# Patient Record
Sex: FEMALE | Race: OTHER | NOT HISPANIC OR LATINO | URBAN - METROPOLITAN AREA
[De-identification: names, ages, dates, MRNs, and addresses within clinical notes are randomized per-mention and may not be internally consistent; named-entity substitution may affect disease eponyms.]

---

## 2019-01-04 ENCOUNTER — INPATIENT (INPATIENT)
Facility: HOSPITAL | Age: 79
LOS: 5 days | Discharge: ANOTHER HOSPITAL | End: 2019-01-10
Attending: INTERNAL MEDICINE | Admitting: INTERNAL MEDICINE
Payer: MEDICARE

## 2019-01-04 VITALS
TEMPERATURE: 99 F | OXYGEN SATURATION: 100 % | RESPIRATION RATE: 22 BRPM | HEART RATE: 123 BPM | DIASTOLIC BLOOD PRESSURE: 72 MMHG | SYSTOLIC BLOOD PRESSURE: 116 MMHG

## 2019-01-04 DIAGNOSIS — I48.91 UNSPECIFIED ATRIAL FIBRILLATION: ICD-10-CM

## 2019-01-04 LAB
ALBUMIN SERPL ELPH-MCNC: 3.6 G/DL — SIGNIFICANT CHANGE UP (ref 3.3–5)
ALP SERPL-CCNC: 285 U/L — HIGH (ref 40–120)
ALT FLD-CCNC: 26 U/L — SIGNIFICANT CHANGE UP (ref 4–33)
APTT BLD: 31.3 SEC — SIGNIFICANT CHANGE UP (ref 27.5–36.3)
AST SERPL-CCNC: 38 U/L — HIGH (ref 4–32)
BASE EXCESS BLDV CALC-SCNC: -0.9 MMOL/L — SIGNIFICANT CHANGE UP
BASOPHILS # BLD AUTO: 0.04 K/UL — SIGNIFICANT CHANGE UP (ref 0–0.2)
BASOPHILS NFR BLD AUTO: 0.4 % — SIGNIFICANT CHANGE UP (ref 0–2)
BILIRUB SERPL-MCNC: 0.5 MG/DL — SIGNIFICANT CHANGE UP (ref 0.2–1.2)
BLOOD GAS VENOUS - CREATININE: 0.72 MG/DL — SIGNIFICANT CHANGE UP (ref 0.5–1.3)
BUN SERPL-MCNC: 11 MG/DL — SIGNIFICANT CHANGE UP (ref 7–23)
CALCIUM SERPL-MCNC: 8.9 MG/DL — SIGNIFICANT CHANGE UP (ref 8.4–10.5)
CHLORIDE BLDV-SCNC: 96 MMOL/L — SIGNIFICANT CHANGE UP (ref 96–108)
CHLORIDE SERPL-SCNC: 98 MMOL/L — SIGNIFICANT CHANGE UP (ref 98–107)
CO2 SERPL-SCNC: 21 MMOL/L — LOW (ref 22–31)
CREAT SERPL-MCNC: 0.81 MG/DL — SIGNIFICANT CHANGE UP (ref 0.5–1.3)
EOSINOPHIL # BLD AUTO: 0.25 K/UL — SIGNIFICANT CHANGE UP (ref 0–0.5)
EOSINOPHIL NFR BLD AUTO: 2.7 % — SIGNIFICANT CHANGE UP (ref 0–6)
GAS PNL BLDV: 130 MMOL/L — LOW (ref 136–146)
GLUCOSE BLDV-MCNC: 104 — HIGH (ref 70–99)
GLUCOSE SERPL-MCNC: 104 MG/DL — HIGH (ref 70–99)
HCO3 BLDV-SCNC: 23 MMOL/L — SIGNIFICANT CHANGE UP (ref 20–27)
HCT VFR BLD CALC: 33.5 % — LOW (ref 34.5–45)
HCT VFR BLDV CALC: 32.3 % — LOW (ref 34.5–45)
HGB BLD-MCNC: 10.4 G/DL — LOW (ref 11.5–15.5)
HGB BLDV-MCNC: 10.4 G/DL — LOW (ref 11.5–15.5)
IMM GRANULOCYTES NFR BLD AUTO: 0.2 % — SIGNIFICANT CHANGE UP (ref 0–1.5)
INR BLD: 2.04 — HIGH (ref 0.88–1.17)
LACTATE BLDV-MCNC: 2 MMOL/L — SIGNIFICANT CHANGE UP (ref 0.5–2)
LYMPHOCYTES # BLD AUTO: 2.14 K/UL — SIGNIFICANT CHANGE UP (ref 1–3.3)
LYMPHOCYTES # BLD AUTO: 22.7 % — SIGNIFICANT CHANGE UP (ref 13–44)
MCHC RBC-ENTMCNC: 26.5 PG — LOW (ref 27–34)
MCHC RBC-ENTMCNC: 31 % — LOW (ref 32–36)
MCV RBC AUTO: 85.2 FL — SIGNIFICANT CHANGE UP (ref 80–100)
MONOCYTES # BLD AUTO: 0.63 K/UL — SIGNIFICANT CHANGE UP (ref 0–0.9)
MONOCYTES NFR BLD AUTO: 6.7 % — SIGNIFICANT CHANGE UP (ref 2–14)
NEUTROPHILS # BLD AUTO: 6.33 K/UL — SIGNIFICANT CHANGE UP (ref 1.8–7.4)
NEUTROPHILS NFR BLD AUTO: 67.3 % — SIGNIFICANT CHANGE UP (ref 43–77)
NRBC # FLD: 0 K/UL — LOW (ref 25–125)
PCO2 BLDV: 40 MMHG — LOW (ref 41–51)
PH BLDV: 7.39 PH — SIGNIFICANT CHANGE UP (ref 7.32–7.43)
PLATELET # BLD AUTO: 322 K/UL — SIGNIFICANT CHANGE UP (ref 150–400)
PMV BLD: 10.1 FL — SIGNIFICANT CHANGE UP (ref 7–13)
PO2 BLDV: 30 MMHG — LOW (ref 35–40)
POTASSIUM BLDV-SCNC: 4 MMOL/L — SIGNIFICANT CHANGE UP (ref 3.4–4.5)
POTASSIUM SERPL-MCNC: 5 MMOL/L — SIGNIFICANT CHANGE UP (ref 3.5–5.3)
POTASSIUM SERPL-SCNC: 5 MMOL/L — SIGNIFICANT CHANGE UP (ref 3.5–5.3)
PROT SERPL-MCNC: 8 G/DL — SIGNIFICANT CHANGE UP (ref 6–8.3)
PROTHROM AB SERPL-ACNC: 23.2 SEC — HIGH (ref 9.8–13.1)
RBC # BLD: 3.93 M/UL — SIGNIFICANT CHANGE UP (ref 3.8–5.2)
RBC # FLD: 15.9 % — HIGH (ref 10.3–14.5)
SAO2 % BLDV: 45.3 % — LOW (ref 60–85)
SODIUM SERPL-SCNC: 133 MMOL/L — LOW (ref 135–145)
TROPONIN T, HIGH SENSITIVITY: 8 NG/L — SIGNIFICANT CHANGE UP (ref ?–14)
TROPONIN T, HIGH SENSITIVITY: 9 NG/L — SIGNIFICANT CHANGE UP (ref ?–14)
WBC # BLD: 9.41 K/UL — SIGNIFICANT CHANGE UP (ref 3.8–10.5)
WBC # FLD AUTO: 9.41 K/UL — SIGNIFICANT CHANGE UP (ref 3.8–10.5)

## 2019-01-04 PROCEDURE — 71045 X-RAY EXAM CHEST 1 VIEW: CPT | Mod: 26

## 2019-01-04 RX ORDER — SODIUM CHLORIDE 9 MG/ML
500 INJECTION INTRAMUSCULAR; INTRAVENOUS; SUBCUTANEOUS ONCE
Qty: 0 | Refills: 0 | Status: COMPLETED | OUTPATIENT
Start: 2019-01-04 | End: 2019-01-04

## 2019-01-04 RX ADMIN — SODIUM CHLORIDE 500 MILLILITER(S): 9 INJECTION INTRAMUSCULAR; INTRAVENOUS; SUBCUTANEOUS at 21:16

## 2019-01-04 NOTE — ED ADULT TRIAGE NOTE - CHIEF COMPLAINT QUOTE
hx of DM COPD Asthma c/o SOB x 1 week LS clear pt speaking full sentences but reports increased GRADY. denies cough, as per family no hx of a-fib but is in afib at this time.

## 2019-01-04 NOTE — ED PROVIDER NOTE - OBJECTIVE STATEMENT
78F w/ pmhx DM, COPD, asthma presents w chief complaint of worsening shortness of breath and GRADY. Per patient, has been having months of shortness of breath, GRAYD, fatigue, intermittent chest discomfort, palpitations, lightheadedness, dizziness. Patient and family are poor historians and unable to recall specifics of prior medical workups. Per family, patient was reportedly in [facility] in Aug 2018 and was dx with left lower extremity DVT. She was then back at [facility] for her ongoing symptoms - it is unclear what medical testing was performed at that time. Per family, patient was told she has some heart condition (possibly blocked vessels?) and needs some procedure performed (pt and family unclear, possibly referring to cath). Patient has yet to udnergo any further medical interventions. Patient reports she may have had a cardiac recorder - also, unclear as to specifics. Patient and family deny any known prior history of being diagnosed with A-fib. Currently, patient is noted to be in Afib with RVR, HR varying 110-160s. Patient endorses ongoing shortness of breath, chest discomfort, weakness. Denies any associated headache, cough, orthopnea, blurry vision, fevers or chills, nausea or vomiting, abdominal pain. Denies hx tobacco, ethanol, or drug use. Meds include amlodipine valsartan, warfarin, monteleukast, statin, metformin, symbicort. No allergies.

## 2019-01-04 NOTE — ED ADULT NURSE NOTE - OBJECTIVE STATEMENT
pt sitting quiety with family at bedside. Pt reports" feeling short of breath, dizzy and having chest discomfort for Months...but very bad last few days. " Placed on cm= Afib 130's MD Cho at bedside. sl and labs sent, RR 28- 34 (Pt +exhertional sob.) POX 96% room air placed on 2 lnc. HOB elevated, pt positioned for comfort. Medicated as per orders. awaits xray.

## 2019-01-04 NOTE — ED PROVIDER NOTE - MEDICAL DECISION MAKING DETAILS
78F w/ pmhx DM, COPD, asthma presents w chief complaint of worsening shortness of breath and GRADY, as well as lightheadedness and chest discomfort. Afib in triage, Exam as above. Concern for ongoing afib as etiology of symptoms vs ACS vs other cardiac etiology vs other. Labs, CXR, EKG. Will require admit for further cardiac workup. Will administer fluids, diltiazem, re-assess  Gavin Sims MD, PGY2 Emergency Medicine

## 2019-01-04 NOTE — ED PROVIDER NOTE - ATTENDING CONTRIBUTION TO CARE
DR. LUZ, ATTENDING MD-  I performed a face to face bedside interview with patient regarding history of present illness, review of symptoms and past medical history. I completed an independent physical exam.  I have discussed patient's plan of care with the resident.   Documentation as above in the note.    79 y/o female h/o dm, copd, asthma, dvt on coumadin here with sob for months duration worse over past week.  A/w intermittent cp, palpitations, worsening fatigue.  Denies f/c, ha, neck stiffness, cough, abd pain, n/v/d, dysuria, rash.  Afebrile, fatigued, ctabil, s1s2 tachy irreg irreg no m/r/g, abd soft non ttp no r/g, no cva tenderness b/l, no leg swelling b/l, no rash.  Apparent new afib though sx for months, consider anemia vs lyte abn vs acs, doubt copd as clear lungs.  Obtain cbc, cmp, trop, cxr, ekg, give diltiazem for rate control, will admit for further care and evaluation.

## 2019-01-04 NOTE — ED PROVIDER NOTE - NS ED ROS FT
see HPI for full ROS  shortness of breath  GRADY  palpitationns  chest discomfort  lightheadedness  dizziness  fatigue

## 2019-01-04 NOTE — ED ADULT NURSE NOTE - NSIMPLEMENTINTERV_GEN_ALL_ED
Implemented All Fall with Harm Risk Interventions:  [place] to call system. Call bell, personal items and telephone within reach. Instruct patient to call for assistance. Room bathroom lighting operational. Non-slip footwear when patient is off stretcher. Physically safe environment: no spills, clutter or unnecessary equipment. Stretcher in lowest position, wheels locked, appropriate side rails in place. Provide visual cue, wrist band, yellow gown, etc. Monitor gait and stability. Monitor for mental status changes and reorient to person, place, and time. Review medications for side effects contributing to fall risk. Reinforce activity limits and safety measures with patient and family. Provide visual clues: red socks.

## 2019-01-05 DIAGNOSIS — Z29.9 ENCOUNTER FOR PROPHYLACTIC MEASURES, UNSPECIFIED: ICD-10-CM

## 2019-01-05 DIAGNOSIS — I48.91 UNSPECIFIED ATRIAL FIBRILLATION: ICD-10-CM

## 2019-01-05 DIAGNOSIS — E11.65 TYPE 2 DIABETES MELLITUS WITH HYPERGLYCEMIA: ICD-10-CM

## 2019-01-05 DIAGNOSIS — E78.5 HYPERLIPIDEMIA, UNSPECIFIED: ICD-10-CM

## 2019-01-05 DIAGNOSIS — J45.909 UNSPECIFIED ASTHMA, UNCOMPLICATED: ICD-10-CM

## 2019-01-05 DIAGNOSIS — J44.9 CHRONIC OBSTRUCTIVE PULMONARY DISEASE, UNSPECIFIED: ICD-10-CM

## 2019-01-05 DIAGNOSIS — I82.409 ACUTE EMBOLISM AND THROMBOSIS OF UNSPECIFIED DEEP VEINS OF UNSPECIFIED LOWER EXTREMITY: ICD-10-CM

## 2019-01-05 DIAGNOSIS — Z98.890 OTHER SPECIFIED POSTPROCEDURAL STATES: Chronic | ICD-10-CM

## 2019-01-05 PROCEDURE — 93970 EXTREMITY STUDY: CPT | Mod: 26

## 2019-01-05 PROCEDURE — 70450 CT HEAD/BRAIN W/O DYE: CPT | Mod: 26

## 2019-01-05 PROCEDURE — 71275 CT ANGIOGRAPHY CHEST: CPT | Mod: 26

## 2019-01-05 RX ORDER — DEXTROSE 50 % IN WATER 50 %
25 SYRINGE (ML) INTRAVENOUS ONCE
Qty: 0 | Refills: 0 | Status: DISCONTINUED | OUTPATIENT
Start: 2019-01-05 | End: 2019-01-10

## 2019-01-05 RX ORDER — FUROSEMIDE 40 MG
20 TABLET ORAL ONCE
Qty: 0 | Refills: 0 | Status: COMPLETED | OUTPATIENT
Start: 2019-01-05 | End: 2019-01-05

## 2019-01-05 RX ORDER — DORZOLAMIDE HYDROCHLORIDE 20 MG/ML
1 SOLUTION/ DROPS OPHTHALMIC
Qty: 0 | Refills: 0 | Status: DISCONTINUED | OUTPATIENT
Start: 2019-01-05 | End: 2019-01-10

## 2019-01-05 RX ORDER — SODIUM CHLORIDE 9 MG/ML
1000 INJECTION, SOLUTION INTRAVENOUS
Qty: 0 | Refills: 0 | Status: DISCONTINUED | OUTPATIENT
Start: 2019-01-05 | End: 2019-01-10

## 2019-01-05 RX ORDER — INSULIN LISPRO 100/ML
VIAL (ML) SUBCUTANEOUS
Qty: 0 | Refills: 0 | Status: DISCONTINUED | OUTPATIENT
Start: 2019-01-05 | End: 2019-01-10

## 2019-01-05 RX ORDER — INFLUENZA VIRUS VACCINE 15; 15; 15; 15 UG/.5ML; UG/.5ML; UG/.5ML; UG/.5ML
0.5 SUSPENSION INTRAMUSCULAR ONCE
Qty: 0 | Refills: 0 | Status: DISCONTINUED | OUTPATIENT
Start: 2019-01-05 | End: 2019-01-10

## 2019-01-05 RX ORDER — MONTELUKAST 4 MG/1
10 TABLET, CHEWABLE ORAL DAILY
Qty: 0 | Refills: 0 | Status: DISCONTINUED | OUTPATIENT
Start: 2019-01-05 | End: 2019-01-10

## 2019-01-05 RX ORDER — DEXTROSE 50 % IN WATER 50 %
12.5 SYRINGE (ML) INTRAVENOUS ONCE
Qty: 0 | Refills: 0 | Status: DISCONTINUED | OUTPATIENT
Start: 2019-01-05 | End: 2019-01-10

## 2019-01-05 RX ORDER — DEXTROSE 50 % IN WATER 50 %
15 SYRINGE (ML) INTRAVENOUS ONCE
Qty: 0 | Refills: 0 | Status: DISCONTINUED | OUTPATIENT
Start: 2019-01-05 | End: 2019-01-10

## 2019-01-05 RX ORDER — BUDESONIDE AND FORMOTEROL FUMARATE DIHYDRATE 160; 4.5 UG/1; UG/1
2 AEROSOL RESPIRATORY (INHALATION)
Qty: 0 | Refills: 0 | Status: DISCONTINUED | OUTPATIENT
Start: 2019-01-05 | End: 2019-01-10

## 2019-01-05 RX ORDER — INSULIN LISPRO 100/ML
VIAL (ML) SUBCUTANEOUS AT BEDTIME
Qty: 0 | Refills: 0 | Status: DISCONTINUED | OUTPATIENT
Start: 2019-01-05 | End: 2019-01-10

## 2019-01-05 RX ORDER — KETOTIFEN FUMARATE 0.34 MG/ML
1 SOLUTION OPHTHALMIC DAILY
Qty: 0 | Refills: 0 | Status: DISCONTINUED | OUTPATIENT
Start: 2019-01-05 | End: 2019-01-10

## 2019-01-05 RX ORDER — ATORVASTATIN CALCIUM 80 MG/1
20 TABLET, FILM COATED ORAL AT BEDTIME
Qty: 0 | Refills: 0 | Status: DISCONTINUED | OUTPATIENT
Start: 2019-01-05 | End: 2019-01-10

## 2019-01-05 RX ORDER — WARFARIN SODIUM 2.5 MG/1
6 TABLET ORAL ONCE
Qty: 0 | Refills: 0 | Status: COMPLETED | OUTPATIENT
Start: 2019-01-05 | End: 2019-01-05

## 2019-01-05 RX ORDER — GLUCAGON INJECTION, SOLUTION 0.5 MG/.1ML
1 INJECTION, SOLUTION SUBCUTANEOUS ONCE
Qty: 0 | Refills: 0 | Status: DISCONTINUED | OUTPATIENT
Start: 2019-01-05 | End: 2019-01-10

## 2019-01-05 RX ADMIN — BUDESONIDE AND FORMOTEROL FUMARATE DIHYDRATE 2 PUFF(S): 160; 4.5 AEROSOL RESPIRATORY (INHALATION) at 21:23

## 2019-01-05 RX ADMIN — WARFARIN SODIUM 6 MILLIGRAM(S): 2.5 TABLET ORAL at 17:06

## 2019-01-05 RX ADMIN — BUDESONIDE AND FORMOTEROL FUMARATE DIHYDRATE 2 PUFF(S): 160; 4.5 AEROSOL RESPIRATORY (INHALATION) at 12:06

## 2019-01-05 RX ADMIN — Medication 20 MILLIGRAM(S): at 17:06

## 2019-01-05 RX ADMIN — DORZOLAMIDE HYDROCHLORIDE 1 DROP(S): 20 SOLUTION/ DROPS OPHTHALMIC at 17:06

## 2019-01-05 RX ADMIN — KETOTIFEN FUMARATE 1 DROP(S): 0.34 SOLUTION OPHTHALMIC at 12:06

## 2019-01-05 RX ADMIN — MONTELUKAST 10 MILLIGRAM(S): 4 TABLET, CHEWABLE ORAL at 12:06

## 2019-01-05 RX ADMIN — ATORVASTATIN CALCIUM 20 MILLIGRAM(S): 80 TABLET, FILM COATED ORAL at 21:39

## 2019-01-05 NOTE — H&P ADULT - PROBLEM SELECTOR PLAN 2
Patient with Hx of DVT found at outside hospital and is on coumadin now with acute SOB  Will check CTPA  Bilateral LE dopplers ordered

## 2019-01-05 NOTE — H&P ADULT - NEGATIVE ENMT SYMPTOMS
no ear pain/no hearing difficulty/no nasal congestion/no nasal discharge/no vertigo/no sinus symptoms/no tinnitus

## 2019-01-05 NOTE — H&P ADULT - ATTENDING COMMENTS
Patient seen and examined.  Agree with above pa note.  patient is a 78 year old woman with history of Left LE DVT (Aug 2018) on Coumadin,  COPD, DM, Asthma admitted with SOB/GRADY and palpitations.    In er noted to be in Af with RVR  + orthopnea  + edema   + chest pain  Patient denies any active chest pain, cough, syncope, nausea, abdominal pain, fever, chills,  or rash.  Denies any history of CAD, MI, valve disease, cardiomyopathy, or congenital heart disease.      ecg  af with rvr, nonspec st abnl    vitals stable  nad  aao x nc/at neck supple   no jvd  cv s1s2 tachy, irreg  lungs clear b/l  abd soft, nt  ext b/l edema    A/P     78 year old woman with history of Left LE DVT (Aug 2018) on Coumadin,  COPD, DM, Asthma admitted with SOB/GRADY and palpitations.      1. new onset Af with RVR  rate better controlled   cont cardizem  cont a/c per inr   check echo to eval lv/valve fxn  no pe on cta    2. Dyspnea  multifactorial secondary to chronic lung disease, new af and component of acute HF/volume overload  rate control   iv lasix x 1 now   check echo   lou/dccv in still in af monday and inr therap  keep npo post mn sunday night  cta no pe  pulmo eval     3. DVT  cont a/c  repeat duplex no changes    4. Chest pain  resolved  consider ischemic eval pending clinical course Patient seen and examined.  Agree with above pa note.  patient is a 78 year old woman with history of Left LE DVT (Aug 2018) on Coumadin,  COPD, DM, Asthma admitted with SOB/GRADY and palpitations.    In er noted to be in Af with RVR  + orthopnea  + edema   + chest pain  Patient denies any active chest pain, cough, syncope, nausea, abdominal pain, fever, chills,  or rash.  Denies any history of CAD, MI, valve disease, cardiomyopathy, or congenital heart disease.      ecg  af with rvr, nonspec st abnl    vitals stable  nad  aao x nc/at neck supple   no jvd  cv s1s2 tachy, irreg  lungs clear b/l  abd soft, nt  ext b/l edema    A/P     78 year old woman with history of Left LE DVT (Aug 2018) on Coumadin,  COPD, DM, Asthma admitted with SOB/GRADY and palpitations.      1. new onset Af with RVR  rate better controlled   cont cardizem  cont a/c per inr   check echo to eval lv/valve fxn  no pe on cta    2. Dyspnea  multifactorial secondary to chronic lung disease, new af and component of acute HF/volume overload  rate control   iv lasix x 1 now   check echo   lou/dccv in still in af monday and inr therap  keep npo post mn sunday night  cta no pe  pulmo eval     3. DVT  cont a/c  repeat duplex no changes    4. Chest pain  resolved  consider ischemic eval pending clinical course    5. HTN  hold home meds  cont cardizem

## 2019-01-05 NOTE — H&P ADULT - PMH
Asthma    COPD (chronic obstructive pulmonary disease)    DM (diabetes mellitus)    DVT (deep venous thrombosis)

## 2019-01-05 NOTE — H&P ADULT - PROBLEM SELECTOR PLAN 1
[id number] Score of 7 and patient already on Coumadin for Hx of left LE DVT. Patient was given IV and PO Cardizem with better rate control  Will monitor on telemetry, serial EKG and Buck prn for any episodes of chest pain and palpitations   HgbA1C, TSH, lipid profile, CBC, CMP in am   TTE ordered   Started on Cardizem 30mg q6hrs  Continue with Coumadin, INR noted to be 2.0. Will need to dose coumadin tonight after INR check with morning labs

## 2019-01-05 NOTE — CONSULT NOTE ADULT - SUBJECTIVE AND OBJECTIVE BOX
Patient is a 78y old  Female who presents with a chief complaint of Palpitations and SOB (05 Jan 2019 05:47)      HPI:  79 y/o F with PMH of Left LE DVT diagnosed on Aug 2018(on Coumadin), COPD, DM, Asthma presented with the complaint of SOB/GRADY and palpitations. As per the patient she has been having intermittent SOB but it worsened the past few days which prompted her to come to the ER. Patient stated that any minimal exertion she would have to stop to catch her breath. Patient stated that she sleeps with 2-3 pillows at night and endorsed of orthopnea and PND. Patient also endorsed of intermittent LE swelling and stated that she is complaint with her medications. Patient also endorsed of midsternal chest pain, characterized as a  pressure like sensation, without any aggravating factors, alleviated when she would massage her chest, without any radiations of the chest pain, with a severity of 5/10. Patient also endorsed of palpitations for the past few days. Patient denied any fevers, chills, N/V/D/C, abdominal pain, dysuria, melena, hematochezia, recent travel, sick contact, pleuritic or positional chest pain.     On ED admission EKG revealed Atrial fibrillation with RVR at a rate of 147 with QTc of 466, CE x 1: Trop: 8, CE x 2: Trop: 9, H&H: 10.4/33.5, Na: 133, Gluc: 104, Alk Phos: 285, AST: 38. Prelim CXR: 6 mm right lung nodular opacity of uncertain significance. Follow up to resolution. Hazy right lung opacities may be secondary to rotation versus atelectasis and/or pneumonia. In the ED patient received IV Cardizem and PO Cardizem which improved her rate. (05 Jan 2019 05:47)    pulmo called: Above confirmed: She never had any fever at home:       ?FOLLOWING PRESENT  x ] Hx of PE/DVT, [? ] Hx COPD, [y ] Hx of Asthma, [ y] Hx of Hospitalization, [x ]  Hx of BiPAP/CPAP use, [x ] Hx of ROYER    Allergies    No Known Allergies    Intolerances        PAST MEDICAL & SURGICAL HISTORY:  DVT (deep venous thrombosis)  Asthma  COPD (chronic obstructive pulmonary disease)  DM (diabetes mellitus)  History of embolectomy: LLE      FAMILY HISTORY:  No pertinent family history in first degree relatives      Social History: [ x ] TOBACCO                  [ x ] ETOH                                 [x  ] IVDA/DRUGS    REVIEW OF SYSTEMS      General:	x    Skin/Breast:x  	  Ophthalmologic:x  	  ENMT:	x    Respiratory and Thorax: SOB, chest pain!  	  Cardiovascular:	x    Gastrointestinal:	x    Genitourinary:	x    Musculoskeletal:	x    Neurological:	x    Psychiatric:	x    Hematology/Lymphatics:	x    Endocrine:	x    Allergic/Immunologic:	x    MEDICATIONS  (STANDING):  atorvastatin 20 milliGRAM(s) Oral at bedtime  buDESOnide  80 MICROgram(s)/formoterol 4.5 MICROgram(s) Inhaler 2 Puff(s) Inhalation two times a day  dextrose 5%. 1000 milliLiter(s) (50 mL/Hr) IV Continuous <Continuous>  dextrose 50% Injectable 12.5 Gram(s) IV Push once  dextrose 50% Injectable 25 Gram(s) IV Push once  dextrose 50% Injectable 25 Gram(s) IV Push once  diltiazem    Tablet 30 milliGRAM(s) Oral four times a day  dorzolamide 2% Ophthalmic Solution 1 Drop(s) Both EYES <User Schedule>  furosemide   Injectable 20 milliGRAM(s) IV Push once  influenza   Vaccine 0.5 milliLiter(s) IntraMuscular once  insulin lispro (HumaLOG) corrective regimen sliding scale   SubCutaneous three times a day before meals  insulin lispro (HumaLOG) corrective regimen sliding scale   SubCutaneous at bedtime  ketotifen 0.025% Ophthalmic Solution 1 Drop(s) Both EYES daily  montelukast 10 milliGRAM(s) Oral daily  warfarin 6 milliGRAM(s) Oral once    MEDICATIONS  (PRN):  dextrose 40% Gel 15 Gram(s) Oral once PRN Blood Glucose LESS THAN 70 milliGRAM(s)/deciliter  glucagon  Injectable 1 milliGRAM(s) IntraMuscular once PRN Glucose LESS THAN 70 milligrams/deciliter       Vital Signs Last 24 Hrs  T(C): 36.8 (05 Jan 2019 14:54), Max: 37.1 (05 Jan 2019 06:26)  T(F): 98.2 (05 Jan 2019 14:54), Max: 98.7 (05 Jan 2019 06:26)  HR: 110 (05 Jan 2019 14:54) (82 - 138)  BP: 109/69 (05 Jan 2019 14:54) (97/72 - 122/78)  BP(mean): --  RR: 32 (05 Jan 2019 14:54) (18 - 32)  SpO2: 96% (05 Jan 2019 14:54) (96% - 100%)        I&O's Summary      Physical Exam:   GENERAL: NAD, well-groomed, well-developed  HEENT: MERLINE/   Atraumatic, Normocephalic  ENMT: No tonsillar erythema, exudates, or enlargement; Moist mucous membranes, Good dentition, No lesions  NECK: Supple, No JVD, Normal thyroid  CHEST/LUNG: Clear to auscultation bilaterally; No rales, rhonchi, wheezing, or rubs  CVS: Regular rate and rhythm; No murmurs, rubs, or gallops  GI: : Soft, Nontender, Nondistended; Bowel sounds present  NERVOUS SYSTEM:  Alert & Oriented X3  EXTREMITIES:  - edema  LYMPH: No lymphadenopathy noted  SKIN: No rashes or lesions  ENDOCRINOLOGY: No Thyromegaly  PSYCH: Appropriate    Labs:  -0.9<40<4>>30<<7.395>>-0.9<<3><<4><<5<<309>>                            10.8   7.45  )-----------( 268      ( 05 Jan 2019 06:52 )             37.4                         10.4   9.41  )-----------( 322      ( 04 Jan 2019 20:40 )             33.5     01-05    137  |  106  |  9   ----------------------------<  94  5.1   |  18<L>  |  0.63  01-04    133<L>  |  98  |  11  ----------------------------<  104<H>  5.0   |  21<L>  |  0.81    Ca    9.4      05 Jan 2019 06:52  Ca    8.9      04 Jan 2019 20:40  Phos  4.1     01-05  Mg     1.9     01-05    TPro  7.5  /  Alb  3.3  /  TBili  0.7  /  DBili  x   /  AST  31  /  ALT  26  /  AlkPhos  279<H>  01-05  TPro  8.0  /  Alb  3.6  /  TBili  0.5  /  DBili  x   /  AST  38<H>  /  ALT  26  /  AlkPhos  285<H>  01-04    CAPILLARY BLOOD GLUCOSE      POCT Blood Glucose.: 138 mg/dL (05 Jan 2019 12:54)  POCT Blood Glucose.: 89 mg/dL (05 Jan 2019 09:01)    LIVER FUNCTIONS - ( 05 Jan 2019 06:52 )  Alb: 3.3 g/dL / Pro: 7.5 g/dL / ALK PHOS: 279 u/L / ALT: 26 u/L / AST: 31 u/L / GGT: x           PT/INR - ( 05 Jan 2019 06:52 )   PT: 21.8 SEC;   INR: 1.88          PTT - ( 05 Jan 2019 06:52 )  PTT:37.8 SEC    D DImer      Studies  Chest X-RAY  CT SCAN Chest   CT Abdomen  Venous Dopplers: LE:   Others      < from: CT Head No Cont (01.05.19 @ 08:39) >    FINDINGS:     There is enlargement of ventricles and sulci greater expected for age is   with volume loss more pronounced in the cerebellar region. There is   decreased attenuation in the white matter, likely microvascular disease.   There is there is no intraventricular or extra axial hemorrhage or   midline shift. The basal cisterns are patent.    There is a prominent partially empty sella. There is atherosclerotic   vascular calcification of the carotid siphons. The visualized paranasal   sinuses, mastoid air cells and middle ear cavities are clear.    The soft tissues of the scalp are unremarkable. The calvarium is intact.    IMPRESSION:     Volume loss and white matter decreased attenuation likely microvascular   disease, there is no acute hemorrhage or midline shift, if symptoms   persist consider follow up head CT or MRI if no contraindications        < from: CT Angio Chest w/ IV Cont (01.05.19 @ 08:39) >  HEART: Heart size is normal. No pericardial effusion. Dense mitral   annular calcification. Aortic valve and coronary artery atherosclerotic   calcifications.  MEDIASTINUM AND VIKTORIYA: No lymphadenopathy.  CHEST WALL AND LOWER NECK: Within normal limits.  VISUALIZED UPPER ABDOMEN: Small hiatal hernia. A partially imaged fat   density lesion at the periphery of the right renal interpole.  BONES: Multilevel degenerative change.    IMPRESSION:     Limited evaluation of the distal segmental and subsegmental pulmonary   arteries due to streak and motion artifact. No central, main, lobar or   proximal segmental pulmonary embolism.     Small bilateral pleural effusions with likely pulmonary edema.    A partially imaged fat density lesion in the periphery of the right renal   interpole is indeterminate, may reflect an angiomyolipoma. For more   definitive characterization, consider nonurgent contrast-enhanced renal   protocol CT on an outpatient basis.    < end of copied text >        BONY JENKINS M.D., RADIOLOGY RESIDENT  This document has been electronically signed.  MORGAN JORGE M.D., ATTENDING RADIOLOGIST  This document has been electronically signed. Jan 5 2019 12:54PM    < end of copied text >

## 2019-01-05 NOTE — H&P ADULT - ASSESSMENT
79 y/o F with PMH of Left LE DVT diagnosed on Aug 2018(on Coumadin), COPD, DM, Asthma presented with the complaint of SOB/GRADY and palpitations. Found to be in Afib with RVR    +Atrial fibrillation with RVR-s/p Cardizem started on PO Cardizem 30mg q6hrs and on Coumadin

## 2019-01-05 NOTE — H&P ADULT - NEGATIVE OPHTHALMOLOGIC SYMPTOMS
no blurred vision L/no diplopia/no photophobia/no lacrimation L/no lacrimation R/no discharge R/no blurred vision R/no discharge L

## 2019-01-05 NOTE — ED ADULT NURSE REASSESSMENT NOTE - NS ED NURSE REASSESS COMMENT FT1
Pt transported to essu2, monitor in place ox 2 liters nc on. Pt resp even & unlabored. axox3. cm= afib 90's no c/o chest pain

## 2019-01-05 NOTE — H&P ADULT - NEGATIVE NEUROLOGICAL SYMPTOMS
no weakness/no generalized seizures/no confusion/no transient paralysis/no paresthesias/no syncope/no vertigo/no focal seizures/no difficulty walking/no tremors/no headache/no loss of sensation/no hemiparesis/no loss of consciousness

## 2019-01-05 NOTE — CONSULT NOTE ADULT - PROBLEM SELECTOR RECOMMENDATION 9
Likely reason for her SOB along with CHF: THERE IS NO pe: But limited: Her INR was therapeutic on admission. So unlikely to be PE : For EMILI DCCV on Monday

## 2019-01-05 NOTE — H&P ADULT - NEGATIVE MUSCULOSKELETAL SYMPTOMS
no arthralgia/no arthritis/no stiffness/no joint swelling/no muscle weakness/no myalgia/no muscle cramps/no neck pain

## 2019-01-05 NOTE — CONSULT NOTE ADULT - ASSESSMENT
77 y/o F with PMH of Left LE DVT diagnosed on Aug 2018(on Coumadin), COPD, DM, Asthma presented with the complaint of SOB/GRADY and palpitations. Found to be in Afib with RVR    +Atrial fibrillation with RVR-s/p Cardizem started on PO Cardizem 30mg q6hrs and on Coumadin

## 2019-01-05 NOTE — H&P ADULT - HISTORY OF PRESENT ILLNESS
77 y/o F with PMH of Left LE DVT diagnosed on Aug 2018(on Coumadin), COPD, DM, Asthma presented with the complaint of SOB/GRADY and palpitations. As per the patient she has been having intermittent SOB but it worsened the past few days which prompted her to come to the ER. Patient stated that any minimal exertion she would have to stop to catch her breath. Patient stated that she sleeps with 2-3 pillows at night and endorsed of orthopnea and PND. Patient also endorsed of intermittent LE swelling and stated that she is complaint with her medications. Patient also endorsed of midsternal chest pain, characterized as a  pressure like sensation, without any aggravating factors, alleviated when she would massage her chest, without any radiations of the chest pain, with a severity of 5/10. Patient also endorsed of palpitations for the past few days. Patient denied any fevers, chills, N/V/D/C, abdominal pain, dysuria, melena, hematochezia, recent travel, sick contact, pleuritic or positional chest pain.     On ED admission EKG revealed Atrial fibrillation with RVR at a rate of 147 with QTc of 466, CE x 1: Trop: 8, CE x 2: Trop: 9, H&H: 10.4/33.5, Na: 133, Gluc: 104, Alk Phos: 285, AST: 38. Prelim CXR: 6 mm right lung nodular opacity of uncertain significance. Follow up to resolution. Hazy right lung opacities may be secondary to rotation versus atelectasis and/or pneumonia. In the ED patient received IV Cardizem and PO Cardizem which improved her rate.

## 2019-01-05 NOTE — H&P ADULT - GASTROINTESTINAL DETAILS
nontender/no rebound tenderness/no rigidity/normal/no guarding/soft/bowel sounds normal/no distention

## 2019-01-05 NOTE — H&P ADULT - RS GEN PE MLT RESP DETAILS PC
normal/no intercostal retractions/airway patent/respirations non-labored/no chest wall tenderness/no rhonchi/no wheezes/rales

## 2019-01-05 NOTE — H&P ADULT - NSHPLABSRESULTS_GEN_ALL_CORE
10.4   9.41  )-----------( 322      ( 04 Jan 2019 20:40 )             33.5     01-04    133<L>  |  98  |  11  ----------------------------<  104<H>  5.0   |  21<L>  |  0.81    Ca    8.9      04 Jan 2019 20:40    TPro  8.0  /  Alb  3.6  /  TBili  0.5  /  DBili  x   /  AST  38<H>  /  ALT  26  /  AlkPhos  285<H>  01-04    Prelim CXR: 6 mm right lung nodular opacity of uncertain significance. Follow up to resolution. Hazy right lung opacities may be secondary to rotation versus atelectasis and/or pneumonia.    EKG: Atrial fibrillation with RVR at a rate of 147 with QTc of 466

## 2019-01-06 LAB
BUN SERPL-MCNC: 10 MG/DL — SIGNIFICANT CHANGE UP (ref 7–23)
CALCIUM SERPL-MCNC: 8.9 MG/DL — SIGNIFICANT CHANGE UP (ref 8.4–10.5)
CHLORIDE SERPL-SCNC: 100 MMOL/L — SIGNIFICANT CHANGE UP (ref 98–107)
CO2 SERPL-SCNC: 24 MMOL/L — SIGNIFICANT CHANGE UP (ref 22–31)
CREAT SERPL-MCNC: 0.75 MG/DL — SIGNIFICANT CHANGE UP (ref 0.5–1.3)
GLUCOSE SERPL-MCNC: 94 MG/DL — SIGNIFICANT CHANGE UP (ref 70–99)
HCT VFR BLD CALC: 33.8 % — LOW (ref 34.5–45)
HGB BLD-MCNC: 10.5 G/DL — LOW (ref 11.5–15.5)
INR BLD: 1.86 — HIGH (ref 0.88–1.17)
MAGNESIUM SERPL-MCNC: 1.9 MG/DL — SIGNIFICANT CHANGE UP (ref 1.6–2.6)
MCHC RBC-ENTMCNC: 26.3 PG — LOW (ref 27–34)
MCHC RBC-ENTMCNC: 31.1 % — LOW (ref 32–36)
MCV RBC AUTO: 84.7 FL — SIGNIFICANT CHANGE UP (ref 80–100)
NRBC # FLD: 0 K/UL — LOW (ref 25–125)
PHOSPHATE SERPL-MCNC: 4.6 MG/DL — HIGH (ref 2.5–4.5)
PLATELET # BLD AUTO: 334 K/UL — SIGNIFICANT CHANGE UP (ref 150–400)
PMV BLD: 10.2 FL — SIGNIFICANT CHANGE UP (ref 7–13)
POTASSIUM SERPL-MCNC: 3.6 MMOL/L — SIGNIFICANT CHANGE UP (ref 3.5–5.3)
POTASSIUM SERPL-SCNC: 3.6 MMOL/L — SIGNIFICANT CHANGE UP (ref 3.5–5.3)
PROTHROM AB SERPL-ACNC: 21 SEC — HIGH (ref 9.8–13.1)
RBC # BLD: 3.99 M/UL — SIGNIFICANT CHANGE UP (ref 3.8–5.2)
RBC # FLD: 15.9 % — HIGH (ref 10.3–14.5)
SODIUM SERPL-SCNC: 138 MMOL/L — SIGNIFICANT CHANGE UP (ref 135–145)
WBC # BLD: 7.2 K/UL — SIGNIFICANT CHANGE UP (ref 3.8–10.5)
WBC # FLD AUTO: 7.2 K/UL — SIGNIFICANT CHANGE UP (ref 3.8–10.5)

## 2019-01-06 RX ORDER — DIGOXIN 250 MCG
0.25 TABLET ORAL ONCE
Qty: 0 | Refills: 0 | Status: COMPLETED | OUTPATIENT
Start: 2019-01-06 | End: 2019-01-06

## 2019-01-06 RX ORDER — FUROSEMIDE 40 MG
20 TABLET ORAL DAILY
Qty: 0 | Refills: 0 | Status: DISCONTINUED | OUTPATIENT
Start: 2019-01-06 | End: 2019-01-08

## 2019-01-06 RX ORDER — WARFARIN SODIUM 2.5 MG/1
7 TABLET ORAL ONCE
Qty: 0 | Refills: 0 | Status: COMPLETED | OUTPATIENT
Start: 2019-01-06 | End: 2019-01-06

## 2019-01-06 RX ADMIN — Medication 20 MILLIGRAM(S): at 14:21

## 2019-01-06 RX ADMIN — BUDESONIDE AND FORMOTEROL FUMARATE DIHYDRATE 2 PUFF(S): 160; 4.5 AEROSOL RESPIRATORY (INHALATION) at 11:17

## 2019-01-06 RX ADMIN — Medication 0.25 MILLIGRAM(S): at 14:20

## 2019-01-06 RX ADMIN — KETOTIFEN FUMARATE 1 DROP(S): 0.34 SOLUTION OPHTHALMIC at 11:16

## 2019-01-06 RX ADMIN — WARFARIN SODIUM 7 MILLIGRAM(S): 2.5 TABLET ORAL at 18:06

## 2019-01-06 RX ADMIN — DORZOLAMIDE HYDROCHLORIDE 1 DROP(S): 20 SOLUTION/ DROPS OPHTHALMIC at 18:09

## 2019-01-06 RX ADMIN — MONTELUKAST 10 MILLIGRAM(S): 4 TABLET, CHEWABLE ORAL at 11:16

## 2019-01-06 RX ADMIN — ATORVASTATIN CALCIUM 20 MILLIGRAM(S): 80 TABLET, FILM COATED ORAL at 21:11

## 2019-01-06 NOTE — CONSULT NOTE ADULT - SUBJECTIVE AND OBJECTIVE BOX
JEANNE TRUJILLO  78y Female  MRN:[id number]    Patient is a 78y old  Female who presents with a chief complaint of Palpitations and SOB (06 Jan 2019 14:36)    HPI:  79 y/o F with PMH of Left LE DVT diagnosed on Aug 2018(on Coumadin), COPD, DM, Asthma presented with the complaint of SOB/GRADY and palpitations. As per the patient she has been having intermittent SOB but it worsened the past few days which prompted her to come to the ER. Patient stated that any minimal exertion she would have to stop to catch her breath. Patient stated that she sleeps with 2-3 pillows at night and endorsed of orthopnea and PND. Patient also endorsed of intermittent LE swelling and stated that she is complaint with her medications. Patient also endorsed of midsternal chest pain, characterized as a  pressure like sensation, without any aggravating factors, alleviated when she would massage her chest, without any radiations of the chest pain, with a severity of 5/10. Patient also endorsed of palpitations for the past few days. Patient denied any fevers, chills, N/V/D/C, abdominal pain, dysuria, melena, hematochezia, recent travel, sick contact, pleuritic or positional chest pain.     On ED admission EKG revealed Atrial fibrillation with RVR at a rate of 147 with QTc of 466, CE x 1: Trop: 8, CE x 2: Trop: 9, H&H: 10.4/33.5, Na: 133, Gluc: 104, Alk Phos: 285, AST: 38. Prelim CXR: 6 mm right lung nodular opacity of uncertain significance. Follow up to resolution. Hazy right lung opacities may be secondary to rotation versus atelectasis and/or pneumonia. In the ED patient received IV Cardizem and PO Cardizem which improved her rate. (05 Jan 2019 05:47)      Patient seen and evaluated at bedside.      Interval HPI: sob has improved as per pt  remains in Afib on tele    PAST MEDICAL & SURGICAL HISTORY:  DVT (deep venous thrombosis)  Asthma  COPD (chronic obstructive pulmonary disease)  DM (diabetes mellitus)  History of embolectomy: LLE    SOC:  non smoker  no drug or alcohol abuse    fam hx:  non cont     REVIEW OF SYSTEMS:  Constitutional: No fever, chills, fatigue or weight loss.  Skin: No rash.  Eyes: No recent vision problems or eye pain.  ENT: No congestion, ear pain, or sore throat.  Endocrine: No thyroid problems.  Cardiovascular: as per hpi  Respiratory: as per hpi  Gastrointestinal: No abdominal pain, nausea, vomiting, or diarrhea.  Genitourinary: No dysuria.  Musculoskeletal: No joint swelling.  Neurologic: No headache.    VITALS:  Vital Signs Last 24 Hrs  T(C): 36.7 (06 Jan 2019 18:04), Max: 36.7 (05 Jan 2019 21:23)  T(F): 98.1 (06 Jan 2019 18:04), Max: 98.1 (06 Jan 2019 18:04)  HR: 108 (06 Jan 2019 18:04) (86 - 161)  BP: 107/90 (06 Jan 2019 18:04) (99/59 - 123/78)  BP(mean): 67 (06 Jan 2019 05:13) (67 - 67)  RR: 18 (06 Jan 2019 18:04) (16 - 18)  SpO2: 96% (06 Jan 2019 18:04) (96% - 100%)  CAPILLARY BLOOD GLUCOSE      POCT Blood Glucose.: 133 mg/dL (06 Jan 2019 18:05)  POCT Blood Glucose.: 108 mg/dL (06 Jan 2019 12:31)  POCT Blood Glucose.: 108 mg/dL (06 Jan 2019 08:39)  POCT Blood Glucose.: 160 mg/dL (05 Jan 2019 21:16)    I&O's Summary      PHYSICAL EXAM:  GENERAL: NAD, well-developed  HEAD:  Atraumatic, Normocephalic  EYES: EOMI, PERRLA, conjunctiva and sclera clear  NECK: Supple, No JVD  CHEST/LUNG: Clear to auscultation bilaterally; No wheeze  HEART: S1, S2; irreg irreg  ABDOMEN: Soft, Nontender, Nondistended; Bowel sounds present  EXTREMITIES:  2+ Peripheral Pulses, No clubbing, cyanosis, or edema  PSYCH: Normal affect  NEUROLOGY: AAOX3; non-focal  SKIN: No rashes or lesions    Consultant(s) Notes Reviewed:  [x ] YES  [ ] NO  Care Discussed with Consultants/Other Providers [ x] YES  [ ] NO    MEDS:  MEDICATIONS  (STANDING):  atorvastatin 20 milliGRAM(s) Oral at bedtime  buDESOnide  80 MICROgram(s)/formoterol 4.5 MICROgram(s) Inhaler 2 Puff(s) Inhalation two times a day  dextrose 5%. 1000 milliLiter(s) (50 mL/Hr) IV Continuous <Continuous>  dextrose 50% Injectable 12.5 Gram(s) IV Push once  dextrose 50% Injectable 25 Gram(s) IV Push once  dextrose 50% Injectable 25 Gram(s) IV Push once  diltiazem    Tablet 60 milliGRAM(s) Oral four times a day  dorzolamide 2% Ophthalmic Solution 1 Drop(s) Both EYES <User Schedule>  furosemide   Injectable 20 milliGRAM(s) IV Push daily  influenza   Vaccine 0.5 milliLiter(s) IntraMuscular once  insulin lispro (HumaLOG) corrective regimen sliding scale   SubCutaneous three times a day before meals  insulin lispro (HumaLOG) corrective regimen sliding scale   SubCutaneous at bedtime  ketotifen 0.025% Ophthalmic Solution 1 Drop(s) Both EYES daily  montelukast 10 milliGRAM(s) Oral daily    MEDICATIONS  (PRN):  dextrose 40% Gel 15 Gram(s) Oral once PRN Blood Glucose LESS THAN 70 milliGRAM(s)/deciliter  glucagon  Injectable 1 milliGRAM(s) IntraMuscular once PRN Glucose LESS THAN 70 milligrams/deciliter    ALLERGIES:  No Known Allergies      LABS:                        10.5   7.20  )-----------( 334      ( 06 Jan 2019 06:00 )             33.8     01-06    138  |  100  |  10  ----------------------------<  94  3.6   |  24  |  0.75    Ca    8.9      06 Jan 2019 06:00  Phos  4.6     01-06  Mg     1.9     01-06    TPro  7.5  /  Alb  3.3  /  TBili  0.7  /  DBili  x   /  AST  31  /  ALT  26  /  AlkPhos  279<H>  01-05    PT/INR - ( 06 Jan 2019 06:00 )   PT: 21.0 SEC;   INR: 1.86          PTT - ( 05 Jan 2019 06:52 )  PTT:37.8 SEC      LIVER FUNCTIONS - ( 05 Jan 2019 06:52 )  Alb: 3.3 g/dL / Pro: 7.5 g/dL / ALK PHOS: 279 u/L / ALT: 26 u/L / AST: 31 u/L / GGT: x

## 2019-01-06 NOTE — CONSULT NOTE ADULT - ASSESSMENT
79 yo female h/o dvt on coumadin, copd, dm, asthma, a/w c.o sob, curtis.  found to be in afib with rvr and acute heart failure  PE ruled out  ACS ruled out    afib  cards following  rate control with cardizem  tele monitor  AC with coumadin  f/u echo  possible stress test  possible dccv    acute heart failure  unknown EF  pending echo  diuresis with lasix iv  strict i/o    DVT   on AC with coumadin  goal inr 2-3    asthma/copd  stable  nebs prn  pulm f/u    PT

## 2019-01-06 NOTE — PROGRESS NOTE ADULT - SUBJECTIVE AND OBJECTIVE BOX
CARDIOLOGY FOLLOW UP NOTE - DR. MORILLO    Subjective:    no cp  still sob    PHYSICAL EXAM:  T(C): 36.6 (01-06-19 @ 11:27), Max: 36.8 (01-05-19 @ 14:54)  HR: 109 (01-06-19 @ 11:27) (95 - 140)  BP: 106/62 (01-06-19 @ 11:27) (104/67 - 123/78)  RR: 18 (01-06-19 @ 11:27) (16 - 18)  SpO2: 96% (01-06-19 @ 11:27) (96% - 100%)  Wt(kg): --  I&O's Summary      Appearance: Normal	  Cardiovascular: Normal S1 S2,  irreg  tachy   respiratory: Lungs clear to auscultation	  Gastrointestinal:  Soft, Non-tender, + BS	  Extremities: Normal range of motion, No clubbing, cyanosis or edema  Vascular: Peripheral pulses palpable 2+ bilaterally  MEDICATIONS  (STANDING):  atorvastatin 20 milliGRAM(s) Oral at bedtime  buDESOnide  80 MICROgram(s)/formoterol 4.5 MICROgram(s) Inhaler 2 Puff(s) Inhalation two times a day  dextrose 5%. 1000 milliLiter(s) (50 mL/Hr) IV Continuous <Continuous>  dextrose 50% Injectable 12.5 Gram(s) IV Push once  dextrose 50% Injectable 25 Gram(s) IV Push once  dextrose 50% Injectable 25 Gram(s) IV Push once  digoxin  Injectable 0.25 milliGRAM(s) IV Push once  diltiazem    Tablet 30 milliGRAM(s) Oral four times a day  diltiazem    Tablet 60 milliGRAM(s) Oral four times a day  dorzolamide 2% Ophthalmic Solution 1 Drop(s) Both EYES <User Schedule>  furosemide   Injectable 20 milliGRAM(s) IV Push daily  influenza   Vaccine 0.5 milliLiter(s) IntraMuscular once  insulin lispro (HumaLOG) corrective regimen sliding scale   SubCutaneous three times a day before meals  insulin lispro (HumaLOG) corrective regimen sliding scale   SubCutaneous at bedtime  ketotifen 0.025% Ophthalmic Solution 1 Drop(s) Both EYES daily  montelukast 10 milliGRAM(s) Oral daily  warfarin 7 milliGRAM(s) Oral once      TELEMETRY: 	    ECG:  	  RADIOLOGY:   DIAGNOSTIC TESTING:  [ ] Echocardiogram:  [ ] Catheterization:  [ ] Stress Test:    OTHER: 	    LABS:	 	    CARDIAC MARKERS:                                10.5   7.20  )-----------( 334      ( 06 Jan 2019 06:00 )             33.8     01-06    138  |  100  |  10  ----------------------------<  94  3.6   |  24  |  0.75    Ca    8.9      06 Jan 2019 06:00  Phos  4.6     01-06  Mg     1.9     01-06    TPro  7.5  /  Alb  3.3  /  TBili  0.7  /  DBili  x   /  AST  31  /  ALT  26  /  AlkPhos  279<H>  01-05    proBNP:   PT/INR - ( 06 Jan 2019 06:00 )   PT: 21.0 SEC;   INR: 1.86          PTT - ( 05 Jan 2019 06:52 )  PTT:37.8 SEC  Lipid Profile:   HgA1c:

## 2019-01-06 NOTE — PROGRESS NOTE ADULT - SUBJECTIVE AND OBJECTIVE BOX
Patient is a 78y old  Female who presents with a chief complaint of Palpitations and SOB (06 Jan 2019 13:25)      Any change in ROS: She is feeling a little better     MEDICATIONS  (STANDING):  atorvastatin 20 milliGRAM(s) Oral at bedtime  buDESOnide  80 MICROgram(s)/formoterol 4.5 MICROgram(s) Inhaler 2 Puff(s) Inhalation two times a day  dextrose 5%. 1000 milliLiter(s) (50 mL/Hr) IV Continuous <Continuous>  dextrose 50% Injectable 12.5 Gram(s) IV Push once  dextrose 50% Injectable 25 Gram(s) IV Push once  dextrose 50% Injectable 25 Gram(s) IV Push once  diltiazem    Tablet 60 milliGRAM(s) Oral four times a day  dorzolamide 2% Ophthalmic Solution 1 Drop(s) Both EYES <User Schedule>  furosemide   Injectable 20 milliGRAM(s) IV Push daily  influenza   Vaccine 0.5 milliLiter(s) IntraMuscular once  insulin lispro (HumaLOG) corrective regimen sliding scale   SubCutaneous three times a day before meals  insulin lispro (HumaLOG) corrective regimen sliding scale   SubCutaneous at bedtime  ketotifen 0.025% Ophthalmic Solution 1 Drop(s) Both EYES daily  montelukast 10 milliGRAM(s) Oral daily  warfarin 7 milliGRAM(s) Oral once    MEDICATIONS  (PRN):  dextrose 40% Gel 15 Gram(s) Oral once PRN Blood Glucose LESS THAN 70 milliGRAM(s)/deciliter  glucagon  Injectable 1 milliGRAM(s) IntraMuscular once PRN Glucose LESS THAN 70 milligrams/deciliter    Vital Signs Last 24 Hrs  T(C): 36.6 (06 Jan 2019 11:27), Max: 36.8 (05 Jan 2019 14:54)  T(F): 97.8 (06 Jan 2019 11:27), Max: 98.2 (05 Jan 2019 14:54)  HR: 108 (06 Jan 2019 13:56) (95 - 140)  BP: 99/59 (06 Jan 2019 13:56) (99/59 - 123/78)  BP(mean): 67 (06 Jan 2019 05:13) (67 - 67)  RR: 18 (06 Jan 2019 13:56) (16 - 18)  SpO2: 96% (06 Jan 2019 13:56) (96% - 100%)    I&O's Summary        Physical Exam:   GENERAL: NAD, well-groomed, well-developed  HEENT: MERLINE/   Atraumatic, Normocephalic  ENMT: No tonsillar erythema, exudates, or enlargement; Moist mucous membranes, Good dentition, No lesions  NECK: Supple, No JVD, Normal thyroid  CHEST/LUNG: Clear to auscultaion, ; No rales, rhonchi, wheezing, or rubs  CVS: Regular rate and rhythm; No murmurs, rubs, or gallops  GI: : Soft, Nontender, Nondistended; Bowel sounds present  NERVOUS SYSTEM:  Alert & Oriented X3  EXTREMITIES:  2+ Peripheral Pulses, No clubbing, cyanosis, or edema  LYMPH: No lymphadenopathy noted  SKIN: No rashes or lesions  ENDOCRINOLOGY: No Thyromegaly  PSYCH: Appropriate    Labs:  23                            10.5   7.20  )-----------( 334      ( 06 Jan 2019 06:00 )             33.8                         10.8   7.45  )-----------( 268      ( 05 Jan 2019 06:52 )             37.4                         10.4   9.41  )-----------( 322      ( 04 Jan 2019 20:40 )             33.5     01-06    138  |  100  |  10  ----------------------------<  94  3.6   |  24  |  0.75  01-05    137  |  106  |  9   ----------------------------<  94  5.1   |  18<L>  |  0.63  01-04    133<L>  |  98  |  11  ----------------------------<  104<H>  5.0   |  21<L>  |  0.81    Ca    8.9      06 Jan 2019 06:00  Ca    9.4      05 Jan 2019 06:52  Ca    8.9      04 Jan 2019 20:40  Phos  4.6     01-06  Phos  4.1     01-05  Mg     1.9     01-06  Mg     1.9     01-05    TPro  7.5  /  Alb  3.3  /  TBili  0.7  /  DBili  x   /  AST  31  /  ALT  26  /  AlkPhos  279<H>  01-05  TPro  8.0  /  Alb  3.6  /  TBili  0.5  /  DBili  x   /  AST  38<H>  /  ALT  26  /  AlkPhos  285<H>  01-04    CAPILLARY BLOOD GLUCOSE      POCT Blood Glucose.: 108 mg/dL (06 Jan 2019 12:31)  POCT Blood Glucose.: 108 mg/dL (06 Jan 2019 08:39)  POCT Blood Glucose.: 160 mg/dL (05 Jan 2019 21:16)  POCT Blood Glucose.: 108 mg/dL (05 Jan 2019 17:59)      LIVER FUNCTIONS - ( 05 Jan 2019 06:52 )  Alb: 3.3 g/dL / Pro: 7.5 g/dL / ALK PHOS: 279 u/L / ALT: 26 u/L / AST: 31 u/L / GGT: x           PT/INR - ( 06 Jan 2019 06:00 )   PT: 21.0 SEC;   INR: 1.86          PTT - ( 05 Jan 2019 06:52 )  PTT:37.8 SEC      < from: CT Angio Chest w/ IV Cont (01.05.19 @ 08:39) >  lower lobe. No central, main, lobar or proximal segmental pulmonary   embolism. Thoracic aorta and main pulmonary artery are normal in caliber.   Atherosclerotic calcification of the thoracic aorta.    CHEST:     LUNGS AND LARGE AIRWAYS: Evaluation limited by respiratory motion. Patent   central airways. Mild interlobular septal thickening with nonspecific   groundglass opacities and peribronchial thickening at the lung bases, may   reflect pulmonary edema. A calcified granuloma in the right lower lobe.  PLEURA: Small bilateral pleural effusions.  VESSELS: As above.  HEART: Heart size is normal. No pericardial effusion. Dense mitral   annular calcification. Aortic valve and coronary artery atherosclerotic   calcifications.  MEDIASTINUM AND VIKTORIYA: No lymphadenopathy.  CHEST WALL AND LOWER NECK: Within normal limits.  VISUALIZED UPPER ABDOMEN: Small hiatal hernia. A partially imaged fat   density lesion at the periphery of the right renal interpole.  BONES: Multilevel degenerative change.    IMPRESSION:     Limited evaluation of the distal segmental and subsegmental pulmonary   arteries due to streak and motion artifact. No central, main, lobar or   proximal segmental pulmonary embolism.     Small bilateral pleural effusions with likely pulmonary edema.    A partially imaged fat density lesion in the periphery of the right renal   interpole is indeterminate, may reflect an angiomyolipoma. For more   definitive characterization, consider nonurgent contrast-enhanced renal   protocol CT on an outpatient basis.                  DELICIA SANDOVAL M.D., RADIOLOGY RESIDENT  This document has been electronically signed.  WILLOW DEE M.D., ATTENDING RADIOLOGIST  This document has been electronically signed. Jan 5 2019 10:31AM        < end of copied text >      RECENT CULTURES:        RESPIRATORY CULTURES:          Studies  Chest X-RAY  CT SCAN Chest   Venous Dopplers: LE:   CT Abdomen  Others

## 2019-01-06 NOTE — PROGRESS NOTE ADULT - ASSESSMENT
A/P    78 year old woman with history of Left LE DVT (Aug 2018) on Coumadin,  COPD, DM, Asthma admitted with SOB/GRADY and palpitations.    1. new onset Af with RVR  rate still not well controlled  inc cardizem to 60 mg  give dog x 1 iv   cont a/c per inr   check echo to eval lv/valve fxn  no pe on cta  npo for gissel for poss lou/dccv    2. Dyspnea  multifactorial secondary to chronic lung disease, new af and component of acute HF/volume overload  rate control   cont lasix iv daily   check echo   lou/dccv gissel  keep npo post mn   cta no pe  pulmo f/u     3. DVT  cont a/c  repeat duplex no changes    4. Chest pain  resolved  consider ischemic eval pending clinical course and after dccv    5. HTN  hold home meds  cont cardizem .

## 2019-01-07 LAB
APTT BLD: 44.5 SEC — HIGH (ref 27.5–36.3)
BUN SERPL-MCNC: 19 MG/DL — SIGNIFICANT CHANGE UP (ref 7–23)
CALCIUM SERPL-MCNC: 9 MG/DL — SIGNIFICANT CHANGE UP (ref 8.4–10.5)
CHLORIDE SERPL-SCNC: 101 MMOL/L — SIGNIFICANT CHANGE UP (ref 98–107)
CO2 SERPL-SCNC: 24 MMOL/L — SIGNIFICANT CHANGE UP (ref 22–31)
CREAT SERPL-MCNC: 0.87 MG/DL — SIGNIFICANT CHANGE UP (ref 0.5–1.3)
GLUCOSE SERPL-MCNC: 101 MG/DL — HIGH (ref 70–99)
HCT VFR BLD CALC: 35.5 % — SIGNIFICANT CHANGE UP (ref 34.5–45)
HGB BLD-MCNC: 11.3 G/DL — LOW (ref 11.5–15.5)
INR BLD: 1.72 — HIGH (ref 0.88–1.17)
MAGNESIUM SERPL-MCNC: 1.8 MG/DL — SIGNIFICANT CHANGE UP (ref 1.6–2.6)
MCHC RBC-ENTMCNC: 26 PG — LOW (ref 27–34)
MCHC RBC-ENTMCNC: 31.8 % — LOW (ref 32–36)
MCV RBC AUTO: 81.6 FL — SIGNIFICANT CHANGE UP (ref 80–100)
NRBC # FLD: 0 K/UL — LOW (ref 25–125)
PHOSPHATE SERPL-MCNC: 4 MG/DL — SIGNIFICANT CHANGE UP (ref 2.5–4.5)
PLATELET # BLD AUTO: 347 K/UL — SIGNIFICANT CHANGE UP (ref 150–400)
PMV BLD: 10.5 FL — SIGNIFICANT CHANGE UP (ref 7–13)
POTASSIUM SERPL-MCNC: 3.6 MMOL/L — SIGNIFICANT CHANGE UP (ref 3.5–5.3)
POTASSIUM SERPL-SCNC: 3.6 MMOL/L — SIGNIFICANT CHANGE UP (ref 3.5–5.3)
PROTHROM AB SERPL-ACNC: 19.9 SEC — HIGH (ref 9.8–13.1)
RBC # BLD: 4.35 M/UL — SIGNIFICANT CHANGE UP (ref 3.8–5.2)
RBC # FLD: 15.9 % — HIGH (ref 10.3–14.5)
SODIUM SERPL-SCNC: 139 MMOL/L — SIGNIFICANT CHANGE UP (ref 135–145)
WBC # BLD: 7.25 K/UL — SIGNIFICANT CHANGE UP (ref 3.8–10.5)
WBC # FLD AUTO: 7.25 K/UL — SIGNIFICANT CHANGE UP (ref 3.8–10.5)

## 2019-01-07 PROCEDURE — 99233 SBSQ HOSP IP/OBS HIGH 50: CPT

## 2019-01-07 PROCEDURE — 76376 3D RENDER W/INTRP POSTPROCES: CPT | Mod: 26

## 2019-01-07 PROCEDURE — 93312 ECHO TRANSESOPHAGEAL: CPT | Mod: 26

## 2019-01-07 PROCEDURE — 93306 TTE W/DOPPLER COMPLETE: CPT | Mod: 26

## 2019-01-07 RX ORDER — HEPARIN SODIUM 5000 [USP'U]/ML
3000 INJECTION INTRAVENOUS; SUBCUTANEOUS EVERY 6 HOURS
Qty: 0 | Refills: 0 | Status: DISCONTINUED | OUTPATIENT
Start: 2019-01-07 | End: 2019-01-10

## 2019-01-07 RX ORDER — WARFARIN SODIUM 2.5 MG/1
7 TABLET ORAL ONCE
Qty: 0 | Refills: 0 | Status: DISCONTINUED | OUTPATIENT
Start: 2019-01-07 | End: 2019-01-07

## 2019-01-07 RX ORDER — ENOXAPARIN SODIUM 100 MG/ML
70 INJECTION SUBCUTANEOUS EVERY 12 HOURS
Qty: 0 | Refills: 0 | Status: DISCONTINUED | OUTPATIENT
Start: 2019-01-07 | End: 2019-01-07

## 2019-01-07 RX ORDER — HEPARIN SODIUM 5000 [USP'U]/ML
INJECTION INTRAVENOUS; SUBCUTANEOUS
Qty: 25000 | Refills: 0 | Status: DISCONTINUED | OUTPATIENT
Start: 2019-01-07 | End: 2019-01-10

## 2019-01-07 RX ORDER — HEPARIN SODIUM 5000 [USP'U]/ML
6500 INJECTION INTRAVENOUS; SUBCUTANEOUS ONCE
Qty: 0 | Refills: 0 | Status: COMPLETED | OUTPATIENT
Start: 2019-01-07 | End: 2019-01-07

## 2019-01-07 RX ORDER — HEPARIN SODIUM 5000 [USP'U]/ML
6500 INJECTION INTRAVENOUS; SUBCUTANEOUS EVERY 6 HOURS
Qty: 0 | Refills: 0 | Status: DISCONTINUED | OUTPATIENT
Start: 2019-01-07 | End: 2019-01-10

## 2019-01-07 RX ADMIN — MONTELUKAST 10 MILLIGRAM(S): 4 TABLET, CHEWABLE ORAL at 12:33

## 2019-01-07 RX ADMIN — DORZOLAMIDE HYDROCHLORIDE 1 DROP(S): 20 SOLUTION/ DROPS OPHTHALMIC at 21:10

## 2019-01-07 RX ADMIN — BUDESONIDE AND FORMOTEROL FUMARATE DIHYDRATE 2 PUFF(S): 160; 4.5 AEROSOL RESPIRATORY (INHALATION) at 12:33

## 2019-01-07 RX ADMIN — HEPARIN SODIUM 1400 UNIT(S)/HR: 5000 INJECTION INTRAVENOUS; SUBCUTANEOUS at 22:52

## 2019-01-07 RX ADMIN — ATORVASTATIN CALCIUM 20 MILLIGRAM(S): 80 TABLET, FILM COATED ORAL at 21:11

## 2019-01-07 RX ADMIN — Medication 20 MILLIGRAM(S): at 05:03

## 2019-01-07 RX ADMIN — KETOTIFEN FUMARATE 1 DROP(S): 0.34 SOLUTION OPHTHALMIC at 21:10

## 2019-01-07 RX ADMIN — ENOXAPARIN SODIUM 70 MILLIGRAM(S): 100 INJECTION SUBCUTANEOUS at 12:33

## 2019-01-07 RX ADMIN — HEPARIN SODIUM 6500 UNIT(S): 5000 INJECTION INTRAVENOUS; SUBCUTANEOUS at 22:51

## 2019-01-07 NOTE — CONSULT NOTE ADULT - SUBJECTIVE AND OBJECTIVE BOX
This is a 77 yo female with past medical history of hypertension, diabetes T2, asthma/COPD, left LR DVT diagnosed 2018 (on Coumadin) who presented with worsening shortness of breath and palpitations wit intermittent chest discomfort.  No weakness, slurred speech, lightheadedness or unsteady gait. Says she had had palpitations and shortness of breath for many years but has never been evaluated or told that she had an arrhythmia.  She presented with worsening symptoms and was found to be in atrial fibrillation with RVR (147bpm) and acute heart failure. She is not rate controlled despite Cardizem. Her INR was therapeutic on admission but has been subtherapeutic since. She remains symptomatic.  We are asked to evaluate her for DC cardioversion.         PAST MEDICAL & SURGICAL HISTORY:  DVT (deep venous thrombosis)  Asthma  COPD (chronic obstructive pulmonary disease)  DM (diabetes mellitus)  History of embolectomy: LLE DVT    NKDA    MEDICATIONS  (STANDING):  atorvastatin 20 milliGRAM(s) Oral at bedtime  buDESOnide  80 MICROgram(s)/formoterol 4.5 MICROgram(s) Inhaler 2 Puff(s) Inhalation two times a day  dextrose 5%. 1000 milliLiter(s) (50 mL/Hr) IV Continuous <Continuous>  dextrose 50% Injectable 12.5 Gram(s) IV Push once  dextrose 50% Injectable 25 Gram(s) IV Push once  dextrose 50% Injectable 25 Gram(s) IV Push once  diltiazem    Tablet 60 milliGRAM(s) Oral four times a day  dorzolamide 2% Ophthalmic Solution 1 Drop(s) Both EYES <User Schedule>  enoxaparin Injectable 70 milliGRAM(s) SubCutaneous every 12 hours  furosemide   Injectable 20 milliGRAM(s) IV Push daily  influenza   Vaccine 0.5 milliLiter(s) IntraMuscular once  insulin lispro (HumaLOG) corrective regimen sliding scale   SubCutaneous three times a day before meals  insulin lispro (HumaLOG) corrective regimen sliding scale   SubCutaneous at bedtime  ketotifen 0.025% Ophthalmic Solution 1 Drop(s) Both EYES daily  montelukast 10 milliGRAM(s) Oral daily  warfarin 7 milliGRAM(s) Oral once    MEDICATIONS  (PRN):  dextrose 40% Gel 15 Gram(s) Oral once PRN Blood Glucose LESS THAN 70 milliGRAM(s)/deciliter  glucagon  Injectable 1 milliGRAM(s) IntraMuscular once PRN Glucose LESS THAN 70 milligrams/deciliter      FAMILY HISTORY:  No pertinent family history in first degree relatives      SOCIAL HISTORY:  Patient lives with her     CIGARETTES:  never  DRUGS:  Never  ALCOHOL:   never    REVIEW OF SYSTEMS:    CONSTITUTIONAL: No fever, weight loss, chills, shakes, or fatigue  EYES: No eye pain, visual disturbances, or discharge  ENMT:  No difficulty hearing, tinnitus, vertigo; No sinus or throat pain  NECK: No pain or stiffness  BREASTS: No pain, masses, or nipple discharge  RESPIRATORY: No cough, wheezing, hemoptysis    +shortness of breath/GRADY  CARDIOVASCULAR: Intermittent  mild chest discomfort.   +dyspnea,    + palpitations, No dizziness, syncope, paroxysmal nocturnal dyspnea, orthopnea, or arm or leg swelling  GASTROINTESTINAL: No abdominal  or epigastric pain, nausea, vomiting, hematemesis, diarrhea, constipation, melena or bright red blood.  GENITOURINARY: No dysuria, nocturia, hematuria, or urinary incontinence  NEUROLOGICAL: No headaches, memory loss, slurred speech, limb weakness, loss of strength, numbness, or tremors  SKIN: No itching, burning, rashes, or lesions   LYMPH NODES: No enlarged glands  ENDOCRINE: No heat or cold intolerance, or hair loss  MUSCULOSKELETAL: No joint pain or swelling, muscle, back, or extremity pain  PSYCHIATRIC: No depression, anxiety, or difficulty sleeping  HEME/LYMPH: No easy bruising or bleeding gums  ALLERGY AND IMMUNOLOGIC: No hives or rash.      Vital Signs Last 24 Hrs  T(C): 36.5 (2019 12:29), Max: 36.7 (2019 18:04)  T(F): 97.7 (2019 12:29), Max: 98.1 (2019 18:04)  HR: 108 (2019 12:29) (84 - 112)  BP: 108/67 (2019 12:29) (102/52 - 112/68)  BP(mean): --  RR: 18 (2019 12:29) (18 - 18)  SpO2: 99% (2019 12:29) (96% - 99%)    PHYSICAL EXAM:    GENERAL: In no apparent distress, well nourished, and hydrated.  HEAD:  Atraumatic, Normocephalic  EYES: EOMI, PERRLA, conjunctiva and sclera clear  ENMT: No tonsillar erythema, exudates, or enlargement; Moist mucous membranes + dentures  NECK: Supple and normal thyroid.  No JVD or carotid bruit.  Carotid pulse is 2+ bilaterally.  HEART: Irregular rate and rhythm; No murmurs, rubs, or gallops.  PULMONARY: Clear to auscultation and perfusion but decreased at bases. No rales, wheezing, or rhonchi bilaterally.  ABDOMEN: Soft, Nontender, Nondistended; Bowel sounds present  EXTREMITIES:  2+ Peripheral Pulses, No clubbing, cyanosis, or edema  LYMPH: No lymphadenopathy noted  NEUROLOGICAL: Grossly nonfocal          INTERPRETATION OF TELEMETRY:    ECG:  Atrial fibrillation @ 147 bpm QRS 84ms            LABS:                        11.3   7.25  )-----------( 347      ( 2019 06:15 )             35.5     -    139  |  101  |  19  ----------------------------<  101<H>  3.6   |  24  |  0.87    Ca    9.0      2019 06:15  Phos  4.0       Mg     1.8       TSH 3.32        PT/INR - ( 2019 06:15 )   PT: 19.9 SEC;   INR: 1.72                Daily     Daily Weight in k.9 (2019 11:13)    RADIOLOGY & ADDITIONAL STUDIES:  PREVIOUS DIAGNOSTIC TESTING:      < from: CT Angio Chest w/ IV Cont (19 @ 08:39) >  CHEST:     LUNGS AND LARGE AIRWAYS: Evaluation limited by respiratory motion. Patent   central airways. Mild interlobular septal thickening with nonspecific   groundglass opacities and peribronchial thickening at the lung bases, may   reflect pulmonary edema. A calcified granuloma in the right lower lobe.  PLEURA: Small bilateral pleural effusions.  VESSELS: As above.  HEART: Heart size is normal. No pericardial effusion. Dense mitral   annular calcification. Aortic valve and coronary artery atherosclerotic   calcifications.  MEDIASTINUM AND VIKTORIYA: No lymphadenopathy.  CHEST WALL AND LOWER NECK: Within normal limits.  VISUALIZED UPPER ABDOMEN: Small hiatal hernia. A partially imaged fat   density lesion at the periphery of the right renal interpole.  BONES: Multilevel degenerative change.    IMPRESSION:     Limited evaluation of the distal segmental and subsegmental pulmonary   arteries due to streak and motion artifact. No central, main, lobar or   proximal segmental pulmonary embolism.     Small bilateral pleural effusions with likely pulmonary edema.    A partially imaged fat density lesion in the periphery of the right renal   interpole is indeterminate, may reflect an angiomyolipoma. For more   definitive characterization, consider nonurgent contrast-enhanced renal   protocol CT on an outpatient basis.      ECHO  FINDINGS:   none    STRESS  FINDINGS:   none    CATHETERIZATION  FINDINGS:                RECOMMENDATIONS:

## 2019-01-07 NOTE — PROGRESS NOTE ADULT - SUBJECTIVE AND OBJECTIVE BOX
Patient is a 78y old  Female who presents with a chief complaint of Palpitations and SOB (07 Jan 2019 14:00)      Any change in ROS:   Feeling [facility] better  MEDICATIONS  (STANDING):  atorvastatin 20 milliGRAM(s) Oral at bedtime  buDESOnide  80 MICROgram(s)/formoterol 4.5 MICROgram(s) Inhaler 2 Puff(s) Inhalation two times a day  dextrose 5%. 1000 milliLiter(s) (50 mL/Hr) IV Continuous <Continuous>  dextrose 50% Injectable 12.5 Gram(s) IV Push once  dextrose 50% Injectable 25 Gram(s) IV Push once  dextrose 50% Injectable 25 Gram(s) IV Push once  diltiazem    Tablet 60 milliGRAM(s) Oral four times a day  dorzolamide 2% Ophthalmic Solution 1 Drop(s) Both EYES <User Schedule>  enoxaparin Injectable 70 milliGRAM(s) SubCutaneous every 12 hours  furosemide   Injectable 20 milliGRAM(s) IV Push daily  influenza   Vaccine 0.5 milliLiter(s) IntraMuscular once  insulin lispro (HumaLOG) corrective regimen sliding scale   SubCutaneous three times a day before meals  insulin lispro (HumaLOG) corrective regimen sliding scale   SubCutaneous at bedtime  ketotifen 0.025% Ophthalmic Solution 1 Drop(s) Both EYES daily  montelukast 10 milliGRAM(s) Oral daily  warfarin 7 milliGRAM(s) Oral once    MEDICATIONS  (PRN):  dextrose 40% Gel 15 Gram(s) Oral once PRN Blood Glucose LESS THAN 70 milliGRAM(s)/deciliter  glucagon  Injectable 1 milliGRAM(s) IntraMuscular once PRN Glucose LESS THAN 70 milligrams/deciliter    Vital Signs Last 24 Hrs  T(C): 36.5 (07 Jan 2019 12:29), Max: 36.7 (06 Jan 2019 18:04)  T(F): 97.7 (07 Jan 2019 12:29), Max: 98.1 (06 Jan 2019 18:04)  HR: 108 (07 Jan 2019 12:29) (84 - 108)  BP: 108/67 (07 Jan 2019 12:29) (105/65 - 112/68)  BP(mean): --  RR: 18 (07 Jan 2019 12:29) (18 - 18)  SpO2: 99% (07 Jan 2019 12:29) (96% - 99%)    I&O's Summary        Physical Exam:   GENERAL: NAD, well-groomed, well-developed  HEENT: MERLINE/   Atraumatic, Normocephalic  ENMT: No tonsillar erythema, exudates, or enlargement; Moist mucous membranes, Good dentition, No lesions  NECK: Supple, No JVD, Normal thyroid  CHEST/LUNG: Clear to auscultaion, ; No rales, rhonchi, wheezing, or rubs  CVS: Regular rate and rhythm; No murmurs, rubs, or gallops  GI: : Soft, Nontender, Nondistended; Bowel sounds present  NERVOUS SYSTEM:  Alert & Oriented X3  EXTREMITIES:  2+ Peripheral Pulses, No clubbing, cyanosis, or edema  LYMPH: No lymphadenopathy noted  SKIN: No rashes or lesions  ENDOCRINOLOGY: No Thyromegaly  PSYCH: Appropriate    Labs:  23                            11.3   7.25  )-----------( 347      ( 07 Jan 2019 06:15 )             35.5                         10.5   7.20  )-----------( 334      ( 06 Jan 2019 06:00 )             33.8                         10.8   7.45  )-----------( 268      ( 05 Jan 2019 06:52 )             37.4                         10.4   9.41  )-----------( 322      ( 04 Jan 2019 20:40 )             33.5     01-07    139  |  101  |  19  ----------------------------<  101<H>  3.6   |  24  |  0.87  01-06    138  |  100  |  10  ----------------------------<  94  3.6   |  24  |  0.75  01-05    137  |  106  |  9   ----------------------------<  94  5.1   |  18<L>  |  0.63  01-04    133<L>  |  98  |  11  ----------------------------<  104<H>  5.0   |  21<L>  |  0.81    Ca    9.0      07 Jan 2019 06:15  Ca    8.9      06 Jan 2019 06:00  Phos  4.0     01-07  Phos  4.6     01-06  Mg     1.8     01-07  Mg     1.9     01-06    TPro  7.5  /  Alb  3.3  /  TBili  0.7  /  DBili  x   /  AST  31  /  ALT  26  /  AlkPhos  279<H>  01-05  TPro  8.0  /  Alb  3.6  /  TBili  0.5  /  DBili  x   /  AST  38<H>  /  ALT  26  /  AlkPhos  285<H>  01-04    CAPILLARY BLOOD GLUCOSE      POCT Blood Glucose.: 99 mg/dL (07 Jan 2019 12:53)  POCT Blood Glucose.: 105 mg/dL (07 Jan 2019 06:14)  POCT Blood Glucose.: 115 mg/dL (07 Jan 2019 00:17)  POCT Blood Glucose.: 133 mg/dL (06 Jan 2019 18:05)        PT/INR - ( 07 Jan 2019 06:15 )   PT: 19.9 SEC;   INR: 1.72          < from: CT Head No Cont (01.05.19 @ 08:39) >  ased attenuation in the white matter, likely microvascular disease.   There is there is no intraventricular or extra axial hemorrhage or   midline shift. The basal cisterns are patent.    There is a prominent partially empty sella. There is atherosclerotic   vascular calcification of the carotid siphons. The visualized paranasal   sinuses, mastoid air cells and middle ear cavities are clear.    The soft tissues of the scalp are unremarkable. The calvarium is intact.    IMPRESSION:     Volume loss and white matter decreased attenuation likely microvascular   disease, there is no acute hemorrhage or midline shift, if symptoms   persist consider follow up head CT or MRI if no contraindications    < end of copied text >  < from: CT Angio Chest w/ IV Cont (01.05.19 @ 08:39) >  Limited evaluation of the distal segmental and subsegmental pulmonary   arteries due to streak and motion artifact. No central, main, lobar or   proximal segmental pulmonary embolism.     Small bilateral pleural effusions with likely pulmonary edema.    A partially imaged fat density lesion in the periphery of the right renal   interpole is indeterminate, may reflect an angiomyolipoma. For more   definitive characterization, consider nonurgent contrast-enhanced renal   protocol CT on an outpatient basis.    < end of copied text >            RECENT CULTURES:        RESPIRATORY CULTURES:          Studies  Chest X-RAY  CT SCAN Chest   Venous Dopplers: LE:   CT Abdomen  Others

## 2019-01-07 NOTE — CONSULT NOTE ADULT - ASSESSMENT
77 y/o F with PMH of Left LE DVT diagnosed on Aug 2018(on Coumadin), COPD, DM, Asthma presented with the complaint of SOB/GRADY and palpitations. Found to be in Afib with RVR and acute heart failure. TSH normal . Unable to rate control on Cardizem. Would recommend DCCV for restoration of normal sinus rhythm. Cardioversion to be preceeded by transesophageal echocardiogram to r/o atrial/atrial appendage thrombus.  Patient now on Lovenox to Coumadin until INR therapeutic.   Risks, benefits and alternatives to a cardioversion have been discussed and the patient has consented.  Continue NPO.  Continue Cardizem for now.

## 2019-01-07 NOTE — PROGRESS NOTE ADULT - SUBJECTIVE AND OBJECTIVE BOX
CARDIOLOGY FOLLOW UP - Dr. Fitch    CC c.o dizziness, no cp, sob slight improvement today       PHYSICAL EXAM:  T(C): 36.5 (01-07-19 @ 12:29), Max: 36.7 (01-06-19 @ 18:04)  HR: 108 (01-07-19 @ 12:29) (84 - 112)  BP: 108/67 (01-07-19 @ 12:29) (99/59 - 112/68)  RR: 18 (01-07-19 @ 12:29) (18 - 18)  SpO2: 99% (01-07-19 @ 12:29) (96% - 99%)  Wt(kg): --  I&O's Summary      Appearance: Normal	  Cardiovascular: Normal S1 S2,irregular   Respiratory: Lungs clear to auscultation	  Gastrointestinal:  Soft, Non-tender, + BS	  Extremities: Normal range of motion, No clubbing, cyanosis or edema        MEDICATIONS  (STANDING):  atorvastatin 20 milliGRAM(s) Oral at bedtime  buDESOnide  80 MICROgram(s)/formoterol 4.5 MICROgram(s) Inhaler 2 Puff(s) Inhalation two times a day  dextrose 5%. 1000 milliLiter(s) (50 mL/Hr) IV Continuous <Continuous>  dextrose 50% Injectable 12.5 Gram(s) IV Push once  dextrose 50% Injectable 25 Gram(s) IV Push once  dextrose 50% Injectable 25 Gram(s) IV Push once  diltiazem    Tablet 60 milliGRAM(s) Oral four times a day  dorzolamide 2% Ophthalmic Solution 1 Drop(s) Both EYES <User Schedule>  enoxaparin Injectable 70 milliGRAM(s) SubCutaneous every 12 hours  furosemide   Injectable 20 milliGRAM(s) IV Push daily  influenza   Vaccine 0.5 milliLiter(s) IntraMuscular once  insulin lispro (HumaLOG) corrective regimen sliding scale   SubCutaneous three times a day before meals  insulin lispro (HumaLOG) corrective regimen sliding scale   SubCutaneous at bedtime  ketotifen 0.025% Ophthalmic Solution 1 Drop(s) Both EYES daily  montelukast 10 milliGRAM(s) Oral daily  warfarin 7 milliGRAM(s) Oral once      TELEMETRY: afib HR 90- 120	    ECG:  	  RADIOLOGY:   DIAGNOSTIC TESTING:  [ ] Echocardiogram:  [ ]  Catheterization:  [ ] Stress Test:    OTHER: 	    LABS:	 	                                11.3   7.25  )-----------( 347      ( 07 Jan 2019 06:15 )             35.5     01-07    139  |  101  |  19  ----------------------------<  101<H>  3.6   |  24  |  0.87    Ca    9.0      07 Jan 2019 06:15  Phos  4.0     01-07  Mg     1.8     01-07      PT/INR - ( 07 Jan 2019 06:15 )   PT: 19.9 SEC;   INR: 1.72

## 2019-01-07 NOTE — PROGRESS NOTE ADULT - ASSESSMENT
A/P    78 year old woman with history of Left LE DVT (Aug 2018) on Coumadin,  COPD, DM, Asthma admitted with SOB/GRADY and palpitations.    1. new onset Af with RVR  s/p Dig ivp x1, rates overall improving continue with cardizem to 60 mg q 6hrs   CHADS=3-4 cont a/c coudin per inr . Start lovenox  for inr < 2  check echo to eval lv/valve fxn  no pe on cta  npo for gissel for lou/dccv today, EP f/u     2. Dyspnea  multifactorial secondary to chronic lung disease, new af and component of acute HF/volume overload, improving  rate control, plan for lou/dccv   cont lasix 20 mg iv daily   check echo   cta no pe  pulmo f/u     3. DVT  cont a/c  repeat duplex no changes    4. Chest pain  resolved  plan for stress test tomorrow for ischemic eval     5. HTN  bp stable cont cardizem

## 2019-01-07 NOTE — PROGRESS NOTE ADULT - ASSESSMENT
79 yo female h/o dvt on coumadin, copd, dm, asthma, a/w c.o sob, curtis.  found to be in afib with rvr and acute heart failure  PE ruled out  ACS ruled out    afib  cards following  rate control with cardizem  tele monitor  AC with coumadin  f/u echo -    plan for lou/dccv later today  possible stress test     acute heart failure  unknown EF  pending echo  diuresis with lasix iv  strict i/o    DVT   on AC with coumadin  goal inr 2-3    asthma/copd  stable  nebs prn  pulm f/u    PT

## 2019-01-08 LAB
APTT BLD: > 200 SEC — CRITICAL HIGH (ref 27.5–36.3)
BUN SERPL-MCNC: 18 MG/DL — SIGNIFICANT CHANGE UP (ref 7–23)
CALCIUM SERPL-MCNC: 9.2 MG/DL — SIGNIFICANT CHANGE UP (ref 8.4–10.5)
CHLORIDE SERPL-SCNC: 105 MMOL/L — SIGNIFICANT CHANGE UP (ref 98–107)
CO2 SERPL-SCNC: 22 MMOL/L — SIGNIFICANT CHANGE UP (ref 22–31)
CREAT SERPL-MCNC: 0.69 MG/DL — SIGNIFICANT CHANGE UP (ref 0.5–1.3)
GLUCOSE SERPL-MCNC: 90 MG/DL — SIGNIFICANT CHANGE UP (ref 70–99)
HCT VFR BLD CALC: 35.3 % — SIGNIFICANT CHANGE UP (ref 34.5–45)
HGB BLD-MCNC: 11.1 G/DL — LOW (ref 11.5–15.5)
INR BLD: 2.27 — HIGH (ref 0.88–1.17)
MAGNESIUM SERPL-MCNC: 1.9 MG/DL — SIGNIFICANT CHANGE UP (ref 1.6–2.6)
MCHC RBC-ENTMCNC: 26.4 PG — LOW (ref 27–34)
MCHC RBC-ENTMCNC: 31.4 % — LOW (ref 32–36)
MCV RBC AUTO: 84 FL — SIGNIFICANT CHANGE UP (ref 80–100)
NRBC # FLD: 0 K/UL — LOW (ref 25–125)
PLATELET # BLD AUTO: 343 K/UL — SIGNIFICANT CHANGE UP (ref 150–400)
PMV BLD: 10.1 FL — SIGNIFICANT CHANGE UP (ref 7–13)
POTASSIUM SERPL-MCNC: 3.9 MMOL/L — SIGNIFICANT CHANGE UP (ref 3.5–5.3)
POTASSIUM SERPL-SCNC: 3.9 MMOL/L — SIGNIFICANT CHANGE UP (ref 3.5–5.3)
PROTHROM AB SERPL-ACNC: 26.5 SEC — HIGH (ref 9.8–13.1)
RBC # BLD: 4.2 M/UL — SIGNIFICANT CHANGE UP (ref 3.8–5.2)
RBC # FLD: 15.9 % — HIGH (ref 10.3–14.5)
SODIUM SERPL-SCNC: 141 MMOL/L — SIGNIFICANT CHANGE UP (ref 135–145)
WBC # BLD: 8.36 K/UL — SIGNIFICANT CHANGE UP (ref 3.8–10.5)
WBC # FLD AUTO: 8.36 K/UL — SIGNIFICANT CHANGE UP (ref 3.8–10.5)

## 2019-01-08 PROCEDURE — 93010 ELECTROCARDIOGRAM REPORT: CPT | Mod: 77

## 2019-01-08 PROCEDURE — 93010 ELECTROCARDIOGRAM REPORT: CPT

## 2019-01-08 PROCEDURE — 99232 SBSQ HOSP IP/OBS MODERATE 35: CPT

## 2019-01-08 RX ORDER — FUROSEMIDE 40 MG
20 TABLET ORAL DAILY
Qty: 0 | Refills: 0 | Status: DISCONTINUED | OUTPATIENT
Start: 2019-01-08 | End: 2019-01-10

## 2019-01-08 RX ADMIN — HEPARIN SODIUM 0 UNIT(S)/HR: 5000 INJECTION INTRAVENOUS; SUBCUTANEOUS at 14:11

## 2019-01-08 RX ADMIN — Medication 20 MILLIGRAM(S): at 05:27

## 2019-01-08 RX ADMIN — HEPARIN SODIUM 1600 UNIT(S)/HR: 5000 INJECTION INTRAVENOUS; SUBCUTANEOUS at 05:29

## 2019-01-08 RX ADMIN — MONTELUKAST 10 MILLIGRAM(S): 4 TABLET, CHEWABLE ORAL at 11:58

## 2019-01-08 RX ADMIN — ATORVASTATIN CALCIUM 20 MILLIGRAM(S): 80 TABLET, FILM COATED ORAL at 22:21

## 2019-01-08 RX ADMIN — HEPARIN SODIUM 3000 UNIT(S): 5000 INJECTION INTRAVENOUS; SUBCUTANEOUS at 05:30

## 2019-01-08 RX ADMIN — HEPARIN SODIUM 1300 UNIT(S)/HR: 5000 INJECTION INTRAVENOUS; SUBCUTANEOUS at 15:11

## 2019-01-08 RX ADMIN — BUDESONIDE AND FORMOTEROL FUMARATE DIHYDRATE 2 PUFF(S): 160; 4.5 AEROSOL RESPIRATORY (INHALATION) at 22:21

## 2019-01-08 NOTE — PROGRESS NOTE ADULT - ASSESSMENT
A/P    78 year old woman with history of Left LE DVT (Aug 2018) on Coumadin,  COPD, DM, Asthma admitted with SOB/GRADY and palpitations.    1. new onset Af with RVR  spontaneously converted , In NSR   check EKG   on cardizem to 60 mg q 6hrs, change to cardizem  mg starting tomorrow    s/p EMILI, EF 66%, normal LV fx, Mod to sev MS, mild AS, severe mobile atherosclerotic plaque in the descending thoracic aorta, Large ANDREW thrombus ( 3.0 x 1.4 cm)  DCCV cancelled  CHADS=3-4 cont a/c on hep gtt, Continue to hold coumadin for possible cath tomorrow pending INR levels   no pe on cta  EP f/u    2. Dyspnea  multifactorial secondary to chronic lung disease, new af and component of acute HF/volume overload, improving  rate control  can change lasix to 20 mg PO daily  EMILI with normal LV fx    cta no pe  pulmo f/u     3. DVT  cont a/c, repeat duplex no changes    4. Chest pain  resolved  stress test deferred in setting of ANDREW clot noted on EMILI    5. HTN  bp stable cont cardizem

## 2019-01-08 NOTE — PROGRESS NOTE ADULT - ASSESSMENT
79 y/o F with PMH of Left LE DVT diagnosed on Aug 2018(on Coumadin), COPD, DM, Asthma presented with the complaint of SOB/GRADY and palpitations. Found to be in Afib with RVR and acute heart failure. TSH normal . Unable to rate control on Cardizem. Plan was for EMILI DCCV yesterday but patient found to have large left atrial appendage thrombus on EMILI. LV systolic function normal with EF 66%. DCCV cancelled,  however, she self converted overnight and has remained in NSR. In addition echo showed mild to moderate MR and moderate to severe mitral stenosis as well as mild aortic stenosis. Plan is for cardiac cath for further evaluation.   Plan:  Hold INR for cardiac cath. Continue IV heparin for anticoagulation.  Continue Cardizem 60mg qid for now. 79 y/o F with PMH of Left LE DVT diagnosed on Aug 2018(on Coumadin), COPD, DM, Asthma presented with the complaint of SOB/GRADY and palpitations. Found to be in Afib with RVR and acute heart failure. TSH normal . Unable to rate control on Cardizem. Plan was for EMILI DCCV yesterday but patient found to have large left atrial appendage thrombus on EMILI. LV systolic function normal with EF 66%. DCCV cancelled,  however, she self converted overnight and has remained in NSR. In addition echo showed mild to moderate MR and moderate to severe mitral stenosis as well as mild aortic stenosis. Denies history of rheumatic heart disease. Plan is for cardiac cath for further evaluation.   Plan:  Hold INR for cardiac cath. Continue IV heparin for anticoagulation.  Continue Cardizem 60mg qid for now.

## 2019-01-08 NOTE — PROGRESS NOTE ADULT - SUBJECTIVE AND OBJECTIVE BOX
Patient is a 78y old  Female who presents with a chief complaint of Palpitations and SOB (08 Jan 2019 11:40)      Any change in ROS: Pt is dong ok : no SOB at rest:     MEDICATIONS  (STANDING):  atorvastatin 20 milliGRAM(s) Oral at bedtime  buDESOnide  80 MICROgram(s)/formoterol 4.5 MICROgram(s) Inhaler 2 Puff(s) Inhalation two times a day  dextrose 5%. 1000 milliLiter(s) (50 mL/Hr) IV Continuous <Continuous>  dextrose 50% Injectable 12.5 Gram(s) IV Push once  dextrose 50% Injectable 25 Gram(s) IV Push once  dextrose 50% Injectable 25 Gram(s) IV Push once  diltiazem    Tablet 60 milliGRAM(s) Oral four times a day  dorzolamide 2% Ophthalmic Solution 1 Drop(s) Both EYES <User Schedule>  furosemide   Injectable 20 milliGRAM(s) IV Push daily  heparin  Infusion.  Unit(s)/Hr (14 mL/Hr) IV Continuous <Continuous>  influenza   Vaccine 0.5 milliLiter(s) IntraMuscular once  insulin lispro (HumaLOG) corrective regimen sliding scale   SubCutaneous three times a day before meals  insulin lispro (HumaLOG) corrective regimen sliding scale   SubCutaneous at bedtime  ketotifen 0.025% Ophthalmic Solution 1 Drop(s) Both EYES daily  montelukast 10 milliGRAM(s) Oral daily    MEDICATIONS  (PRN):  dextrose 40% Gel 15 Gram(s) Oral once PRN Blood Glucose LESS THAN 70 milliGRAM(s)/deciliter  glucagon  Injectable 1 milliGRAM(s) IntraMuscular once PRN Glucose LESS THAN 70 milligrams/deciliter  heparin  Injectable 6500 Unit(s) IV Push every 6 hours PRN For aPTT less than 40  heparin  Injectable 3000 Unit(s) IV Push every 6 hours PRN For aPTT between 40 - 57    Vital Signs Last 24 Hrs  T(C): 36.9 (08 Jan 2019 11:51), Max: 36.9 (08 Jan 2019 11:51)  T(F): 98.5 (08 Jan 2019 11:51), Max: 98.5 (08 Jan 2019 11:51)  HR: 67 (08 Jan 2019 11:51) (67 - 78)  BP: 110/66 (08 Jan 2019 11:51) (108/66 - 133/77)  BP(mean): --  RR: 18 (08 Jan 2019 11:51) (16 - 18)  SpO2: 99% (08 Jan 2019 11:51) (98% - 100%)    I&O's Summary        Physical Exam:   GENERAL: NAD, well-groomed, well-developed  HEENT: MERLINE/   Atraumatic, Normocephalic  ENMT: No tonsillar erythema, exudates, or enlargement; Moist mucous membranes, Good dentition, No lesions  NECK: Supple, No JVD, Normal thyroid  CHEST/LUNG: Clear to auscultaion  CVS: Regular rate and rhythm; No murmurs, rubs, or gallops  GI: : Soft, Nontender, Nondistended; Bowel sounds present  NERVOUS SYSTEM:  Alert & Oriented X3  EXTREMITIES:  2+ Peripheral Pulses, No clubbing, cyanosis, or edema  LYMPH: No lymphadenopathy noted  SKIN: No rashes or lesions  ENDOCRINOLOGY: No Thyromegaly  PSYCH: Appropriate    Labs:  23                            11.1   8.36  )-----------( 343      ( 08 Jan 2019 05:00 )             35.3                         11.3   7.25  )-----------( 347      ( 07 Jan 2019 06:15 )             35.5                         10.5   7.20  )-----------( 334      ( 06 Jan 2019 06:00 )             33.8                         10.8   7.45  )-----------( 268      ( 05 Jan 2019 06:52 )             37.4                         10.4   9.41  )-----------( 322      ( 04 Jan 2019 20:40 )             33.5     01-08    141  |  105  |  18  ----------------------------<  90  3.9   |  22  |  0.69  01-07    139  |  101  |  19  ----------------------------<  101<H>  3.6   |  24  |  0.87  01-06    138  |  100  |  10  ----------------------------<  94  3.6   |  24  |  0.75  01-05    137  |  106  |  9   ----------------------------<  94  5.1   |  18<L>  |  0.63  01-04    133<L>  |  98  |  11  ----------------------------<  104<H>  5.0   |  21<L>  |  0.81    Ca    9.2      08 Jan 2019 05:00  Ca    9.0      07 Jan 2019 06:15  Phos  4.0     01-07  Mg     1.9     01-08  Mg     1.8     01-07    TPro  7.5  /  Alb  3.3  /  TBili  0.7  /  DBili  x   /  AST  31  /  ALT  26  /  AlkPhos  279<H>  01-05  TPro  8.0  /  Alb  3.6  /  TBili  0.5  /  DBili  x   /  AST  38<H>  /  ALT  26  /  AlkPhos  285<H>  01-04    CAPILLARY BLOOD GLUCOSE      POCT Blood Glucose.: 129 mg/dL (08 Jan 2019 12:42)  POCT Blood Glucose.: 145 mg/dL (08 Jan 2019 09:14)  POCT Blood Glucose.: 217 mg/dL (07 Jan 2019 21:12)  POCT Blood Glucose.: 93 mg/dL (07 Jan 2019 15:59)        PT/INR - ( 08 Jan 2019 05:00 )   PT: 26.5 SEC;   INR: 2.27          PTT - ( 07 Jan 2019 21:30 )  PTT:44.5 SEC          RECENT CULTURES:        RESPIRATORY CULTURES:    < from: CT Angio Chest w/ IV Cont (01.05.19 @ 08:39) >  PLEURA: Small bilateral pleural effusions.  VESSELS: As above.  HEART: Heart size is normal. No pericardial effusion. Dense mitral   annular calcification. Aortic valve and coronary artery atherosclerotic   calcifications.  MEDIASTINUM AND VIKTORIYA: No lymphadenopathy.  CHEST WALL AND LOWER NECK: Within normal limits.  VISUALIZED UPPER ABDOMEN: Small hiatal hernia. A partially imaged fat   density lesion at the periphery of the right renal interpole.  BONES: Multilevel degenerative change.    IMPRESSION:     Limited evaluation of the distal segmental and subsegmental pulmonary   arteries due to streak and motion artifact. No central, main, lobar or   proximal segmental pulmonary embolism.     Small bilateral pleural effusions with likely pulmonary edema.    A partially imaged fat density lesion in the periphery of the right renal   interpole is indeterminate, may reflect an angiomyolipoma. For more   definitive characterization, consider nonurgent contrast-enhanced renal   protocol CT on an outpatient basis.                  DELICIA SANDOVAL M.D., RADIOLOGY RESIDENT  This document has been electronically signed.  WILLOW DEE M.D., ATTENDING RADIOLOGIST  This document has been electronically signed. Jan 5 2019 10:31AM    < end of copied text >        Studies  Chest X-RAY  CT SCAN Chest   Venous Dopplers: LE:   CT Abdomen  Others

## 2019-01-08 NOTE — PROGRESS NOTE ADULT - ASSESSMENT
77 yo female h/o dvt on coumadin, copd, dm, asthma, a/w c.o sob, curtis.  found to be in afib with rvr and acute heart failure  PE ruled out  ACS ruled out    afib  cards following  converted to NSR  cont with cardizem for now  tele monitor  found to have atrial thrombus on echo  dccv cancelled  pending cath  coumadin on hold  on hep gtt       acute heart failure  diuresis with lasix   strict i/o    DVT   on AC with hep gtt    asthma/copd  stable  nebs prn  pulm f/u    PT

## 2019-01-08 NOTE — PHYSICAL THERAPY INITIAL EVALUATION ADULT - GAIT DISTANCE, PT EVAL
5 feet/x 4 reps; distance limited secondary to (+) tubes/lines. will assess further distance when feasible

## 2019-01-08 NOTE — PROGRESS NOTE ADULT - SUBJECTIVE AND OBJECTIVE BOX
JEANNE TRUJILLO  78y Female  MRN:[id number]    Patient is a 78y old  Female who presents with a chief complaint of Palpitations and SOB (06 Jan 2019 14:36)    HPI:  77 y/o F with PMH of Left LE DVT diagnosed on Aug 2018(on Coumadin), COPD, DM, Asthma presented with the complaint of SOB/GRADY and palpitations. As per the patient she has been having intermittent SOB but it worsened the past few days which prompted her to come to the ER. Patient stated that any minimal exertion she would have to stop to catch her breath. Patient stated that she sleeps with 2-3 pillows at night and endorsed of orthopnea and PND. Patient also endorsed of intermittent LE swelling and stated that she is complaint with her medications. Patient also endorsed of midsternal chest pain, characterized as a  pressure like sensation, without any aggravating factors, alleviated when she would massage her chest, without any radiations of the chest pain, with a severity of 5/10. Patient also endorsed of palpitations for the past few days. Patient denied any fevers, chills, N/V/D/C, abdominal pain, dysuria, melena, hematochezia, recent travel, sick contact, pleuritic or positional chest pain.     On ED admission EKG revealed Atrial fibrillation with RVR at a rate of 147 with QTc of 466, CE x 1: Trop: 8, CE x 2: Trop: 9, H&H: 10.4/33.5, Na: 133, Gluc: 104, Alk Phos: 285, AST: 38. Prelim CXR: 6 mm right lung nodular opacity of uncertain significance. Follow up to resolution. Hazy right lung opacities may be secondary to rotation versus atelectasis and/or pneumonia. In the ED patient received IV Cardizem and PO Cardizem which improved her rate. (05 Jan 2019 05:47)      Patient seen and evaluated at bedside.      Interval HPI: found to have atrial thrombus on echo  self converted to NSR.      PAST MEDICAL & SURGICAL HISTORY:  DVT (deep venous thrombosis)  Asthma  COPD (chronic obstructive pulmonary disease)  DM (diabetes mellitus)  History of embolectomy: LLE    SOC:  non smoker  no drug or alcohol abuse    fam hx:  non cont     REVIEW OF SYSTEMS:  Constitutional: No fever, chills, fatigue or weight loss.  Skin: No rash.  Eyes: No recent vision problems or eye pain.  ENT: No congestion, ear pain, or sore throat.  Endocrine: No thyroid problems.  Cardiovascular: as per hpi  Respiratory: as per hpi  Gastrointestinal: No abdominal pain, nausea, vomiting, or diarrhea.  Genitourinary: No dysuria.  Musculoskeletal: No joint swelling.  Neurologic: No headache.    VITALS:  Vital Signs Last 24 Hrs  T(C): 37 (08 Jan 2019 20:41), Max: 37 (08 Jan 2019 20:41)  T(F): 98.6 (08 Jan 2019 20:41), Max: 98.6 (08 Jan 2019 20:41)  HR: 72 (08 Jan 2019 20:41) (67 - 77)  BP: 132/51 (08 Jan 2019 20:41) (108/66 - 132/51)  BP(mean): --  RR: 16 (08 Jan 2019 20:41) (16 - 18)  SpO2: 100% (08 Jan 2019 20:41) (99% - 100%)      PHYSICAL EXAM:  GENERAL: NAD, well-developed  HEAD:  Atraumatic, Normocephalic  EYES: EOMI, PERRLA, conjunctiva and sclera clear  NECK: Supple, No JVD  CHEST/LUNG: Clear to auscultation bilaterally; No wheeze  HEART: S1, S2;   ABDOMEN: Soft, Nontender, Nondistended; Bowel sounds present  EXTREMITIES:  2+ Peripheral Pulses, No clubbing, cyanosis, or edema  PSYCH: Normal affect  NEUROLOGY: AAOX3; non-focal  SKIN: No rashes or lesions    Consultant(s) Notes Reviewed:  [x ] YES  [ ] NO  Care Discussed with Consultants/Other Providers [ x] YES  [ ] NO    MEDS:  MEDICATIONS  (STANDING):  atorvastatin 20 milliGRAM(s) Oral at bedtime  buDESOnide  80 MICROgram(s)/formoterol 4.5 MICROgram(s) Inhaler 2 Puff(s) Inhalation two times a day  dextrose 5%. 1000 milliLiter(s) (50 mL/Hr) IV Continuous <Continuous>  dextrose 50% Injectable 12.5 Gram(s) IV Push once  dextrose 50% Injectable 25 Gram(s) IV Push once  dextrose 50% Injectable 25 Gram(s) IV Push once  diltiazem    Tablet 60 milliGRAM(s) Oral four times a day  dorzolamide 2% Ophthalmic Solution 1 Drop(s) Both EYES <User Schedule>  furosemide    Tablet 20 milliGRAM(s) Oral daily  heparin  Infusion.  Unit(s)/Hr (14 mL/Hr) IV Continuous <Continuous>  influenza   Vaccine 0.5 milliLiter(s) IntraMuscular once  insulin lispro (HumaLOG) corrective regimen sliding scale   SubCutaneous three times a day before meals  insulin lispro (HumaLOG) corrective regimen sliding scale   SubCutaneous at bedtime  ketotifen 0.025% Ophthalmic Solution 1 Drop(s) Both EYES daily  montelukast 10 milliGRAM(s) Oral daily    MEDICATIONS  (PRN):  dextrose 40% Gel 15 Gram(s) Oral once PRN Blood Glucose LESS THAN 70 milliGRAM(s)/deciliter  glucagon  Injectable 1 milliGRAM(s) IntraMuscular once PRN Glucose LESS THAN 70 milligrams/deciliter  heparin  Injectable 6500 Unit(s) IV Push every 6 hours PRN For aPTT less than 40  heparin  Injectable 3000 Unit(s) IV Push every 6 hours PRN For aPTT between 40 - 57      ALLERGIES:  No Known Allergies      LABS:                                          11.1   8.36  )-----------( 343      ( 08 Jan 2019 05:00 )             35.3   01-08    141  |  105  |  18  ----------------------------<  90  3.9   |  22  |  0.69    Ca    9.2      08 Jan 2019 05:00  Phos  4.0     01-07  Mg     1.9     01-08    PT/INR - ( 08 Jan 2019 05:00 )   PT: 26.5 SEC;   INR: 2.27          PTT - ( 08 Jan 2019 11:30 )  PTT:> 200.0 SEC      < from: EMILI w/TTE (w/3D Echo) (01.07.19 @ 15:03) >  ------------------------------  CONCLUSIONS:  1. Mitral annular calcification and calcified mitral  leaflets with decreased diastolic opening.  In some views  the valve has a rheumatic appearance. Mild-moderate mitral  regurgitation. Mean transmitral valve gradient equals 8 mm  Hg, estimated mitral valve area equals 1.1 sqcm (by  pressure half time equation), consistent with moderate to  severe mitral stenosis.  2. Calcified trileaflet aortic valve with decreased  opening. Peak transaortic valve gradient equals 17 mm Hg,  mean transaortic valve gradient equals 12 mm Hg, consistent  with mild aortic stenosis. No aortic valve regurgitation  seen.  3. Normal aortic root.  Mild non-mobile atherosclerotic  plaque in the aortic arch.  Severe, bulky, mobile  atherosclerotic plaque in the descending thoracic aorta.  4. Mildly dilated left atrium.  LA volume index = 35 cc/m2.   A very large (about  3.0 cm X 1.4 cm) thrombus is  visualized in the left atrial appendage.  5. Normal left ventricular internal dimensions and wall  thicknesses.  6. Normal left ventricular systolic function. No segmental  wall motion abnormalities.  7. Normal right ventricular size and function.  8. Contrast injection demonstrates no evidence of a patent  foramen ovale.  *** No previous Echo exam.  -------------------------------------------------------    < end of copied text >

## 2019-01-08 NOTE — PROGRESS NOTE ADULT - SUBJECTIVE AND OBJECTIVE BOX
CARDIOLOGY FOLLOW UP - Dr. Fitch    CC no chest pain or sob   feels better       PHYSICAL EXAM:  T(C): 36.5 (01-08-19 @ 05:20), Max: 36.7 (01-07-19 @ 21:06)  HR: 77 (01-08-19 @ 05:20) (71 - 108)  BP: 113/60 (01-08-19 @ 05:20) (108/66 - 133/77)  RR: 18 (01-08-19 @ 05:20) (16 - 18)  SpO2: 100% (01-08-19 @ 05:20) (98% - 100%)  Wt(kg): --  I&O's Summary      Appearance: Normal	  Cardiovascular: Normal S1 S2,RRR, +sys murmur  Respiratory: Lungs clear to auscultation	  Gastrointestinal:  Soft, Non-tender, + BS	  Extremities: Normal range of motion, No clubbing, cyanosis or edema        MEDICATIONS  (STANDING):  atorvastatin 20 milliGRAM(s) Oral at bedtime  buDESOnide  80 MICROgram(s)/formoterol 4.5 MICROgram(s) Inhaler 2 Puff(s) Inhalation two times a day  dextrose 5%. 1000 milliLiter(s) (50 mL/Hr) IV Continuous <Continuous>  dextrose 50% Injectable 12.5 Gram(s) IV Push once  dextrose 50% Injectable 25 Gram(s) IV Push once  dextrose 50% Injectable 25 Gram(s) IV Push once  diltiazem    Tablet 60 milliGRAM(s) Oral four times a day  dorzolamide 2% Ophthalmic Solution 1 Drop(s) Both EYES <User Schedule>  furosemide   Injectable 20 milliGRAM(s) IV Push daily  heparin  Infusion.  Unit(s)/Hr (14 mL/Hr) IV Continuous <Continuous>  influenza   Vaccine 0.5 milliLiter(s) IntraMuscular once  insulin lispro (HumaLOG) corrective regimen sliding scale   SubCutaneous three times a day before meals  insulin lispro (HumaLOG) corrective regimen sliding scale   SubCutaneous at bedtime  ketotifen 0.025% Ophthalmic Solution 1 Drop(s) Both EYES daily  montelukast 10 milliGRAM(s) Oral daily      TELEMETRY: NSR 	    ECG:  	  RADIOLOGY:   DIAGNOSTIC TESTING:  [ ] Echocardiogram:  < from: EMILI w/TTE (w/3D Echo) (01.07.19 @ 15:03) >  Ejection Fraction (Teicholtz): 66 %  ------------------------------------------------------------------------  OBSERVATIONS:  Mitral Valve: Mitral annular calcification and calcified  mitral leaflets with decreased diastolic opening.  In some  views the valve has a rheumatic appearance. Mild-moderate  mitral regurgitation. Mean transmitral valve gradient  equals 8 mm Hg, estimated mitral valve area equals 1.1 sqcm  (by pressure half time equation), consistent with moderate  to severe mitral stenosis.  Aortic Root: Normal aortic root.  Mild non-mobile  atherosclerotic plaque in the aortic arch.  Severe, bulky,  mobile atherosclerotic plaque in the descending thoracic  aorta.  Aortic Valve: Calcified trileaflet aortic valve with  decreased opening. Peak transaortic valve gradient equals  17 mm Hg, mean transaortic valve gradient equals 12 mm Hg,  consistent with mild aortic stenosis. No aortic valve  regurgitation seen.  Left Atrium: Mildly dilated left atrium.  LA volume index =  35 cc/m2.  A very large (about  3.0 cm X 1.4 cm) thrombus  is visualized in the left atrial appendage.  Left Ventricle: Normal left ventricular systolic function.  No segmental wall motion abnormalities. Normal left  ventricular internal dimensions and wall thicknesses.  Right Heart: Normal right atrium. Normal right ventricular  size and function. Normal tricuspid valve.  Mild-moderate  tricuspid regurgitation. Normal pulmonic valve.  Pericardium/PleuraNormal pericardium with no pericardial  effusion.  ------------------------------------------------------------------------  CONCLUSIONS:  1. Mitral annular calcification and calcified mitral  leaflets with decreased diastolic opening.  In some views  the valve has a rheumatic appearance. Mild-moderate mitral  regurgitation. Mean transmitral valve gradient equals 8 mm  Hg, estimated mitral valve area equals 1.1 sqcm (by  pressure half time equation), consistent with moderate to  severe mitral stenosis.  2. Calcified trileaflet aortic valve with decreased  opening. Peak transaortic valve gradient equals 17 mm Hg,  mean transaortic valve gradient equals 12 mm Hg, consistent  with mild aortic stenosis. No aortic valve regurgitation  seen.  3. Normal aortic root.  Mild non-mobile atherosclerotic  plaque in the aortic arch.  Severe, bulky, mobile  atherosclerotic plaque in the descending thoracic aorta.  4. Mildly dilated left atrium.  LA volume index = 35 cc/m2.   A very large (about  3.0 cm X 1.4 cm) thrombus is  visualized in the left atrial appendage.  5. Normal left ventricular internal dimensions and wall  thicknesses.  6. Normal left ventricular systolic function. No segmental  wall motion abnormalities.  7. Normal right ventricular size and function.  8. Contrast injection demonstrates no evidence of a patent  foramen ovale.  *** No previous Echo exam.  ------------------------------------------------------------------------  Confirmed on  1/7/2019 - 18:49:43 by Michael Alfaro M.D.  ------------------------------------------------------------------------    < end of copied text >      [ ]  Catheterization:  [ ] Stress Test:    OTHER: 	    LABS:	 	                                11.1   8.36  )-----------( 343      ( 08 Jan 2019 05:00 )             35.3     01-08    141  |  105  |  18  ----------------------------<  90  3.9   |  22  |  0.69    Ca    9.2      08 Jan 2019 05:00  Phos  4.0     01-07  Mg     1.9     01-08      PT/INR - ( 08 Jan 2019 05:00 )   PT: 26.5 SEC;   INR: 2.27          PTT - ( 07 Jan 2019 21:30 )  PTT:44.5 SEC

## 2019-01-08 NOTE — PROGRESS NOTE ADULT - SUBJECTIVE AND OBJECTIVE BOX
Patient feeling significantly better since this morning.  No palpitations. No chest pain, shortness of breath or lightheadedness.   DCCV cancelled yesterday 2/2 very large ANDREW thrombus noted on transesophageal echo. Patient self converted to NSR overnight.    Vital Signs Last 24 Hrs  T(C): 36.9 (08 Jan 2019 11:51), Max: 36.9 (08 Jan 2019 11:51)  T(F): 98.5 (08 Jan 2019 11:51), Max: 98.5 (08 Jan 2019 11:51)  HR: 67 (08 Jan 2019 11:51) (67 - 78)  BP: 110/66 (08 Jan 2019 11:51) (108/66 - 133/77)  BP(mean): --  RR: 18 (08 Jan 2019 11:51) (16 - 18)  SpO2: 99% (08 Jan 2019 11:51) (98% - 100%)      EKG  Telemetry:  Currently NSR 70's.      MEDICATIONS  (STANDING):  atorvastatin 20 milliGRAM(s) Oral at bedtime  buDESOnide  80 MICROgram(s)/formoterol 4.5 MICROgram(s) Inhaler 2 Puff(s) Inhalation two times a day  dextrose 5%. 1000 milliLiter(s) (50 mL/Hr) IV Continuous <Continuous>  dextrose 50% Injectable 12.5 Gram(s) IV Push once  dextrose 50% Injectable 25 Gram(s) IV Push once  dextrose 50% Injectable 25 Gram(s) IV Push once  diltiazem    Tablet 60 milliGRAM(s) Oral four times a day  dorzolamide 2% Ophthalmic Solution 1 Drop(s) Both EYES <User Schedule>  furosemide    Tablet 20 milliGRAM(s) Oral daily  heparin  Infusion.  Unit(s)/Hr (14 mL/Hr) IV Continuous <Continuous>  influenza   Vaccine 0.5 milliLiter(s) IntraMuscular once  insulin lispro (HumaLOG) corrective regimen sliding scale   SubCutaneous three times a day before meals  insulin lispro (HumaLOG) corrective regimen sliding scale   SubCutaneous at bedtime  ketotifen 0.025% Ophthalmic Solution 1 Drop(s) Both EYES daily  montelukast 10 milliGRAM(s) Oral daily    MEDICATIONS  (PRN):  dextrose 40% Gel 15 Gram(s) Oral once PRN Blood Glucose LESS THAN 70 milliGRAM(s)/deciliter  glucagon  Injectable 1 milliGRAM(s) IntraMuscular once PRN Glucose LESS THAN 70 milligrams/deciliter  heparin  Injectable 6500 Unit(s) IV Push every 6 hours PRN For aPTT less than 40  heparin  Injectable 3000 Unit(s) IV Push every 6 hours PRN For aPTT between 40 - 57          Physical exam:   Gen- A&O x3. NAD  Resp- clear to auscultation.  No wheezing, rales or rhonchi  CV- S1 and S2 RRR. Grade 3/6 systolic murmur   ABD-soft nontender +bowel sounds  EXT-NO edema No calf tenderness.  Neuro- grossly nonfocal                          11.1   8.36  )-----------( 343      ( 08 Jan 2019 05:00 )             35.3     PT/INR - ( 08 Jan 2019 05:00 )   PT: 26.5 SEC;   INR: 2.27          PTT - ( 08 Jan 2019 11:30 )  PTT:> 200.0 SEC  01-08    141  |  105  |  18  ----------------------------<  90  3.9   |  22  |  0.69    Ca    9.2      08 Jan 2019 05:00  Phos  4.0     01-07  Mg     1.9     01-08            ECHOCARDIOGRAM;  < from: EMILI w/TTE (w/3D Echo) (01.07.19 @ 15:03) >  Ejection Fraction (Leitz): 66 %  ------------------------------------------------------------------------  OBSERVATIONS:  Mitral Valve: Mitral annular calcification and calcified  mitral leaflets with decreased diastolic opening.  In some  views the valve has a rheumatic appearance. Mild-moderate  mitral regurgitation. Mean transmitral valve gradient  equals 8 mm Hg, estimated mitral valve area equals 1.1 sqcm  (by pressure half time equation), consistent with moderate  to severe mitral stenosis.  Aortic Root: Normal aortic root.  Mild non-mobile  atherosclerotic plaque in the aortic arch.  Severe, bulky,  mobile atherosclerotic plaque in the descending thoracic  aorta.  Aortic Valve: Calcified trileaflet aortic valve with  decreased opening. Peak transaortic valve gradient equals  17 mm Hg, mean transaortic valve gradient equals 12 mm Hg,  consistent with mild aortic stenosis. No aortic valve  regurgitation seen.  Left Atrium: Mildly dilated left atrium.  LA volume index =  35 cc/m2.  A very large (about  3.0 cm X 1.4 cm) thrombus  is visualized in the left atrial appendage.  Left Ventricle: Normal left ventricular systolic function.  No segmental wall motion abnormalities. Normal left  ventricular internal dimensions and wall thicknesses.  Right Heart: Normal right atrium. Normal right ventricular  size and function. Normal tricuspid valve.  Mild-moderate  tricuspid regurgitation. Normal pulmonic valve.  Pericardium/PleuraNormal pericardium with no pericardial  effusion.  ------------------------------------------------------------------------  CONCLUSIONS:  1. Mitral annular calcification and calcified mitral  < from: EMILI w/TTE (w/3D Echo) (01.07.19 @ 15:03) >  leaflets with decreased diastolic opening.  In some views  the valve has a rheumatic appearance. Mild-moderate mitral  regurgitation. Mean transmitral valve gradient equals 8 mm  Hg, estimated mitral valve area equals 1.1 sqcm (by  pressure half time equation), consistent with moderate to  severe mitral stenosis.  2. Calcified trileaflet aortic valve with decreased  opening. Peak transaortic valve gradient equals 17 mm Hg,  mean transaortic valve gradient equals 12 mm Hg, consistent  with mild aortic stenosis. No aortic valve regurgitation  seen.  3. Normal aortic root.  Mild non-mobile atherosclerotic  plaque in the aortic arch.  Severe, bulky, mobile  atherosclerotic plaque in the descending thoracic aorta.  4. Mildly dilated left atrium.  LA volume index = 35 cc/m2.   A very large (about  3.0 cm X 1.4 cm) thrombus is  visualized in the left atrial appendage.  5. Normal left ventricular internal dimensions and wall  thicknesses.  6. Normal left ventricular systolic function. No segmental  wall motion abnormalities.  7. Normal right ventricular size and function.  8. Contrast injection demonstrates no evidence of a patent  foramen ovale.  *** No previous Echo exam.  ------------------------------------------------------------------------  Confirmed on  1/7/2019 - 18:49:43 by Michael Alfaro M.D.  -----------------------------------------------------------------------

## 2019-01-08 NOTE — CHART NOTE - NSCHARTNOTEFT_GEN_A_CORE
Pt noted to have A very large (about  3.0 cm X 1.4 cm) thrombus is visualized in the left atrial appendage on EMILI/TTE, Hep qtt started, Coumadin and lovenox dcd, NST dcd, keep pt NPO for possible cath in am as per Sana Ceballos NP( Dr Fitch), will c/t monitor closely.

## 2019-01-09 LAB
ANION GAP SERPL CALC-SCNC: 11 MEQ/L — SIGNIFICANT CHANGE UP (ref 7–14)
APTT BLD: 140.3 SEC — CRITICAL HIGH (ref 27.5–36.3)
APTT BLD: > 200 SEC — CRITICAL HIGH (ref 27.5–36.3)
APTT BLD: > 200 SEC — CRITICAL HIGH (ref 27.5–36.3)
BASOPHILS # BLD AUTO: 0.03 K/UL — SIGNIFICANT CHANGE UP (ref 0–0.2)
BASOPHILS NFR BLD AUTO: 0.4 % — SIGNIFICANT CHANGE UP (ref 0–2)
BUN SERPL-MCNC: 13 MG/DL — SIGNIFICANT CHANGE UP (ref 7–23)
CALCIUM SERPL-MCNC: 9.2 MG/DL — SIGNIFICANT CHANGE UP (ref 8.4–10.5)
CHLORIDE SERPL-SCNC: 101 MMOL/L — SIGNIFICANT CHANGE UP (ref 98–107)
CO2 SERPL-SCNC: 24 MMOL/L — SIGNIFICANT CHANGE UP (ref 22–31)
CREAT SERPL-MCNC: 0.72 MG/DL — SIGNIFICANT CHANGE UP (ref 0.5–1.3)
EOSINOPHIL # BLD AUTO: 0.41 K/UL — SIGNIFICANT CHANGE UP (ref 0–0.5)
EOSINOPHIL NFR BLD AUTO: 5.6 % — SIGNIFICANT CHANGE UP (ref 0–6)
GLUCOSE SERPL-MCNC: 96 MG/DL — SIGNIFICANT CHANGE UP (ref 70–99)
HCT VFR BLD CALC: 36.7 % — SIGNIFICANT CHANGE UP (ref 34.5–45)
HCT VFR BLD CALC: 36.7 % — SIGNIFICANT CHANGE UP (ref 34.5–45)
HGB BLD-MCNC: 11.3 G/DL — LOW (ref 11.5–15.5)
HGB BLD-MCNC: 11.3 G/DL — LOW (ref 11.5–15.5)
IMM GRANULOCYTES NFR BLD AUTO: 0.3 % — SIGNIFICANT CHANGE UP (ref 0–1.5)
INR BLD: 2.08 — HIGH (ref 0.88–1.17)
LYMPHOCYTES # BLD AUTO: 1.61 K/UL — SIGNIFICANT CHANGE UP (ref 1–3.3)
LYMPHOCYTES # BLD AUTO: 22.1 % — SIGNIFICANT CHANGE UP (ref 13–44)
MAGNESIUM SERPL-MCNC: 2.1 MG/DL — SIGNIFICANT CHANGE UP (ref 1.6–2.6)
MCHC RBC-ENTMCNC: 25.9 PG — LOW (ref 27–34)
MCHC RBC-ENTMCNC: 25.9 PG — LOW (ref 27–34)
MCHC RBC-ENTMCNC: 30.8 % — LOW (ref 32–36)
MCHC RBC-ENTMCNC: 30.8 % — LOW (ref 32–36)
MCV RBC AUTO: 84 FL — SIGNIFICANT CHANGE UP (ref 80–100)
MCV RBC AUTO: 84 FL — SIGNIFICANT CHANGE UP (ref 80–100)
MONOCYTES # BLD AUTO: 0.68 K/UL — SIGNIFICANT CHANGE UP (ref 0–0.9)
MONOCYTES NFR BLD AUTO: 9.3 % — SIGNIFICANT CHANGE UP (ref 2–14)
NEUTROPHILS # BLD AUTO: 4.54 K/UL — SIGNIFICANT CHANGE UP (ref 1.8–7.4)
NEUTROPHILS NFR BLD AUTO: 62.3 % — SIGNIFICANT CHANGE UP (ref 43–77)
NRBC # FLD: 0 K/UL — LOW (ref 25–125)
NRBC # FLD: 0 K/UL — LOW (ref 25–125)
PLATELET # BLD AUTO: 338 K/UL — SIGNIFICANT CHANGE UP (ref 150–400)
PLATELET # BLD AUTO: 338 K/UL — SIGNIFICANT CHANGE UP (ref 150–400)
PMV BLD: 10.5 FL — SIGNIFICANT CHANGE UP (ref 7–13)
PMV BLD: 10.5 FL — SIGNIFICANT CHANGE UP (ref 7–13)
POTASSIUM SERPL-MCNC: 3.4 MMOL/L — LOW (ref 3.5–5.3)
POTASSIUM SERPL-SCNC: 3.4 MMOL/L — LOW (ref 3.5–5.3)
PROTHROM AB SERPL-ACNC: 24.3 SEC — HIGH (ref 9.8–13.1)
RBC # BLD: 4.37 M/UL — SIGNIFICANT CHANGE UP (ref 3.8–5.2)
RBC # BLD: 4.37 M/UL — SIGNIFICANT CHANGE UP (ref 3.8–5.2)
RBC # FLD: 15.8 % — HIGH (ref 10.3–14.5)
RBC # FLD: 15.8 % — HIGH (ref 10.3–14.5)
SODIUM SERPL-SCNC: 136 MMOL/L — SIGNIFICANT CHANGE UP (ref 135–145)
WBC # BLD: 7.29 K/UL — SIGNIFICANT CHANGE UP (ref 3.8–10.5)
WBC # BLD: 7.29 K/UL — SIGNIFICANT CHANGE UP (ref 3.8–10.5)
WBC # FLD AUTO: 7.29 K/UL — SIGNIFICANT CHANGE UP (ref 3.8–10.5)
WBC # FLD AUTO: 7.29 K/UL — SIGNIFICANT CHANGE UP (ref 3.8–10.5)

## 2019-01-09 PROCEDURE — 99232 SBSQ HOSP IP/OBS MODERATE 35: CPT

## 2019-01-09 RX ORDER — GABAPENTIN 400 MG/1
100 CAPSULE ORAL ONCE
Qty: 0 | Refills: 0 | Status: COMPLETED | OUTPATIENT
Start: 2019-01-09 | End: 2019-01-09

## 2019-01-09 RX ORDER — DILTIAZEM HCL 120 MG
240 CAPSULE, EXT RELEASE 24 HR ORAL DAILY
Qty: 0 | Refills: 0 | Status: DISCONTINUED | OUTPATIENT
Start: 2019-01-09 | End: 2019-01-10

## 2019-01-09 RX ORDER — POTASSIUM CHLORIDE 20 MEQ
40 PACKET (EA) ORAL EVERY 4 HOURS
Qty: 0 | Refills: 0 | Status: COMPLETED | OUTPATIENT
Start: 2019-01-09 | End: 2019-01-09

## 2019-01-09 RX ADMIN — Medication 40 MILLIEQUIVALENT(S): at 11:59

## 2019-01-09 RX ADMIN — HEPARIN SODIUM 0 UNIT(S)/HR: 5000 INJECTION INTRAVENOUS; SUBCUTANEOUS at 13:15

## 2019-01-09 RX ADMIN — HEPARIN SODIUM 1000 UNIT(S)/HR: 5000 INJECTION INTRAVENOUS; SUBCUTANEOUS at 01:48

## 2019-01-09 RX ADMIN — HEPARIN SODIUM 400 UNIT(S)/HR: 5000 INJECTION INTRAVENOUS; SUBCUTANEOUS at 22:11

## 2019-01-09 RX ADMIN — HEPARIN SODIUM 0 UNIT(S)/HR: 5000 INJECTION INTRAVENOUS; SUBCUTANEOUS at 00:49

## 2019-01-09 RX ADMIN — HEPARIN SODIUM 0 UNIT(S)/HR: 5000 INJECTION INTRAVENOUS; SUBCUTANEOUS at 21:04

## 2019-01-09 RX ADMIN — Medication 20 MILLIGRAM(S): at 05:19

## 2019-01-09 RX ADMIN — MONTELUKAST 10 MILLIGRAM(S): 4 TABLET, CHEWABLE ORAL at 12:00

## 2019-01-09 RX ADMIN — ATORVASTATIN CALCIUM 20 MILLIGRAM(S): 80 TABLET, FILM COATED ORAL at 22:05

## 2019-01-09 RX ADMIN — Medication 240 MILLIGRAM(S): at 05:19

## 2019-01-09 RX ADMIN — KETOTIFEN FUMARATE 1 DROP(S): 0.34 SOLUTION OPHTHALMIC at 12:00

## 2019-01-09 RX ADMIN — HEPARIN SODIUM 700 UNIT(S)/HR: 5000 INJECTION INTRAVENOUS; SUBCUTANEOUS at 14:34

## 2019-01-09 RX ADMIN — GABAPENTIN 100 MILLIGRAM(S): 400 CAPSULE ORAL at 23:50

## 2019-01-09 RX ADMIN — Medication 40 MILLIEQUIVALENT(S): at 18:36

## 2019-01-09 RX ADMIN — BUDESONIDE AND FORMOTEROL FUMARATE DIHYDRATE 2 PUFF(S): 160; 4.5 AEROSOL RESPIRATORY (INHALATION) at 11:58

## 2019-01-09 NOTE — PROGRESS NOTE ADULT - SUBJECTIVE AND OBJECTIVE BOX
Patient without complaints.  No shortness of breath, palpitations, dizziness of chest pain.     Vital Signs Last 24 Hrs  T(C): 36.4 (09 Jan 2019 05:15), Max: 37 (08 Jan 2019 20:41)  T(F): 97.5 (09 Jan 2019 05:15), Max: 98.6 (08 Jan 2019 20:41)  HR: 62 (09 Jan 2019 05:15) (62 - 72)  BP: 115/58 (09 Jan 2019 05:15) (107/66 - 132/51)  BP(mean): --  RR: 16 (09 Jan 2019 05:15) (16 - 18)  SpO2: 99% (09 Jan 2019 05:15) (98% - 100%)      EKG  Telemetry:  Normal sinus rhythm 60-70's.    MEDICATIONS  (STANDING):  atorvastatin 20 milliGRAM(s) Oral at bedtime  buDESOnide  80 MICROgram(s)/formoterol 4.5 MICROgram(s) Inhaler 2 Puff(s) Inhalation two times a day  dextrose 5%. 1000 milliLiter(s) (50 mL/Hr) IV Continuous <Continuous>  dextrose 50% Injectable 12.5 Gram(s) IV Push once  dextrose 50% Injectable 25 Gram(s) IV Push once  dextrose 50% Injectable 25 Gram(s) IV Push once  diltiazem    milliGRAM(s) Oral daily  dorzolamide 2% Ophthalmic Solution 1 Drop(s) Both EYES <User Schedule>  furosemide    Tablet 20 milliGRAM(s) Oral daily  heparin  Infusion.  Unit(s)/Hr (14 mL/Hr) IV Continuous <Continuous>  influenza   Vaccine 0.5 milliLiter(s) IntraMuscular once  insulin lispro (HumaLOG) corrective regimen sliding scale   SubCutaneous three times a day before meals  insulin lispro (HumaLOG) corrective regimen sliding scale   SubCutaneous at bedtime  ketotifen 0.025% Ophthalmic Solution 1 Drop(s) Both EYES daily  montelukast 10 milliGRAM(s) Oral daily  potassium chloride    Tablet ER 40 milliEquivalent(s) Oral every 4 hours    MEDICATIONS  (PRN):  dextrose 40% Gel 15 Gram(s) Oral once PRN Blood Glucose LESS THAN 70 milliGRAM(s)/deciliter  glucagon  Injectable 1 milliGRAM(s) IntraMuscular once PRN Glucose LESS THAN 70 milligrams/deciliter  heparin  Injectable 6500 Unit(s) IV Push every 6 hours PRN For aPTT less than 40  heparin  Injectable 3000 Unit(s) IV Push every 6 hours PRN For aPTT between 40 - 57          Physical exam:   Gen-alert, comfortable. NAD  Resp- clear to auscultation.  No wheezing, rales or rhonchi  CV- NOrmal s1 and S2 RRR. Grade 2-3/6 systolic murmur.  No gallops or rubs  ABD- soft non tender + bowel sounds  EXT-no edema  Neuro- grossly nonfocal                            11.3   7.29  )-----------( 338      ( 09 Jan 2019 07:05 )             36.7     PT/INR - ( 09 Jan 2019 07:05 )   PT: 24.3 SEC;   INR: 2.08          PTT - ( 09 Jan 2019 12:11 )  PTT:> 200.0 SEC  01-09    136  |  101  |  13  ----------------------------<  96  3.4<L>   |  24  |  0.72    Ca    9.2      09 Jan 2019 07:05  Mg     2.1     01-09      ECHOCARDIOGRAM  < from: EMILI w/TTE (w/3D Echo) (01.07.19 @ 15:03) >  CONCLUSIONS:  1. Mitral annular calcification and calcified mitral  leaflets with decreased diastolic opening.  In some views  the valve has a rheumatic appearance. Mild-moderate mitral  regurgitation. Mean transmitral valve gradient equals 8 mm  Hg, estimated mitral valve area equals 1.1 sqcm (by  pressure half time equation), consistent with moderate to  severe mitral stenosis.  2. Calcified trileaflet aortic valve with decreased  opening. Peak transaortic valve gradient equals 17 mm Hg,  mean transaortic valve gradient equals 12 mm Hg, consistent  with mild aortic stenosis. No aortic valve regurgitation  seen.  3. Normal aortic root.  Mild non-mobile atherosclerotic  plaque in the aortic arch.  Severe, bulky, mobile  atherosclerotic plaque in the descending thoracic aorta.  4. Mildly dilated left atrium.  LA volume index = 35 cc/m2.   A very large (about  3.0 cm X 1.4 cm) thrombus is  visualized in the left atrial appendage.  5. Normal left ventricular internal dimensions and wall  thicknesses.  6. Normal left ventricular systolic function. No segmental  wall motion abnormalities.  EF 66%  7. Normal right ventricular size and function.  8. Contrast injection demonstrates no evidence of a patent  foramen ovale.  *** No previous Echo exam.  ------------------------------------------------------------------------  Confirmed on  1/7/2019 - 18:49:43 by Michael Alfaro M.D.  ------------------------------------------------------------------------    < end of copied text > Patient without complaints.  No shortness of breath, palpitations, dizziness of chest pain.     Vital Signs Last 24 Hrs  T(C): 36.4 (09 Jan 2019 05:15), Max: 37 (08 Jan 2019 20:41)  T(F): 97.5 (09 Jan 2019 05:15), Max: 98.6 (08 Jan 2019 20:41)  HR: 62 (09 Jan 2019 05:15) (62 - 72)  BP: 115/58 (09 Jan 2019 05:15) (107/66 - 132/51)  BP(mean): --  RR: 16 (09 Jan 2019 05:15) (16 - 18)  SpO2: 99% (09 Jan 2019 05:15) (98% - 100%)      EKG  Telemetry:  Normal sinus rhythm 50-70's.    MEDICATIONS  (STANDING):  atorvastatin 20 milliGRAM(s) Oral at bedtime  buDESOnide  80 MICROgram(s)/formoterol 4.5 MICROgram(s) Inhaler 2 Puff(s) Inhalation two times a day  dextrose 5%. 1000 milliLiter(s) (50 mL/Hr) IV Continuous <Continuous>  dextrose 50% Injectable 12.5 Gram(s) IV Push once  dextrose 50% Injectable 25 Gram(s) IV Push once  dextrose 50% Injectable 25 Gram(s) IV Push once  diltiazem    milliGRAM(s) Oral daily  dorzolamide 2% Ophthalmic Solution 1 Drop(s) Both EYES <User Schedule>  furosemide    Tablet 20 milliGRAM(s) Oral daily  heparin  Infusion.  Unit(s)/Hr (14 mL/Hr) IV Continuous <Continuous>  influenza   Vaccine 0.5 milliLiter(s) IntraMuscular once  insulin lispro (HumaLOG) corrective regimen sliding scale   SubCutaneous three times a day before meals  insulin lispro (HumaLOG) corrective regimen sliding scale   SubCutaneous at bedtime  ketotifen 0.025% Ophthalmic Solution 1 Drop(s) Both EYES daily  montelukast 10 milliGRAM(s) Oral daily  potassium chloride    Tablet ER 40 milliEquivalent(s) Oral every 4 hours    MEDICATIONS  (PRN):  dextrose 40% Gel 15 Gram(s) Oral once PRN Blood Glucose LESS THAN 70 milliGRAM(s)/deciliter  glucagon  Injectable 1 milliGRAM(s) IntraMuscular once PRN Glucose LESS THAN 70 milligrams/deciliter  heparin  Injectable 6500 Unit(s) IV Push every 6 hours PRN For aPTT less than 40  heparin  Injectable 3000 Unit(s) IV Push every 6 hours PRN For aPTT between 40 - 57          Physical exam:   Gen-alert, comfortable. NAD  Resp- clear to auscultation.  No wheezing, rales or rhonchi  CV- NOrmal s1 and S2 RRR. Grade 2-3/6 systolic murmur.  No gallops or rubs  ABD- soft non tender + bowel sounds  EXT-no edema  Neuro- grossly nonfocal                            11.3   7.29  )-----------( 338      ( 09 Jan 2019 07:05 )             36.7     PT/INR - ( 09 Jan 2019 07:05 )   PT: 24.3 SEC;   INR: 2.08          PTT - ( 09 Jan 2019 12:11 )  PTT:> 200.0 SEC  01-09    136  |  101  |  13  ----------------------------<  96  3.4<L>   |  24  |  0.72    Ca    9.2      09 Jan 2019 07:05  Mg     2.1     01-09      ECHOCARDIOGRAM  < from: EMILI w/TTE (w/3D Echo) (01.07.19 @ 15:03) >  CONCLUSIONS:  1. Mitral annular calcification and calcified mitral  leaflets with decreased diastolic opening.  In some views  the valve has a rheumatic appearance. Mild-moderate mitral  regurgitation. Mean transmitral valve gradient equals 8 mm  Hg, estimated mitral valve area equals 1.1 sqcm (by  pressure half time equation), consistent with moderate to  severe mitral stenosis.  2. Calcified trileaflet aortic valve with decreased  opening. Peak transaortic valve gradient equals 17 mm Hg,  mean transaortic valve gradient equals 12 mm Hg, consistent  with mild aortic stenosis. No aortic valve regurgitation  seen.  3. Normal aortic root.  Mild non-mobile atherosclerotic  plaque in the aortic arch.  Severe, bulky, mobile  atherosclerotic plaque in the descending thoracic aorta.  4. Mildly dilated left atrium.  LA volume index = 35 cc/m2.   A very large (about  3.0 cm X 1.4 cm) thrombus is  visualized in the left atrial appendage.  5. Normal left ventricular internal dimensions and wall  thicknesses.  6. Normal left ventricular systolic function. No segmental  wall motion abnormalities.  EF 66%  7. Normal right ventricular size and function.  8. Contrast injection demonstrates no evidence of a patent  foramen ovale.  *** No previous Echo exam.  ------------------------------------------------------------------------  Confirmed on  1/7/2019 - 18:49:43 by Michael Alfaro M.D.  ------------------------------------------------------------------------    < end of copied text >

## 2019-01-09 NOTE — CHART NOTE - NSCHARTNOTEFT_GEN_A_CORE
Patient complained of cramping in her left foot.  With h/o DM will give dose of Gabapentin and monitor.

## 2019-01-09 NOTE — PROGRESS NOTE ADULT - PROBLEM SELECTOR PLAN 5
controlled: no exacerbation: cont BD and singulair: no wheezing  1/8: No wheezing: not SOB due to copd  1/9: stable: no wheezing

## 2019-01-09 NOTE — PROGRESS NOTE ADULT - ASSESSMENT
77 y/o F with PMH of Left LE DVT diagnosed on Aug 2018(on Coumadin), COPD, DM, Asthma presented with Afib with RVR and acute heart failure. TSH normal . Unable to rate control on Cardizem. Plan was for EMILI DCCV but found to have large left atrial appendage thrombus on EMILI. LV systolic function normal with EF 66%. DCCV cancelled, however, she self convertedt and has remained in NSR. In addition echo showed mild to moderate MR and moderate to severe mitral stenosis as well as mild aortic stenosis. Denies history of rheumatic heart disease. Plan is for cardiac cath for further evaluation.    Plan:  Hold INR for cardiac cath. Continue IV heparin for anticoagulation.  Continue Cardizem 60mg qid for now. Would consider Amiodarone for maintenance of sinus rhythm since her symptoms are predominantly 2/2 AFib w/RVR. 79 y/o F with PMH of Left LE DVT diagnosed on Aug 2018(on Coumadin), COPD, DM, Asthma presented with Afib with RVR and acute heart failure. TSH normal . Unable to rate control on Cardizem. Plan was for EMILI DCCV but found to have large left atrial appendage thrombus on EMILI. LV systolic function normal with EF 66%. DCCV cancelled, however, she self convertedt and has remained in NSR. In addition echo showed mild to moderate MR and moderate to severe mitral stenosis as well as mild aortic stenosis. Denies history of rheumatic heart disease. Plan is for cardiac cath for further evaluation.    Plan:  Hold INR for cardiac cath. Continue IV heparin for anticoagulation.  Continue Cardizem 60mg qid for now. Would consider Amiodarone for maintenance of sinus rhythm since her symptoms occur when in AFib. 77 y/o F with PMH of Left LE DVT diagnosed on Aug 2018(on Coumadin), COPD, DM, Asthma presented with Afib with RVR and acute heart failure. TSH normal . Unable to rate control on Cardizem. Plan was for EMILI DCCV but found to have large left atrial appendage thrombus on EMILI. LV systolic function normal with EF 66%. DCCV cancelled, however, she self convertedt and has remained in NSR. In addition echo showed mild to moderate MR and moderate to severe mitral stenosis as well as mild aortic stenosis. Denies history of rheumatic heart disease. Plan is for cardiac cath for further evaluation.    Plan:  Hold INR for cardiac cath. Continue IV heparin for anticoagulation.  Continue Cardizem 60mg qid for now. Would consider Amiodarone for maintenance of sinus rhythm since her symptoms occur when in AFib.  Keep K+ > 4.0 and Mg > 2.0

## 2019-01-09 NOTE — PROGRESS NOTE ADULT - SUBJECTIVE AND OBJECTIVE BOX
CARDIOLOGY FOLLOW UP - Dr. Fitch    CC no cp or sob       PHYSICAL EXAM:  T(C): 36.4 (01-09-19 @ 05:15), Max: 37 (01-08-19 @ 20:41)  HR: 62 (01-09-19 @ 05:15) (62 - 72)  BP: 115/58 (01-09-19 @ 05:15) (107/66 - 132/51)  RR: 16 (01-09-19 @ 05:15) (16 - 18)  SpO2: 99% (01-09-19 @ 05:15) (98% - 100%)  Wt(kg): --  I&O's Summary      Appearance: Normal	  Cardiovascular: Normal S1 S2,RRR,sys murmur   Respiratory: Lungs clear to auscultation	  Gastrointestinal:  Soft, Non-tender, + BS	  Extremities: Normal range of motion, No clubbing, cyanosis or edema        MEDICATIONS  (STANDING):  atorvastatin 20 milliGRAM(s) Oral at bedtime  buDESOnide  80 MICROgram(s)/formoterol 4.5 MICROgram(s) Inhaler 2 Puff(s) Inhalation two times a day  dextrose 5%. 1000 milliLiter(s) (50 mL/Hr) IV Continuous <Continuous>  dextrose 50% Injectable 12.5 Gram(s) IV Push once  dextrose 50% Injectable 25 Gram(s) IV Push once  dextrose 50% Injectable 25 Gram(s) IV Push once  diltiazem    milliGRAM(s) Oral daily  dorzolamide 2% Ophthalmic Solution 1 Drop(s) Both EYES <User Schedule>  furosemide    Tablet 20 milliGRAM(s) Oral daily  heparin  Infusion.  Unit(s)/Hr (14 mL/Hr) IV Continuous <Continuous>  influenza   Vaccine 0.5 milliLiter(s) IntraMuscular once  insulin lispro (HumaLOG) corrective regimen sliding scale   SubCutaneous three times a day before meals  insulin lispro (HumaLOG) corrective regimen sliding scale   SubCutaneous at bedtime  ketotifen 0.025% Ophthalmic Solution 1 Drop(s) Both EYES daily  montelukast 10 milliGRAM(s) Oral daily  potassium chloride    Tablet ER 40 milliEquivalent(s) Oral every 4 hours      TELEMETRY: NSR  	    ECG:  	  RADIOLOGY:   DIAGNOSTIC TESTING:  [ ] Echocardiogram:  [ ]  Catheterization:  [ ] Stress Test:    OTHER: 	    LABS:	 	                                11.3   7.29  )-----------( 338      ( 09 Jan 2019 07:05 )             36.7     01-09    136  |  101  |  13  ----------------------------<  96  3.4<L>   |  24  |  0.72    Ca    9.2      09 Jan 2019 07:05  Mg     2.1     01-09      PT/INR - ( 09 Jan 2019 07:05 )   PT: 24.3 SEC;   INR: 2.08          PTT - ( 08 Jan 2019 23:00 )  PTT:> 200.0 SEC

## 2019-01-09 NOTE — PROGRESS NOTE ADULT - ASSESSMENT
A/P    78 year old woman with history of Left LE DVT (Aug 2018) on Coumadin,  COPD, DM, Asthma admitted with SOB/GRADY and palpitations.    1. new onset Af with RVR  spontaneously converted , and remains in NSR   on cardizem  mg, EP f/u  s/p EMILI, EF 66%, normal LV fx, Mod to sev MS, mild AS, severe mobile atherosclerotic plaque in the descending thoracic aorta, Large ANDREW thrombus ( 3.0 x 1.4 cm)  DCCV cancelled  CHADS=3-4 cont a/c on hep gtt, Continue to hold coumadin tonight for possible cath tomorrow pending INR levels   no pe on cta  hypokalemia today, supplementation noted    2. Dyspnea  multifactorial secondary to chronic lung disease, new af and component of acute Diastolic HF/volume overload, improved   rate controlled  conitnue with  lasix to 20 mg PO daily  EMILI with normal LV fx , mod- sev MS  cta no pe  pulmo f/u     3. DVT  cont a/c, repeat duplex no changes    4. Chest pain  resolved  stress test deferred in setting of ANDREW clot noted on EMILI    5. HTN  bp stable cont cardizem

## 2019-01-09 NOTE — PROGRESS NOTE ADULT - SUBJECTIVE AND OBJECTIVE BOX
JEANNE TRUJILLO  78y Female  MRN:[id number]    Patient is a 78y old  Female who presents with a chief complaint of Palpitations and SOB (06 Jan 2019 14:36)    HPI:  79 y/o F with PMH of Left LE DVT diagnosed on Aug 2018(on Coumadin), COPD, DM, Asthma presented with the complaint of SOB/GRADY and palpitations. As per the patient she has been having intermittent SOB but it worsened the past few days which prompted her to come to the ER. Patient stated that any minimal exertion she would have to stop to catch her breath. Patient stated that she sleeps with 2-3 pillows at night and endorsed of orthopnea and PND. Patient also endorsed of intermittent LE swelling and stated that she is complaint with her medications. Patient also endorsed of midsternal chest pain, characterized as a  pressure like sensation, without any aggravating factors, alleviated when she would massage her chest, without any radiations of the chest pain, with a severity of 5/10. Patient also endorsed of palpitations for the past few days. Patient denied any fevers, chills, N/V/D/C, abdominal pain, dysuria, melena, hematochezia, recent travel, sick contact, pleuritic or positional chest pain.     On ED admission EKG revealed Atrial fibrillation with RVR at a rate of 147 with QTc of 466, CE x 1: Trop: 8, CE x 2: Trop: 9, H&H: 10.4/33.5, Na: 133, Gluc: 104, Alk Phos: 285, AST: 38. Prelim CXR: 6 mm right lung nodular opacity of uncertain significance. Follow up to resolution. Hazy right lung opacities may be secondary to rotation versus atelectasis and/or pneumonia. In the ED patient received IV Cardizem and PO Cardizem which improved her rate. (05 Jan 2019 05:47)      Patient seen and evaluated at bedside.      Interval HPI: no events o/n    PAST MEDICAL & SURGICAL HISTORY:  DVT (deep venous thrombosis)  Asthma  COPD (chronic obstructive pulmonary disease)  DM (diabetes mellitus)  History of embolectomy: LLE    SOC:  non smoker  no drug or alcohol abuse    fam hx:  non cont     REVIEW OF SYSTEMS:  Constitutional: No fever, chills, fatigue or weight loss.  Skin: No rash.  Eyes: No recent vision problems or eye pain.  ENT: No congestion, ear pain, or sore throat.  Endocrine: No thyroid problems.  Cardiovascular: as per hpi  Respiratory: as per hpi  Gastrointestinal: No abdominal pain, nausea, vomiting, or diarrhea.  Genitourinary: No dysuria.  Musculoskeletal: No joint swelling.  Neurologic: No headache.    VITALS:  Vital Signs Last 24 Hrs  T(C): 36.3 (09 Jan 2019 14:21), Max: 37 (08 Jan 2019 20:41)  T(F): 97.4 (09 Jan 2019 14:21), Max: 98.6 (08 Jan 2019 20:41)  HR: 67 (09 Jan 2019 14:21) (62 - 72)  BP: 103/57 (09 Jan 2019 14:21) (103/57 - 132/51)  BP(mean): --  RR: 18 (09 Jan 2019 14:21) (16 - 18)  SpO2: 100% (09 Jan 2019 14:21) (98% - 100%)      PHYSICAL EXAM:  GENERAL: NAD, well-developed  HEAD:  Atraumatic, Normocephalic  EYES: EOMI, PERRLA, conjunctiva and sclera clear  NECK: Supple, No JVD  CHEST/LUNG: Clear to auscultation bilaterally; No wheeze  HEART: S1, S2;   ABDOMEN: Soft, Nontender, Nondistended; Bowel sounds present  EXTREMITIES:  2+ Peripheral Pulses, No clubbing, cyanosis, or edema  PSYCH: Normal affect  NEUROLOGY: AAOX3; non-focal  SKIN: No rashes or lesions    Consultant(s) Notes Reviewed:  [x ] YES  [ ] NO  Care Discussed with Consultants/Other Providers [ x] YES  [ ] NO    MEDS:  MEDICATIONS  (STANDING):  atorvastatin 20 milliGRAM(s) Oral at bedtime  buDESOnide  80 MICROgram(s)/formoterol 4.5 MICROgram(s) Inhaler 2 Puff(s) Inhalation two times a day  dextrose 5%. 1000 milliLiter(s) (50 mL/Hr) IV Continuous <Continuous>  dextrose 50% Injectable 12.5 Gram(s) IV Push once  dextrose 50% Injectable 25 Gram(s) IV Push once  dextrose 50% Injectable 25 Gram(s) IV Push once  diltiazem    milliGRAM(s) Oral daily  dorzolamide 2% Ophthalmic Solution 1 Drop(s) Both EYES <User Schedule>  furosemide    Tablet 20 milliGRAM(s) Oral daily  heparin  Infusion.  Unit(s)/Hr (14 mL/Hr) IV Continuous <Continuous>  influenza   Vaccine 0.5 milliLiter(s) IntraMuscular once  insulin lispro (HumaLOG) corrective regimen sliding scale   SubCutaneous three times a day before meals  insulin lispro (HumaLOG) corrective regimen sliding scale   SubCutaneous at bedtime  ketotifen 0.025% Ophthalmic Solution 1 Drop(s) Both EYES daily  montelukast 10 milliGRAM(s) Oral daily  potassium chloride    Tablet ER 40 milliEquivalent(s) Oral every 4 hours    MEDICATIONS  (PRN):  dextrose 40% Gel 15 Gram(s) Oral once PRN Blood Glucose LESS THAN 70 milliGRAM(s)/deciliter  glucagon  Injectable 1 milliGRAM(s) IntraMuscular once PRN Glucose LESS THAN 70 milligrams/deciliter  heparin  Injectable 6500 Unit(s) IV Push every 6 hours PRN For aPTT less than 40  heparin  Injectable 3000 Unit(s) IV Push every 6 hours PRN For aPTT between 40 - 57        ALLERGIES:  No Known Allergies      LABS:                                                11.3   7.29  )-----------( 338      ( 09 Jan 2019 07:05 )             36.7   01-09    136  |  101  |  13  ----------------------------<  96  3.4<L>   |  24  |  0.72    Ca    9.2      09 Jan 2019 07:05  Mg     2.1     01-09    PT/INR - ( 09 Jan 2019 07:05 )   PT: 24.3 SEC;   INR: 2.08          PTT - ( 09 Jan 2019 12:11 )  PTT:> 200.0 SEC    < from: EMILI w/TTE (w/3D Echo) (01.07.19 @ 15:03) >  ------------------------------  CONCLUSIONS:  1. Mitral annular calcification and calcified mitral  leaflets with decreased diastolic opening.  In some views  the valve has a rheumatic appearance. Mild-moderate mitral  regurgitation. Mean transmitral valve gradient equals 8 mm  Hg, estimated mitral valve area equals 1.1 sqcm (by  pressure half time equation), consistent with moderate to  severe mitral stenosis.  2. Calcified trileaflet aortic valve with decreased  opening. Peak transaortic valve gradient equals 17 mm Hg,  mean transaortic valve gradient equals 12 mm Hg, consistent  with mild aortic stenosis. No aortic valve regurgitation  seen.  3. Normal aortic root.  Mild non-mobile atherosclerotic  plaque in the aortic arch.  Severe, bulky, mobile  atherosclerotic plaque in the descending thoracic aorta.  4. Mildly dilated left atrium.  LA volume index = 35 cc/m2.   A very large (about  3.0 cm X 1.4 cm) thrombus is  visualized in the left atrial appendage.  5. Normal left ventricular internal dimensions and wall  thicknesses.  6. Normal left ventricular systolic function. No segmental  wall motion abnormalities.  7. Normal right ventricular size and function.  8. Contrast injection demonstrates no evidence of a patent  foramen ovale.  *** No previous Echo exam.  -------------------------------------------------------    < end of copied text >

## 2019-01-09 NOTE — PROGRESS NOTE ADULT - PROBLEM SELECTOR PLAN 2
on coumadin and AC. To keep INR =2-3  1/7: cont ac  1/8: cont AC.  1/9: cont AC: CTA is limited for pe: but she has already been on AC:

## 2019-01-09 NOTE — PROGRESS NOTE ADULT - PROBLEM SELECTOR PLAN 6
as above  1/7: No asthma exacerbation  1/8: No wheezing  1/9: stable: no wheezing: outpatient full PFT

## 2019-01-09 NOTE — PROGRESS NOTE ADULT - SUBJECTIVE AND OBJECTIVE BOX
Patient is a 78y old  Female who presents with a chief complaint of Palpitations and SOB (09 Jan 2019 12:59)      Any change in ROS: She seems  better:     MEDICATIONS  (STANDING):  atorvastatin 20 milliGRAM(s) Oral at bedtime  buDESOnide  80 MICROgram(s)/formoterol 4.5 MICROgram(s) Inhaler 2 Puff(s) Inhalation two times a day  dextrose 5%. 1000 milliLiter(s) (50 mL/Hr) IV Continuous <Continuous>  dextrose 50% Injectable 12.5 Gram(s) IV Push once  dextrose 50% Injectable 25 Gram(s) IV Push once  dextrose 50% Injectable 25 Gram(s) IV Push once  diltiazem    milliGRAM(s) Oral daily  dorzolamide 2% Ophthalmic Solution 1 Drop(s) Both EYES <User Schedule>  furosemide    Tablet 20 milliGRAM(s) Oral daily  heparin  Infusion.  Unit(s)/Hr (14 mL/Hr) IV Continuous <Continuous>  influenza   Vaccine 0.5 milliLiter(s) IntraMuscular once  insulin lispro (HumaLOG) corrective regimen sliding scale   SubCutaneous three times a day before meals  insulin lispro (HumaLOG) corrective regimen sliding scale   SubCutaneous at bedtime  ketotifen 0.025% Ophthalmic Solution 1 Drop(s) Both EYES daily  montelukast 10 milliGRAM(s) Oral daily  potassium chloride    Tablet ER 40 milliEquivalent(s) Oral every 4 hours    MEDICATIONS  (PRN):  dextrose 40% Gel 15 Gram(s) Oral once PRN Blood Glucose LESS THAN 70 milliGRAM(s)/deciliter  glucagon  Injectable 1 milliGRAM(s) IntraMuscular once PRN Glucose LESS THAN 70 milligrams/deciliter  heparin  Injectable 6500 Unit(s) IV Push every 6 hours PRN For aPTT less than 40  heparin  Injectable 3000 Unit(s) IV Push every 6 hours PRN For aPTT between 40 - 57    Vital Signs Last 24 Hrs  T(C): 36.3 (09 Jan 2019 14:21), Max: 37 (08 Jan 2019 20:41)  T(F): 97.4 (09 Jan 2019 14:21), Max: 98.6 (08 Jan 2019 20:41)  HR: 67 (09 Jan 2019 14:21) (62 - 72)  BP: 103/57 (09 Jan 2019 14:21) (103/57 - 132/51)  BP(mean): --  RR: 18 (09 Jan 2019 14:21) (16 - 18)  SpO2: 100% (09 Jan 2019 14:21) (98% - 100%)    I&O's Summary        Physical Exam:   GENERAL: Obese  HEENT: MERLINE/   Atraumatic, Normocephalic  ENMT: No tonsillar erythema, exudates, or enlargement; Moist mucous membranes, Good dentition, No lesions  NECK: Supple, No JVD, Normal thyroid  CHEST/LUNG: Clear to auscultaion, ; No rales, rhonchi, wheezing, or rubs  CVS: Regular rate and rhythm; No murmurs, rubs, or gallops  GI: : Soft, Nontender, Nondistended; Bowel sounds present  NERVOUS SYSTEM:  Alert & Oriented X3  EXTREMITIES:  - edema  LYMPH: No lymphadenopathy noted  SKIN: No rashes or lesions  ENDOCRINOLOGY: No Thyromegaly  PSYCH: Appropriate    Labs:  23                            11.3   7.29  )-----------( 338      ( 09 Jan 2019 07:05 )             36.7                         11.1   8.36  )-----------( 343      ( 08 Jan 2019 05:00 )             35.3                         11.3   7.25  )-----------( 347      ( 07 Jan 2019 06:15 )             35.5                         10.5   7.20  )-----------( 334      ( 06 Jan 2019 06:00 )             33.8     01-09    136  |  101  |  13  ----------------------------<  96  3.4<L>   |  24  |  0.72  01-08    141  |  105  |  18  ----------------------------<  90  3.9   |  22  |  0.69  01-07    139  |  101  |  19  ----------------------------<  101<H>  3.6   |  24  |  0.87  01-06    138  |  100  |  10  ----------------------------<  94  3.6   |  24  |  0.75    Ca    9.2      09 Jan 2019 07:05  Ca    9.2      08 Jan 2019 05:00  Mg     2.1     01-09  Mg     1.9     01-08      CAPILLARY BLOOD GLUCOSE      POCT Blood Glucose.: 130 mg/dL (09 Jan 2019 12:41)  POCT Blood Glucose.: 114 mg/dL (09 Jan 2019 08:47)  POCT Blood Glucose.: 141 mg/dL (08 Jan 2019 21:03)  POCT Blood Glucose.: 103 mg/dL (08 Jan 2019 17:13)        PT/INR - ( 09 Jan 2019 07:05 )   PT: 24.3 SEC;   INR: 2.08          PTT - ( 09 Jan 2019 12:11 )  PTT:> 200.0 SEC          RECENT CULTURES:        RESPIRATORY CULTURES:    < from: CT Head No Cont (01.05.19 @ 08:39) >  COMPARISON STUDY: None available.    FINDINGS:     There is enlargement of ventricles and sulci greater expected for age is   with volume loss more pronounced in the cerebellar region. There is   decreased attenuation in the white matter, likely microvascular disease.   There is there is no intraventricular or extra axial hemorrhage or   midline shift. The basal cisterns are patent.    There is a prominent partially empty sella. There is atherosclerotic   vascular calcification of the carotid siphons. The visualized paranasal   sinuses, mastoid air cells and middle ear cavities are clear.    The soft tissues of the scalp are unremarkable. The calvarium is intact.    IMPRESSION:     Volume loss and white matter decreased attenuation likely microvascular   disease, there is no acute hemorrhage or midline shift, if symptoms   persist consider follow up head CT or MRI if no contraindications      < from: CT Angio Chest w/ IV Cont (01.05.19 @ 08:39) >  RA: Small bilateral pleural effusions.  VESSELS: As above.  HEART: Heart size is normal. No pericardial effusion. Dense mitral   annular calcification. Aortic valve and coronary artery atherosclerotic   calcifications.  MEDIASTINUM AND VIKTORIYA: No lymphadenopathy.  CHEST WALL AND LOWER NECK: Within normal limits.  VISUALIZED UPPER ABDOMEN: Small hiatal hernia. A partially imaged fat   density lesion at the periphery of the right renal interpole.  BONES: Multilevel degenerative change.    IMPRESSION:     Limited evaluation of the distal segmental and subsegmental pulmonary   arteries due to streak and motion artifact. No central, main, lobar or   proximal segmental pulmonary embolism.     Small bilateral pleural effusions with likely pulmonary edema.    A partially imaged fat density lesion in the periphery of the right renal   interpole is indeterminate, may reflect an angiomyolipoma. For more   definitive characterization, consider nonurgent contrast-enhanced renal   protocol CT on an outpatient basis.          < from: EMILI w/TTE (w/3D Echo) (01.07.19 @ 15:03) >  eft Ventricle: Normal left ventricular systolic function.  No segmental wall motion abnormalities. Normal left  ventricular internal dimensions and wall thicknesses.  Right Heart: Normal right atrium. Normal right ventricular  size and function. Normal tricuspid valve.  Mild-moderate  tricuspid regurgitation. Normal pulmonic valve.  Pericardium/PleuraNormal pericardium with no pericardial  effusion.  ------------------------------------------------------------------------  CONCLUSIONS:  1. Mitral annular calcification and calcified mitral  leaflets with decreased diastolic opening.  In some views  the valve has a rheumatic appearance. Mild-moderate mitral  regurgitation. Mean transmitral valve gradient equals 8 mm  Hg, estimated mitral valve area equals 1.1 sqcm (by  pressure half time equation), consistent with moderate to  severe mitral stenosis.  2. Calcified trileaflet aortic valve with decreased  opening. Peak transaortic valve gradient equals 17 mm Hg,  mean transaortic valve gradient equals 12 mm Hg, consistent  with mild aortic stenosis. No aortic valve regurgitation  seen.  3. Normal aortic root.  Mild non-mobile atherosclerotic  plaque in the aortic arch.  Severe, bulky, mobile  atherosclerotic plaque in the descending thoracic aorta.  4. Mildly dilated left atrium.  LA volume index = 35 cc/m2.   A very large (about  3.0 cm X 1.4 cm) thrombus is  visualized in the left atrial appendage.  5. Normal left ventricular internal dimensions and wall  thicknesses.  6. Normal left ventricular systolic function. No segmental  wall motion abnormalities.  7. Normal right ventricular size and function.  8. Contrast injection demonstrates no evidence of a patent  foramen ovale.  *** No previous Echo exam.  ------------------------------------------------------------------------  Confirmed on  1/7/2019 - 18:49:43 by Michael Alfaro M.D.  ------------------------------------------------------------------------    < end of copied text >          DELICIA SANDOVAL M.D., RADIOLOGY RESIDENT  This document has been electronically signed.  WILLOW DEE M.D., ATTENDING RADIOLOGIST  This document has been electronically signed. Jan 5 2019 10:31AM    < end of copied text >          BONY JENKINS M.D., RADIOLOGY RESIDENT  This document has been electronically signed.  MORGAN JORGE M.D., ATTENDING RADIOLOGIST  This document has been electronically signed. Jan 5 2019 12:54PM        < end of copied text >        Studies  Chest X-RAY  CT SCAN Chest   Venous Dopplers: LE:   CT Abdomen  Others

## 2019-01-09 NOTE — PROVIDER CONTACT NOTE (CRITICAL VALUE NOTIFICATION) - SITUATION
lab representative spoke to unit reception desk, reporting critical lab value: appt greater than 200.

## 2019-01-10 ENCOUNTER — TRANSCRIPTION ENCOUNTER (OUTPATIENT)
Age: 79
End: 2019-01-10

## 2019-01-10 ENCOUNTER — INPATIENT (INPATIENT)
Facility: HOSPITAL | Age: 79
LOS: 12 days | Discharge: HOME | DRG: 246 | End: 2019-01-23
Attending: INTERNAL MEDICINE | Admitting: THORACIC SURGERY (CARDIOTHORACIC VASCULAR SURGERY)
Payer: COMMERCIAL

## 2019-01-10 VITALS
OXYGEN SATURATION: 96 % | TEMPERATURE: 98 F | SYSTOLIC BLOOD PRESSURE: 110 MMHG | DIASTOLIC BLOOD PRESSURE: 61 MMHG | RESPIRATION RATE: 17 BRPM | HEART RATE: 76 BPM

## 2019-01-10 VITALS
DIASTOLIC BLOOD PRESSURE: 67 MMHG | HEART RATE: 75 BPM | RESPIRATION RATE: 18 BRPM | SYSTOLIC BLOOD PRESSURE: 129 MMHG | OXYGEN SATURATION: 99 %

## 2019-01-10 DIAGNOSIS — Z98.890 OTHER SPECIFIED POSTPROCEDURAL STATES: Chronic | ICD-10-CM

## 2019-01-10 DIAGNOSIS — Q23.2 CONGENITAL MITRAL STENOSIS: ICD-10-CM

## 2019-01-10 LAB
ANION GAP SERPL CALC-SCNC: 11 MEQ/L — SIGNIFICANT CHANGE UP (ref 7–14)
APTT BLD: 64.6 SEC — HIGH (ref 27.5–36.3)
APTT BLD: 71 SEC — HIGH (ref 27.5–36.3)
BASOPHILS # BLD AUTO: 0.02 K/UL — SIGNIFICANT CHANGE UP (ref 0–0.2)
BASOPHILS NFR BLD AUTO: 0.2 % — SIGNIFICANT CHANGE UP (ref 0–2)
BUN SERPL-MCNC: 11 MG/DL — SIGNIFICANT CHANGE UP (ref 7–23)
CALCIUM SERPL-MCNC: 9.4 MG/DL — SIGNIFICANT CHANGE UP (ref 8.4–10.5)
CHLORIDE SERPL-SCNC: 104 MMOL/L — SIGNIFICANT CHANGE UP (ref 98–107)
CO2 SERPL-SCNC: 21 MMOL/L — LOW (ref 22–31)
CREAT SERPL-MCNC: 0.64 MG/DL — SIGNIFICANT CHANGE UP (ref 0.5–1.3)
EOSINOPHIL # BLD AUTO: 0.26 K/UL — SIGNIFICANT CHANGE UP (ref 0–0.5)
EOSINOPHIL NFR BLD AUTO: 2.7 % — SIGNIFICANT CHANGE UP (ref 0–6)
GLUCOSE BLDC GLUCOMTR-MCNC: 86 MG/DL — SIGNIFICANT CHANGE UP (ref 70–99)
GLUCOSE SERPL-MCNC: 104 MG/DL — HIGH (ref 70–99)
HCT VFR BLD CALC: 33.4 % — LOW (ref 34.5–45)
HCT VFR BLD CALC: 33.4 % — LOW (ref 34.5–45)
HGB BLD-MCNC: 10.4 G/DL — LOW (ref 11.5–15.5)
HGB BLD-MCNC: 10.4 G/DL — LOW (ref 11.5–15.5)
IMM GRANULOCYTES NFR BLD AUTO: 0.3 % — SIGNIFICANT CHANGE UP (ref 0–1.5)
INR BLD: 1.62 — HIGH (ref 0.88–1.17)
LYMPHOCYTES # BLD AUTO: 1.86 K/UL — SIGNIFICANT CHANGE UP (ref 1–3.3)
LYMPHOCYTES # BLD AUTO: 19.1 % — SIGNIFICANT CHANGE UP (ref 13–44)
MAGNESIUM SERPL-MCNC: 2 MG/DL — SIGNIFICANT CHANGE UP (ref 1.6–2.6)
MCHC RBC-ENTMCNC: 26.3 PG — LOW (ref 27–34)
MCHC RBC-ENTMCNC: 26.3 PG — LOW (ref 27–34)
MCHC RBC-ENTMCNC: 31.1 % — LOW (ref 32–36)
MCHC RBC-ENTMCNC: 31.1 % — LOW (ref 32–36)
MCV RBC AUTO: 84.3 FL — SIGNIFICANT CHANGE UP (ref 80–100)
MCV RBC AUTO: 84.3 FL — SIGNIFICANT CHANGE UP (ref 80–100)
MONOCYTES # BLD AUTO: 0.72 K/UL — SIGNIFICANT CHANGE UP (ref 0–0.9)
MONOCYTES NFR BLD AUTO: 7.4 % — SIGNIFICANT CHANGE UP (ref 2–14)
NEUTROPHILS # BLD AUTO: 6.85 K/UL — SIGNIFICANT CHANGE UP (ref 1.8–7.4)
NEUTROPHILS NFR BLD AUTO: 70.3 % — SIGNIFICANT CHANGE UP (ref 43–77)
NRBC # FLD: 0 K/UL — LOW (ref 25–125)
NRBC # FLD: 0 K/UL — LOW (ref 25–125)
PLATELET # BLD AUTO: 309 K/UL — SIGNIFICANT CHANGE UP (ref 150–400)
PLATELET # BLD AUTO: 309 K/UL — SIGNIFICANT CHANGE UP (ref 150–400)
PMV BLD: 9.8 FL — SIGNIFICANT CHANGE UP (ref 7–13)
PMV BLD: 9.8 FL — SIGNIFICANT CHANGE UP (ref 7–13)
POTASSIUM SERPL-MCNC: 4.6 MMOL/L — SIGNIFICANT CHANGE UP (ref 3.5–5.3)
POTASSIUM SERPL-SCNC: 4.6 MMOL/L — SIGNIFICANT CHANGE UP (ref 3.5–5.3)
PROTHROM AB SERPL-ACNC: 18.2 SEC — HIGH (ref 9.8–13.1)
RBC # BLD: 3.96 M/UL — SIGNIFICANT CHANGE UP (ref 3.8–5.2)
RBC # BLD: 3.96 M/UL — SIGNIFICANT CHANGE UP (ref 3.8–5.2)
RBC # FLD: 15.4 % — HIGH (ref 10.3–14.5)
RBC # FLD: 15.4 % — HIGH (ref 10.3–14.5)
SODIUM SERPL-SCNC: 136 MMOL/L — SIGNIFICANT CHANGE UP (ref 135–145)
WBC # BLD: 9.74 K/UL — SIGNIFICANT CHANGE UP (ref 3.8–10.5)
WBC # BLD: 9.74 K/UL — SIGNIFICANT CHANGE UP (ref 3.8–10.5)
WBC # FLD AUTO: 9.74 K/UL — SIGNIFICANT CHANGE UP (ref 3.8–10.5)
WBC # FLD AUTO: 9.74 K/UL — SIGNIFICANT CHANGE UP (ref 3.8–10.5)

## 2019-01-10 PROCEDURE — 71045 X-RAY EXAM CHEST 1 VIEW: CPT | Mod: 26

## 2019-01-10 PROCEDURE — 99223 1ST HOSP IP/OBS HIGH 75: CPT

## 2019-01-10 PROCEDURE — 99232 SBSQ HOSP IP/OBS MODERATE 35: CPT

## 2019-01-10 RX ORDER — FUROSEMIDE 40 MG
1 TABLET ORAL
Qty: 0 | Refills: 0 | COMMUNITY
Start: 2019-01-10

## 2019-01-10 RX ORDER — FUROSEMIDE 40 MG
20 TABLET ORAL DAILY
Qty: 0 | Refills: 0 | Status: DISCONTINUED | OUTPATIENT
Start: 2019-01-10 | End: 2019-01-14

## 2019-01-10 RX ORDER — WARFARIN SODIUM 2.5 MG/1
1 TABLET ORAL
Qty: 0 | Refills: 0 | COMMUNITY

## 2019-01-10 RX ORDER — HEPARIN SODIUM 5000 [USP'U]/ML
3000 INJECTION INTRAVENOUS; SUBCUTANEOUS EVERY 6 HOURS
Qty: 0 | Refills: 0 | Status: DISCONTINUED | OUTPATIENT
Start: 2019-01-10 | End: 2019-01-10

## 2019-01-10 RX ORDER — SODIUM CHLORIDE 9 MG/ML
1000 INJECTION INTRAMUSCULAR; INTRAVENOUS; SUBCUTANEOUS
Qty: 0 | Refills: 0 | Status: DISCONTINUED | OUTPATIENT
Start: 2019-01-10 | End: 2019-01-10

## 2019-01-10 RX ORDER — AMLODIPINE AND VALSARTAN 5; 320 MG/1; MG/1
1 TABLET, FILM COATED ORAL
Qty: 0 | Refills: 0 | COMMUNITY

## 2019-01-10 RX ORDER — BUDESONIDE AND FORMOTEROL FUMARATE DIHYDRATE 160; 4.5 UG/1; UG/1
2 AEROSOL RESPIRATORY (INHALATION)
Qty: 0 | Refills: 0 | Status: DISCONTINUED | OUTPATIENT
Start: 2019-01-10 | End: 2019-01-14

## 2019-01-10 RX ORDER — HEPARIN SODIUM 5000 [USP'U]/ML
400 INJECTION INTRAVENOUS; SUBCUTANEOUS
Qty: 25000 | Refills: 0 | Status: DISCONTINUED | OUTPATIENT
Start: 2019-01-10 | End: 2019-01-10

## 2019-01-10 RX ORDER — ASPIRIN/CALCIUM CARB/MAGNESIUM 324 MG
81 TABLET ORAL DAILY
Qty: 0 | Refills: 0 | Status: DISCONTINUED | OUTPATIENT
Start: 2019-01-10 | End: 2019-01-14

## 2019-01-10 RX ORDER — MONTELUKAST 4 MG/1
10 TABLET, CHEWABLE ORAL DAILY
Qty: 0 | Refills: 0 | Status: DISCONTINUED | OUTPATIENT
Start: 2019-01-10 | End: 2019-01-14

## 2019-01-10 RX ORDER — DILTIAZEM HCL 120 MG
1 CAPSULE, EXT RELEASE 24 HR ORAL
Qty: 0 | Refills: 0 | COMMUNITY
Start: 2019-01-10

## 2019-01-10 RX ORDER — SODIUM CHLORIDE 9 MG/ML
3 INJECTION INTRAMUSCULAR; INTRAVENOUS; SUBCUTANEOUS EVERY 8 HOURS
Qty: 0 | Refills: 0 | Status: DISCONTINUED | OUTPATIENT
Start: 2019-01-10 | End: 2019-01-14

## 2019-01-10 RX ORDER — HEPARIN SODIUM 5000 [USP'U]/ML
4 INJECTION INTRAVENOUS; SUBCUTANEOUS
Qty: 0 | Refills: 0 | COMMUNITY
Start: 2019-01-10

## 2019-01-10 RX ORDER — HEPARIN SODIUM 5000 [USP'U]/ML
6500 INJECTION INTRAVENOUS; SUBCUTANEOUS EVERY 6 HOURS
Qty: 0 | Refills: 0 | Status: DISCONTINUED | OUTPATIENT
Start: 2019-01-10 | End: 2019-01-10

## 2019-01-10 RX ORDER — ATORVASTATIN CALCIUM 80 MG/1
20 TABLET, FILM COATED ORAL AT BEDTIME
Qty: 0 | Refills: 0 | Status: DISCONTINUED | OUTPATIENT
Start: 2019-01-10 | End: 2019-01-14

## 2019-01-10 RX ORDER — DILTIAZEM HCL 120 MG
240 CAPSULE, EXT RELEASE 24 HR ORAL DAILY
Qty: 0 | Refills: 0 | Status: DISCONTINUED | OUTPATIENT
Start: 2019-01-10 | End: 2019-01-14

## 2019-01-10 RX ORDER — METFORMIN HYDROCHLORIDE 850 MG/1
1 TABLET ORAL
Qty: 0 | Refills: 0 | COMMUNITY

## 2019-01-10 RX ORDER — HEPARIN SODIUM 5000 [USP'U]/ML
400 INJECTION INTRAVENOUS; SUBCUTANEOUS
Qty: 25000 | Refills: 0 | Status: DISCONTINUED | OUTPATIENT
Start: 2019-01-10 | End: 2019-01-13

## 2019-01-10 RX ADMIN — Medication 240 MILLIGRAM(S): at 06:09

## 2019-01-10 RX ADMIN — SODIUM CHLORIDE 100 MILLILITER(S): 9 INJECTION INTRAMUSCULAR; INTRAVENOUS; SUBCUTANEOUS at 15:14

## 2019-01-10 RX ADMIN — Medication 20 MILLIGRAM(S): at 06:09

## 2019-01-10 RX ADMIN — HEPARIN SODIUM 400 UNIT(S)/HR: 5000 INJECTION INTRAVENOUS; SUBCUTANEOUS at 05:37

## 2019-01-10 RX ADMIN — HEPARIN SODIUM 400 UNIT(S)/HR: 5000 INJECTION INTRAVENOUS; SUBCUTANEOUS at 20:05

## 2019-01-10 RX ADMIN — ATORVASTATIN CALCIUM 20 MILLIGRAM(S): 80 TABLET, FILM COATED ORAL at 21:01

## 2019-01-10 RX ADMIN — MONTELUKAST 10 MILLIGRAM(S): 4 TABLET, CHEWABLE ORAL at 16:30

## 2019-01-10 NOTE — PROGRESS NOTE ADULT - PROVIDER SPECIALTY LIST ADULT
Cardiology
Electrophysiology
Internal Medicine
Pulmonology

## 2019-01-10 NOTE — H&P ADULT - NSHPSOCIALHISTORY_GEN_ALL_CORE
SOCIAL HISTORY:  Smoker: [ ] Yes  [ ] No        PACK YEARS:                         WHEN QUIT?  ETOH use: [ ] Yes  [ ] No              FREQUENCY / QUANTITY:  Ilicit Drug use:  [ ] Yes  [ ] No  Occupation:  Live with:  Assist device use: SOCIAL HISTORY:  Smoker: [ ] Yes  [x ] No        PACK YEARS:                         WHEN QUIT?  ETOH use: [ ] Yes  [ x] No              FREQUENCY / QUANTITY:  Ilicit Drug use:  [ ] Yes  [x ] No  Occupation:  Live with:  Assist device use:

## 2019-01-10 NOTE — PROGRESS NOTE ADULT - PROBLEM SELECTOR PLAN 5
controlled: no exacerbation: cont BD and singulair: no wheezing  1/8: No wheezing: not SOB due to copd  1/10: quiet vesicular without wheezing

## 2019-01-10 NOTE — DISCHARGE NOTE ADULT - SECONDARY DIAGNOSIS.
COPD (chronic obstructive pulmonary disease) DM (diabetes mellitus) DVT (deep venous thrombosis) Atrial fibrillation HLD (hyperlipidemia) Left atrial thrombus

## 2019-01-10 NOTE — DISCHARGE NOTE ADULT - NS AS ACTIVITY OBS
Do not make important decisions/Do not drive or operate machinery/Showering allowed/No Heavy lifting/straining/Walking-Indoors allowed

## 2019-01-10 NOTE — DISCHARGE NOTE ADULT - CARE PLAN
Principal Discharge DX:	CAD (coronary artery disease)  Goal:	need mitral valve replacement , fistula ligation and coronary artery bypass  Assessment and plan of treatment:	for possible mitral valve replacement ( for severe litral stenosis) and open heart surgery for transfer to [facility] Dr Ingram's care- needs bypass for left main disease and fistula ligation for proximal LAD and RCA coronary fistula formation to Pulmonary artery.   continue aspirin, cardizem , zocor  Secondary Diagnosis:	Left atrial thrombus  Goal:	Possible Left atrial appendage clot extraction  Assessment and plan of treatment:	Hold coumadin for open heart surgery. Possible left atrial clot extraction and ligation during open heart surgery  continue heparin drip- restart 6 hours after procedure if RRA site without hematoma, pain , bleeding.  Secondary Diagnosis:	COPD (chronic obstructive pulmonary disease)  Assessment and plan of treatment:	singulair, symbicort  Secondary Diagnosis:	DM (diabetes mellitus)  Assessment and plan of treatment:	insulin sliding scale and finger sticks  hold all oral hypoglycemics  1800 calorie diabetic diet/ low salt, low fat , low cholesterol  Secondary Diagnosis:	DVT (deep venous thrombosis)  Assessment and plan of treatment:	continue heparin drip for chronic DVT  Secondary Diagnosis:	Atrial fibrillation  Assessment and plan of treatment:	on heparin drip   On cardizem oral for rate control  Secondary Diagnosis:	HLD (hyperlipidemia)  Assessment and plan of treatment:	continue statin

## 2019-01-10 NOTE — H&P ADULT - ASSESSMENT
Assessment:  78y Female presents with     Plan: Assessment:  78y Female presents in transfer from [facility] where she was evaluated and treated for new onset atrial fibrillation with EMILI and DCC, and now is transferred to CT surgery at [facility] for evaluation and pre-operative workup of Mitral regurgitation and mitral stenosis as well as CAD with Proximal LAD stenosis and LAD to PA fistula.

## 2019-01-10 NOTE — PROGRESS NOTE ADULT - PROBLEM SELECTOR PROBLEM 1
New onset atrial fibrillation

## 2019-01-10 NOTE — DISCHARGE NOTE ADULT - MEDICATION SUMMARY - MEDICATIONS TO TAKE
I will START or STAY ON the medications listed below when I get home from the hospital:    Aspirin Enteric Coated 81 mg oral delayed release tablet  -- 1 tab(s) by mouth once a day  -- Indication: For CAD    Cardizem  mg/24 hours oral capsule, extended release  -- 1 cap(s) by mouth once a day  -- Indication: For Atrial fibrillation    Lipitor 20 mg oral tablet  -- 1 tab(s) by mouth once a day  -- Indication: For HLD (hyperlipidemia)    Symbicort 80 mcg-4.5 mcg/inh inhalation aerosol  -- 2 puff(s) inhaled 2 times a day  -- Indication: For COPD (chronic obstructive pulmonary disease)    Lasix 20 mg oral tablet  -- 1 tab(s) by mouth once a day  -- Indication: For Hypertension    montelukast 10 mg oral tablet  -- 1 tab(s) by mouth once a day  -- Indication: For Asthma    Azopt 1% ophthalmic suspension  -- 1 drop(s) to each affected eye 2 times a day  -- Indication: For eyedrops    azelastine 0.05% ophthalmic solution  -- 1 drop(s) to each affected eye 2 times a day  -- Indication: For eye drops I will START or STAY ON the medications listed below when I get home from the hospital:    Aspirin Enteric Coated 81 mg oral delayed release tablet  -- 1 tab(s) by mouth once a day  -- Indication: For CAD    Cardizem  mg/24 hours oral capsule, extended release  -- 1 cap(s) by mouth once a day  -- Indication: For Atrial fibrillation    heparin 100 units/mL-D5% intravenous solution  -- 4 milliliter(s) intravenous   -- Indication: For CAD (coronary artery disease)    Lipitor 20 mg oral tablet  -- 1 tab(s) by mouth once a day  -- Indication: For HLD (hyperlipidemia)    Symbicort 80 mcg-4.5 mcg/inh inhalation aerosol  -- 2 puff(s) inhaled 2 times a day  -- Indication: For COPD (chronic obstructive pulmonary disease)    Lasix 20 mg oral tablet  -- 1 tab(s) by mouth once a day  -- Indication: For Hypertension    montelukast 10 mg oral tablet  -- 1 tab(s) by mouth once a day  -- Indication: For Asthma    Azopt 1% ophthalmic suspension  -- 1 drop(s) to each affected eye 2 times a day  -- Indication: For eyedrops    azelastine 0.05% ophthalmic solution  -- 1 drop(s) to each affected eye 2 times a day  -- Indication: For eye drops

## 2019-01-10 NOTE — PROGRESS NOTE ADULT - SUBJECTIVE AND OBJECTIVE BOX
Patient is a 78y old  Female who presents with a chief complaint of Palpitations and SOB (09 Jan 2019 15:15)  Any change in ROS:  For cath today    MEDICATIONS  (STANDING):  Heparin gtt  coumadin--holding  atorvastatin 20 milliGRAM(s) Oral at bedtime  buDESOnide  80 MICROgram(s)/formoterol 4.5 MICROgram(s) Inhaler 2 Puff(s) Inhalation two times a day  diltiazem    milliGRAM(s) Oral daily  dorzolamide 2% Ophthalmic Solution 1 Drop(s) Both EYES <User Schedule>  furosemide    Tablet 20 milliGRAM(s) Oral daily  influenza   Vaccine 0.5 milliLiter(s) IntraMuscular once  insulin lispro (HumaLOG) corrective regimen sliding scale   SubCutaneous three times a day before meals  insulin lispro (HumaLOG) corrective regimen sliding scale   SubCutaneous at bedtime  ketotifen 0.025% Ophthalmic Solution 1 Drop(s) Both EYES daily  montelukast 10 milliGRAM(s) Oral daily    MEDICATIONS  (PRN):  dextrose 40% Gel 15 Gram(s) Oral once PRN Blood Glucose LESS THAN 70 milliGRAM(s)/deciliter  glucagon  Injectable 1 milliGRAM(s) IntraMuscular once PRN Glucose LESS THAN 70 milligrams/deciliter    Vital Signs Last 24 Hrs  T(C): 36.6 (10 Joseph 2019 05:35), Max: 36.6 (09 Jan 2019 22:13)  T(F): 97.8 (10 Joseph 2019 05:35), Max: 97.9 (09 Jan 2019 22:13)  HR: 66 (10 Joseph 2019 05:35) (66 - 79)  BP: 128/70 (10 Joseph 2019 05:35) (103/57 - 128/70)  RR: 18 (10 Joseph 2019 05:35) (18 - 18)  SpO2: 98% (10 Joseph 2019 05:35) (98% - 100%)    I&O's Summary    Physical Exam:   GENERAL: Obese  HEENT: MERLINE/   Atraumatic, Normocephalic  ENMT: No tonsillar erythema, exudates, or enlargement; Moist mucous membranes, Good dentition, No lesions  NECK: Supple, No JVD, Normal thyroid  CHEST/LUNG: Clear to auscultation, ; No rales, rhonchi, wheezing, or rubs  CVS: Regular rate and rhythm; No murmurs, rubs, or gallops  GI: : Soft, Nontender, Nondistended; Bowel sounds present  NERVOUS SYSTEM:  Alert & Oriented X3  EXTREMITIES:  - edema  LYMPH: No lymphadenopathy noted  SKIN: No rashes or lesions  ENDOCRINOLOGY: No Thyromegaly  PSYCH: Appropriate, pleasant      Labs:  23                            10.4   9.74  )-----------( 309      ( 10 Joseph 2019 04:30 )             33.4                         11.3   7.29  )-----------( 338      ( 09 Jan 2019 07:05 )             36.7                         11.1   8.36  )-----------( 343      ( 08 Jan 2019 05:00 )             35.3                         11.3   7.25  )-----------( 347      ( 07 Jan 2019 06:15 )             35.5     01-10    136  |  104  |  11  ----------------------------<  104<H>  4.6   |  21<L>  |  0.64  01-09    136  |  101  |  13  ----------------------------<  96  3.4<L>   |  24  |  0.72  01-08    141  |  105  |  18  ----------------------------<  90  3.9   |  22  |  0.69  01-07    139  |  101  |  19  ----------------------------<  101<H>  3.6   |  24  |  0.87    Ca    9.4      10 Joseph 2019 04:30  Ca    9.2      09 Jan 2019 07:05  Mg     2.0     01-10  Mg     2.1     01-09      CAPILLARY BLOOD GLUCOSE  POCT Blood Glucose.: 109 mg/dL (10 Joseph 2019 08:44)  POCT Blood Glucose.: 95 mg/dL (09 Jan 2019 21:25)  POCT Blood Glucose.: 102 mg/dL (09 Jan 2019 17:20)      PT/INR - ( 10 Joseph 2019 04:30 )   PT: 18.2 SEC;   INR: 1.62     PTT - ( 10 Joseph 2019 12:17 )  PTT:71.0 SEC          RECENT CULTURES:        RESPIRATORY CULTURES:  Studies    Cardiac cath: 1/10: pending report    CT Angio Chest w/ IV Cont (01.05.19 @ 08:39)   CTA: Streak and motion artifact limits evaluation of the distal segmental   and subsegmental pulmonary arteries at the right upper lobe and left   lower lobe. No central, main, lobar or proximal segmental pulmonary   embolism. Thoracic aorta and main pulmonary artery are normal in caliber.   Atherosclerotic calcification of the thoracic aorta.    CHEST:     LUNGS AND LARGE AIRWAYS: Evaluation limited by respiratory motion. Patent   central airways. Mild interlobular septal thickening with nonspecific   groundglass opacities and peribronchial thickening at the lung bases, may   reflect pulmonary edema. A calcified granuloma in the right lower lobe.  PLEURA: Small bilateral pleural effusions.  VESSELS: As above.  HEART: Heart size is normal. No pericardial effusion. Dense mitral   annular calcification. Aortic valve and coronary artery atherosclerotic   calcifications.  MEDIASTINUM AND VIKTORIYA: No lymphadenopathy.  CHEST WALL AND LOWER NECK: Within normal limits.  VISUALIZED UPPER ABDOMEN: Small hiatal hernia. A partially imaged fat   density lesion at the periphery of the right renal interpole.  BONES: Multilevel degenerative change.    IMPRESSION:     Limited evaluation of the distal segmental and subsegmental pulmonary   arteries due to streak and motion artifact. No central, main, lobar or   proximal segmental pulmonary embolism.     Small bilateral pleural effusions with likely pulmonary edema.    A partially imaged fat density lesion in the periphery of the right renal   interpole is indeterminate, may reflect an angiomyolipoma. For more   definitive characterization, consider nonurgent contrast-enhanced renal   protocol CT on an outpatient basis.    Xray Chest 1 View- PORTABLE-Urgent (01.04.19 @ 22:31) >  Multiple nodular densities within the bilateral lungs. Recommend further   evaluation with chest CT.    EMILI w/ ECHO: EF- 66%, Mitral annular calcification and calcified mitral leaflets with decreased diastolic opening.  In some views the valve has a rheumatic appearance.   Mild-moderate mitral regurgitation. Mean transmitral valve gradient equals 8 mm Hg, estimated mitral valve area equals 1.1 sqcm (by pressure half time equation), consistent with moderate to severe mitral stenosis.   2. Calcified trileaflet aortic valve with decreased  opening. Peak transaortic valve gradient equals 17 mm Hg, mean transaortic valve gradient equals 12 mm Hg, consistent with mild aortic stenosis. No aortic valve regurgitation seen.   3. Normal aortic root.  Mild non-mobile atherosclerotic plaque in the aortic arch.  Severe, bulky, mobile atherosclerotic plaque in the descending thoracic aorta.  4. Mildly dilated left atrium.  LA volume index = 35 cc/m2.  A very large (about  3.0 cm X 1.4 cm) thrombus is visualized in the left atrial appendage.   5. Normal left ventricular internal dimensions and wall thicknesses. 6. Normal left ventricular systolic function. No segmental wall motion abnormalities.   7. Normal right ventricular size and function.  8. Contrast injection demonstrates no evidence of a patent foramen ovale. *** No previous Echo exam.        Venous Dopplers: LE:   CT Abdomen  Others

## 2019-01-10 NOTE — DISCHARGE NOTE ADULT - PLAN OF CARE
Hold coumadin for open heart surgery. Possible left atrial clot extraction and ligation during open heart surgery  continue heparin drip- restart 6 hours after procedure if RRA site without hematoma, pain , bleeding. singulair, symbicort insulin sliding scale and finger sticks  hold all oral hypoglycemics  1800 calorie diabetic diet/ low salt, low fat , low cholesterol continue heparin drip for chronic DVT on heparin drip   On cardizem oral for rate control continue statin need mitral valve replacement , fistula ligation and coronary artery bypass for possible mitral valve replacement ( for severe litral stenosis) and open heart surgery for transfer to [facility] Dr Ingram's care- needs bypass for left main disease and fistula ligation for proximal LAD and RCA coronary fistula formation to Pulmonary artery.   continue aspirin, cardizem , zocor Possible Left atrial appendage clot extraction

## 2019-01-10 NOTE — PROGRESS NOTE ADULT - SUBJECTIVE AND OBJECTIVE BOX
Patient s/p cardiac cath via right radial artery. Tolerated the procedure well.  No chest pain, shortness of breath, palpitations or lightheadedness.      MEDICATIONS  (STANDING):  atorvastatin 20 milliGRAM(s) Oral at bedtime  buDESOnide  80 MICROgram(s)/formoterol 4.5 MICROgram(s) Inhaler 2 Puff(s) Inhalation two times a day  dextrose 5%. 1000 milliLiter(s) (50 mL/Hr) IV Continuous <Continuous>  dextrose 50% Injectable 12.5 Gram(s) IV Push once  dextrose 50% Injectable 25 Gram(s) IV Push once  dextrose 50% Injectable 25 Gram(s) IV Push once  diltiazem    milliGRAM(s) Oral daily  dorzolamide 2% Ophthalmic Solution 1 Drop(s) Both EYES <User Schedule>  furosemide    Tablet 20 milliGRAM(s) Oral daily  influenza   Vaccine 0.5 milliLiter(s) IntraMuscular once  insulin lispro (HumaLOG) corrective regimen sliding scale   SubCutaneous three times a day before meals  insulin lispro (HumaLOG) corrective regimen sliding scale   SubCutaneous at bedtime  ketotifen 0.025% Ophthalmic Solution 1 Drop(s) Both EYES daily  montelukast 10 milliGRAM(s) Oral daily  sodium chloride 0.9%. 1000 milliLiter(s) (100 mL/Hr) IV Continuous <Continuous>    MEDICATIONS  (PRN):  dextrose 40% Gel 15 Gram(s) Oral once PRN Blood Glucose LESS THAN 70 milliGRAM(s)/deciliter  glucagon  Injectable 1 milliGRAM(s) IntraMuscular once PRN Glucose LESS THAN 70 milligrams/deciliter          Vital Signs Last 24 Hrs  T(C): 36.6 (10 Joseph 2019 05:35), Max: 36.6 (09 Jan 2019 22:13)  T(F): 97.8 (10 Joseph 2019 05:35), Max: 97.9 (09 Jan 2019 22:13)  HR: 66 (10 Joseph 2019 05:35) (66 - 79)  BP: 128/70 (10 Joseph 2019 05:35) (116/59 - 128/70)  BP(mean): --  RR: 18 (10 Joseph 2019 05:35) (18 - 18)  SpO2: 98% (10 Joseph 2019 05:35) (98% - 100%)  I&O's Detail      Physical exam:   Gen- NAD  Resp- clear to ausculation.  No wheezing, rales or rhonchi  CV- S1 and S2 RRR. Grade 2-3/6 systolic murmur.  No gallops or rubs  ABD- soft nontender + bowel sounds  EXT- no edema or calf tenderness  Neuro-grossly nonfocal    EKG/ TELEM:  Normal sinus rhythm.   No events overnight.                            10.4   9.74  )-----------( 309      ( 10 Joseph 2019 04:30 )             33.4     PT/INR - ( 10 Joseph 2019 04:30 )   PT: 18.2 SEC;   INR: 1.62          PTT - ( 10 Joseph 2019 12:17 )  PTT:71.0 SEC  01-10    136  |  104  |  11  ----------------------------<  104<H>  4.6   |  21<L>  |  0.64    Ca    9.4      10 Joseph 2019 04:30  Mg     2.0     01-10      < from: Cardiac Cath Lab - Adult (01.10.19 @ 10:54) >  VENTRICLES: Global left ventricular function was normal. EF estimated was  60 %.  VALVES: AORTIC VALVE: There was mild aortic insufficiency. MITRAL VALVE:  The mitral valve exhibited mild regurgitation.  CORONARY VESSELS: The coronary circulation is right dominant.  LM:   --  LM: Normal.  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 There was a 75 % stenosis. Lesion is at the  bifurcation of a large diagonal branch. Lesion difficult to visual with  significant vessel overlap. IFR was 0.89 but IVUS imaging revealed severe  atherosclerotic disease with calcium and IVUS mla of 4.1 mm2. IVUS  catheter was occlusive at the lesion and unable to be advanced past  lesion.  --  D1: There was a 50 % stenosis in the proximal third of the vessel  segment.  CX:   --  Circumflex: Angiography showed mild atherosclerosis with no flow  limiting lesions.  RCA:   --  RCA: Angiography showed no evidence of disease. 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  --  RPDA: Angiography showed no evidence of disease.  --  RPL1:Angiography showed no evidence of disease.  AORTA: Ascending aorta: Normal. No evidence of any aortic fistula. No  aneurysm. The PA fills from coronary flow from root injection.  COMPLICATIONS: There were no complications.  DIAGNOSTIC IMPRESSIONS: New onset chest pain, AF with RVR, and acute  diastolic HF. EMILI with mod-severe mitral stenosis and very large thrombus  in DANTE. Cath with severe proximal LAD lesion with correlating IVUS  findings and moderate diagonal disease. 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 LV function preserved.  DIAGNOSTIC RECOMMENDATIONS: Continue medical treatment of CAD, AF. CTS  evaluation for MVR/CABG possible fistula ligation/dante thrombus  extraction/dante ligation.  < from: Cardiac Cath Lab - Adult (01.10.19 @ 10:54) >  INTERVENTIONALIMPRESSIONS: New onset chest pain, AF with RVR, and acute  diastolic HF. EMILI with mod-severe mitral stenosis and very large thrombus  in DANTE. Cath with severe proximal LAD lesion with correlating IVUS  findings and moderate diagonal disease. 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 LV function preserved.  INTERVENTIONAL RECOMMENDATIONS: Continue medical treatment of CAD, AF. CTS  evaluation for MVR/CABG possible fistula ligation/dante thrombus  extraction/dante ligation.  Prepared and signed by  Michael Fitch M.D

## 2019-01-10 NOTE — DISCHARGE NOTE ADULT - CARE PROVIDER_API CALL
Michael Fitch), Cardiovascular Disease; Internal Medicine; Interventional Cardiology; Nuclear Cardiology  [address]  [address]  [place] [address]  Phone: [phone]  Fax: [phone]    Robbie Ingram), Surgery; Thoracic and Cardiac Surgery  [address] [place] [address]  Phone: [phone]  Fax: [phone]

## 2019-01-10 NOTE — DISCHARGE NOTE ADULT - PATIENT PORTAL LINK FT
You can access the [facility] Patient Portal, offered by [facility], by registering with the following website: [email or web address]

## 2019-01-10 NOTE — H&P ADULT - NSHPPHYSICALEXAM_GEN_ALL_CORE
PHYSICAL EXAM  Vital Signs Last 24 Hrs  T(C): 36.7 (10 Joseph 2019 22:15), Max: 36.7 (10 Joseph 2019 22:15)  T(F): 98 (10 Joseph 2019 22:15), Max: 98 (10 Joseph 2019 22:15)  HR: 76 (10 Joseph 2019 22:15) (66 - 76)  BP: 110/61 (10 Joseph 2019 22:15) (110/61 - 129/67)  BP(mean): --  RR: 17 (10 Joseph 2019 22:15) (17 - 18)  SpO2: 96% (10 Joseph 2019 22:15) (96% - 99%)    General: Well nourished, well developed, no acute distress.                                                         Neuro: Normal exam oriented to person/place & time with no focal motor or sensory  deficits.                    Eyes: Normal exam of conjunctiva & lids, pupils equally reactive.   ENT: Normal exam of nasal/oral mucosa with absence of cyanosis.   Neck: Normal exam of jugular veins, trachea & thyroid.   Chest: Normal lung exam with good air movement absence of wheezes, rales, or rhonchi:                                                                          CV:  Auscultation: normal [ ] S3[ ] S4[ ] Irregular [ ] Rub[ ] Clicks[ ]  Murmurs none:[ ]systolic [ ]  diastolic [ ] holosystolic [ ]  Carotids: No Bruits[ ] Other____________ Abdominal Aorta: normal [ ] nonpalpable[ ]                                                                         GI: Normal exam of abdomen, liver & spleen with no noted masses or tenderness.                                                                                              Extremities: Normal no evidence of cyanosis or deformity Edema: none[ ]trace[ ]1+[ ]2+[ ]3+[ ]4+[ ]  Lower Extremity Pulses: Right[ ] Left[ ]Varicosities[ ]  SKIN : Normal exam to inspection & palation. PHYSICAL EXAM  Vital Signs Last 24 Hrs  T(C): 36.7 (10 Joseph 2019 22:15), Max: 36.7 (10 Joseph 2019 22:15)  T(F): 98 (10 Joseph 2019 22:15), Max: 98 (10 Joseph 2019 22:15)  HR: 76 (10 Joseph 2019 22:15) (66 - 76)  BP: 110/61 (10 Joseph 2019 22:15) (110/61 - 129/67)  BP(mean): --  RR: 17 (10 Joseph 2019 22:15) (17 - 18)  SpO2: 96% (10 Joseph 2019 22:15) (96% - 99%)    General: Obese, anxious appearing female, resting comfortably in bed, denies any complaints.                                                         Neuro: Normal exam oriented to person/place & time with no focal motor or sensory  deficits.                    Eyes: Normal exam of conjunctiva & lids, pupils equally reactive.   ENT: Normal exam of nasal/oral mucosa with absence of cyanosis. Upper dentures, and lower teeth intact, no pain or signs of infection.   Neck: Normal exam of jugular veins, trachea & thyroid.   Chest: Normal lung exam with good air movement absence of wheezes, rales, or rhonchi:                                                                          CV:  Auscultation: normal [ x] S3[ ] S4[ ] Irregular [ ] Rub[ ] Clicks[ ]  Murmurs none:[ ]systolic [x ]  diastolic [ ] holosystolic [ ]  Carotids: No Bruits[x] Other____________ Abdominal Aorta: normal [x ] nonpalpable[ ]                                                                         GI: Normal exam of abdomen, liver & spleen with no noted masses or tenderness.                                                                                              Extremities: Normal no evidence of cyanosis or deformity Edema: none[ ]trace[x ]1+[ ]2+[ ]3+[ ]4+[ ]  Lower Extremity Pulses: Right[x ] Left[x ]Varicosities[ ]  SKIN : Normal exam to inspection & palation. Left groin with surgical site from prior embolectomy clean and well healed, Right groin is clean and dry with palpable pulses bilaterally, Chest is clean and dry with no signs of prior surgery or trauma.

## 2019-01-10 NOTE — H&P ADULT - NSHPREVIEWOFSYSTEMS_GEN_ALL_CORE
Review of Systems  GENERAL:  Fevers[] chills[] sweats[] fatigue[] weight loss[] weight gain []                                        NEURO:  parathesias[] seizures []  syncope []  confusion []                                                                                  EYES: glasses[]  blurry vision[]  discharge[] pain[] glaucoma []                                                                            ENMT:  difficulty hearing []  vertigo[]  dysphagia[] epistaxis[] recent dental work []                                      CV:  chest pain[] palpitations[] GRADY [] diaphoresis [] edema[]                                                                                             RESPIRATORY:  wheezing[] SOB[] cough [] sputum[] hemoptysis[]                                                                    GI:  nausea[]  vomiting []  diarrhea[] constipation [] melena []                                                                        : hematuria[ ]  dysuria[ ] urgency[] incontinence[]                                                                                              MUSKULOSKELETAL:  arthritis[ ]  joint swelling [ ] muscle weakness [ ]                                                                  SKIN/BREAST:  rash[ ] itching [ ]  hair loss[ ] masses[ ]                                                                                                PSYCH:  dementia [ ] depresion [ ] anxiety[ ]                                                                                                                  HEME/LYMPH:  bruises easily[ ] enlarged lymph nodes[ ] tender lymph nodes[ ]                                                 ENDOCRINE:  cold intolerance[ ] heat intolerance[ ] polydipsia[ ] Review of Systems  GENERAL:  Fevers[] chills[] sweats[] fatigue[] weight loss[] weight gain []                                        NEURO:  parathesias[] seizures []  syncope []  confusion [] dizziness [x]                                                                              EYES: glasses[]  blurry vision[]  discharge[] pain[] glaucoma []                                                                            ENMT:  difficulty hearing []  vertigo[]  dysphagia[] epistaxis[] recent dental work []                                      CV:  chest pain[x] palpitations[x] GRADY [x] diaphoresis [] edema[]                                                                                             RESPIRATORY:  wheezing[] SOB[x] cough [] sputum[] hemoptysis[]                                                                    GI:  nausea[]  vomiting []  diarrhea[] constipation [] melena []                                                                        : hematuria[ ]  dysuria[ ] urgency[] incontinence[]                                                                                              MUSKULOSKELETAL:  arthritis[ ]  joint swelling [ ] muscle weakness [ ]                                                                  SKIN/BREAST:  rash[ ] itching [ ]  hair loss[ ] masses[ ]                                                                                                PSYCH:  dementia [ ] depresion [ ] anxiety[ ]                                                                                                                  HEME/LYMPH:  bruises easily[ ] enlarged lymph nodes[ ] tender lymph nodes[ ]                                                 ENDOCRINE:  cold intolerance[ ] heat intolerance[ ] polydipsia[ ]

## 2019-01-10 NOTE — DISCHARGE NOTE ADULT - MEDICATION SUMMARY - MEDICATIONS TO CHANGE
I will SWITCH the dose or number of times a day I take the medications listed below when I get home from the hospital:    metFORMIN 850 mg oral tablet  -- 1 tab(s) by mouth once a day (in the morning)

## 2019-01-10 NOTE — DISCHARGE NOTE ADULT - CARE PROVIDERS DIRECT ADDRESSES
,DirectAddress_Unknown,[email or web address]

## 2019-01-10 NOTE — H&P ADULT - NSHPLABSRESULTS_GEN_ALL_CORE
LABS:                        10.4   9.74  )-----------( 309      ( 10 Joseph 2019 04:30 )             33.4     01-10    136  |  104  |  11  ----------------------------<  104<H>  4.6   |  21<L>  |  0.64    Ca    9.4      10 Joseph 2019 04:30  Mg     2.0     01-10      PT/INR - ( 10 Joseph 2019 04:30 )   PT: 18.2 SEC;   INR: 1.62          PTT - ( 10 Joseph 2019 12:17 )  PTT:71.0 SEC        Cardiac Cath:    TTE / EMILI: LABS:                        10.4   9.74  )-----------( 309      ( 10 Joseph 2019 04:30 )             33.4     01-10    136  |  104  |  11  ----------------------------<  104<H>  4.6   |  21<L>  |  0.64    Ca    9.4      10 Joseph 2019 04:30  Mg     2.0     01-10      PT/INR - ( 10 Joseph 2019 04:30 )   PT: 18.2 SEC;   INR: 1.62          PTT - ( 10 Joseph 2019 12:17 )  PTT:71.0 SEC        Cardiac Cath:  VENTRICLES: Global left ventricular function was normal. EF estimated was  60 %.  VALVES: AORTIC VALVE: There was mild aortic insufficiency. MITRAL VALVE:  The mitral valve exhibited mild regurgitation.  CORONARY VESSELS: The coronary circulation is right dominant.  LM:   --  LM: Normal.  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 There was a 75 % stenosis. Lesion is at the  bifurcation of a large diagonal branch. Lesion difficult to visual with  significant vessel overlap. IFR was 0.89 but IVUS imaging revealed severe  atherosclerotic disease with calcium and IVUS mla of 4.1 mm2. IVUS  catheter was occlusive at the lesion and unable to be advanced past  lesion.  --  D1: There was a 50 % stenosis in the proximal third of the vessel  segment.  CX:   --  Circumflex: Angiography showed mild atherosclerosis with no flow  limiting lesions.  RCA:   --  RCA: Angiography showed no evidence of disease. 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  --  RPDA: Angiography showed no evidence of disease.  --  RPL1:Angiography showed no evidence of disease.  AORTA: Ascending aorta: Normal. No evidence of any aortic fistula. No  aneurysm. The PA fills from coronary flow from root injection.      TTE / EMILI:  CONCLUSIONS:  1. Mitral annular calcification and calcified mitral  leaflets with decreased diastolic opening.  In some views  the valve has a rheumatic appearance. Mild-moderate mitral  regurgitation. Mean transmitral valve gradient equals 8 mm  Hg, estimated mitral valve area equals 1.1 sqcm (by  pressure half time equation), consistent with moderate to  severe mitral stenosis.  2. Calcified trileaflet aortic valve with decreased  opening. Peak transaortic valve gradient equals 17 mm Hg,  mean transaortic valve gradient equals 12 mm Hg, consistent  with mild aortic stenosis. No aortic valve regurgitation  seen.  3. Normal aortic root.  Mild non-mobile atherosclerotic  plaque in the aortic arch.  Severe, bulky, mobile  atherosclerotic plaque in the descending thoracic aorta.  4. Mildly dilated left atrium.  LA volume index = 35 cc/m2.   A very large (about  3.0 cm X 1.4 cm) thrombus is  visualized in the left atrial appendage.  5. Normal left ventricular internal dimensions and wall  thicknesses.  6. Normal left ventricular systolic function. No segmental  wall motion abnormalities.  7. Normal right ventricular size and function.  8. Contrast injection demonstrates no evidence of a patent  foramen ovale.

## 2019-01-10 NOTE — DISCHARGE NOTE ADULT - ADDITIONAL INSTRUCTIONS
No driving x 24 hours. No strenuous activity for three weeks. Monitor site of procedure and notify your doctor for any  bleeding / swelling/redness/ discharge/pain. You may shower but no baths or swimming x one week. No heavy lifting x 1 week.       1800 calorie diabetic diet/ low salt, low fat , low cholesterol

## 2019-01-10 NOTE — PROGRESS NOTE ADULT - PROBLEM SELECTOR PLAN 1
cont AC  1/7:I think A fibrilaiton in addition to chf is the major reson for SOB: no intrinsic lung disese : she  would  nee d outpt full PFT: FOR STRESS TESTING: AND CARDIOVEERSION
cont AC
cont AC  1/7:I think A fibrilaiton in addition to chf is the major reson for SOB: no intrinsic lung disese : she  would  nee d outpt full PFT: FOR STRESS TESTING: AND CARDIOVEERSION  1/8: No cardioversion done secondary to ANDREW  Clot: on AC  1/9: cont AC: Her breathing has been stable!
cont AC  1/7:I think A fibrilaiton in addition to chf is the major reson for SOB: no intrinsic lung disese : she  would  nee d outpt full PFT: FOR STRESS TESTING: AND CARDIOVEERSION  1/8: No cardioversion done secondary to LLClot: on AC
cont AC  1/7:I think A fibrilaiton in addition to chf is the major reson for SOB: no intrinsic lung disese : she  would  nee d outpt full PFT: FOR STRESS TESTING: AND CARDIOVEERSION  1/8: No cardioversion done secondary to LLClot: on AC  1/10: Cardiac cath today:

## 2019-01-10 NOTE — PROGRESS NOTE ADULT - ATTENDING COMMENTS
Agree with above NP note.  CV status remains stable  plan for cath when INR below 1.8 for possible preop MVR eval  pt remains NSR  cont lasix PO  cont heparin gtt
Agree with above NP note.  DCCV cancelled due to ANDREW clot  plan for cath when INR below 1.8 for preop MVR eval  pt now in NSR  cont lasix PO  cont heparin gtt
Agree with above NP note.  [email or web address] [email or web address] today   pharm stress gissel  cont lasix  cont a/c
Seems to be doing better: CTA no PE: but limited: She is on AC anyway: No asthma/ COPD exacerbation:
Seems to be doing better: CTA no PE: but limited: She is on AC anyway: No asthma/ COPD exacerbation:  1/7: stable from pulm point of view!
Seems to be doing better: CTA no PE: but limited: She is on AC anyway: No asthma/ COPD exacerbation:  1/7: stable from pulm point of view!  1/8: I think she got SOB secondary to rapid atrial fibrillation as well as chf: She has underlying chronic lung disease too: Outpatient full PFT .
Seems to be doing better: CTA no PE: but limited: She is on AC anyway: No asthma/ COPD exacerbation:  1/7: stable from pulm point of view!  1/8: I think she got SOB secondary to rapid atrial fibrillation as well as chf: She has underlying chronic lung disease too: Outpatient full PFT .  1/9: She seems to be stable from pulmonary point of view:
Stable from pulmonary point of view: No wheezing

## 2019-01-10 NOTE — PROGRESS NOTE ADULT - PROBLEM SELECTOR PROBLEM 3
Type 2 diabetes mellitus with hyperglycemia, without long-term current use of insulin

## 2019-01-10 NOTE — H&P ADULT - HISTORY OF PRESENT ILLNESS
History of Present Illness:  78y Female w/ PMH of LLE DVT 8/2018, on coumadin, COPD, DM, Asthma, presented to [facility] ER with new onset Atrial fibrillation and dyspnea on exertion worsening over the past few days prior to presentation on 1/5. Patient underwent EMILI and DCC at [facility] and was found to have a large ANDREW thrombus as well as mild to moderate MR with mild Mitral stenosis, EF 66%. CT PA was negative for PE. Left Heart Cath revealed LAD 75% stenosis and a fistula to the PA, and 50% stenosis of D1. The patient was then transferred to [facility] under Dr Ingram's Service for evaluation and pre-operative work up for CABG/MVR with ANDREW ligation and thrombus removal. The patient is currently anticoagulated on a heparin drip. Pt denies any complaints at this time, no chest pain, shortness of breath, nausea, vomiting and the patient is afebrile.     Past Medical History  DVT (deep venous thrombosis)  Asthma  COPD (chronic obstructive pulmonary disease)  DM (diabetes mellitus)  No pertinent past medical history      Past Surgical History  History of embolectomy: LLE  No significant past surgical history      MEDICATIONS  (STANDING):  aspirin enteric coated 81 milliGRAM(s) Oral daily  atorvastatin 20 milliGRAM(s) Oral at bedtime  buDESOnide  80 MICROgram(s)/formoterol 4.5 MICROgram(s) Inhaler 2 Puff(s) Inhalation two times a day  diltiazem    milliGRAM(s) Oral daily  furosemide    Tablet 20 milliGRAM(s) Oral daily  heparin  Infusion 400 Unit(s)/Hr (4 mL/Hr) IV Continuous <Continuous>  montelukast 10 milliGRAM(s) Oral daily  sodium chloride 0.9% lock flush 3 milliLiter(s) IV Push every 8 hours    MEDICATIONS  (PRN):    Antiplatelet therapy:                           Last dose/amt:    Allergies: No Known Allergies        Relevant Family History  FAMILY HISTORY:  No pertinent family history in first degree relatives

## 2019-01-10 NOTE — DISCHARGE NOTE ADULT - HOSPITAL COURSE
77 y/o female with a PMHx of  LLE DVT on Coumadin (diagnosed in August 2018), COPD, DM and asthma presents to ED with shortness of breath and palpitations, found to be in rapid atrial fibriillation.- on heparin gtt and Coumadin, started Cardizem for rate control, s/p echo showing LA thrombus (about  3.0 cm X 1.4 cm) the left atrial appendage, now converted to NSR, on heparin gtt since 1/7 +CP - s/p cath prox LAD 70%  LM disease EF 60% Also +bll bilateral pleural effusions with likely pulmonary edema- s/p Lasix 20mg IV, changed to PO on 1/8,  pulm following+ Multiple falls- CT head- unremarkable  Chronic LLE DVT on dopplers- s/p  coumadin, CTPA- No PE. Pt for transfer to [facility] for apparent proximal LAD and RCA coronary fistula formation to PA. CTS evaluation for MVR/CABG possible fistula ligation/dante thrombus  extraction/dante ligation. Will restart heparin gtt tonight if RRA site ok.           Tests  EKG: Atrial fibrillation at 147 bpm with RVR, QTc 466  CE x2: Trop 8-->9  H/H: 10.4/33.5  Na: 133  Glucose: 104  Alk phos: 285  AST: 38    1/4 CXR: Multiple nodular densities within the bilateral lungs.    1/5 CTA chest: Limited evaluation of the distal segmental and subsegmental pulmonary arteries due to streak and motion artifact. No central, main, lobar or proximal segmental pulmonary embolism. Small bilateral pleural effusions with likely pulmonary edema. A partially imaged fat density lesion in the periphery of the right renal interpole is indeterminate, may reflect an angiomyolipoma. For more definitive characterization, consider nonurgent contrast-enhanced renal protocol CT on an outpatient basis.  1/5 CT head: Volume loss and white matter decreased attenuation likely microvascular   disease. There is no acute hemorrhage or midline shift.  1/5 LE dopplers: Chronic deep vein thrombosis noted in the left femoral vein. No evidence of DVT in the right lower extremity. No evidence of  superficial venous insufficiency noted in the right and left lower extremities.    1/7 EMILI w/ ECHO: EF- 66%, Mitral annular calcification and calcified mitral leaflets with decreased diastolic opening.  In some views the valve has a rheumatic appearance. Mild-moderate mitral regurgitation. Mean transmitral valve gradient equals 8 mm Hg, estimated mitral valve area equals 1.1 sqcm (by pressure half time equation), consistent with moderate to severe mitral stenosis. 2. Calcified trileaflet aortic valve with decreased  opening. Peak transaortic valve gradient equals 17 mm Hg, mean transaortic valve gradient equals 12 mm Hg, consistent with mild aortic stenosis. No aortic valve regurgitation seen. 3. Normal aortic root.  Mild non-mobile atherosclerotic plaque in the aortic arch.  Severe, bulky, mobile atherosclerotic plaque in the descending thoracic aorta.  4. Mildly dilated left atrium.  LA volume index = 35 cc/m2.  A very large (about  3.0 cm X 1.4 cm) thrombus is visualized in the left atrial appendage. 5. Normal left ventricular internal dimensions and wall thicknesses. 6. Normal left ventricular systolic function. No segmental wall motion abnormalities. 7. Normal right ventricular size and function.  8. Contrast injection demonstrates no evidence of a patent foramen ovale. *** No previous Echo exam.

## 2019-01-10 NOTE — DISCHARGE NOTE ADULT - MEDICATION SUMMARY - MEDICATIONS TO STOP TAKING
I will STOP taking the medications listed below when I get home from the hospital:    Coumadin 5 mg oral tablet  -- 1 tab(s) by mouth once a day    amlodipine-valsartan 5 mg-160 mg oral tablet  -- 1 tab(s) by mouth once a day

## 2019-01-10 NOTE — H&P ADULT - PROBLEM SELECTOR PLAN 1
- Pre-op workup for possible CABG  -ASA 81mg, Lipitor 20mg  - Hold Coumadin  -TTE, Carotids, TSH, HgbA1c, Type and screen, Nasal PCR

## 2019-01-10 NOTE — PROGRESS NOTE ADULT - REASON FOR ADMISSION
Palpitations and SOB

## 2019-01-10 NOTE — PROGRESS NOTE ADULT - ASSESSMENT
77 y/o F with PMH of Left LE DVT diagnosed on Aug 2018(on Coumadin), COPD, DM, Asthma presented with Afib with RVR and acute heart failure. TSH normal . Unable to rate control on Cardizem. Plan was for EMILI DCCV but found to have large left atrial appendage thrombus on EMILI. LV systolic function normal with EF 66%. DCCV cancelled. She self converted  and has remained in NSR. Echo with mild to moderate MR,  moderate to severe mitral stenosis and mild aortic stenosis. Denies history of rheumatic heart disease.    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 Patient remains in NSR.  Keep K+ > 4.0 and Mg > 2.0

## 2019-01-10 NOTE — H&P ADULT - PROBLEM SELECTOR PLAN 2
-Finger sticks ac/hs  -HgbA1c  -ISS coverage -Finger sticks ac/hs  -HgbA1c  -ISS coverage  -Hold home Metformin prior to surgery

## 2019-01-11 DIAGNOSIS — E11.9 TYPE 2 DIABETES MELLITUS WITHOUT COMPLICATIONS: ICD-10-CM

## 2019-01-11 DIAGNOSIS — I25.118 ATHEROSCLEROTIC HEART DISEASE OF NATIVE CORONARY ARTERY WITH OTHER FORMS OF ANGINA PECTORIS: ICD-10-CM

## 2019-01-11 DIAGNOSIS — I34.2 NONRHEUMATIC MITRAL (VALVE) STENOSIS: ICD-10-CM

## 2019-01-11 DIAGNOSIS — I34.0 NONRHEUMATIC MITRAL (VALVE) INSUFFICIENCY: ICD-10-CM

## 2019-01-11 PROBLEM — J45.909 UNSPECIFIED ASTHMA, UNCOMPLICATED: Chronic | Status: ACTIVE | Noted: 2019-01-04

## 2019-01-11 PROBLEM — I82.409 ACUTE EMBOLISM AND THROMBOSIS OF UNSPECIFIED DEEP VEINS OF UNSPECIFIED LOWER EXTREMITY: Chronic | Status: ACTIVE | Noted: 2019-01-05

## 2019-01-11 PROBLEM — J44.9 CHRONIC OBSTRUCTIVE PULMONARY DISEASE, UNSPECIFIED: Chronic | Status: ACTIVE | Noted: 2019-01-04

## 2019-01-11 LAB
ALBUMIN SERPL ELPH-MCNC: 3.7 G/DL — SIGNIFICANT CHANGE UP (ref 3.3–5)
ALBUMIN SERPL ELPH-MCNC: 4.3 G/DL — SIGNIFICANT CHANGE UP (ref 3.3–5)
ALP SERPL-CCNC: 252 U/L — HIGH (ref 40–120)
ALP SERPL-CCNC: 279 U/L — HIGH (ref 40–120)
ALT FLD-CCNC: 23 U/L — SIGNIFICANT CHANGE UP (ref 10–45)
ALT FLD-CCNC: 30 U/L — SIGNIFICANT CHANGE UP (ref 10–45)
ANION GAP SERPL CALC-SCNC: 13 MMOL/L — SIGNIFICANT CHANGE UP (ref 5–17)
ANION GAP SERPL CALC-SCNC: 14 MMOL/L — SIGNIFICANT CHANGE UP (ref 5–17)
APPEARANCE UR: CLEAR — SIGNIFICANT CHANGE UP
APTT BLD: 51.5 SEC — HIGH (ref 27.5–36.3)
APTT BLD: 70.5 SEC — HIGH (ref 27.5–36.3)
APTT BLD: 78.9 SEC — HIGH (ref 27.5–36.3)
AST SERPL-CCNC: 34 U/L — SIGNIFICANT CHANGE UP (ref 10–40)
AST SERPL-CCNC: 37 U/L — SIGNIFICANT CHANGE UP (ref 10–40)
BASOPHILS # BLD AUTO: 0 K/UL — SIGNIFICANT CHANGE UP (ref 0–0.2)
BASOPHILS # BLD AUTO: 0.02 K/UL — SIGNIFICANT CHANGE UP (ref 0–0.2)
BASOPHILS NFR BLD AUTO: 0.2 % — SIGNIFICANT CHANGE UP (ref 0–2)
BASOPHILS NFR BLD AUTO: 0.2 % — SIGNIFICANT CHANGE UP (ref 0–2)
BILIRUB SERPL-MCNC: 0.7 MG/DL — SIGNIFICANT CHANGE UP (ref 0.2–1.2)
BILIRUB SERPL-MCNC: 0.7 MG/DL — SIGNIFICANT CHANGE UP (ref 0.2–1.2)
BILIRUB UR-MCNC: NEGATIVE — SIGNIFICANT CHANGE UP
BLD GP AB SCN SERPL QL: NEGATIVE — SIGNIFICANT CHANGE UP
BLD GP AB SCN SERPL QL: NEGATIVE — SIGNIFICANT CHANGE UP
BUN SERPL-MCNC: 14 MG/DL — SIGNIFICANT CHANGE UP (ref 7–23)
BUN SERPL-MCNC: 15 MG/DL — SIGNIFICANT CHANGE UP (ref 7–23)
CALCIUM SERPL-MCNC: 9 MG/DL — SIGNIFICANT CHANGE UP (ref 8.4–10.5)
CALCIUM SERPL-MCNC: 9.2 MG/DL — SIGNIFICANT CHANGE UP (ref 8.4–10.5)
CHLORIDE SERPL-SCNC: 102 MMOL/L — SIGNIFICANT CHANGE UP (ref 96–108)
CHLORIDE SERPL-SCNC: 99 MMOL/L — SIGNIFICANT CHANGE UP (ref 96–108)
CO2 SERPL-SCNC: 20 MMOL/L — LOW (ref 22–31)
CO2 SERPL-SCNC: 21 MMOL/L — LOW (ref 22–31)
COLOR SPEC: YELLOW — SIGNIFICANT CHANGE UP
CREAT SERPL-MCNC: 0.66 MG/DL — SIGNIFICANT CHANGE UP (ref 0.5–1.3)
CREAT SERPL-MCNC: 0.68 MG/DL — SIGNIFICANT CHANGE UP (ref 0.5–1.3)
DIFF PNL FLD: ABNORMAL
EOSINOPHIL # BLD AUTO: 0.3 K/UL — SIGNIFICANT CHANGE UP (ref 0–0.5)
EOSINOPHIL # BLD AUTO: 0.36 K/UL — SIGNIFICANT CHANGE UP (ref 0–0.5)
EOSINOPHIL NFR BLD AUTO: 3.2 % — SIGNIFICANT CHANGE UP (ref 0–6)
EOSINOPHIL NFR BLD AUTO: 4.2 % — SIGNIFICANT CHANGE UP (ref 0–6)
ESTIMATED AVERAGE GLUCOSE: 137 MG/DL — HIGH (ref 68–114)
GLUCOSE BLDC GLUCOMTR-MCNC: 118 MG/DL — HIGH (ref 70–99)
GLUCOSE BLDC GLUCOMTR-MCNC: 178 MG/DL — HIGH (ref 70–99)
GLUCOSE BLDC GLUCOMTR-MCNC: 76 MG/DL — SIGNIFICANT CHANGE UP (ref 70–99)
GLUCOSE BLDC GLUCOMTR-MCNC: 95 MG/DL — SIGNIFICANT CHANGE UP (ref 70–99)
GLUCOSE SERPL-MCNC: 112 MG/DL — HIGH (ref 70–99)
GLUCOSE SERPL-MCNC: 87 MG/DL — SIGNIFICANT CHANGE UP (ref 70–99)
GLUCOSE UR QL: NEGATIVE — SIGNIFICANT CHANGE UP
HBA1C BLD-MCNC: 6.4 % — HIGH (ref 4–5.6)
HBA1C BLD-MCNC: 6.4 % — HIGH (ref 4–5.6)
HCT VFR BLD CALC: 33.8 % — LOW (ref 34.5–45)
HCT VFR BLD CALC: 34.3 % — LOW (ref 34.5–45)
HGB BLD-MCNC: 10.7 G/DL — LOW (ref 11.5–15.5)
HGB BLD-MCNC: 11.4 G/DL — LOW (ref 11.5–15.5)
IMM GRANULOCYTES NFR BLD AUTO: 0.2 % — SIGNIFICANT CHANGE UP (ref 0–1.5)
INR BLD: 1.37 RATIO — HIGH (ref 0.88–1.16)
KETONES UR-MCNC: NEGATIVE — SIGNIFICANT CHANGE UP
LEUKOCYTE ESTERASE UR-ACNC: ABNORMAL
LYMPHOCYTES # BLD AUTO: 1.08 K/UL — SIGNIFICANT CHANGE UP (ref 1–3.3)
LYMPHOCYTES # BLD AUTO: 1.5 K/UL — SIGNIFICANT CHANGE UP (ref 1–3.3)
LYMPHOCYTES # BLD AUTO: 12.6 % — LOW (ref 13–44)
LYMPHOCYTES # BLD AUTO: 16.6 % — SIGNIFICANT CHANGE UP (ref 13–44)
MCHC RBC-ENTMCNC: 26.2 PG — LOW (ref 27–34)
MCHC RBC-ENTMCNC: 27.5 PG — SIGNIFICANT CHANGE UP (ref 27–34)
MCHC RBC-ENTMCNC: 31.7 GM/DL — LOW (ref 32–36)
MCHC RBC-ENTMCNC: 33.2 GM/DL — SIGNIFICANT CHANGE UP (ref 32–36)
MCV RBC AUTO: 82.7 FL — SIGNIFICANT CHANGE UP (ref 80–100)
MCV RBC AUTO: 82.8 FL — SIGNIFICANT CHANGE UP (ref 80–100)
MONOCYTES # BLD AUTO: 0.79 K/UL — SIGNIFICANT CHANGE UP (ref 0–0.9)
MONOCYTES # BLD AUTO: 0.9 K/UL — SIGNIFICANT CHANGE UP (ref 0–0.9)
MONOCYTES NFR BLD AUTO: 9.2 % — SIGNIFICANT CHANGE UP (ref 2–14)
MONOCYTES NFR BLD AUTO: 9.7 % — SIGNIFICANT CHANGE UP (ref 2–14)
MRSA PCR RESULT.: SIGNIFICANT CHANGE UP
NEUTROPHILS # BLD AUTO: 6.28 K/UL — SIGNIFICANT CHANGE UP (ref 1.8–7.4)
NEUTROPHILS # BLD AUTO: 6.6 K/UL — SIGNIFICANT CHANGE UP (ref 1.8–7.4)
NEUTROPHILS NFR BLD AUTO: 70.4 % — SIGNIFICANT CHANGE UP (ref 43–77)
NEUTROPHILS NFR BLD AUTO: 73.6 % — SIGNIFICANT CHANGE UP (ref 43–77)
NITRITE UR-MCNC: NEGATIVE — SIGNIFICANT CHANGE UP
NT-PROBNP SERPL-SCNC: 316 PG/ML — HIGH (ref 0–300)
PH UR: 6.5 — SIGNIFICANT CHANGE UP (ref 5–8)
PLATELET # BLD AUTO: 272 K/UL — SIGNIFICANT CHANGE UP (ref 150–400)
PLATELET # BLD AUTO: 348 K/UL — SIGNIFICANT CHANGE UP (ref 150–400)
POTASSIUM SERPL-MCNC: 3.7 MMOL/L — SIGNIFICANT CHANGE UP (ref 3.5–5.3)
POTASSIUM SERPL-MCNC: 3.8 MMOL/L — SIGNIFICANT CHANGE UP (ref 3.5–5.3)
POTASSIUM SERPL-SCNC: 3.7 MMOL/L — SIGNIFICANT CHANGE UP (ref 3.5–5.3)
POTASSIUM SERPL-SCNC: 3.8 MMOL/L — SIGNIFICANT CHANGE UP (ref 3.5–5.3)
PROT SERPL-MCNC: 7.7 G/DL — SIGNIFICANT CHANGE UP (ref 6–8.3)
PROT SERPL-MCNC: 8.6 G/DL — HIGH (ref 6–8.3)
PROT UR-MCNC: ABNORMAL
PROTHROM AB SERPL-ACNC: 15.9 SEC — HIGH (ref 10–12.9)
RBC # BLD: 4.08 M/UL — SIGNIFICANT CHANGE UP (ref 3.8–5.2)
RBC # BLD: 4.15 M/UL — SIGNIFICANT CHANGE UP (ref 3.8–5.2)
RBC # FLD: 14.6 % — HIGH (ref 10.3–14.5)
RBC # FLD: 15.9 % — HIGH (ref 10.3–14.5)
RH IG SCN BLD-IMP: POSITIVE — SIGNIFICANT CHANGE UP
RH IG SCN BLD-IMP: POSITIVE — SIGNIFICANT CHANGE UP
S AUREUS DNA NOSE QL NAA+PROBE: SIGNIFICANT CHANGE UP
SODIUM SERPL-SCNC: 134 MMOL/L — LOW (ref 135–145)
SODIUM SERPL-SCNC: 135 MMOL/L — SIGNIFICANT CHANGE UP (ref 135–145)
SP GR SPEC: >1.05 (ref 1.01–1.02)
TSH SERPL-MCNC: 8.02 UIU/ML — HIGH (ref 0.27–4.2)
UROBILINOGEN FLD QL: NEGATIVE — SIGNIFICANT CHANGE UP
WBC # BLD: 8.55 K/UL — SIGNIFICANT CHANGE UP (ref 3.8–10.5)
WBC # BLD: 9.3 K/UL — SIGNIFICANT CHANGE UP (ref 3.8–10.5)
WBC # FLD AUTO: 8.55 K/UL — SIGNIFICANT CHANGE UP (ref 3.8–10.5)
WBC # FLD AUTO: 9.3 K/UL — SIGNIFICANT CHANGE UP (ref 3.8–10.5)

## 2019-01-11 PROCEDURE — 93306 TTE W/DOPPLER COMPLETE: CPT | Mod: 26

## 2019-01-11 PROCEDURE — 99231 SBSQ HOSP IP/OBS SF/LOW 25: CPT | Mod: 24

## 2019-01-11 PROCEDURE — 93880 EXTRACRANIAL BILAT STUDY: CPT | Mod: 26

## 2019-01-11 PROCEDURE — 93010 ELECTROCARDIOGRAM REPORT: CPT

## 2019-01-11 RX ORDER — DEXTROSE 50 % IN WATER 50 %
25 SYRINGE (ML) INTRAVENOUS ONCE
Qty: 0 | Refills: 0 | Status: DISCONTINUED | OUTPATIENT
Start: 2019-01-11 | End: 2019-01-14

## 2019-01-11 RX ORDER — SODIUM CHLORIDE 9 MG/ML
1000 INJECTION, SOLUTION INTRAVENOUS
Qty: 0 | Refills: 0 | Status: DISCONTINUED | OUTPATIENT
Start: 2019-01-11 | End: 2019-01-14

## 2019-01-11 RX ORDER — DOCUSATE SODIUM 100 MG
100 CAPSULE ORAL THREE TIMES A DAY
Qty: 0 | Refills: 0 | Status: DISCONTINUED | OUTPATIENT
Start: 2019-01-11 | End: 2019-01-14

## 2019-01-11 RX ORDER — GLUCAGON INJECTION, SOLUTION 0.5 MG/.1ML
1 INJECTION, SOLUTION SUBCUTANEOUS ONCE
Qty: 0 | Refills: 0 | Status: DISCONTINUED | OUTPATIENT
Start: 2019-01-11 | End: 2019-01-12

## 2019-01-11 RX ORDER — BRINZOLAMIDE 10 MG/ML
1 SUSPENSION/ DROPS OPHTHALMIC
Qty: 0 | Refills: 0 | COMMUNITY

## 2019-01-11 RX ORDER — AZELASTINE HCL 0.05 %
1 DROPS OPHTHALMIC (EYE)
Qty: 0 | Refills: 0 | COMMUNITY

## 2019-01-11 RX ORDER — INSULIN LISPRO 100/ML
VIAL (ML) SUBCUTANEOUS
Qty: 0 | Refills: 0 | Status: DISCONTINUED | OUTPATIENT
Start: 2019-01-11 | End: 2019-01-14

## 2019-01-11 RX ORDER — DEXTROSE 50 % IN WATER 50 %
25 SYRINGE (ML) INTRAVENOUS ONCE
Qty: 0 | Refills: 0 | Status: DISCONTINUED | OUTPATIENT
Start: 2019-01-11 | End: 2019-01-12

## 2019-01-11 RX ORDER — INSULIN LISPRO 100/ML
VIAL (ML) SUBCUTANEOUS AT BEDTIME
Qty: 0 | Refills: 0 | Status: DISCONTINUED | OUTPATIENT
Start: 2019-01-11 | End: 2019-01-14

## 2019-01-11 RX ORDER — DEXTROSE 50 % IN WATER 50 %
15 SYRINGE (ML) INTRAVENOUS ONCE
Qty: 0 | Refills: 0 | Status: DISCONTINUED | OUTPATIENT
Start: 2019-01-11 | End: 2019-01-14

## 2019-01-11 RX ORDER — DEXTROSE 50 % IN WATER 50 %
12.5 SYRINGE (ML) INTRAVENOUS ONCE
Qty: 0 | Refills: 0 | Status: DISCONTINUED | OUTPATIENT
Start: 2019-01-11 | End: 2019-01-12

## 2019-01-11 RX ORDER — DORZOLAMIDE HYDROCHLORIDE 20 MG/ML
1 SOLUTION/ DROPS OPHTHALMIC
Qty: 0 | Refills: 0 | Status: DISCONTINUED | OUTPATIENT
Start: 2019-01-11 | End: 2019-01-14

## 2019-01-11 RX ADMIN — HEPARIN SODIUM 4 UNIT(S)/HR: 5000 INJECTION INTRAVENOUS; SUBCUTANEOUS at 00:02

## 2019-01-11 RX ADMIN — SODIUM CHLORIDE 3 MILLILITER(S): 9 INJECTION INTRAMUSCULAR; INTRAVENOUS; SUBCUTANEOUS at 22:08

## 2019-01-11 RX ADMIN — Medication 240 MILLIGRAM(S): at 06:27

## 2019-01-11 RX ADMIN — SODIUM CHLORIDE 3 MILLILITER(S): 9 INJECTION INTRAMUSCULAR; INTRAVENOUS; SUBCUTANEOUS at 06:22

## 2019-01-11 RX ADMIN — Medication 81 MILLIGRAM(S): at 15:15

## 2019-01-11 RX ADMIN — ATORVASTATIN CALCIUM 20 MILLIGRAM(S): 80 TABLET, FILM COATED ORAL at 22:03

## 2019-01-11 RX ADMIN — MONTELUKAST 10 MILLIGRAM(S): 4 TABLET, CHEWABLE ORAL at 15:15

## 2019-01-11 RX ADMIN — Medication 0: at 22:04

## 2019-01-11 RX ADMIN — Medication 20 MILLIGRAM(S): at 06:27

## 2019-01-11 RX ADMIN — HEPARIN SODIUM 6 UNIT(S)/HR: 5000 INJECTION INTRAVENOUS; SUBCUTANEOUS at 09:41

## 2019-01-11 RX ADMIN — HEPARIN SODIUM 6 UNIT(S)/HR: 5000 INJECTION INTRAVENOUS; SUBCUTANEOUS at 20:23

## 2019-01-11 RX ADMIN — Medication 2: at 18:05

## 2019-01-11 RX ADMIN — HEPARIN SODIUM 6 UNIT(S)/HR: 5000 INJECTION INTRAVENOUS; SUBCUTANEOUS at 01:00

## 2019-01-11 RX ADMIN — SODIUM CHLORIDE 3 MILLILITER(S): 9 INJECTION INTRAMUSCULAR; INTRAVENOUS; SUBCUTANEOUS at 00:02

## 2019-01-11 RX ADMIN — SODIUM CHLORIDE 3 MILLILITER(S): 9 INJECTION INTRAMUSCULAR; INTRAVENOUS; SUBCUTANEOUS at 15:18

## 2019-01-11 RX ADMIN — Medication 100 MILLIGRAM(S): at 22:03

## 2019-01-11 NOTE — CONSULT NOTE ADULT - CONSULT REASON
ct surg eval    hx  LLE DVT 8/2018, on coumadin, COPD, DM, Asthma, presented to [facility] ER with new onset Atrial fibrillation and dyspnea on exertion worsening over the past few days prior to presentation on 1/5. Patient underwent EMILI and DCC at [facility] and was found to have a large ANDREW thrombus as well as mild to moderate MR with mild Mitral stenosis, EF 66%.

## 2019-01-11 NOTE — PROGRESS NOTE ADULT - SUBJECTIVE AND OBJECTIVE BOX
JEANNE TRUJILLO  78y Female  MRN:[id number]    Patient is a 78y old  Female who presents with a chief complaint of Palpitations and SOB (06 Jan 2019 14:36)    HPI:  77 y/o F with PMH of Left LE DVT diagnosed on Aug 2018(on Coumadin), COPD, DM, Asthma presented with the complaint of SOB/GRADY and palpitations. As per the patient she has been having intermittent SOB but it worsened the past few days which prompted her to come to the ER. Patient stated that any minimal exertion she would have to stop to catch her breath. Patient stated that she sleeps with 2-3 pillows at night and endorsed of orthopnea and PND. Patient also endorsed of intermittent LE swelling and stated that she is complaint with her medications. Patient also endorsed of midsternal chest pain, characterized as a  pressure like sensation, without any aggravating factors, alleviated when she would massage her chest, without any radiations of the chest pain, with a severity of 5/10. Patient also endorsed of palpitations for the past few days. Patient denied any fevers, chills, N/V/D/C, abdominal pain, dysuria, melena, hematochezia, recent travel, sick contact, pleuritic or positional chest pain.     On ED admission EKG revealed Atrial fibrillation with RVR at a rate of 147 with QTc of 466, CE x 1: Trop: 8, CE x 2: Trop: 9, H&H: 10.4/33.5, Na: 133, Gluc: 104, Alk Phos: 285, AST: 38. Prelim CXR: 6 mm right lung nodular opacity of uncertain significance. Follow up to resolution. Hazy right lung opacities may be secondary to rotation versus atelectasis and/or pneumonia. In the ED patient received IV Cardizem and PO Cardizem which improved her rate. (05 Jan 2019 05:47)      Patient seen and evaluated at bedside.  tx to [facility] overnight for CTSx eval    Interval HPI: s/p cath showing CAD, valvular dz, and fistula     PAST MEDICAL & SURGICAL HISTORY:  DVT (deep venous thrombosis)  Asthma  COPD (chronic obstructive pulmonary disease)  DM (diabetes mellitus)  History of embolectomy: LLE    SOC:  non smoker  no drug or alcohol abuse    fam hx:  non cont     REVIEW OF SYSTEMS:  as per hpi    VITALS:  Vital Signs Last 24 Hrs  T(C): 36.7 (11 Jan 2019 04:42), Max: 36.7 (10 Joseph 2019 22:15)  T(F): 98 (11 Jan 2019 04:42), Max: 98 (10 Joseph 2019 22:15)  HR: 63 (11 Jan 2019 04:42) (63 - 76)  BP: 125/71 (11 Jan 2019 04:42) (110/61 - 129/67)  BP(mean): --  RR: 18 (11 Jan 2019 04:42) (17 - 18)  SpO2: 98% (11 Jan 2019 04:42) (96% - 99%)      PHYSICAL EXAM:  GENERAL: NAD, well-developed  HEAD:  Atraumatic, Normocephalic  EYES: EOMI, PERRLA, conjunctiva and sclera clear  NECK: Supple, No JVD  CHEST/LUNG: Clear to auscultation bilaterally; No wheeze  HEART: S1, S2;   ABDOMEN: Soft, Nontender, Nondistended; Bowel sounds present  EXTREMITIES:  2+ Peripheral Pulses, No clubbing, cyanosis, or edema  PSYCH: Normal affect  NEUROLOGY: AAOX3; non-focal  SKIN: No rashes or lesions    Consultant(s) Notes Reviewed:  [x ] YES  [ ] NO  Care Discussed with Consultants/Other Providers [ x] YES  [ ] NO    MEDS:  MEDICATIONS  (STANDING):  aspirin enteric coated 81 milliGRAM(s) Oral daily  atorvastatin 20 milliGRAM(s) Oral at bedtime  buDESOnide  80 MICROgram(s)/formoterol 4.5 MICROgram(s) Inhaler 2 Puff(s) Inhalation two times a day  dextrose 5%. 1000 milliLiter(s) (50 mL/Hr) IV Continuous <Continuous>  dextrose 50% Injectable 12.5 Gram(s) IV Push once  dextrose 50% Injectable 25 Gram(s) IV Push once  dextrose 50% Injectable 25 Gram(s) IV Push once  diltiazem    milliGRAM(s) Oral daily  furosemide    Tablet 20 milliGRAM(s) Oral daily  heparin  Infusion 400 Unit(s)/Hr (6 mL/Hr) IV Continuous <Continuous>  insulin lispro (HumaLOG) corrective regimen sliding scale   SubCutaneous three times a day before meals  insulin lispro (HumaLOG) corrective regimen sliding scale   SubCutaneous at bedtime  montelukast 10 milliGRAM(s) Oral daily  sodium chloride 0.9% lock flush 3 milliLiter(s) IV Push every 8 hours    MEDICATIONS  (PRN):  dextrose 40% Gel 15 Gram(s) Oral once PRN Blood Glucose LESS THAN 70 milliGRAM(s)/deciliter  glucagon  Injectable 1 milliGRAM(s) IntraMuscular once PRN Glucose LESS THAN 70 milligrams/deciliter        ALLERGIES:  No Known Allergies      LABS:                                        10.7   8.55  )-----------( 348      ( 11 Jan 2019 08:14 )             33.8   01-11    135  |  102  |  14  ----------------------------<  112<H>  3.7   |  20<L>  |  0.66    Ca    9.0      11 Jan 2019 06:54  Mg     2.0     01-10    TPro  7.7  /  Alb  3.7  /  TBili  0.7  /  DBili  x   /  AST  34  /  ALT  23  /  AlkPhos  252<H>  01-11    < from: Cardiac Cath Lab - Adult (01.10.19 @ 10:54) >  VENTRICLES: Global left ventricular function was normal. EF estimated was  60 %.  VALVES: AORTIC VALVE: There was mild aortic insufficiency. MITRAL VALVE:  The mitral valve exhibited mild regurgitation.  CORONARY VESSELS: The coronary circulation is right dominant.  LM:   --  LM: Normal.  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 There was a 75 % stenosis. Lesion is at the  bifurcation of a large diagonal branch. Lesion difficult to visual with  significant vessel overlap. IFR was 0.89 but IVUS imaging revealed severe  atherosclerotic disease with calcium and IVUS mla of 4.1 mm2. IVUS  catheter was occlusive at the lesion and unable to be advanced past  lesion.  --  D1: There was a 50 % stenosis in the proximal third of the vessel  segment.  CX:   --  Circumflex: Angiography showed mild atherosclerosis with no flow  limiting lesions.  RCA:   --  RCA: Angiography showed no evidence of disease. 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  --  RPDA: Angiography showed no evidence of disease.  --  RPL1:Angiography showed no evidence of disease.  AORTA: Ascending aorta: Normal. No evidence of any aortic fistula. No  aneurysm. The PA fills from coronary flow from root injection.  COMPLICATIONS: There were no complications.  DIAGNOSTIC IMPRESSIONS: New onset chest pain, AF with RVR, and acute  diastolic HF. EMILI with mod-severe mitral stenosis and very large thrombus  in DANTE. Cath with severe proximal LAD lesion with correlating IVUS  findings and moderate diagonal disease. 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 LV function preserved.  DIAGNOSTIC RECOMMENDATIONS: Continue medical treatment of CAD, AF. CTS  evaluation for MVR/CABG possible fistula ligation/dante thrombus  extraction/dante ligation.  INTERVENTIONALIMPRESSIONS: New onset chest pain, AF with RVR, and acute  diastolic HF. EMILI with mod-severe mitral stenosis and very large thrombus  in DANTE. Cath with severe proximal LAD lesion with correlating IVUS  findings and moderate diagonal disease. 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 LV function preserved.  INTERVENTIONAL RECOMMENDATIONS: Continue medical treatment of CAD, AF. CTS  evaluation for MVR/CABG possible fistula ligation/dante thrombus  extraction/dante ligation.    < end of copied text >          < from: EMILI w/TTE (w/3D Echo) (01.07.19 @ 15:03) >  ------------------------------  CONCLUSIONS:  1. Mitral annular calcification and calcified mitral  leaflets with decreased diastolic opening.  In some views  the valve has a rheumatic appearance. Mild-moderate mitral  regurgitation. Mean transmitral valve gradient equals 8 mm  Hg, estimated mitral valve area equals 1.1 sqcm (by  pressure half time equation), consistent with moderate to  severe mitral stenosis.  2. Calcified trileaflet aortic valve with decreased  opening. Peak transaortic valve gradient equals 17 mm Hg,  mean transaortic valve gradient equals 12 mm Hg, consistent  with mild aortic stenosis. No aortic valve regurgitation  seen.  3. Normal aortic root.  Mild non-mobile atherosclerotic  plaque in the aortic arch.  Severe, bulky, mobile  atherosclerotic plaque in the descending thoracic aorta.  4. Mildly dilated left atrium.  LA volume index = 35 cc/m2.   A very large (about  3.0 cm X 1.4 cm) thrombus is  visualized in the left atrial appendage.  5. Normal left ventricular internal dimensions and wall  thicknesses.  6. Normal left ventricular systolic function. No segmental  wall motion abnormalities.  7. Normal right ventricular size and function.  8. Contrast injection demonstrates no evidence of a patent  foramen ovale.  *** No previous Echo exam.  -------------------------------------------------------    < end of copied text >

## 2019-01-11 NOTE — CONSULT NOTE ADULT - SUBJECTIVE AND OBJECTIVE BOX
CARDIOLOGY CONSULT - Dr. Fitch     CHIEF COMPLAINT: CABG/MVR with DANTE ligations and thrombus removal     HPI:  History of Present Illness:  78y Female w/ PMH of LLE DVT 2018, on coumadin, COPD, DM, Asthma, presented to [facility] ER with new onset Atrial fibrillation and dyspnea on exertion worsening over the past few days prior to presentation on . Patient underwent EMILI and DCC at [facility] and was found to have a large DANTE thrombus as well as mild to moderate MR with mild Mitral stenosis, EF 66%. CT PA was negative for PE. Left Heart Cath revealed LAD 75% stenosis and a fistula to the PA, and 50% stenosis of D1. The patient was then transferred to [facility] under Dr Ingram's Service for evaluation and pre-operative work up for CABG/MVR with DANTE ligation and thrombus removal. The patient is currently anticoagulated on a heparin drip. Pt denies any complaints at this time, no chest pain, shortness of breath, nausea, vomiting and the patient is afebrile.     Past Medical History  DVT (deep venous thrombosis)  Asthma  COPD (chronic obstructive pulmonary disease)  DM (diabetes mellitus)  No pertinent past medical history      Past Surgical History  History of embolectomy: LLE  No significant past surgical history      MEDICATIONS  (STANDING):  aspirin enteric coated 81 milliGRAM(s) Oral daily  atorvastatin 20 milliGRAM(s) Oral at bedtime  buDESOnide  80 MICROgram(s)/formoterol 4.5 MICROgram(s) Inhaler 2 Puff(s) Inhalation two times a day  diltiazem    milliGRAM(s) Oral daily  furosemide    Tablet 20 milliGRAM(s) Oral daily  heparin  Infusion 400 Unit(s)/Hr (4 mL/Hr) IV Continuous <Continuous>  montelukast 10 milliGRAM(s) Oral daily  sodium chloride 0.9% lock flush 3 milliLiter(s) IV Push every 8 hours    MEDICATIONS  (PRN):    Antiplatelet therapy:                           Last dose/amt:    Allergies: No Known Allergies        Relevant Family History  FAMILY HISTORY:  No pertinent family history in first degree relatives (10 Joseph 2019 23:30)      PAST MEDICAL & SURGICAL HISTORY:  DVT (deep venous thrombosis)  Asthma  COPD (chronic obstructive pulmonary disease)  DM (diabetes mellitus)  History of embolectomy: LLE          PREVIOUS DIAGNOSTIC TESTING:    [ ] Echocardiogram:   < from: EMILI w/TTE (w/3D Echo) (19 @ 15:03) >  CONCLUSIONS:  1. Mitral annular calcification and calcified mitral  leaflets with decreased diastolic opening.  In some views  the valve has a rheumatic appearance. Mild-moderate mitral  regurgitation. Mean transmitral valve gradient equals 8 mm  Hg, estimated mitral valve area equals 1.1 sqcm (by  pressure half time equation), consistent with moderate to  severe mitral stenosis.  2. Calcified trileaflet aortic valve with decreased  opening. Peak transaortic valve gradient equals 17 mm Hg,  mean transaortic valve gradient equals 12 mm Hg, consistent  with mild aortic stenosis. No aortic valve regurgitation  seen.  3. Normal aortic root.  Mild non-mobile atherosclerotic  plaque in the aortic arch.  Severe, bulky, mobile  atherosclerotic plaque in the descending thoracic aorta.  4. Mildly dilated left atrium.  LA volume index = 35 cc/m2.   A very large (about  3.0 cm X 1.4 cm) thrombus is  visualized in the left atrial appendage.  5. Normal left ventricular internal dimensions and wall  thicknesses.  6. Normal left ventricular systolic function. No segmental  wall motion abnormalities.  7. Normal right ventricular size and function.  8. Contrast injection demonstrates no evidence of a patent  foramen ovale.  *** No previous Echo exam.    < end of copied text >  [ ]  Catheterization:   < from: Cardiac Cath Lab - Adult (01.10.19 @ 10:54) >  [place] [address]  [phone]  Cath Lab Report -- Comprehensive Report  Patient: JEANNE TRUJILLO  Study date: 01/10/2019  Account number: [id number]  MR number: [id number]  : 1940  Gender: Female  Race: A  Case Physician(s):  Michael Fitch M.D.  Fellow:  Luke Aguilar M.D.  Referring Physician:  Michael Fitch M.D.  INDICATIONS: New onset chest pain, AF with RVR, and acute diastolic HF. EMILI  with mod-severe mitral stenosis and very large thrombus in DANTE.  HISTORY: There was no prior cardiac history. The patient has hypertension  and medication-treated dyslipidemia. PRIOR CARDIOVASCULAR PROCEDURES:  None.  PROCEDURE:  --  Left heart catheterization with ventriculography.  --  Left coronary angiography.  --  Right coronary angiography.  --  Aortography.  --  Diagnostic myocardial fractional flow reserve.  --  Diagnostic IVUS.  TECHNIQUE: The risks and alternatives of the procedures and conscious  sedation were explained to the patient and informed consent was obtained.  Cardiac catheterization performed electively.  Local anesthetic given. Right radial artery access. Left heart  catheterization. Ventriculography was performed. Left coronary artery  angiography. Thevessel was injected utilizing a catheter. Right coronary  artery angiography. The vessel was injected utilizing a catheter.  Aortography. A catheter was placed and contrast was injected. Myocardial  (fractional) flow reserve. Diagnostic IVUS was performed. RADIATION  EXPOSURE: 39.2 min.  CONTRAST GIVEN: 200 ml Optiray.  MEDICATIONS GIVEN: Midazolam, 0.5 mg, IV. Verapamil (Isoptin, Calan,  Covera), 2.5 mg, IA. Nitroglycerin, 100 mcg, intracoronary. Heparin, 2000  units, IA. Heparin, 5000 units, IV. 2% Lidocaine, 10 ml, subcutaneously.  0.9% Normal saline, 50 ml, IV.  VENTRICLES: Global left ventricular function was normal. EF estimated was  60 %.  VALVES: AORTIC VALVE: There was mild aortic insufficiency. MITRAL VALVE:  The mitral valve exhibited mild regurgitation.  CORONARY VESSELS: The coronary circulation is right dominant.  LM:   --  LM: Normal.  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 There was a 75 % stenosis. Lesion is at the  bifurcation of a large diagonal branch. Lesion difficult to visual with  significant vessel overlap. IFR was 0.89 but IVUS imaging revealed severe  atherosclerotic disease with calcium and IVUS mla of 4.1 mm2. IVUS  catheter was occlusive at the lesion and unable to be advanced past  lesion.  --  D1: There was a 50 % stenosis in the proximal third of the vessel  segment.  CX:   --  Circumflex: Angiography showed mild atherosclerosis with no flow  limiting lesions.  RCA:   --  RCA: Angiography showed no evidence of disease. 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  --  RPDA: Angiography showed no evidence of disease.  --  RPL1:Angiography showed no evidence of disease.  AORTA: Ascending aorta: Normal. No evidence of any aortic fistula. No  aneurysm. The PA fills from coronary flow from root injection.  COMPLICATIONS: There were no complications.  DIAGNOSTIC IMPRESSIONS: New onset chest pain, AF with RVR, and acute  diastolic HF. EMILI with mod-severe mitral stenosis and very large thrombus  in DANTE. Cath with severe proximal LAD lesion with correlating IVUS  findings and moderate diagonal disease. 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 LV function preserved.  DIAGNOSTIC RECOMMENDATIONS: Continue medical treatment of CAD, AF. CTS  evaluation for MVR/CABG possible fistula ligation/dante thrombus  extraction/dante ligation.  INTERVENTIONALIMPRESSIONS: New onset chest pain, AF with RVR, and acute  diastolic HF. EMILI with mod-severe mitral stenosis and very large thrombus  in DANTE. Cath with severe proximal LAD lesion with correlating IVUS  findings and moderate diagonal disease. 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 LV function preserved.  INTERVENTIONAL RECOMMENDATIONS: Continue medical treatment of CAD, AF. CTS  evaluation for MVR/CABG possible fistula ligation/dante thrombus  extraction/dante ligation.  Prepared and signed by  Michael Fitch M.D.    < end of copied text >  [ ] Stress Test:  	    MEDICATIONS:  MEDICATIONS  (STANDING):  aspirin enteric coated 81 milliGRAM(s) Oral daily  atorvastatin 20 milliGRAM(s) Oral at bedtime  buDESOnide  80 MICROgram(s)/formoterol 4.5 MICROgram(s) Inhaler 2 Puff(s) Inhalation two times a day  dextrose 5%. 1000 milliLiter(s) (50 mL/Hr) IV Continuous <Continuous>  dextrose 50% Injectable 12.5 Gram(s) IV Push once  dextrose 50% Injectable 25 Gram(s) IV Push once  dextrose 50% Injectable 25 Gram(s) IV Push once  diltiazem    milliGRAM(s) Oral daily  furosemide    Tablet 20 milliGRAM(s) Oral daily  heparin  Infusion 400 Unit(s)/Hr (6 mL/Hr) IV Continuous <Continuous>  insulin lispro (HumaLOG) corrective regimen sliding scale   SubCutaneous three times a day before meals  insulin lispro (HumaLOG) corrective regimen sliding scale   SubCutaneous at bedtime  montelukast 10 milliGRAM(s) Oral daily  sodium chloride 0.9% lock flush 3 milliLiter(s) IV Push every 8 hours      FAMILY HISTORY:  No pertinent family history in first degree relatives      SOCIAL HISTORY:    [x] Non-smoker  [ ] Smoker  [ ] Alcohol    Allergies    No Known Allergies    Intolerances    	    REVIEW OF SYSTEMS:  CONSTITUTIONAL: No fever, weight loss, or fatigue  EYES: No eye pain, visual disturbances, or discharge  ENMT:  No difficulty hearing, tinnitus, vertigo; No sinus or throat pain  NECK: No pain or stiffness  RESPIRATORY: No cough, wheezing, chills or hemoptysis; No Shortness of Breath  CARDIOVASCULAR: No chest pain, palpitations, passing out, dizziness, or leg swelling  GASTROINTESTINAL: No abdominal or epigastric pain. No nausea, vomiting, or hematemesis; No diarrhea or constipation. No melena or hematochezia.  GENITOURINARY: No dysuria, frequency, hematuria, or incontinence  NEUROLOGICAL: No headaches, memory loss, loss of strength, numbness, or tremors  SKIN: No itching, burning, rashes, or lesions   	    [x] All others negative	  [ ] Unable to obtain    PHYSICAL EXAM:  T(C): 36.7 (19 @ 04:42), Max: 36.7 (01-10-19 @ 22:15)  HR: 63 (19 @ 04:42) (63 - 76)  BP: 125/71 (19 @ 04:42) (110/61 - 129/67)  RR: 18 (19 @ 04:42) (17 - 18)  SpO2: 98% (19 @ 04:42) (96% - 99%)  Wt(kg): --  I&O's Summary    10 Joseph 2019 07:  -  2019 07:00  --------------------------------------------------------  IN: 98 mL / OUT: 400 mL / NET: -302 mL    2019 07:  -  2019 12:54  --------------------------------------------------------  IN: 0 mL / OUT: 900 mL / NET: -900 mL        Appearance: Normal	  Psychiatry: A & O x 3, Mood & affect appropriate  HEENT:   Normal oral mucosa, PERRL, EOMI	  Lymphatic: No lymphadenopathy  Cardiovascular: Normal S1 S2,RRR, No JVD, +murmur   Respiratory: Lungs clear to auscultation	  Gastrointestinal:  Soft, Non-tender, + BS	  Skin: No rashes, No ecchymoses, No cyanosis	  Neurologic: Non-focal  Extremities: Normal range of motion, No clubbing, cyanosis or edema  Vascular: Peripheral pulses palpable 2+ bilaterally    TELEMETRY: NSR 	    ECG:  	  RADIOLOGY:  OTHER: 	  	  LABS:	 	    CARDIAC MARKERS:                                  10.7   8.55  )-----------( 348      ( 2019 08:14 )             33.8         135  |  102  |  14  ----------------------------<  112<H>  3.7   |  20<L>  |  0.66    Ca    9.0      2019 06:54  Mg     2.0     01-10    TPro  7.7  /  Alb  3.7  /  TBili  0.7  /  DBili  x   /  AST  34  /  ALT  23  /  AlkPhos  252<H>      PT/INR - ( 2019 00:22 )   PT: 15.9 sec;   INR: 1.37 ratio         PTT - ( 2019 06:54 )  PTT:70.5 sec  proBNP: Serum Pro-Brain Natriuretic Peptide: 316 pg/mL ( @ 00:22)    Lipid Profile:   HgA1c: Hemoglobin A1C, Whole Blood: 6.4 % ( @ 03:12)  Hemoglobin A1C, Whole Blood: 6.4 % ( @ 03:12)    TSH:

## 2019-01-11 NOTE — PROGRESS NOTE ADULT - ASSESSMENT
79 yo female h/o dvt on coumadin, copd, dm, asthma, a/w c.o sob, curtis.  found to be in afib with rvr and acute heart failure  PE ruled out  ACS ruled out    afib  cards following  converted to NSR  cont with cardizem for now  tele monitor  found to have atrial thrombus on echo  dccv cancelled  s/p cath. results noted with LAD dz, valvular dz, and fistula  [facility]  coumadin on hold  on hep gtt       acute heart failure  diuresis with lasix   strict i/o    DVT   on AC with hep gtt    asthma/copd  stable  nebs prn  pulm f/u    PT

## 2019-01-11 NOTE — PROGRESS NOTE ADULT - SUBJECTIVE AND OBJECTIVE BOX
Patient is a 78y old  Female who presents with a chief complaint of Mitral regurgitation, CAD, DANTE thrombus (10 Joseph 2019 23:30)      Any change in ROS: Transferred from [facility] to [facility] for cardiac surgery:  Pt seems and examined today: She has been doing well: Little apprehensive: No sob: no wheezing     MEDICATIONS  (STANDING):  aspirin enteric coated 81 milliGRAM(s) Oral daily  atorvastatin 20 milliGRAM(s) Oral at bedtime  buDESOnide  80 MICROgram(s)/formoterol 4.5 MICROgram(s) Inhaler 2 Puff(s) Inhalation two times a day  dextrose 5%. 1000 milliLiter(s) (50 mL/Hr) IV Continuous <Continuous>  dextrose 50% Injectable 12.5 Gram(s) IV Push once  dextrose 50% Injectable 25 Gram(s) IV Push once  dextrose 50% Injectable 25 Gram(s) IV Push once  diltiazem    milliGRAM(s) Oral daily  furosemide    Tablet 20 milliGRAM(s) Oral daily  heparin  Infusion 400 Unit(s)/Hr (6 mL/Hr) IV Continuous <Continuous>  insulin lispro (HumaLOG) corrective regimen sliding scale   SubCutaneous three times a day before meals  insulin lispro (HumaLOG) corrective regimen sliding scale   SubCutaneous at bedtime  montelukast 10 milliGRAM(s) Oral daily  sodium chloride 0.9% lock flush 3 milliLiter(s) IV Push every 8 hours    MEDICATIONS  (PRN):  dextrose 40% Gel 15 Gram(s) Oral once PRN Blood Glucose LESS THAN 70 milliGRAM(s)/deciliter  glucagon  Injectable 1 milliGRAM(s) IntraMuscular once PRN Glucose LESS THAN 70 milligrams/deciliter    Vital Signs Last 24 Hrs  T(C): 36.7 (11 Jan 2019 04:42), Max: 36.7 (10 Joseph 2019 22:15)  T(F): 98 (11 Jan 2019 04:42), Max: 98 (10 Joseph 2019 22:15)  HR: 63 (11 Jan 2019 04:42) (63 - 76)  BP: 125/71 (11 Jan 2019 04:42) (110/61 - 129/67)  BP(mean): --  RR: 18 (11 Jan 2019 04:42) (17 - 18)  SpO2: 98% (11 Jan 2019 04:42) (96% - 99%)    I&O's Summary    10 Joseph 2019 07:01  -  11 Jan 2019 07:00  --------------------------------------------------------  IN: 98 mL / OUT: 400 mL / NET: -302 mL    11 Jan 2019 07:01  -  11 Jan 2019 10:47  --------------------------------------------------------  IN: 0 mL / OUT: 0 mL / NET: 0 mL          Physical Exam:   GENERAL: NAD, well-groomed, well-developed  HEENT: MERLINE/   Atraumatic, Normocephalic  ENMT: No tonsillar erythema, exudates, or enlargement; Moist mucous membranes, Good dentition, No lesions  NECK: Supple, No JVD, Normal thyroid  CHEST/LUNG: Clear to auscultaion, ; No rales, rhonchi, wheezing, or rubs  CVS: Regular rate and rhythm; No murmurs, rubs, or gallops  GI: : Soft, Nontender, Nondistended; Bowel sounds present  NERVOUS SYSTEM:  Alert & Oriented X3  EXTREMITIES:  2+ Peripheral Pulses, No clubbing, cyanosis, or edema  LYMPH: No lymphadenopathy noted  SKIN: No rashes or lesions  ENDOCRINOLOGY: No Thyromegaly  PSYCH: Appropriate    Labs:  23                            10.7   8.55  )-----------( 348      ( 11 Jan 2019 08:14 )             33.8                         11.4   9.3   )-----------( 272      ( 11 Jan 2019 00:22 )             34.3                         10.4   9.74  )-----------( 309      ( 10 Joseph 2019 04:30 )             33.4                         11.3   7.29  )-----------( 338      ( 09 Jan 2019 07:05 )             36.7                         11.1   8.36  )-----------( 343      ( 08 Jan 2019 05:00 )             35.3     01-11    135  |  102  |  14  ----------------------------<  112<H>  3.7   |  20<L>  |  0.66  01-11    134<L>  |  99  |  15  ----------------------------<  87  3.8   |  21<L>  |  0.68  01-10    136  |  104  |  11  ----------------------------<  104<H>  4.6   |  21<L>  |  0.64  01-09    136  |  101  |  13  ----------------------------<  96  3.4<L>   |  24  |  0.72  01-08    141  |  105  |  18  ----------------------------<  90  3.9   |  22  |  0.69    Ca    9.0      11 Jan 2019 06:54  Ca    9.2      11 Jan 2019 00:22  Ca    9.4      10 Joseph 2019 04:30  Mg     2.0     01-10    TPro  7.7  /  Alb  3.7  /  TBili  0.7  /  DBili  x   /  AST  34  /  ALT  23  /  AlkPhos  252<H>  01-11  TPro  8.6<H>  /  Alb  4.3  /  TBili  0.7  /  DBili  x   /  AST  37  /  ALT  30  /  AlkPhos  279<H>  01-11    CAPILLARY BLOOD GLUCOSE      POCT Blood Glucose.: 118 mg/dL (11 Jan 2019 09:37)  POCT Blood Glucose.: 86 mg/dL (10 Joseph 2019 23:45)  POCT Blood Glucose.: 95 mg/dL (10 Joseph 2019 20:59)  POCT Blood Glucose.: 128 mg/dL (10 Joseph 2019 17:16)  POCT Blood Glucose.: 90 mg/dL (10 Joseph 2019 13:36)      LIVER FUNCTIONS - ( 11 Jan 2019 06:54 )  Alb: 3.7 g/dL / Pro: 7.7 g/dL / ALK PHOS: 252 U/L / ALT: 23 U/L / AST: 34 U/L / GGT: x           PT/INR - ( 11 Jan 2019 00:22 )   PT: 15.9 sec;   INR: 1.37 ratio         PTT - ( 11 Jan 2019 06:54 )  PTT:70.5 sec  Urinalysis Basic - ( 11 Jan 2019 08:25 )    Color: Yellow / Appearance: Clear / SG: >1.050 / pH: x  Gluc: x / Ketone: Negative  / Bili: Negative / Urobili: Negative   Blood: x / Protein: 30 mg/dL / Nitrite: Negative   Leuk Esterase: Large / RBC: 2 /hpf /  /HPF   Sq Epi: x / Non Sq Epi: 3 /hpf / Bacteria: Moderate      Serum Pro-Brain Natriuretic Peptide: 316 pg/mL (01-11 @ 00:22)        RECENT CULTURES:        RESPIRATORY CULTURES:    < from: CT Head No Cont (01.05.19 @ 08:39) >   Multiple falls.    TECHNIQUE: Noncontrast CT scan of the head was performed. Serial axial   images were obtained from the skull base to the vertex using multislice   helical technique. Sagittal and coronal computer-generated reconstructed   views were obtained.    COMPARISON STUDY: None available.    FINDINGS:     There is enlargement of ventricles and sulci greater expected for age is   with volume loss more pronounced in the cerebellar region. There is   decreased attenuation in the white matter, likely microvascular disease.   There is there is no intraventricular or extra axial hemorrhage or   midline shift. The basal cisterns are patent.    There is a prominent partially empty sella. There is atherosclerotic   vascular calcification of the carotid siphons. The visualized paranasal   sinuses, mastoid air cells and middle ear cavities are clear.    The soft tissues of the scalp are unremarkable. The calvarium is intact.    IMPRESSION:     Volume loss and white matter decreased attenuation likely microvascular   disease, there is no acute hemorrhage or midline shift, if symptoms   persist consider follow up head CT or MRI if no contraindications          < from: CT Angio Chest w/ IV Cont (01.05.19 @ 08:39) >  annular calcification. Aortic valve and coronary artery atherosclerotic   calcifications.  MEDIASTINUM AND VIKTORIYA: No lymphadenopathy.  CHEST WALL AND LOWER NECK: Within normal limits.  VISUALIZED UPPER ABDOMEN: Small hiatal hernia. A partially imaged fat   density lesion at the periphery of the right renal interpole.  BONES: Multilevel degenerative change.    IMPRESSION:     Limited evaluation of the distal segmental and subsegmental pulmonary   arteries due to streak and motion artifact. No central, main, lobar or   proximal segmental pulmonary embolism.     Small bilateral pleural effusions with likely pulmonary edema.    A partially imaged fat density lesion in the periphery of the right renal   interpole is indeterminate, may reflect an angiomyolipoma. For more   definitive characterization, consider nonurgent contrast-enhanced renal   protocol CT on an outpatient basis.      < end of copied text >      BONY JENKINS M.D., RADIOLOGY RESIDENT  This document has been electronically signed.  MORGAN JORGE M.D., ATTENDING RADIOLOGIST  This document has been electronically signed. Jan 5 2019 12:54PM        < end of copied text >        Studies  Chest X-RAY  CT SCAN Chest   Venous Dopplers: LE:   CT Abdomen  Others        < from: EMILI w/TTE (w/3D Echo) (01.07.19 @ 15:03) >  35 cc/m2.  A very large (about  3.0 cm X 1.4 cm) thrombus  is visualized in the left atrial appendage.  Left Ventricle: Normal left ventricular systolic function.  No segmental wall motion abnormalities. Normal left  ventricular internal dimensions and wall thicknesses.  Right Heart: Normal right atrium. Normal right ventricular  size and function. Normal tricuspid valve.  Mild-moderate  tricuspid regurgitation. Normal pulmonic valve.  Pericardium/PleuraNormal pericardium with no pericardial  effusion.  ------------------------------------------------------------------------  CONCLUSIONS:  1. Mitral annular calcification and calcified mitral  leaflets with decreased diastolic opening.  In some views  the valve has a rheumatic appearance. Mild-moderate mitral  regurgitation. Mean transmitral valve gradient equals 8 mm  Hg, estimated mitral valve area equals 1.1 sqcm (by  pressure half time equation), consistent with moderate to  severe mitral stenosis.  2. Calcified trileaflet aortic valve with decreased  opening. Peak transaortic valve gradient equals 17 mm Hg,  mean transaortic valve gradient equals 12 mm Hg, consistent  with mild aortic stenosis. No aortic valve regurgitation  seen.  3. Normal aortic root.  Mild non-mobile atherosclerotic  plaque in the aortic arch.  Severe, bulky, mobile  atherosclerotic plaque in the descending thoracic aorta.  4. Mildly dilated left atrium.  LA volume index = 35 cc/m2.   A very large (about  3.0 cm X 1.4 cm) thrombus is  visualized in the left atrial appendage.  5. Normal left ventricular internal dimensions and wall  thicknesses.  6. Normal left ventricular systolic function. No segmental  wall motion abnormalities.  7. Normal right ventricular size and function.  8. Contrast injection demonstrates no evidence of a patent  foramen ovale.  *** No previous Echo exam.  ------------------------------------------------------------------------  Confirmed on  1/7/2019 - 18:49:43 by Michael Alfaro M.D.  ------------------------------------------------------------------------    < end of copied text >      < from: Cardiac Cath Lab - Adult (01.10.19 @ 10:54) >  segment.  CX:   --  Circumflex: Angiography showed mild atherosclerosis with no flow  limiting lesions.  RCA:   --  RCA: Angiography showed no evidence of disease. 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  --  RPDA: Angiography showed no evidence of disease.  --  RPL1:Angiography showed no evidence of disease.  AORTA: Ascending aorta: Normal. No evidence of any aortic fistula. No  aneurysm. The PA fills from coronary flow from root injection.  COMPLICATIONS: There were no complications.  DIAGNOSTIC IMPRESSIONS: New onset chest pain, AF with RVR, and acute  diastolic HF. EMILI with mod-severe mitral stenosis and very large thrombus  in DANTE. Cath with severe proximal LAD lesion with correlating IVUS  findings and moderate diagonal disease. 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 LV function preserved.  DIAGNOSTIC RECOMMENDATIONS: Continue medical treatment of CAD, AF. CTS  evaluation for MVR/CABG possible fistula ligation/dante thrombus  extraction/dante ligation.  INTERVENTIONALIMPRESSIONS: New onset chest pain, AF with RVR, and acute  diastolic HF. EMILI with mod-severe mitral stenosis and very large thrombus  in DANTE. Cath with severe proximal LAD lesion with correlating IVUS  findings and moderate diagonal disease. 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 LV function preserved.  INTERVENTIONAL RECOMMENDATIONS: Continue medical treatment of CAD, AF. CTS  evaluation for MVR/CABG possible fistula ligation/dante thrombus  extraction/dante ligation.  Prepared and signed by  Michael Fitch M.D.  Signed 01/10/2019 15:30:25  HEMODYNAMIC TABLES  Pressures:  Baseline  Pressures:  - HR: 66  Pressures:  - Rhythm:  Pressures:  -- Aortic Pressure (S/D/M): 136/66/94  Pressures:  -- Left Ventricle (s/edp): 139/26/--  Outputs:  Baseline  Outputs:  -- CALCULATIONS[age]    < end of copied text >

## 2019-01-11 NOTE — PROGRESS NOTE ADULT - SUBJECTIVE AND OBJECTIVE BOX
VITAL SIGNS    Telemetry:  SR   Vital Signs Last 24 Hrs  T(C): 36.7 (19 @ 04:42), Max: 36.7 (01-10-19 @ 22:15)  T(F): 98 (19 @ 04:42), Max: 98 (01-10-19 @ 22:15)  HR: 63 (19 @ 04:42) (63 - 76)  BP: 125/71 (19 @ 04:42) (110/61 - 129/67)  RR: 18 (19 @ 04:42) (17 - 18)  SpO2: 98% (19 @ 04:42) (96% - 99%)            01-10 @ 07:  -   @ 07:00  --------------------------------------------------------  IN: 98 mL / OUT: 400 mL / NET: -302 mL     @ 07:01  -   @ 11:18  --------------------------------------------------------  IN: 0 mL / OUT: 0 mL / NET: 0 mL       Daily Height in cm: 162.56 (10 Joseph 2019 23:15)    Daily Weight in k.1 (2019 09:43)  Admit Wt: Drug Dosing Weight  Height (cm): 162.5 (10 Joseph 2019 23:17)  Weight (kg): 77.1 (10 Joseph 2019 23:17)  BMI (kg/m2): 29.2 (10 Joseph 2019 23:17)  BSA (m2): 1.82 (10 Joseph 2019 23:17)    Bilirubin Total, Serum: 0.7 mg/dL ( @ 06:54)  Bilirubin Total, Serum: 0.7 mg/dL ( 00:22)    CAPILLARY BLOOD GLUCOSE      POCT Blood Glucose.: 118 mg/dL (2019 09:37)  POCT Blood Glucose.: 86 mg/dL (10 Joseph 2019 23:45)  POCT Blood Glucose.: 95 mg/dL (10 Joseph 2019 20:59)  POCT Blood Glucose.: 128 mg/dL (10 Joseph 2019 17:16)  POCT Blood Glucose.: 90 mg/dL (10 Joseph 2019 13:36)          MEDICATIONS  aspirin enteric coated 81 milliGRAM(s) Oral daily  atorvastatin 20 milliGRAM(s) Oral at bedtime  buDESOnide  80 MICROgram(s)/formoterol 4.5 MICROgram(s) Inhaler 2 Puff(s) Inhalation two times a day  diltiazem    milliGRAM(s) Oral daily  furosemide    Tablet 20 milliGRAM(s) Oral daily  glucagon  Injectable 1 milliGRAM(s) IntraMuscular once PRN  heparin  Infusion 400 Unit(s)/Hr IV Continuous <Continuous>  insulin lispro (HumaLOG) corrective regimen sliding scale   SubCutaneous three times a day before meals  insulin lispro (HumaLOG) corrective regimen sliding scale   SubCutaneous at bedtime  montelukast 10 milliGRAM(s) Oral daily  sodium chloride 0.9% lock flush 3 milliLiter(s) IV Push every 8 hours      PHYSICAL EXAM  Subjective:  Neurology: alert and oriented x 3, nonfocal, no gross deficits  CV :  Sternal Wound :  CDI , Stable  Lungs:  Abdomen: soft, NT,ND, ( )BM  :  voiding  Extremities:       LABS      135  |  102  |  14  ----------------------------<  112<H>  3.7   |  20<L>  |  0.66    Ca    9.0      2019 06:54  Mg     2.0     01-10    TPro  7.7  /  Alb  3.7  /  TBili  0.7  /  DBili  x   /  AST  34  /  ALT  23  /  AlkPhos  252<H>                                   10.7   8.55  )-----------( 348      ( 2019 08:14 )             33.8          PT/INR - ( 2019 00:22 )   PT: 15.9 sec;   INR: 1.37 ratio         PTT - ( 2019 06:54 )  PTT:70.5 sec       PAST MEDICAL & SURGICAL HISTORY:  DVT (deep venous thrombosis)  Asthma  COPD (chronic obstructive pulmonary disease)  DM (diabetes mellitus)  History of embolectomy: KATHERINE VITAL SIGNS    Telemetry:  SR   Vital Signs Last 24 Hrs  T(C): 36.7 (19 @ 04:42), Max: 36.7 (01-10-19 @ 22:15)  T(F): 98 (19 @ 04:42), Max: 98 (01-10-19 @ 22:15)  HR: 63 (19 @ 04:42) (63 - 76)  BP: 125/71 (19 @ 04:42) (110/61 - 129/67)  RR: 18 (19 @ 04:42) (17 - 18)  SpO2: 98% (19 @ 04:42) (96% - 99%)            01-10 @ 07:  -   @ 07:00  --------------------------------------------------------  IN: 98 mL / OUT: 400 mL / NET: -302 mL     @ 07:01  -   @ 11:18  --------------------------------------------------------  IN: 0 mL / OUT: 0 mL / NET: 0 mL       Daily Height in cm: 162.56 (10 Joseph 2019 23:15)    Daily Weight in k.1 (2019 09:43)  Admit Wt: Drug Dosing Weight  Height (cm): 162.5 (10 Joseph 2019 23:17)  Weight (kg): 77.1 (10 Joseph 2019 23:17)  BMI (kg/m2): 29.2 (10 Joseph 2019 23:17)  BSA (m2): 1.82 (10 Joseph 2019 23:17)    Bilirubin Total, Serum: 0.7 mg/dL ( @ 06:54)  Bilirubin Total, Serum: 0.7 mg/dL ( 00:22)    CAPILLARY BLOOD GLUCOSE      POCT Blood Glucose.: 118 mg/dL (2019 09:37)  POCT Blood Glucose.: 86 mg/dL (10 Joseph 2019 23:45)  POCT Blood Glucose.: 95 mg/dL (10 Joseph 2019 20:59)  POCT Blood Glucose.: 128 mg/dL (10 Joseph 2019 17:16)  POCT Blood Glucose.: 90 mg/dL (10 Joseph 2019 13:36)          MEDICATIONS  aspirin enteric coated 81 milliGRAM(s) Oral daily  atorvastatin 20 milliGRAM(s) Oral at bedtime  buDESOnide  80 MICROgram(s)/formoterol 4.5 MICROgram(s) Inhaler 2 Puff(s) Inhalation two times a day  diltiazem    milliGRAM(s) Oral daily  furosemide    Tablet 20 milliGRAM(s) Oral daily  glucagon  Injectable 1 milliGRAM(s) IntraMuscular once PRN  heparin  Infusion 400 Unit(s)/Hr IV Continuous <Continuous>  insulin lispro (HumaLOG) corrective regimen sliding scale   SubCutaneous three times a day before meals  insulin lispro (HumaLOG) corrective regimen sliding scale   SubCutaneous at bedtime  montelukast 10 milliGRAM(s) Oral daily  sodium chloride 0.9% lock flush 3 milliLiter(s) IV Push every 8 hours      PHYSICAL EXAM  Subjective: NAD  Neurology: alert and oriented x 3, nonfocal, no gross deficits  CV : S1S2  Sternal Wound :  CDI , Stable  Lungs: CTA b/l  Abdomen: soft, NT,ND, (+ )BM  :  voiding  Extremities: -c/c/e      LABS      135  |  102  |  14  ----------------------------<  112<H>  3.7   |  20<L>  |  0.66    Ca    9.0      2019 06:54  Mg     2.0     01-10    TPro  7.7  /  Alb  3.7  /  TBili  0.7  /  DBili  x   /  AST  34  /  ALT  23  /  AlkPhos  252<H>                                   10.7   8.55  )-----------( 348      ( 2019 08:14 )             33.8          PT/INR - ( 2019 00:22 )   PT: 15.9 sec;   INR: 1.37 ratio         PTT - ( 2019 06:54 )  PTT:70.5 sec       PAST MEDICAL & SURGICAL HISTORY:  DVT (deep venous thrombosis)  Asthma  COPD (chronic obstructive pulmonary disease)  DM (diabetes mellitus)  History of embolectomy: KATHERINE

## 2019-01-11 NOTE — CONSULT NOTE ADULT - ATTENDING COMMENTS
Patient seen and examined.  Agree with above NP note.  cv stable   await poss or monday   cont a/c   care per cts

## 2019-01-11 NOTE — PHARMACOTHERAPY INTERVENTION NOTE - COMMENTS
Completed admission medication reconciliation. Added patient's current pharmacy on profile. Communicated with NP Fariba Ayoub regarding discrepanices: Azopt eye drops (substituted with formulary Dorzolamide 1 drop in both eyes QHS) and Azelastine PRN.

## 2019-01-11 NOTE — PROGRESS NOTE ADULT - ASSESSMENT
78y Female w/ PMH of LLE DVT 8/2018, on coumadin, COPD, DM, Asthma, presented to [facility] ER with new onset Atrial fibrillation and dyspnea on exertion worsening over the past few days prior to presentation on 1/5. Patient underwent EMILI and DCC at [facility] and was found to have a large ANDREW thrombus as well as mild to moderate MR with mild Mitral stenosis, EF 66%. CT PA was negative for PE. Left Heart Cath revealed LAD 75% stenosis and a fistula to the PA, and 50% stenosis of D1. The patient was then transferred to [facility] under Dr Ingram's Service for evaluation and pre-operative work up for CABG/MVR with ANDREW ligation and thrombus removal.  1/11 pending carotid doppler. Anticipate OHS Monday

## 2019-01-11 NOTE — CONSULT NOTE ADULT - ASSESSMENT
EMILI 1/7/9:  EF 66%, normal LV fx, Mod to sev MS, mild AS, severe mobile atherosclerotic plaque in the descending thoracic aorta, Large ANDREW thrombus ( 3.0 x 1.4 cm)  [facility] 1/10/19: LAD 75% stenosis and a fistula to the PA, and 50% stenosis of D1    a/p    78y Female w/ PMH of LLE DVT 8/2018, on coumadin, COPD, DM, Asthma, presented to [facility] ER with new onset Atrial fibrillation and dyspnea on exertion worsening over the past few days prior to presentation on 1/5. Patient underwent EMILI and DCC at [facility] and was found to have a large ANDREW thrombus as well as mild to moderate MR with mild Mitral stenosis, EF 66%. CT PA was negative for PE. Left Heart Cath revealed LAD 75% stenosis and a fistula to the PA, and 50% stenosis of D1. The patient was then transferred to [facility] under Dr Ingram's Service for evaluation and pre-operative work up for CABG/MVR with ANDREW ligation and thrombus removal.    1. CAD  Left Heart Cath revealed LAD 75% stenosis and a fistula to the PA, and 50% stenosis of D1.   cv stable no chest pain or sob. no evidence of fluid overload on exam   continue bb, statin, asa, lasix 20mg PO daily   continue heparin GTT   CT surg eval for CABG     2. Mitral valve insufficiency   EMILI revealing mild-mod MR with mild mitral stenosis   CT surg eval for MVR     3. ANDREW   EMILI revealing large ANDREW thrombus   continue AC with hep gtt  CT surg eval for ANDREW ligation & Thrombus removal     4. New onset Afib with RVR  spontaneously converted , and remains in NSR, rate controlled    continue Cardizem as ordered   CHADS=3-4 cont a/c on hep gtt    5. DVT, hx  cont a/c, repeat duplex no changes    dvt ppx EMILI 1/7/9:  EF 66%, normal LV fx, Mod to sev MS, mild AS, severe mobile atherosclerotic plaque in the descending thoracic aorta, Large ANDREW thrombus ( 3.0 x 1.4 cm)  [facility] 1/10/19: LAD 75% stenosis and a fistula to the PA, and 50% stenosis of D1    a/p    78y Female w/ PMH of LLE DVT 8/2018, on coumadin, COPD, DM, Asthma, presented to [facility] ER with new onset Atrial fibrillation and dyspnea on exertion worsening over the past few days prior to presentation on 1/5. Patient underwent EMILI and DCC at [facility] and was found to have a large ANDREW thrombus as well as mild to moderate MR with mild Mitral stenosis, EF 66%. CT PA was negative for PE. Left Heart Cath revealed LAD 75% stenosis and a fistula to the PA, and 50% stenosis of D1. The patient was then transferred to [facility] under Dr Ingram's Service for evaluation and pre-operative work up for CABG/MVR with ANDREW ligation and thrombus removal.    1. CAD  Left Heart Cath revealed LAD 75% stenosis and a fistula to the PA, and 50% stenosis of D1.   cv stable no chest pain or sob. no evidence of fluid overload on exam   continue bb, statin, asa, lasix 20mg PO daily   continue heparin GTT   CT surg eval for CABG     2. Mitral valve stenosis   EMILI revealing mild-mod MR with mod-sev mitral stenosis   CT surg eval for MVR     3. ANDREW   EMILI revealing large ANDREW thrombus   continue AC with hep gtt  CT surg eval for ANDREW ligation & Thrombus removal     4. New onset Afib with RVR  and remains in NSR, rate controlled    continue Cardizem as ordered   CHADS=3-4 cont a/c on hep gtt    5. DVT, hx  cont a/c, repeat duplex no changes    dvt ppx

## 2019-01-11 NOTE — PROGRESS NOTE ADULT - PROBLEM SELECTOR PLAN 2
on coumadin and AC. To keep INR =2-3  1/7: cont ac  1/8: cont AC.  1/10: Heparin gtt  1/11: on heparin

## 2019-01-11 NOTE — PATIENT PROFILE ADULT - DO YOU WANT TO COMPLETE THE HCP AND NAME A HEALTH CARE AGENT
yes/Damari Winchester, spouse  (h) [phone] (c) [phone]    Alie Tovarore, daughter [phone]

## 2019-01-12 DIAGNOSIS — J44.9 CHRONIC OBSTRUCTIVE PULMONARY DISEASE, UNSPECIFIED: ICD-10-CM

## 2019-01-12 LAB
ANION GAP SERPL CALC-SCNC: 14 MMOL/L — SIGNIFICANT CHANGE UP (ref 5–17)
APTT BLD: 56.5 SEC — HIGH (ref 27.5–36.3)
APTT BLD: 59.5 SEC — HIGH (ref 27.5–36.3)
APTT BLD: 92.5 SEC — HIGH (ref 27.5–36.3)
BUN SERPL-MCNC: 16 MG/DL — SIGNIFICANT CHANGE UP (ref 7–23)
CALCIUM SERPL-MCNC: 9.2 MG/DL — SIGNIFICANT CHANGE UP (ref 8.4–10.5)
CHLORIDE SERPL-SCNC: 98 MMOL/L — SIGNIFICANT CHANGE UP (ref 96–108)
CO2 SERPL-SCNC: 23 MMOL/L — SIGNIFICANT CHANGE UP (ref 22–31)
CREAT SERPL-MCNC: 0.67 MG/DL — SIGNIFICANT CHANGE UP (ref 0.5–1.3)
GLUCOSE BLDC GLUCOMTR-MCNC: 112 MG/DL — HIGH (ref 70–99)
GLUCOSE BLDC GLUCOMTR-MCNC: 124 MG/DL — HIGH (ref 70–99)
GLUCOSE BLDC GLUCOMTR-MCNC: 145 MG/DL — HIGH (ref 70–99)
GLUCOSE BLDC GLUCOMTR-MCNC: 152 MG/DL — HIGH (ref 70–99)
GLUCOSE BLDC GLUCOMTR-MCNC: 190 MG/DL — HIGH (ref 70–99)
GLUCOSE SERPL-MCNC: 105 MG/DL — HIGH (ref 70–99)
HCT VFR BLD CALC: 36 % — SIGNIFICANT CHANGE UP (ref 34.5–45)
HGB BLD-MCNC: 11.8 G/DL — SIGNIFICANT CHANGE UP (ref 11.5–15.5)
MAGNESIUM SERPL-MCNC: 2.2 MG/DL — SIGNIFICANT CHANGE UP (ref 1.6–2.6)
MCHC RBC-ENTMCNC: 27.4 PG — SIGNIFICANT CHANGE UP (ref 27–34)
MCHC RBC-ENTMCNC: 32.8 GM/DL — SIGNIFICANT CHANGE UP (ref 32–36)
MCV RBC AUTO: 83.6 FL — SIGNIFICANT CHANGE UP (ref 80–100)
PHOSPHATE SERPL-MCNC: 3.9 MG/DL — SIGNIFICANT CHANGE UP (ref 2.5–4.5)
PLATELET # BLD AUTO: 310 K/UL — SIGNIFICANT CHANGE UP (ref 150–400)
POTASSIUM SERPL-MCNC: 3.7 MMOL/L — SIGNIFICANT CHANGE UP (ref 3.5–5.3)
POTASSIUM SERPL-SCNC: 3.7 MMOL/L — SIGNIFICANT CHANGE UP (ref 3.5–5.3)
RBC # BLD: 4.31 M/UL — SIGNIFICANT CHANGE UP (ref 3.8–5.2)
RBC # FLD: 14.6 % — HIGH (ref 10.3–14.5)
SODIUM SERPL-SCNC: 135 MMOL/L — SIGNIFICANT CHANGE UP (ref 135–145)
WBC # BLD: 9.8 K/UL — SIGNIFICANT CHANGE UP (ref 3.8–10.5)
WBC # FLD AUTO: 9.8 K/UL — SIGNIFICANT CHANGE UP (ref 3.8–10.5)

## 2019-01-12 RX ORDER — POTASSIUM CHLORIDE 20 MEQ
40 PACKET (EA) ORAL EVERY 4 HOURS
Qty: 0 | Refills: 0 | Status: COMPLETED | OUTPATIENT
Start: 2019-01-12 | End: 2019-01-12

## 2019-01-12 RX ORDER — CARVEDILOL PHOSPHATE 80 MG/1
3.12 CAPSULE, EXTENDED RELEASE ORAL ONCE
Qty: 0 | Refills: 0 | Status: COMPLETED | OUTPATIENT
Start: 2019-01-14 | End: 2019-01-14

## 2019-01-12 RX ADMIN — SODIUM CHLORIDE 3 MILLILITER(S): 9 INJECTION INTRAMUSCULAR; INTRAVENOUS; SUBCUTANEOUS at 14:47

## 2019-01-12 RX ADMIN — Medication 40 MILLIEQUIVALENT(S): at 08:00

## 2019-01-12 RX ADMIN — MONTELUKAST 10 MILLIGRAM(S): 4 TABLET, CHEWABLE ORAL at 13:01

## 2019-01-12 RX ADMIN — Medication 100 MILLIGRAM(S): at 13:05

## 2019-01-12 RX ADMIN — HEPARIN SODIUM 4.5 UNIT(S)/HR: 5000 INJECTION INTRAVENOUS; SUBCUTANEOUS at 04:10

## 2019-01-12 RX ADMIN — Medication 20 MILLIGRAM(S): at 07:05

## 2019-01-12 RX ADMIN — SODIUM CHLORIDE 3 MILLILITER(S): 9 INJECTION INTRAMUSCULAR; INTRAVENOUS; SUBCUTANEOUS at 22:57

## 2019-01-12 RX ADMIN — Medication 240 MILLIGRAM(S): at 03:00

## 2019-01-12 RX ADMIN — HEPARIN SODIUM 4.5 UNIT(S)/HR: 5000 INJECTION INTRAVENOUS; SUBCUTANEOUS at 13:05

## 2019-01-12 RX ADMIN — Medication 81 MILLIGRAM(S): at 13:01

## 2019-01-12 RX ADMIN — Medication 40 MILLIEQUIVALENT(S): at 13:04

## 2019-01-12 RX ADMIN — HEPARIN SODIUM 5 UNIT(S)/HR: 5000 INJECTION INTRAVENOUS; SUBCUTANEOUS at 19:58

## 2019-01-12 RX ADMIN — SODIUM CHLORIDE 3 MILLILITER(S): 9 INJECTION INTRAMUSCULAR; INTRAVENOUS; SUBCUTANEOUS at 07:07

## 2019-01-12 RX ADMIN — ATORVASTATIN CALCIUM 20 MILLIGRAM(S): 80 TABLET, FILM COATED ORAL at 22:58

## 2019-01-12 NOTE — PROVIDER CONTACT NOTE (OTHER) - ASSESSMENT
Pt A&O x4, complains of dizziness when walking to bathroom. Lying down pt is asymptomatic. BP 92/60 manually. No complaints of CP or SOB.

## 2019-01-12 NOTE — PROGRESS NOTE ADULT - ASSESSMENT
78y Female w/ PMH of LLE DVT 8/2018, on coumadin, COPD, DM, Asthma, presented to [facility] ER with new onset Atrial fibrillation and dyspnea on exertion worsening over the past few days prior to presentation on 1/5. Patient underwent EMILI and DCC at [facility] and was found to have a large ANDREW thrombus as well as mild to moderate MR with mild Mitral stenosis, EF 66%. CT PA was negative for PE. Left Heart Cath revealed LAD 75% stenosis and a fistula to the PA, and 50% stenosis of D1. The patient was then transferred to [facility] under Dr Ingram's Service for evaluation and pre-operative work up for CABG/MVR with ANDREW ligation and thrombus removal.  1/11 pending carotid doppler. Anticipate OHS Monday 1/12 VSS, no distress overnight, pt OOB to bedside chair, heparin gtt maintained. No chest pain, no curtis, no resp distress. For OR monday.

## 2019-01-12 NOTE — PROGRESS NOTE ADULT - SUBJECTIVE AND OBJECTIVE BOX
Interval Hx; Events Overnight:  VSS, no distress overnight, pt OOB to bedside chair, heparin gtt maintained. No chest pain, no curtis, no resp distress.     LABS:                11.8                 135  | 23   | 16           9.8   >-----------< 310     ------------------------< 105                   36.0                 3.7  | 98   | 0.67                                         Ca 9.2   Mg 2.2   Ph 3.9        PTT - ( 2019 11:02 )  PTT:59.5 sec    VITAL SIGNS    Telemetry: afib 60-90s     Daily     Daily Weight in k.6 (2019 08:30)      Vital Signs Last 24 Hrs  T(C): 36.9 (19 @ 14:01), Max: 36.9 (19 @ 14:01)  T(F): 98.5 (19 @ 14:01), Max: 98.5 (19 @ 14:01)  HR: 62 (19 @ 14:01) (62 - 89)  BP: 121/70 (19 @ 14:01) (91/59 - 121/70)  RR: 22 (19 @ 14:01) (18 - 22)  SpO2: 100% (19 @ 14:01) (98% - 100%)             I&O's Detail    2019 07:01  -  2019 07:00  --------------------------------------------------------  IN:    Oral Fluid: 600 mL  Total IN: 600 mL    OUT:    Voided: 1400 mL  Total OUT: 1400 mL    Total NET: -800 mL                    GLUCOSE  CAPILLARY BLOOD GLUCOSE      POCT Blood Glucose.: 124 mg/dL (2019 13:11)  POCT Blood Glucose.: 190 mg/dL (2019 11:51)  POCT Blood Glucose.: 145 mg/dL (2019 07:09)  POCT Blood Glucose.: 76 mg/dL (2019 21:43)  POCT Blood Glucose.: 178 mg/dL (2019 17:09)                  PHYSICAL EXAM      General: NAD, well appearing, in no distress  Neurology: A&O x3, non focal, no neuro deficits. Moves all extremities to command.  CV : s1 s2 irrregular, +murmur   Lungs: clear to auscultation  Abdomen: soft, nontender, nondistended, positive bowel sounds, +flatus  :    voiding         Extremities:    no  edema. + pedal pulses      Skin: intact, no lesions  groin sites: clean, intact, no rash        MEDICATIONS  aspirin enteric coated 81 milliGRAM(s) Oral daily  atorvastatin 20 milliGRAM(s) Oral at bedtime  buDESOnide  80 MICROgram(s)/formoterol 4.5 MICROgram(s) Inhaler 2 Puff(s) Inhalation two times a day  dextrose 40% Gel 15 Gram(s) Oral once PRN  dextrose 5%. 1000 milliLiter(s) IV Continuous <Continuous>  dextrose 50% Injectable 12.5 Gram(s) IV Push once  dextrose 50% Injectable 25 Gram(s) IV Push once  dextrose 50% Injectable 25 Gram(s) IV Push once  diltiazem    milliGRAM(s) Oral daily  docusate sodium 100 milliGRAM(s) Oral three times a day  dorzolamide 2% Ophthalmic Solution 1 Drop(s) Both EYES <User Schedule>  furosemide    Tablet 20 milliGRAM(s) Oral daily  glucagon  Injectable 1 milliGRAM(s) IntraMuscular once PRN  heparin  Infusion 400 Unit(s)/Hr IV Continuous <Continuous>  insulin lispro (HumaLOG) corrective regimen sliding scale   SubCutaneous three times a day before meals  insulin lispro (HumaLOG) corrective regimen sliding scale   SubCutaneous at bedtime  montelukast 10 milliGRAM(s) Oral daily  sodium chloride 0.9% lock flush 3 milliLiter(s) IV Push every 8 hours

## 2019-01-12 NOTE — PHYSICAL THERAPY INITIAL EVALUATION ADULT - PLANNED THERAPY INTERVENTIONS, PT EVAL
transfer training/gait training/balance training/bed mobility training/GOAL: Pt will negotiate 5 steps with 1 HR and step to pattern CG in 4 weeks.

## 2019-01-12 NOTE — PROGRESS NOTE ADULT - PROBLEM SELECTOR PLAN 2
on coumadin and AC. To keep INR =2-3  1/7: cont ac  1/8: cont AC.  1/10: Heparin gtt  1/11: on heparin  1/12 continue heparin gtt

## 2019-01-12 NOTE — PROGRESS NOTE ADULT - ATTENDING COMMENTS
Patient seen and examined.  Agree with above NP note.  cv stable   await poss or monday   recurrent paroxysmal symptomatic afib, consider amio  cont a/c   care per cts

## 2019-01-12 NOTE — PHYSICAL THERAPY INITIAL EVALUATION ADULT - ADDITIONAL COMMENTS
pt lives in 1st floor apt of private home with spouse, 4 steps to enter +HR. Pt uses a rolling walker at baseline, dtr-in-law assists with functional mobility.

## 2019-01-12 NOTE — PROGRESS NOTE ADULT - SUBJECTIVE AND OBJECTIVE BOX
Patient is a 78y old  Female who presents with a chief complaint of Mitral regurgitation, CAD, ANDREW thrombus (12 Jan 2019 09:07)    Any change in ROS: c/o episode of dizziness with SOB while ambulating.     MEDICATIONS  (STANDING):  aspirin enteric coated 81 milliGRAM(s) Oral daily  atorvastatin 20 milliGRAM(s) Oral at bedtime  buDESOnide  80 MICROgram(s)/formoterol 4.5 MICROgram(s) Inhaler 2 Puff(s) Inhalation two times a day  dextrose 5%. 1000 milliLiter(s) (50 mL/Hr) IV Continuous <Continuous>  dextrose 50% Injectable 12.5 Gram(s) IV Push once  dextrose 50% Injectable 25 Gram(s) IV Push once  dextrose 50% Injectable 25 Gram(s) IV Push once  diltiazem    milliGRAM(s) Oral daily  docusate sodium 100 milliGRAM(s) Oral three times a day  dorzolamide 2% Ophthalmic Solution 1 Drop(s) Both EYES <User Schedule>  furosemide    Tablet 20 milliGRAM(s) Oral daily  heparin  Infusion 400 Unit(s)/Hr (4.5 mL/Hr) IV Continuous <Continuous>  insulin lispro (HumaLOG) corrective regimen sliding scale   SubCutaneous three times a day before meals  insulin lispro (HumaLOG) corrective regimen sliding scale   SubCutaneous at bedtime  montelukast 10 milliGRAM(s) Oral daily  potassium chloride    Tablet ER 40 milliEquivalent(s) Oral every 4 hours  sodium chloride 0.9% lock flush 3 milliLiter(s) IV Push every 8 hours    MEDICATIONS  (PRN):  dextrose 40% Gel 15 Gram(s) Oral once PRN Blood Glucose LESS THAN 70 milliGRAM(s)/deciliter  glucagon  Injectable 1 milliGRAM(s) IntraMuscular once PRN Glucose LESS THAN 70 milligrams/deciliter    Vital Signs Last 24 Hrs  T(C): 36.3 (12 Jan 2019 06:43), Max: 36.7 (11 Jan 2019 14:39)  T(F): 97.4 (12 Jan 2019 06:43), Max: 98.1 (11 Jan 2019 14:39)  HR: 80 (12 Jan 2019 08:23) (63 - 89)  BP: 96/54 (12 Jan 2019 08:23) (91/59 - 115/79)  BP(mean): --  RR: 19 (12 Jan 2019 06:43) (17 - 20)  SpO2: 98% (12 Jan 2019 06:43) (96% - 99%)    I&O's Summary    11 Jan 2019 07:01  -  12 Jan 2019 07:00  --------------------------------------------------------  IN: 600 mL / OUT: 1400 mL / NET: -800 mL          Physical Exam:   GENERAL: The patient comfortable with no apparent distress.   HEENT: Head is normocephalic and atraumatic.    NECK: Supple with no elevated JVP.  LUNGS: Clear to auscultation without wheeze and rhonchi.  HEART: S1 and S2 present without murmur.  ABDOMEN: Soft, nontender, and nondistended.  EXTREMITIES: No edema or calf tenderness.  NEUROLOGIC: Grossly intact.    Labs:                              11.8   9.8   )-----------( 310      ( 12 Jan 2019 03:24 )             36.0                         10.7   8.55  )-----------( 348      ( 11 Jan 2019 08:14 )             33.8                         11.4   9.3   )-----------( 272      ( 11 Jan 2019 00:22 )             34.3                         10.4   9.74  )-----------( 309      ( 10 Joseph 2019 04:30 )             33.4                         11.3   7.29  )-----------( 338      ( 09 Jan 2019 07:05 )             36.7     01-12    135  |  98  |  16  ----------------------------<  105<H>  3.7   |  23  |  0.67  01-11    135  |  102  |  14  ----------------------------<  112<H>  3.7   |  20<L>  |  0.66  01-11    134<L>  |  99  |  15  ----------------------------<  87  3.8   |  21<L>  |  0.68  01-10    136  |  104  |  11  ----------------------------<  104<H>  4.6   |  21<L>  |  0.64  01-09    136  |  101  |  13  ----------------------------<  96  3.4<L>   |  24  |  0.72    Ca    9.2      12 Jan 2019 03:24  Ca    9.0      11 Jan 2019 06:54  Ca    9.2      11 Jan 2019 00:22  Phos  3.9     01-12  Mg     2.2     01-12    TPro  7.7  /  Alb  3.7  /  TBili  0.7  /  DBili  x   /  AST  34  /  ALT  23  /  AlkPhos  252<H>  01-11  TPro  8.6<H>  /  Alb  4.3  /  TBili  0.7  /  DBili  x   /  AST  37  /  ALT  30  /  AlkPhos  279<H>  01-11    CAPILLARY BLOOD GLUCOSE      POCT Blood Glucose.: 145 mg/dL (12 Jan 2019 07:09)  POCT Blood Glucose.: 76 mg/dL (11 Jan 2019 21:43)  POCT Blood Glucose.: 178 mg/dL (11 Jan 2019 17:09)  POCT Blood Glucose.: 95 mg/dL (11 Jan 2019 11:53)      LIVER FUNCTIONS - ( 11 Jan 2019 06:54 )  Alb: 3.7 g/dL / Pro: 7.7 g/dL / ALK PHOS: 252 U/L / ALT: 23 U/L / AST: 34 U/L / GGT: x           PT/INR - ( 11 Jan 2019 00:22 )   PT: 15.9 sec;   INR: 1.37 ratio         PTT - ( 12 Jan 2019 03:24 )  PTT:92.5 sec  Urinalysis Basic - ( 11 Jan 2019 08:25 )    Color: Yellow / Appearance: Clear / SG: >1.050 / pH: x  Gluc: x / Ketone: Negative  / Bili: Negative / Urobili: Negative   Blood: x / Protein: 30 mg/dL / Nitrite: Negative   Leuk Esterase: Large / RBC: 2 /hpf /  /HPF   Sq Epi: x / Non Sq Epi: 3 /hpf / Bacteria: Moderate      Serum Pro-Brain Natriuretic Peptide: 316 pg/mL (01-11 @ 00:22)      Studies  Chest X-RAY   < from: Xray Chest 1 View AP/PA (01.10.19 @ 23:38) >  EXAM:  XR CHEST AP OR PA 1V                            PROCEDURE DATE:  01/10/2019            INTERPRETATION:  CLINICAL INFORMATION: Chest pain.    EXAM: Frontal radiograph of the chest.    COMPARISON: Chest radiograph from 1/4/2019, CT chest 1/5/2019.    FINDINGS:  Heart size is normal. Loop recorder is present.    A 4 mm nodule in the right lower lung field corresponds to calcified   granuloma seen on recent CT chest from 1/5/2019. Small left pleural   effusion. No pneumothorax.    IMPRESSION: Small left pleural effusion. No pneumothorax.    < end of copied text >    CT SCAN Chest   < from: CT Angio Chest w/ IV Cont (01.05.19 @ 08:39) >  EXAM:  CT ANGIO CHEST (W)AW IC        PROCEDURE DATE:  Jan 5 2019         INTERPRETATION:  CLINICAL INFORMATION: Shortness of breath and chest   palpitations. History of DVT. Assess for pulmonary embolism.    COMPARISON: None.    PROCEDURE:   CT Angiography of the Chest.  80 ml of Omnipaque 350 was injected intravenously. 20 ml were discarded.  Sagittal and coronal reformats were performed as well as 3D (MIP)   reconstructions.      FINDINGS:    CTA: Streak and motion artifact limits evaluation of the distal segmental   and subsegmental pulmonary arteries at the right upper lobe and left   lower lobe. No central, main, lobar or proximal segmental pulmonary   embolism. Thoracic aorta and main pulmonary artery are normal in caliber.   Atherosclerotic calcification of the thoracic aorta.    CHEST:     LUNGS AND LARGE AIRWAYS: Evaluation limited by respiratory motion. Patent   central airways. Mild interlobular septal thickening with nonspecific   groundglass opacities and peribronchial thickening at the lung bases, may   reflect pulmonary edema. A calcified granuloma in the right lower lobe.  PLEURA: Small bilateral pleural effusions.  VESSELS: As above.  HEART: Heart size is normal. No pericardial effusion. Dense mitral   annular calcification. Aortic valve and coronary artery atherosclerotic   calcifications.  MEDIASTINUM AND VIKTORIYA: No lymphadenopathy.  CHEST WALL AND LOWER NECK: Within normal limits.  VISUALIZED UPPER ABDOMEN: Small hiatal hernia. A partially imaged fat   density lesion at the periphery of the right renal interpole.  BONES: Multilevel degenerative change.    IMPRESSION:     Limited evaluation of the distal segmental and subsegmental pulmonary   arteries due to streak and motion artifact. No central, main, lobar or   proximal segmental pulmonary embolism.     Small bilateral pleural effusions with likely pulmonary edema.    A partially imaged fat density lesion in the periphery of the right renal   interpole is indeterminate, may reflect an angiomyolipoma. For more   definitive characterization, consider nonurgent contrast-enhanced renal   protocol CT on an outpatient basis.    < end of copied text >    Venous Dopplers: LE:  < from: VA Duplex Ext Veins Lower Comp, Bilat. (01.05.19 @ 10:22) >  Patient name: JEANNE TRUJILLO  Date of test: 1/5/2019  MR#: [id number]  [facility] #: [id number]    Location: [facility] Physician(s): , Michael Fitch MD  Interpreted by: Sujit Graves MD, [facility], [facility], [facility]  Tech: Dominique Camarena ALIA  Type of Test: Lower Extremity Venous  ------------------------------------------------------------------------  Procedure: Real-time grayscale and color Duplex  ultrasonography was used to interrogate the deep veins of  the bilateral lower extremities.  Indications: Shortness of breath (R06.02)  ------------------------------------------------------------------------  RESULT:  ------------------------------------------------------------------------  RIGHT:  Deep venous thrombosis: No  Superficial venous thrombosis: No  Deep venous insufficiency: No  Superficial venous insufficiency: No  ------------------------------------------------------------------------  LEFT:  Deep venous thrombosis: Yes  Superficial venous thrombosis: No  Deep venous insufficiency: No  Superficial venous insufficiency: No  ------------------------------------------------------------------------  Right Findings: No evidence of thrombosis in the right  lower extremity.  The right common femoral, femoral,  popliteal, gastrocnemius, posteriortibial, and peroneal  veins appear sonographically normal and compressible  without evidence of thrombosis.  Normal phasic Doppler  waveforms noted in the right common femoral vein.  No evidence of superficial vein thrombosis.  The right  great saphenous and short saphenous veins are patent, and  demonstrate full compressibility.  No evidence of deep and superficial venous insufficiency  in the right lower extremity.  Normal valve closure times  (VCT) with proximal and distal augmentation noted within  the common femoral, femoral, popliteal, great saphenous,  and short saphenous veins.  Left Findings: Chronic thrombosis noted in the left  femoral vein.  There is otherwise no evidence of acute thrombosis in the  left lower extremity.  The leftcommon femoral, popliteal,  gastrocnemius, posterior tibial, and peroneal veins appear  sonographically normal and compressible without evidence  of thrombosis.  Normal phasic Doppler waveforms noted in the left common  femoral vein.  No evidence of superficial vein thrombosis.  The left  great saphenous and short saphenous veins are patent, and  demonstrate full compressibility.  No evidence of superficial venous insufficiency in the  left lower extremity.  Normal valve closure times (VCT)  withproximal and distal augmentation noted within the  common femoral, femoral, popliteal, great saphenous, and  short saphenous veins.  ------------------------------------------------------------------------  Summary/Impressions:  1.  Chronic deep veinthrombosis noted in the left femoral  vein.  No evidence of DVT in the right lower extremity.  2.  No evidence of  superficial venous insufficiency noted  in the right and left lower extremities.    < end of copied text >     CT Abdomen   Others  < from: Transthoracic Echocardiogram (01.11.19 @ 09:33) >  Patient name: JEANNE TRUJILLO  YOB: 1940   Age: 78 (F)   MR#: [id number]  Study Date: 1/11/2019  Location: [address] [place], [place] [id number]: Shahla Velez RDCS  [facility] Sonographer: Esther Perez M.D.  Study quality: Technically difficult  ReferringPhysician: Robbie Ingram MD  Blood Pressure: 110/56 mmHg  Height: 163 cm  Weight: 77 kg  BSA: 1.8 m2  ------------------------------------------------------------------------  PROCEDURE: Transthoracic echocardiogram with 2-D, M-Mode  and complete spectral and color flow Doppler.  INDICATION: Cardiac murmur, unspecified (R01.1)  ------------------------------------------------------------------------  Dimensions:    Normal Values:  LA:     3.9    2.0 - 4.0 cm  Ao:     2.6    2.0 - 3.8 cm  SEPTUM: 0.7    0.6 - 1.2 cm  PWT:    0.9    0.6 - 1.1 cm  LVIDd:  4.2    3.0 - 5.6 cm  LVIDs:  2.9    1.8 - 4.0 cm  Derived variables:  LVMI: 55 g/m2  RWT: 0.42  Fractional short: 31 %  EF (Teicholtz): 59 %  Doppler Peak Velocity (m/sec): AoV=2.3  ------------------------------------------------------------------------  Observations:  Mitral Valve: Mitral annular calcification and calcified  mitral leaflets with decreased diastolic opening. Mild  mitral regurgitation.  Peak mitral valve gradient equals 9  mm Hg, mean transmitral valve gradient equals 4 mm Hg,  consistent with mild mitral stenosis.  Aortic Valve/Aorta: Calcified aortic valve with decreased  opening. Peak transaortic valve gradient equals 21 mm Hg,  mean transaortic valve gradient equals 12 mm Hg, estimated  aortic valve area equals 1.2 sqcm (by continuity equation),  aortic valve velocity time integral equals 46 cm,  consistent with moderate aortic stenosis. Peak left  ventricular outflow tract gradient equals 3 mm Hg, mean  gradient is equal to 1 mm Hg, LVOT velocity time integral  equals 18 cm.  Aortic Root: 2.6 cm.  LVOT diameter: 2 cm.  Left Atrium: Normal left atrium.  LA volume index = 25  cc/m2.  Left Ventricle: Normal left ventricular systolic function.  No segmental wall motion abnormalities. Normal left  ventricular internal dimensions and wall thicknesses.  Right Heart: Normal right atrium. The right ventricle is  not well visualized; grossly normal right ventricular  systolic function. Normal tricuspid valve. Minimal  tricuspid regurgitation. Normal pulmonic valve. Minimal  pulmonic regurgitation.  Pericardium/Pleura: Normal pericardium with no pericardial  effusion.  Hemodynamic: Estimated right atrial pressure is 8 mm Hg.  Estimated right ventricular systolic pressure equals 37 mm  Hg, assuming right atrial pressure equals 8 mm Hg,  consistent with borderline pulmonary hypertension.  ------------------------------------------------------------------------  Conclusions:  1. Mitral annular calcification and calcified mitral  leaflets with decreased diastolic opening. Peak mitral  valve gradient equals 9 mm Hg, mean transmitral valve  gradient equals 4 mm Hg, consistent with mild mitral  stenosis.  2. Calcified aortic valve with decreased opening. Peak  transaortic valve gradient equals 21 mm Hg, mean  transaortic valve gradient equals 12 mm Hg, estimated  aortic valve area equals 1.2 sqcm (by continuity equation),  aortic valve velocity time integral equals 46 cm,  consistent with moderate aortic stenosis.  3. Normal left ventricular internal dimensions and wall  thicknesses.  4. Normal left ventricular systolic function. No segmental  wall motion abnormalities.  *** No previous Echo exam.  ----------------------------------------------    < end of copied text >

## 2019-01-12 NOTE — PROGRESS NOTE ADULT - SUBJECTIVE AND OBJECTIVE BOX
CARDIOLOGY FOLLOW UP - Dr. Fitch    CC no cp or sob   c.o dizziness when OOB to bathroom this  AM       PHYSICAL EXAM:  T(C): 36.3 (01-12-19 @ 06:43), Max: 36.7 (01-11-19 @ 14:39)  HR: 80 (01-12-19 @ 08:23) (63 - 89)  BP: 96/54 (01-12-19 @ 08:23) (91/59 - 115/79)  RR: 19 (01-12-19 @ 06:43) (17 - 20)  SpO2: 98% (01-12-19 @ 06:43) (96% - 99%)  Wt(kg): --  I&O's Summary    11 Jan 2019 07:01  -  12 Jan 2019 07:00  --------------------------------------------------------  IN: 600 mL / OUT: 1400 mL / NET: -800 mL        Appearance: Normal	  Cardiovascular: Normal S1 S2, irregular + murmur  Respiratory: Lungs clear to auscultation	  Gastrointestinal:  Soft, Non-tender, + BS	  Extremities: Normal range of motion, No clubbing, cyanosis or edema        MEDICATIONS  (STANDING):  aspirin enteric coated 81 milliGRAM(s) Oral daily  atorvastatin 20 milliGRAM(s) Oral at bedtime  buDESOnide  80 MICROgram(s)/formoterol 4.5 MICROgram(s) Inhaler 2 Puff(s) Inhalation two times a day  dextrose 5%. 1000 milliLiter(s) (50 mL/Hr) IV Continuous <Continuous>  dextrose 50% Injectable 12.5 Gram(s) IV Push once  dextrose 50% Injectable 25 Gram(s) IV Push once  dextrose 50% Injectable 25 Gram(s) IV Push once  diltiazem    milliGRAM(s) Oral daily  docusate sodium 100 milliGRAM(s) Oral three times a day  dorzolamide 2% Ophthalmic Solution 1 Drop(s) Both EYES <User Schedule>  furosemide    Tablet 20 milliGRAM(s) Oral daily  heparin  Infusion 400 Unit(s)/Hr (4.5 mL/Hr) IV Continuous <Continuous>  insulin lispro (HumaLOG) corrective regimen sliding scale   SubCutaneous three times a day before meals  insulin lispro (HumaLOG) corrective regimen sliding scale   SubCutaneous at bedtime  montelukast 10 milliGRAM(s) Oral daily  potassium chloride    Tablet ER 40 milliEquivalent(s) Oral every 4 hours  sodium chloride 0.9% lock flush 3 milliLiter(s) IV Push every 8 hours      TELEMETRY: 	    ECG:  	  RADIOLOGY:   DIAGNOSTIC TESTING:  [ ] Echocardiogram:  [ ]  Catheterization:  [ ] Stress Test:    OTHER: 	    LABS:	 	                                11.8   9.8   )-----------( 310      ( 12 Jan 2019 03:24 )             36.0     01-12    135  |  98  |  16  ----------------------------<  105<H>  3.7   |  23  |  0.67    Ca    9.2      12 Jan 2019 03:24  Phos  3.9     01-12  Mg     2.2     01-12    TPro  7.7  /  Alb  3.7  /  TBili  0.7  /  DBili  x   /  AST  34  /  ALT  23  /  AlkPhos  252<H>  01-11    PT/INR - ( 11 Jan 2019 00:22 )   PT: 15.9 sec;   INR: 1.37 ratio         PTT - ( 12 Jan 2019 03:24 )  PTT:92.5 sec

## 2019-01-12 NOTE — PROGRESS NOTE ADULT - PROBLEM SELECTOR PLAN 6
as above  1/7: No asthma exacerbation  1/8: No wheezing  1/10: no wheezing  1/11: no wheezing  1/12 stable

## 2019-01-12 NOTE — PROVIDER CONTACT NOTE (OTHER) - ASSESSMENT
Pt A&O x4, asymptomatic, no complaints of CP, SOB. VSS: BP: 155/79 HR 89 temp 97.9. respirations 18 on room air 99% SPo2

## 2019-01-12 NOTE — PROGRESS NOTE ADULT - ASSESSMENT
EMILI 1/7/9:  EF 66%, normal LV fx, Mod to sev MS, mild AS, severe mobile atherosclerotic plaque in the descending thoracic aorta, Large ANDREW thrombus ( 3.0 x 1.4 cm)  [facility] 1/10/19: LAD 75% stenosis and a fistula to the PA, and 50% stenosis of D1    a/p    78y Female w/ PMH of LLE DVT 8/2018, on coumadin, COPD, DM, Asthma, presented to [facility] ER with new onset Atrial fibrillation and dyspnea on exertion worsening over the past few days prior to presentation on 1/5. Patient underwent EMILI and DCC at [facility] and was found to have a large ANDREW thrombus as well as mild to moderate MR with mild Mitral stenosis, EF 66%. CT PA was negative for PE. Left Heart Cath revealed LAD 75% stenosis and a fistula to the PA, and 50% stenosis of D1. The patient was then transferred to [facility] under Dr Ingram's Service for evaluation and pre-operative work up for CABG/MVR with ANDREW ligation and thrombus removal.    1. CAD  Left Heart Cath revealed LAD 75% stenosis and a fistula to the PA, and 50% stenosis of D1.   cv stable, no decomp CHF on exam   continue bb, statin, asa, lasix 20mg PO daily   continue heparin GTT   CT surg eval for CABG     2. Mitral valve stenosis   EMILI revealing mild-mod MR with mod-sev mitral stenosis   CT surg eval for MVR     3. ANDREW   EMILI revealing large ANDREW thrombus   continue AC with hep gtt  CT surg eval for ANDREW ligation & Thrombus removal     4. New onset Afib with RVR  currently in Afib, rate controlled    symptoms of dizziness when OOB likely in the setting of Afib/ hypotension    continue Cardizem as ordered   consider Amiodarone for maintenance of sinus rhythm since her symptoms occur when in AFib  CHADS=3-4 cont a/c on hep gtt  care per CTS    5. DVT, hx  cont a/c, repeat duplex no changes    dvt ppx

## 2019-01-12 NOTE — PHYSICAL THERAPY INITIAL EVALUATION ADULT - PERTINENT HX OF CURRENT PROBLEM, REHAB EVAL
77 yo F presented to [facility] ER with new onset Afib and GRADY worsening over the past few days prior to presentation on 1/5. Pt found to have large ANDREW thrombus as well as mild to moderate MR with mild Mitral stenosis, EF 66%. CT PA was negative for PE. LHC revealed LAD 75% stenosis and a fistula to the PA, and 50% stenosis of D1. Now transferred to [facility] for work up for CABG/MVR with ANDREW ligation and thrombus removal. XRay Chest 1/10: Small left pleural effusion. No pneumothorax.

## 2019-01-12 NOTE — PROGRESS NOTE ADULT - SUBJECTIVE AND OBJECTIVE BOX
JEANNE TRUJILLO  78y Female  MRN:[id number]    Patient is a 78y old  Female who presents with a chief complaint of Palpitations and SOB (06 Jan 2019 14:36)    HPI:  79 y/o F with PMH of Left LE DVT diagnosed on Aug 2018(on Coumadin), COPD, DM, Asthma presented with the complaint of SOB/GRADY and palpitations. As per the patient she has been having intermittent SOB but it worsened the past few days which prompted her to come to the ER. Patient stated that any minimal exertion she would have to stop to catch her breath. Patient stated that she sleeps with 2-3 pillows at night and endorsed of orthopnea and PND. Patient also endorsed of intermittent LE swelling and stated that she is complaint with her medications. Patient also endorsed of midsternal chest pain, characterized as a  pressure like sensation, without any aggravating factors, alleviated when she would massage her chest, without any radiations of the chest pain, with a severity of 5/10. Patient also endorsed of palpitations for the past few days. Patient denied any fevers, chills, N/V/D/C, abdominal pain, dysuria, melena, hematochezia, recent travel, sick contact, pleuritic or positional chest pain.     On ED admission EKG revealed Atrial fibrillation with RVR at a rate of 147 with QTc of 466, CE x 1: Trop: 8, CE x 2: Trop: 9, H&H: 10.4/33.5, Na: 133, Gluc: 104, Alk Phos: 285, AST: 38. Prelim CXR: 6 mm right lung nodular opacity of uncertain significance. Follow up to resolution. Hazy right lung opacities may be secondary to rotation versus atelectasis and/or pneumonia. In the ED patient received IV Cardizem and PO Cardizem which improved her rate. (05 Jan 2019 05:47)      Patient seen and evaluated at bedside.     Interval HPI: no acute events o/n    PAST MEDICAL & SURGICAL HISTORY:  DVT (deep venous thrombosis)  Asthma  COPD (chronic obstructive pulmonary disease)  DM (diabetes mellitus)  History of embolectomy: LLE    SOC:  non smoker  no drug or alcohol abuse    fam hx:  non cont     REVIEW OF SYSTEMS:  as per hpi    VITALS:  Vital Signs Last 24 Hrs  T(C): 36.5 (12 Jan 2019 21:08), Max: 36.9 (12 Jan 2019 14:01)  T(F): 97.7 (12 Jan 2019 21:08), Max: 98.5 (12 Jan 2019 14:01)  HR: 80 (12 Jan 2019 21:08) (62 - 89)  BP: 116/70 (12 Jan 2019 21:08) (91/59 - 121/70)  BP(mean): --  RR: 20 (12 Jan 2019 21:08) (18 - 22)  SpO2: 99% (12 Jan 2019 21:08) (98% - 100%)      PHYSICAL EXAM:  GENERAL: NAD, well-developed  HEAD:  Atraumatic, Normocephalic  EYES: EOMI, PERRLA, conjunctiva and sclera clear  NECK: Supple, No JVD  CHEST/LUNG: Clear to auscultation bilaterally; No wheeze  HEART: S1, S2;   ABDOMEN: Soft, Nontender, Nondistended; Bowel sounds present  EXTREMITIES:  2+ Peripheral Pulses, No clubbing, cyanosis, or edema  PSYCH: Normal affect  NEUROLOGY: AAOX3; non-focal  SKIN: No rashes or lesions    Consultant(s) Notes Reviewed:  [x ] YES  [ ] NO  Care Discussed with Consultants/Other Providers [ x] YES  [ ] NO    MEDS:  MEDICATIONS  (STANDING):  aspirin enteric coated 81 milliGRAM(s) Oral daily  atorvastatin 20 milliGRAM(s) Oral at bedtime  buDESOnide  80 MICROgram(s)/formoterol 4.5 MICROgram(s) Inhaler 2 Puff(s) Inhalation two times a day  dextrose 5%. 1000 milliLiter(s) (50 mL/Hr) IV Continuous <Continuous>  dextrose 50% Injectable 25 Gram(s) IV Push once  diltiazem    milliGRAM(s) Oral daily  docusate sodium 100 milliGRAM(s) Oral three times a day  dorzolamide 2% Ophthalmic Solution 1 Drop(s) Both EYES <User Schedule>  furosemide    Tablet 20 milliGRAM(s) Oral daily  heparin  Infusion 400 Unit(s)/Hr (5 mL/Hr) IV Continuous <Continuous>  insulin lispro (HumaLOG) corrective regimen sliding scale   SubCutaneous three times a day before meals  insulin lispro (HumaLOG) corrective regimen sliding scale   SubCutaneous at bedtime  montelukast 10 milliGRAM(s) Oral daily  sodium chloride 0.9% lock flush 3 milliLiter(s) IV Push every 8 hours    MEDICATIONS  (PRN):  dextrose 40% Gel 15 Gram(s) Oral once PRN Blood Glucose LESS THAN 70 milliGRAM(s)/deciliter      ALLERGIES:  No Known Allergies      LABS:                                         11.8   9.8   )-----------( 310      ( 12 Jan 2019 03:24 )             36.0   01-12    135  |  98  |  16  ----------------------------<  105<H>  3.7   |  23  |  0.67    Ca    9.2      12 Jan 2019 03:24  Phos  3.9     01-12  Mg     2.2     01-12    TPro  7.7  /  Alb  3.7  /  TBili  0.7  /  DBili  x   /  AST  34  /  ALT  23  /  AlkPhos  252<H>  01-11        < from: Cardiac Cath Lab - Adult (01.10.19 @ 10:54) >  VENTRICLES: Global left ventricular function was normal. EF estimated was  60 %.  VALVES: AORTIC VALVE: There was mild aortic insufficiency. MITRAL VALVE:  The mitral valve exhibited mild regurgitation.  CORONARY VESSELS: The coronary circulation is right dominant.  LM:   --  LM: Normal.  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 There was a 75 % stenosis. Lesion is at the  bifurcation of a large diagonal branch. Lesion difficult to visual with  significant vessel overlap. IFR was 0.89 but IVUS imaging revealed severe  atherosclerotic disease with calcium and IVUS mla of 4.1 mm2. IVUS  catheter was occlusive at the lesion and unable to be advanced past  lesion.  --  D1: There was a 50 % stenosis in the proximal third of the vessel  segment.  CX:   --  Circumflex: Angiography showed mild atherosclerosis with no flow  limiting lesions.  RCA:   --  RCA: Angiography showed no evidence of disease. 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  --  RPDA: Angiography showed no evidence of disease.  --  RPL1:Angiography showed no evidence of disease.  AORTA: Ascending aorta: Normal. No evidence of any aortic fistula. No  aneurysm. The PA fills from coronary flow from root injection.  COMPLICATIONS: There were no complications.  DIAGNOSTIC IMPRESSIONS: New onset chest pain, AF with RVR, and acute  diastolic HF. EMILI with mod-severe mitral stenosis and very large thrombus  in DANTE. Cath with severe proximal LAD lesion with correlating IVUS  findings and moderate diagonal disease. 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 LV function preserved.  DIAGNOSTIC RECOMMENDATIONS: Continue medical treatment of CAD, AF. CTS  evaluation for MVR/CABG possible fistula ligation/dante thrombus  extraction/dante ligation.  INTERVENTIONALIMPRESSIONS: New onset chest pain, AF with RVR, and acute  diastolic HF. EMILI with mod-severe mitral stenosis and very large thrombus  in DANTE. Cath with severe proximal LAD lesion with correlating IVUS  findings and moderate diagonal disease. 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 LV function preserved.  INTERVENTIONAL RECOMMENDATIONS: Continue medical treatment of CAD, AF. CTS  evaluation for MVR/CABG possible fistula ligation/dante thrombus  extraction/dante ligation.    < end of copied text >          < from: EMILI w/TTE (w/3D Echo) (01.07.19 @ 15:03) >  ------------------------------  CONCLUSIONS:  1. Mitral annular calcification and calcified mitral  leaflets with decreased diastolic opening.  In some views  the valve has a rheumatic appearance. Mild-moderate mitral  regurgitation. Mean transmitral valve gradient equals 8 mm  Hg, estimated mitral valve area equals 1.1 sqcm (by  pressure half time equation), consistent with moderate to  severe mitral stenosis.  2. Calcified trileaflet aortic valve with decreased  opening. Peak transaortic valve gradient equals 17 mm Hg,  mean transaortic valve gradient equals 12 mm Hg, consistent  with mild aortic stenosis. No aortic valve regurgitation  seen.  3. Normal aortic root.  Mild non-mobile atherosclerotic  plaque in the aortic arch.  Severe, bulky, mobile  atherosclerotic plaque in the descending thoracic aorta.  4. Mildly dilated left atrium.  LA volume index = 35 cc/m2.   A very large (about  3.0 cm X 1.4 cm) thrombus is  visualized in the left atrial appendage.  5. Normal left ventricular internal dimensions and wall  thicknesses.  6. Normal left ventricular systolic function. No segmental  wall motion abnormalities.  7. Normal right ventricular size and function.  8. Contrast injection demonstrates no evidence of a patent  foramen ovale.  *** No previous Echo exam.  -------------------------------------------------------    < end of copied text >

## 2019-01-12 NOTE — PROGRESS NOTE ADULT - SUBJECTIVE AND OBJECTIVE BOX
HPI:  History of Present Illness:  78y Female w/ PMH of LLE DVT 8/2018, on coumadin, COPD, DM, Asthma, presented to [facility] ER with new onset Atrial fibrillation and dyspnea on exertion worsening over the past few days prior to presentation on 1/5. Patient underwent EMILI and DCC at [facility] and was found to have a large ANDREW thrombus as well as mild to moderate MR with mild Mitral stenosis, EF 66%. CT PA was negative for PE. Left Heart Cath revealed LAD 75% stenosis and a fistula to the PA, and 50% stenosis of D1. The patient was then transferred to [facility] under Dr Ingram's Service for evaluation and pre-operative work up for CABG/MVR with ANDREW ligation and thrombus removal. The patient is currently anticoagulated on a heparin drip. Pt denies any complaints at this time, no chest pain, shortness of breath, nausea, vomiting and the patient is afebrile.   Patient has history of diabetes, on oral meds at home, no recent hypoglycemic episodes, no polyuria polydipsia. Patient follows up with PCP for diabetes management.    Past Medical History  DVT (deep venous thrombosis)  Asthma  COPD (chronic obstructive pulmonary disease)  DM (diabetes mellitus)  No pertinent past medical history      Past Surgical History  History of embolectomy: LLE  No significant past surgical history      MEDICATIONS  (STANDING):  aspirin enteric coated 81 milliGRAM(s) Oral daily  atorvastatin 20 milliGRAM(s) Oral at bedtime  buDESOnide  80 MICROgram(s)/formoterol 4.5 MICROgram(s) Inhaler 2 Puff(s) Inhalation two times a day  diltiazem    milliGRAM(s) Oral daily  furosemide    Tablet 20 milliGRAM(s) Oral daily  heparin  Infusion 400 Unit(s)/Hr (4 mL/Hr) IV Continuous <Continuous>  montelukast 10 milliGRAM(s) Oral daily  sodium chloride 0.9% lock flush 3 milliLiter(s) IV Push every 8 hours    MEDICATIONS  (PRN):    Antiplatelet therapy:                           Last dose/amt:    Allergies: No Known Allergies        Relevant Family History  FAMILY HISTORY:  No pertinent family history in first degree relatives (10 Joseph 2019 23:30)    PAST MEDICAL & SURGICAL HISTORY:  DVT (deep venous thrombosis)  Asthma  COPD (chronic obstructive pulmonary disease)  DM (diabetes mellitus)  History of embolectomy: LLE      FAMILY HISTORY:  No pertinent family history in first degree relatives      Social History:    Outpatient Medications:    MEDICATIONS  (STANDING):  aspirin enteric coated 81 milliGRAM(s) Oral daily  atorvastatin 20 milliGRAM(s) Oral at bedtime  buDESOnide  80 MICROgram(s)/formoterol 4.5 MICROgram(s) Inhaler 2 Puff(s) Inhalation two times a day  dextrose 5%. 1000 milliLiter(s) (50 mL/Hr) IV Continuous <Continuous>  dextrose 50% Injectable 12.5 Gram(s) IV Push once  dextrose 50% Injectable 25 Gram(s) IV Push once  dextrose 50% Injectable 25 Gram(s) IV Push once  diltiazem    milliGRAM(s) Oral daily  docusate sodium 100 milliGRAM(s) Oral three times a day  dorzolamide 2% Ophthalmic Solution 1 Drop(s) Both EYES <User Schedule>  furosemide    Tablet 20 milliGRAM(s) Oral daily  heparin  Infusion 400 Unit(s)/Hr (4.5 mL/Hr) IV Continuous <Continuous>  insulin lispro (HumaLOG) corrective regimen sliding scale   SubCutaneous three times a day before meals  insulin lispro (HumaLOG) corrective regimen sliding scale   SubCutaneous at bedtime  montelukast 10 milliGRAM(s) Oral daily  sodium chloride 0.9% lock flush 3 milliLiter(s) IV Push every 8 hours    MEDICATIONS  (PRN):  dextrose 40% Gel 15 Gram(s) Oral once PRN Blood Glucose LESS THAN 70 milliGRAM(s)/deciliter  glucagon  Injectable 1 milliGRAM(s) IntraMuscular once PRN Glucose LESS THAN 70 milligrams/deciliter      Allergies    No Known Allergies    Intolerances      Review of Systems:  Constitutional: No fever, no chills  Eyes: No blurry vision  Neuro: No tremors  HEENT: No pain, no neck swelling  Cardiovascular: No chest pain, no palpitations  Respiratory: Has SOB, no cough  GI: No nausea, vomiting, abdominal pain  : No dysuria  Skin: no rash  MSK: Has leg swelling.  Psych: no depression  Endocrine: no polyuria, polydipsia    ALL OTHER SYSTEMS REVIEWED AND NEGATIVE    UNABLE TO OBTAIN    PHYSICAL EXAM:  VITALS: T(C): 36.9 (01-12-19 @ 14:01)  T(F): 98.5 (01-12-19 @ 14:01), Max: 98.5 (01-12-19 @ 14:01)  HR: 62 (01-12-19 @ 14:01) (62 - 89)  BP: 121/70 (01-12-19 @ 14:01) (91/59 - 121/70)  RR:  (18 - 22)  SpO2:  (98% - 100%)  Wt(kg): --  GENERAL: NAD, well-groomed, well-developed  EYES: No proptosis, no lid lag  HEENT:  Atraumatic, Normocephalic  THYROID: Normal size, no palpable nodules  RESPIRATORY: Clear to auscultation bilaterally; No rales, rhonchi, wheezing  CARDIOVASCULAR: Si S2, No murmurs;  GI: Soft, non distended, normal bowel sounds  SKIN: Dry, intact, No rashes or lesions  MUSCULOSKELETAL: Has BL lower extremity edema.  NEURO:  no tremor, sensation decreased in feet BL,    POCT Blood Glucose.: 124 mg/dL (01-12-19 @ 13:11)  POCT Blood Glucose.: 190 mg/dL (01-12-19 @ 11:51)  POCT Blood Glucose.: 145 mg/dL (01-12-19 @ 07:09)  POCT Blood Glucose.: 76 mg/dL (01-11-19 @ 21:43)  POCT Blood Glucose.: 178 mg/dL (01-11-19 @ 17:09)  POCT Blood Glucose.: 95 mg/dL (01-11-19 @ 11:53)  POCT Blood Glucose.: 118 mg/dL (01-11-19 @ 09:37)  POCT Blood Glucose.: 86 mg/dL (01-10-19 @ 23:45)  POCT Blood Glucose.: 95 mg/dL (01-10-19 @ 20:59)  POCT Blood Glucose.: 128 mg/dL (01-10-19 @ 17:16)  POCT Blood Glucose.: 90 mg/dL (01-10-19 @ 13:36)  POCT Blood Glucose.: 109 mg/dL (01-10-19 @ 08:44)  POCT Blood Glucose.: 95 mg/dL (01-09-19 @ 21:25)  POCT Blood Glucose.: 102 mg/dL (01-09-19 @ 17:20)                            11.8   9.8   )-----------( 310      ( 12 Jan 2019 03:24 )             36.0       01-12    135  |  98  |  16  ----------------------------<  105<H>  3.7   |  23  |  0.67    EGFR if : 98  EGFR if non : 85    Ca    9.2      01-12  Mg     2.2     01-12  Phos  3.9     01-12    TPro  7.7  /  Alb  3.7  /  TBili  0.7  /  DBili  x   /  AST  34  /  ALT  23  /  AlkPhos  252<H>  01-11      Thyroid Function Tests:  01-11 @ 03:14 TSH 8.02 FreeT4 -- T3 -- Anti TPO -- Anti Thyroglobulin Ab -- TSI --  01-05 @ 06:52 TSH 3.32 FreeT4 -- T3 -- Anti TPO -- Anti Thyroglobulin Ab -- TSI --      Hemoglobin A1C, Whole Blood: 6.4 % <H> [4.0 - 5.6] (01-11-19 @ 03:12)  Hemoglobin A1C, Whole Blood: 6.4 % <H> [4.0 - 5.6] (01-11-19 @ 03:12)  Hemoglobin A1C, Whole Blood: 6.5 % <H> [4.0 - 5.6] (01-05-19 @ 06:52)      01-05 Chol 158 LDL 95 HDL 53 Trig 53    Radiology:

## 2019-01-12 NOTE — PROGRESS NOTE ADULT - ASSESSMENT
Assessment  DMT2: 78y Female with DM T2 with hyperglycemia on insulin, blood sugars improving, no hypoglycemic episode,  eating meals,  non compliant with low carb diet.  CAD: Planing CABG, on medications,  no chest pain, stable, monitored.  HLD: On statin.  HTN: Controlled, no headaches, on medications.        Sarah Foley MD  Cell: [phone]  Office: [phone]

## 2019-01-13 LAB
ALBUMIN SERPL ELPH-MCNC: 3.7 G/DL — SIGNIFICANT CHANGE UP (ref 3.3–5)
ALP SERPL-CCNC: 265 U/L — HIGH (ref 40–120)
ALT FLD-CCNC: 29 U/L — SIGNIFICANT CHANGE UP (ref 10–45)
ANION GAP SERPL CALC-SCNC: 11 MMOL/L — SIGNIFICANT CHANGE UP (ref 5–17)
APTT BLD: 62.4 SEC — HIGH (ref 27.5–36.3)
APTT BLD: 64.3 SEC — HIGH (ref 27.5–36.3)
AST SERPL-CCNC: 40 U/L — SIGNIFICANT CHANGE UP (ref 10–40)
BILIRUB SERPL-MCNC: 0.7 MG/DL — SIGNIFICANT CHANGE UP (ref 0.2–1.2)
BLD GP AB SCN SERPL QL: NEGATIVE — SIGNIFICANT CHANGE UP
BUN SERPL-MCNC: 13 MG/DL — SIGNIFICANT CHANGE UP (ref 7–23)
CALCIUM SERPL-MCNC: 9.3 MG/DL — SIGNIFICANT CHANGE UP (ref 8.4–10.5)
CHLORIDE SERPL-SCNC: 101 MMOL/L — SIGNIFICANT CHANGE UP (ref 96–108)
CO2 SERPL-SCNC: 22 MMOL/L — SIGNIFICANT CHANGE UP (ref 22–31)
CREAT SERPL-MCNC: 0.73 MG/DL — SIGNIFICANT CHANGE UP (ref 0.5–1.3)
GLUCOSE BLDC GLUCOMTR-MCNC: 123 MG/DL — HIGH (ref 70–99)
GLUCOSE BLDC GLUCOMTR-MCNC: 133 MG/DL — HIGH (ref 70–99)
GLUCOSE BLDC GLUCOMTR-MCNC: 135 MG/DL — HIGH (ref 70–99)
GLUCOSE BLDC GLUCOMTR-MCNC: 157 MG/DL — HIGH (ref 70–99)
GLUCOSE SERPL-MCNC: 115 MG/DL — HIGH (ref 70–99)
HCT VFR BLD CALC: 38.8 % — SIGNIFICANT CHANGE UP (ref 34.5–45)
HGB BLD-MCNC: 12.5 G/DL — SIGNIFICANT CHANGE UP (ref 11.5–15.5)
MCHC RBC-ENTMCNC: 27 PG — SIGNIFICANT CHANGE UP (ref 27–34)
MCHC RBC-ENTMCNC: 32.2 GM/DL — SIGNIFICANT CHANGE UP (ref 32–36)
MCV RBC AUTO: 83.8 FL — SIGNIFICANT CHANGE UP (ref 80–100)
PLATELET # BLD AUTO: 320 K/UL — SIGNIFICANT CHANGE UP (ref 150–400)
POTASSIUM SERPL-MCNC: 4.1 MMOL/L — SIGNIFICANT CHANGE UP (ref 3.5–5.3)
POTASSIUM SERPL-SCNC: 4.1 MMOL/L — SIGNIFICANT CHANGE UP (ref 3.5–5.3)
PROT SERPL-MCNC: 7.7 G/DL — SIGNIFICANT CHANGE UP (ref 6–8.3)
RBC # BLD: 4.63 M/UL — SIGNIFICANT CHANGE UP (ref 3.8–5.2)
RBC # FLD: 14.8 % — HIGH (ref 10.3–14.5)
RH IG SCN BLD-IMP: POSITIVE — SIGNIFICANT CHANGE UP
SODIUM SERPL-SCNC: 134 MMOL/L — LOW (ref 135–145)
T3 SERPL-MCNC: 105 NG/DL — SIGNIFICANT CHANGE UP (ref 80–200)
T4 AB SER-ACNC: 8 UG/DL — SIGNIFICANT CHANGE UP (ref 4.6–12)
WBC # BLD: 8.4 K/UL — SIGNIFICANT CHANGE UP (ref 3.8–10.5)
WBC # FLD AUTO: 8.4 K/UL — SIGNIFICANT CHANGE UP (ref 3.8–10.5)

## 2019-01-13 PROCEDURE — 99232 SBSQ HOSP IP/OBS MODERATE 35: CPT

## 2019-01-13 RX ORDER — CHLORHEXIDINE GLUCONATE 213 G/1000ML
15 SOLUTION TOPICAL ONCE
Qty: 0 | Refills: 0 | Status: COMPLETED | OUTPATIENT
Start: 2019-01-13 | End: 2019-01-14

## 2019-01-13 RX ORDER — CHLORHEXIDINE GLUCONATE 213 G/1000ML
1 SOLUTION TOPICAL ONCE
Qty: 0 | Refills: 0 | Status: DISCONTINUED | OUTPATIENT
Start: 2019-01-13 | End: 2019-01-13

## 2019-01-13 RX ORDER — CHLORHEXIDINE GLUCONATE 213 G/1000ML
1 SOLUTION TOPICAL ONCE
Qty: 0 | Refills: 0 | Status: COMPLETED | OUTPATIENT
Start: 2019-01-13 | End: 2019-01-14

## 2019-01-13 RX ORDER — CEFUROXIME AXETIL 250 MG
1500 TABLET ORAL ONCE
Qty: 0 | Refills: 0 | Status: DISCONTINUED | OUTPATIENT
Start: 2019-01-13 | End: 2019-01-14

## 2019-01-13 RX ORDER — HEPARIN SODIUM 5000 [USP'U]/ML
500 INJECTION INTRAVENOUS; SUBCUTANEOUS
Qty: 25000 | Refills: 0 | Status: DISCONTINUED | OUTPATIENT
Start: 2019-01-13 | End: 2019-01-14

## 2019-01-13 RX ADMIN — MONTELUKAST 10 MILLIGRAM(S): 4 TABLET, CHEWABLE ORAL at 11:00

## 2019-01-13 RX ADMIN — HEPARIN SODIUM 5 UNIT(S)/HR: 5000 INJECTION INTRAVENOUS; SUBCUTANEOUS at 01:35

## 2019-01-13 RX ADMIN — Medication 100 MILLIGRAM(S): at 05:24

## 2019-01-13 RX ADMIN — Medication 240 MILLIGRAM(S): at 05:24

## 2019-01-13 RX ADMIN — Medication 81 MILLIGRAM(S): at 11:00

## 2019-01-13 RX ADMIN — ATORVASTATIN CALCIUM 20 MILLIGRAM(S): 80 TABLET, FILM COATED ORAL at 22:58

## 2019-01-13 RX ADMIN — Medication 2: at 18:10

## 2019-01-13 RX ADMIN — HEPARIN SODIUM 5 UNIT(S)/HR: 5000 INJECTION INTRAVENOUS; SUBCUTANEOUS at 08:16

## 2019-01-13 RX ADMIN — SODIUM CHLORIDE 3 MILLILITER(S): 9 INJECTION INTRAMUSCULAR; INTRAVENOUS; SUBCUTANEOUS at 14:00

## 2019-01-13 RX ADMIN — Medication 20 MILLIGRAM(S): at 05:24

## 2019-01-13 RX ADMIN — SODIUM CHLORIDE 3 MILLILITER(S): 9 INJECTION INTRAMUSCULAR; INTRAVENOUS; SUBCUTANEOUS at 05:23

## 2019-01-13 RX ADMIN — HEPARIN SODIUM 5 UNIT(S)/HR: 5000 INJECTION INTRAVENOUS; SUBCUTANEOUS at 01:38

## 2019-01-13 NOTE — PROGRESS NOTE ADULT - ASSESSMENT
Assessment  DMT2: 78y Female with DM T2 with hyperglycemia on insulin, blood sugars improving, no hypoglycemic episode,  eating meals,  non compliant with low carb diet.  CAD: Planing CABG, on medications,  no chest pain, stable, monitored.  HLD: On statin.  HTN: Controlled, no headaches, on medications.        Sarah Foley MD  Cell: [phone]  Office: [phone] Assessment  DMT2: 78y Female with DM T2 with hyperglycemia on insulin, blood sugars improving, no hypoglycemic episode,  eating meals,  non compliant with low carb diet.  CAD: Planing procedure, on medications,  no chest pain, stable, monitored.  HLD: On statin.  HTN: Controlled, no headaches, on medications.        Sarah Foley MD  Cell: [phone]  Office: [phone]

## 2019-01-13 NOTE — PROGRESS NOTE ADULT - ATTENDING COMMENTS
Patient seen and examined.  Agree with above NP note.  cv stable   await poss or monday for CABG/MVR/Fistula closure, and ANDREW clot extraction  recurrent paroxysmal symptomatic afib  cont a/c   care per cts

## 2019-01-13 NOTE — PROGRESS NOTE ADULT - PROBLEM SELECTOR PLAN 6
as above  1/7: No asthma exacerbation  1/8: No wheezing  1/10: no wheezing  1/11: no wheezing  1/12 stable  1/13 stable. no SOB, no wheezing

## 2019-01-13 NOTE — PROGRESS NOTE ADULT - ASSESSMENT
EMILI 1/7/9:  EF 66%, normal LV fx, Mod to sev MS, mild AS, severe mobile atherosclerotic plaque in the descending thoracic aorta, Large ANDREW thrombus ( 3.0 x 1.4 cm)  [facility] 1/10/19: LAD 75% stenosis and a fistula to the PA, and 50% stenosis of D1    a/p    78y Female w/ PMH of LLE DVT 8/2018, on coumadin, COPD, DM, Asthma, presented to [facility] ER with new onset Atrial fibrillation and dyspnea on exertion worsening over the past few days prior to presentation on 1/5. Patient underwent EMILI and DCC at [facility] and was found to have a large ANDREW thrombus as well as mild to moderate MR with mild Mitral stenosis, EF 66%. CT PA was negative for PE. Left Heart Cath revealed LAD 75% stenosis and a fistula to the PA, and 50% stenosis of D1. The patient was then transferred to [facility] under Dr Ingram's Service for evaluation and pre-operative work up for CABG/MVR with ANDREW ligation and thrombus removal.    1. CAD  Left Heart Cath revealed LAD 75% stenosis and a fistula to the PA, and 50% stenosis of D1.   cv stable, no decomp CHF on exam   continue bb, statin, asa, lasix 20mg PO daily   continue heparin GTT   CTS f/u for CABG , plan for OR tomorrow      2. Mitral valve stenosis   EMILI revealing mild-mod MR with mod-sev mitral stenosis   CT surg f/u for MVR     3. ANDREW   EMILI revealing large ANDREW thrombus   continue AC with hep gtt  CT surg f/u for ANDREW ligation & Thrombus removal   plan for OR tomorrow      4. New onset Afib with RVR  currently in Afib, rate controlled    continue Cardizem as ordered   CHADS=3-4 cont a/c on hep gtt  care per CTS    5. DVT, hx  cont a/c, repeat duplex no changes    dvt ppx

## 2019-01-13 NOTE — PROGRESS NOTE ADULT - SUBJECTIVE AND OBJECTIVE BOX
CARDIOLOGY FOLLOW UP - Dr. Fitch    CC no cp or sob        PHYSICAL EXAM:  T(C): 36.4 (01-13-19 @ 04:55), Max: 36.9 (01-12-19 @ 14:01)  HR: 77 (01-13-19 @ 04:55) (62 - 80)  BP: 106/70 (01-13-19 @ 04:55) (105/72 - 121/70)  RR: 18 (01-13-19 @ 04:55) (18 - 22)  SpO2: 99% (01-13-19 @ 04:55) (99% - 100%)  Wt(kg): --  I&O's Summary    12 Jan 2019 07:01  -  13 Jan 2019 07:00  --------------------------------------------------------  IN: 540 mL / OUT: 350 mL / NET: 190 mL        Appearance: Normal	  Cardiovascular: Normal S1 S2 irregular +murmur   Respiratory: Lungs clear to auscultation	  Gastrointestinal:  Soft, Non-tender, + BS	  Extremities: Normal range of motion, No clubbing, cyanosis or edema        MEDICATIONS  (STANDING):  aspirin enteric coated 81 milliGRAM(s) Oral daily  atorvastatin 20 milliGRAM(s) Oral at bedtime  buDESOnide  80 MICROgram(s)/formoterol 4.5 MICROgram(s) Inhaler 2 Puff(s) Inhalation two times a day  dextrose 5%. 1000 milliLiter(s) (50 mL/Hr) IV Continuous <Continuous>  dextrose 50% Injectable 25 Gram(s) IV Push once  diltiazem    milliGRAM(s) Oral daily  docusate sodium 100 milliGRAM(s) Oral three times a day  dorzolamide 2% Ophthalmic Solution 1 Drop(s) Both EYES <User Schedule>  furosemide    Tablet 20 milliGRAM(s) Oral daily  heparin  Infusion 500 Unit(s)/Hr (5 mL/Hr) IV Continuous <Continuous>  insulin lispro (HumaLOG) corrective regimen sliding scale   SubCutaneous three times a day before meals  insulin lispro (HumaLOG) corrective regimen sliding scale   SubCutaneous at bedtime  montelukast 10 milliGRAM(s) Oral daily  sodium chloride 0.9% lock flush 3 milliLiter(s) IV Push every 8 hours      TELEMETRY: 	Afib HR      ECG:  	  RADIOLOGY:   DIAGNOSTIC TESTING:  [ ] Echocardiogram:  [ ]  Catheterization:  [ ] Stress Test:    OTHER: 	    LABS:	 	                                12.5   8.4   )-----------( 320      ( 13 Jan 2019 06:50 )             38.8     01-13    134<L>  |  101  |  13  ----------------------------<  115<H>  4.1   |  22  |  0.73    Ca    9.3      13 Jan 2019 06:50  Phos  3.9     01-12  Mg     2.2     01-12    TPro  7.7  /  Alb  3.7  /  TBili  0.7  /  DBili  x   /  AST  40  /  ALT  29  /  AlkPhos  265<H>  01-13    PTT - ( 13 Jan 2019 06:50 )  PTT:64.3 sec

## 2019-01-13 NOTE — PROGRESS NOTE ADULT - PROBLEM SELECTOR PLAN 2
on coumadin and AC. To keep INR =2-3  1/7: cont ac  1/8: cont AC.  1/10: Heparin gtt  1/11: on heparin  1/12 continue heparin gtt  1/13 continue heparin gtt

## 2019-01-13 NOTE — PROGRESS NOTE ADULT - ASSESSMENT
79 yo female h/o dvt on coumadin, copd, dm, asthma, a/w c.o sob, curtis.  found to be in afib with rvr and acute heart failure  PE ruled out  ACS ruled out    afib  cards following  converted to NSR  cont with cardizem for now  tele monitor  found to have atrial thrombus on echo  dccv cancelled  s/p cath. results noted with LAD dz, valvular dz, and fistula  now tx to [facility] for CTS[facility]    pending OR monday   coumadin on hold  on hep gtt       acute heart failure  diuresis with lasix   strict i/o    DVT   on AC with hep gtt    asthma/copd  stable  nebs prn  pulm f/u    PT

## 2019-01-13 NOTE — PROGRESS NOTE ADULT - SUBJECTIVE AND OBJECTIVE BOX
Cardiac Surgery Pre-op Note:  CC: Patient is a 78y old  Female who presents with a chief complaint of Mitral regurgitation, CAD, ANDREW thrombus (13 Jan 2019 09:28)    Referring Physician:     polly                                                                                        Surgeon: Dr. Wang  Procedure: (Date) (Procedure)MVR/CABG x 2    Allergies-No Known Allergies    HPI: 78F PMH of LLE DVT 8/2018, on coumadin, COPD, DM, Asthma, presented to [facility] ER with new onset Atrial fibrillation and dyspnea on exertion worsening over the past few days prior to presentation on 1/5. Patient underwent EMILI and DCC at [facility] and was found to have a large ANDREW thrombus as well as mild to moderate MR with mild Mitral stenosis, EF 66%. CT PA was negative for PE. Left Heart Cath revealed LAD 75% stenosis and a fistula to the PA, and 50% stenosis of D1. The patient was then transferred to [facility] under Dr Wang's Service for evaluation and pre-operative work up for CABG/MVR with ANDREW ligation and thrombus removal. The patient is currently anticoagulated on a heparin drip. Pt denies any complaints at this time, no chest pain, shortness of breath, nausea, vomiting and the patient is afebrile.     Past Medical History  DVT (deep venous thrombosis)  Asthma  COPD (chronic obstructive pulmonary disease)  DM (diabetes mellitus)  No pertinent past medical history    Past Surgical History  History of embolectomy: LLE  No significant past surgical history    MEDICATIONS  (STANDING):  aspirin enteric coated 81 milliGRAM(s) Oral daily  atorvastatin 20 milliGRAM(s) Oral at bedtime  buDESOnide  80 MICROgram(s)/formoterol 4.5 MICROgram(s) Inhaler 2 Puff(s) Inhalation two times a day  diltiazem    milliGRAM(s) Oral daily  furosemide    Tablet 20 milliGRAM(s) Oral daily  heparin  Infusion 400 Unit(s)/Hr (4 mL/Hr) IV Continuous <Continuous>  montelukast 10 milliGRAM(s) Oral daily  sodium chloride 0.9% lock flush 3 milliLiter(s) IV Push every 8 hours    Relevant Family History  FAMILY HISTORY:  No pertinent family history in first degree relatives (10 Joseph 2019 23:30)    PAST MEDICAL & SURGICAL HISTORY:  DVT (deep venous thrombosis)  Asthma  COPD (chronic obstructive pulmonary disease)  DM (diabetes mellitus)  History of embolectomy: LLE    MEDICATIONS  (STANDING):  aspirin enteric coated 81 milliGRAM(s) Oral daily  atorvastatin 20 milliGRAM(s) Oral at bedtime  buDESOnide  80 MICROgram(s)/formoterol 4.5 MICROgram(s) Inhaler 2 Puff(s) Inhalation two times a day  cefuroxime  IVPB 1500 milliGRAM(s) IV Intermittent once  chlorhexidine 0.12% Liquid 15 milliLiter(s) Swish and Spit once  chlorhexidine 4% Liquid 1 Application(s) Topical once  dextrose 5%. 1000 milliLiter(s) (50 mL/Hr) IV Continuous <Continuous>  dextrose 50% Injectable 25 Gram(s) IV Push once  diltiazem    milliGRAM(s) Oral daily  docusate sodium 100 milliGRAM(s) Oral three times a day  dorzolamide 2% Ophthalmic Solution 1 Drop(s) Both EYES <User Schedule>  furosemide    Tablet 20 milliGRAM(s) Oral daily  heparin  Infusion 500 Unit(s)/Hr (5 mL/Hr) IV Continuous <Continuous>  insulin lispro (HumaLOG) corrective regimen sliding scale   SubCutaneous three times a day before meals  insulin lispro (HumaLOG) corrective regimen sliding scale   SubCutaneous at bedtime  montelukast 10 milliGRAM(s) Oral daily  sodium chloride 0.9% lock flush 3 milliLiter(s) IV Push every 8 hours    MEDICATIONS  (PRN):  dextrose 40% Gel 15 Gram(s) Oral once PRN Blood Glucose LESS THAN 70 milliGRAM(s)/deciliter    Labs:                  12.5   8.4   )-----------( 320      ( 13 Jan 2019 06:50 )             38.8   134<L>  |  101  |  13  ----------------------------<  115<H>  4.1   |  22  |  0.73    Ca    9.3      13 Jan 2019 06:50  Phos  3.9     01-12  Mg     2.2     01-12    TPro  7.7  /  Alb  3.7  /  TBili  0.7  /  DBili  x   /  AST  40  /  ALT  29  /  AlkPhos  265<H>  01-13    PTT - ( 13 Jan 2019 06:50 )  PTT:64.3 sec    Blood Type: ABO Interpretation: O (01-13 @ 06:42)    HGB A1C: Hemoglobin A1C, Whole Blood: 6.4 % (01-11 @ 03:12)    Prealbumin:   Pro-BNP: Serum Pro-Brain Natriuretic Peptide: 316 pg/mL (01-11 @ 00:22)    Thyroid Panel: 01-11 @ 03:14/8.02  --/--/--    MRSA: MRSA PCR Result.: [employer] (01-11 @ 11:19)   / MSSA:     CXR: NAD    EKG: Afib 70    Carotid Duplex:    LE dopp chronic L fem DVT    PFT's:    Echocardiogram:  mild MS RUIZ 1.2 cm 2    Cardiac catheterization:      Gen: WN/WD NAD  Sky< from: Cardiac Cath Lab - Adult (01.10.19 @ 10:54) >  VENTRICLES: Global left ventricular function was normal. EF estimated was  60 %.  VALVES: AORTIC VALVE: There was mild aortic insufficiency. MITRAL VALVE:  The mitral valve exhibited mild regurgitation.  CORONARY VESSELS: The coronary circulation is right dominant.  LM:   --  LM: Normal.  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 There was a 75 % stenosis. Lesion is at the  bifurcation of a large diagonal branch. Lesion difficult to visual with  significant vessel overlap. IFR was 0.89 but IVUS imaging revealed severe  atherosclerotic disease with calcium and IVUS mla of 4.1 mm2. IVUS  catheter was occlusive at the lesion and unable to be advanced past  lesion.  --  D1: There was a 50 % stenosis in the proximal third of the vessel  segment.  CX:   --  Circumflex: Angiography showed mild atherosclerosis with no flow  limiting lesions.  RCA:   --  RCA: Angiography showed no evidence of disease. 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    < end of copied text >  ro: AAOx3, nonfocal  Pulm: CTA B/L  CV: RRR, S1S2 +systolic murmur  Abd: Soft, NT, ND +BS  Ext: No edema, + peripheral pulses      Pt has AICD/PPM [ ] Yes  [x ] No             Brand Name:  Pre-op Beta Blocker ordered within 24 hrs of surgery (CABG ONLY)?  [x ] Yes  [ ] No  If not, Why?  Type & Cross  [ ] Yes  [ ] No  NPO after Midnight [x ] Yes  [ ] No  Pre-op ABX ordered, to be taped on chart:  [ x] Yes  [ ] No     Hibiclens/Peridex ordered [x ] Yes  [ ] No  Intraop on Hold: PRBCs, CXR, EMILI [x ]   Consent obtained  [x ] Yes  [ ] No

## 2019-01-13 NOTE — PROGRESS NOTE ADULT - SUBJECTIVE AND OBJECTIVE BOX
Chief complaint  Patient is a 78y old  Female who presents with a chief complaint of Mitral regurgitation, CAD, ANDREW thrombus (13 Jan 2019 13:13)   Review of systems  Patient in bed, looks comfortable, no fever, no hypoglycemia.    Labs and Fingersticks  CAPILLARY BLOOD GLUCOSE      POCT Blood Glucose.: 133 mg/dL (13 Jan 2019 11:37)  POCT Blood Glucose.: 123 mg/dL (13 Jan 2019 07:55)  POCT Blood Glucose.: 152 mg/dL (12 Jan 2019 21:38)      Anion Gap, Serum: 11 (01-13 @ 06:50)  Anion Gap, Serum: 14 (01-12 @ 03:24)      Calcium, Total Serum: 9.3 (01-13 @ 06:50)  Calcium, Total Serum: 9.2 (01-12 @ 03:24)  Albumin, Serum: 3.7 (01-13 @ 06:50)    Alanine Aminotransferase (ALT/SGPT): 29 (01-13 @ 06:50)  Alkaline Phosphatase, Serum: 265 <H> (01-13 @ 06:50)  Aspartate Aminotransferase (AST/SGOT): 40 (01-13 @ 06:50)        01-13    134<L>  |  101  |  13  ----------------------------<  115<H>  4.1   |  22  |  0.73    Ca    9.3      13 Jan 2019 06:50  Phos  3.9     01-12  Mg     2.2     01-12    TPro  7.7  /  Alb  3.7  /  TBili  0.7  /  DBili  x   /  AST  40  /  ALT  29  /  AlkPhos  265<H>  01-13                        12.5   8.4   )-----------( 320      ( 13 Jan 2019 06:50 )             38.8     Medications  MEDICATIONS  (STANDING):  aspirin enteric coated 81 milliGRAM(s) Oral daily  atorvastatin 20 milliGRAM(s) Oral at bedtime  buDESOnide  80 MICROgram(s)/formoterol 4.5 MICROgram(s) Inhaler 2 Puff(s) Inhalation two times a day  cefuroxime  IVPB 1500 milliGRAM(s) IV Intermittent once  chlorhexidine 0.12% Liquid 15 milliLiter(s) Swish and Spit once  chlorhexidine 4% Liquid 1 Application(s) Topical once  dextrose 5%. 1000 milliLiter(s) (50 mL/Hr) IV Continuous <Continuous>  dextrose 50% Injectable 25 Gram(s) IV Push once  diltiazem    milliGRAM(s) Oral daily  docusate sodium 100 milliGRAM(s) Oral three times a day  dorzolamide 2% Ophthalmic Solution 1 Drop(s) Both EYES <User Schedule>  furosemide    Tablet 20 milliGRAM(s) Oral daily  heparin  Infusion 500 Unit(s)/Hr (5 mL/Hr) IV Continuous <Continuous>  insulin lispro (HumaLOG) corrective regimen sliding scale   SubCutaneous three times a day before meals  insulin lispro (HumaLOG) corrective regimen sliding scale   SubCutaneous at bedtime  montelukast 10 milliGRAM(s) Oral daily  sodium chloride 0.9% lock flush 3 milliLiter(s) IV Push every 8 hours      Physical Exam  General: Patient comfortable in bed  Vital Signs Last 12 Hrs  T(F): 97.9 (01-13-19 @ 14:00), Max: 97.9 (01-13-19 @ 14:00)  HR: 84 (01-13-19 @ 14:00) (84 - 84)  BP: 104/69 (01-13-19 @ 14:00) (104/69 - 104/69)  BP(mean): --  RR: 18 (01-13-19 @ 14:00) (18 - 18)  SpO2: 99% (01-13-19 @ 14:00) (99% - 99%)  Neck: No palpable thyroid nodules.  CVS: S1S2, No murmurs  Respiratory: No wheezing, no crepitations  GI: Abdomen soft, bowel sounds positive  Musculoskeletal:  edema lower extremities.   Skin: No skin rashes, no ecchymosis    Diagnostics

## 2019-01-13 NOTE — PROGRESS NOTE ADULT - SUBJECTIVE AND OBJECTIVE BOX
JEANNE TRUJILLO  78y Female  MRN:[id number]    Patient is a 78y old  Female who presents with a chief complaint of Palpitations and SOB (06 Jan 2019 14:36)    HPI:  79 y/o F with PMH of Left LE DVT diagnosed on Aug 2018(on Coumadin), COPD, DM, Asthma presented with the complaint of SOB/GRADY and palpitations. As per the patient she has been having intermittent SOB but it worsened the past few days which prompted her to come to the ER. Patient stated that any minimal exertion she would have to stop to catch her breath. Patient stated that she sleeps with 2-3 pillows at night and endorsed of orthopnea and PND. Patient also endorsed of intermittent LE swelling and stated that she is complaint with her medications. Patient also endorsed of midsternal chest pain, characterized as a  pressure like sensation, without any aggravating factors, alleviated when she would massage her chest, without any radiations of the chest pain, with a severity of 5/10. Patient also endorsed of palpitations for the past few days. Patient denied any fevers, chills, N/V/D/C, abdominal pain, dysuria, melena, hematochezia, recent travel, sick contact, pleuritic or positional chest pain.     On ED admission EKG revealed Atrial fibrillation with RVR at a rate of 147 with QTc of 466, CE x 1: Trop: 8, CE x 2: Trop: 9, H&H: 10.4/33.5, Na: 133, Gluc: 104, Alk Phos: 285, AST: 38. Prelim CXR: 6 mm right lung nodular opacity of uncertain significance. Follow up to resolution. Hazy right lung opacities may be secondary to rotation versus atelectasis and/or pneumonia. In the ED patient received IV Cardizem and PO Cardizem which improved her rate. (05 Jan 2019 05:47)      Patient seen and evaluated at bedside.     Interval HPI: no acute events o/n    PAST MEDICAL & SURGICAL HISTORY:  DVT (deep venous thrombosis)  Asthma  COPD (chronic obstructive pulmonary disease)  DM (diabetes mellitus)  History of embolectomy: LLE    SOC:  non smoker  no drug or alcohol abuse    fam hx:  non cont     REVIEW OF SYSTEMS:  as per hpi    VITALS:  Vital Signs Last 24 Hrs  T(C): 36.4 (13 Jan 2019 04:55), Max: 36.9 (12 Jan 2019 14:01)  T(F): 97.5 (13 Jan 2019 04:55), Max: 98.5 (12 Jan 2019 14:01)  HR: 77 (13 Jan 2019 04:55) (62 - 80)  BP: 106/70 (13 Jan 2019 04:55) (106/70 - 121/70)  BP(mean): --  RR: 18 (13 Jan 2019 04:55) (18 - 22)  SpO2: 99% (13 Jan 2019 04:55) (99% - 100%)      PHYSICAL EXAM:  GENERAL: NAD, well-developed  HEAD:  Atraumatic, Normocephalic  EYES: EOMI, PERRLA, conjunctiva and sclera clear  NECK: Supple, No JVD  CHEST/LUNG: Clear to auscultation bilaterally; No wheeze  HEART: S1, S2;   ABDOMEN: Soft, Nontender, Nondistended; Bowel sounds present  EXTREMITIES:  2+ Peripheral Pulses, No clubbing, cyanosis, or edema  PSYCH: Normal affect  NEUROLOGY: AAOX3; non-focal  SKIN: No rashes or lesions    Consultant(s) Notes Reviewed:  [x ] YES  [ ] NO  Care Discussed with Consultants/Other Providers [ x] YES  [ ] NO    MEDS:  MEDICATIONS  (STANDING):  aspirin enteric coated 81 milliGRAM(s) Oral daily  atorvastatin 20 milliGRAM(s) Oral at bedtime  buDESOnide  80 MICROgram(s)/formoterol 4.5 MICROgram(s) Inhaler 2 Puff(s) Inhalation two times a day  cefuroxime  IVPB 1500 milliGRAM(s) IV Intermittent once  chlorhexidine 0.12% Liquid 15 milliLiter(s) Swish and Spit once  chlorhexidine 4% Liquid 1 Application(s) Topical once  dextrose 5%. 1000 milliLiter(s) (50 mL/Hr) IV Continuous <Continuous>  dextrose 50% Injectable 25 Gram(s) IV Push once  diltiazem    milliGRAM(s) Oral daily  docusate sodium 100 milliGRAM(s) Oral three times a day  dorzolamide 2% Ophthalmic Solution 1 Drop(s) Both EYES <User Schedule>  furosemide    Tablet 20 milliGRAM(s) Oral daily  heparin  Infusion 500 Unit(s)/Hr (5 mL/Hr) IV Continuous <Continuous>  insulin lispro (HumaLOG) corrective regimen sliding scale   SubCutaneous three times a day before meals  insulin lispro (HumaLOG) corrective regimen sliding scale   SubCutaneous at bedtime  montelukast 10 milliGRAM(s) Oral daily  sodium chloride 0.9% lock flush 3 milliLiter(s) IV Push every 8 hours    MEDICATIONS  (PRN):  dextrose 40% Gel 15 Gram(s) Oral once PRN Blood Glucose LESS THAN 70 milliGRAM(s)/deciliter        ALLERGIES:  No Known Allergies      LABS:                                     12.5   8.4   )-----------( 320      ( 13 Jan 2019 06:50 )             38.8   01-13    134<L>  |  101  |  13  ----------------------------<  115<H>  4.1   |  22  |  0.73    Ca    9.3      13 Jan 2019 06:50  Phos  3.9     01-12  Mg     2.2     01-12    TPro  7.7  /  Alb  3.7  /  TBili  0.7  /  DBili  x   /  AST  40  /  ALT  29  /  AlkPhos  265<H>  01-13    PTT - ( 13 Jan 2019 06:50 )  PTT:64.3 sec    < from: Cardiac Cath Lab - Adult (01.10.19 @ 10:54) >  VENTRICLES: Global left ventricular function was normal. EF estimated was  60 %.  VALVES: AORTIC VALVE: There was mild aortic insufficiency. MITRAL VALVE:  The mitral valve exhibited mild regurgitation.  CORONARY VESSELS: The coronary circulation is right dominant.  LM:   --  LM: Normal.  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 There was a 75 % stenosis. Lesion is at the  bifurcation of a large diagonal branch. Lesion difficult to visual with  significant vessel overlap. IFR was 0.89 but IVUS imaging revealed severe  atherosclerotic disease with calcium and IVUS mla of 4.1 mm2. IVUS  catheter was occlusive at the lesion and unable to be advanced past  lesion.   D1: There was a 50 % stenosis in the proximal third of the vessel  segment.  CX:   --  Circumflex: Angiography showed mild atherosclerosis with no flow  limiting lesions.  RCA:   --  RCA: Angiography showed no evidence of disease. 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  --  RPDA: Angiography showed no evidence of disease.  --  RPL1:Angiography showed no evidence of disease.  AORTA: Ascending aorta: Normal. No evidence of any aortic fistula. No  aneurysm. The PA fills from coronary flow from root injection.  COMPLICATIONS: There were no complications.  DIAGNOSTIC IMPRESSIONS: New onset chest pain, AF with RVR, and acute  diastolic HF. EMILI with mod-severe mitral stenosis and very large thrombus  in DANTE. Cath with severe proximal LAD lesion with correlating IVUS  findings and moderate diagonal disease. 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 LV function preserved.  DIAGNOSTIC RECOMMENDATIONS: Continue medical treatment of CAD, AF. CTS  evaluation for MVR/CABG possible fistula ligation/dante thrombus  extraction/dante ligation.  INTERVENTIONALIMPRESSIONS: New onset chest pain, AF with RVR, and acute  diastolic HF. EMILI with mod-severe mitral stenosis and very large thrombus  in DANTE. Cath with severe proximal LAD lesion with correlating IVUS  findings and moderate diagonal disease. 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 LV function preserved.  INTERVENTIONAL RECOMMENDATIONS: Continue medical treatment of CAD, AF. CTS  evaluation for MVR/CABG possible fistula ligation/dante thrombus  extraction/dante ligation.    < end of copied text >          < from: EMILI w/TTE (w/3D Echo) (01.07.19 @ 15:03) >  ------------------------------  CONCLUSIONS:  1. Mitral annular calcification and calcified mitral  leaflets with decreased diastolic opening.  In some views  the valve has a rheumatic appearance. Mild-moderate mitral  regurgitation. Mean transmitral valve gradient equals 8 mm  Hg, estimated mitral valve area equals 1.1 sqcm (by  pressure half time equation), consistent with moderate to  severe mitral stenosis.  2. Calcified trileaflet aortic valve with decreased  opening. Peak transaortic valve gradient equals 17 mm Hg,  mean transaortic valve gradient equals 12 mm Hg, consistent  with mild aortic stenosis. No aortic valve regurgitation  seen.  3. Normal aortic root.  Mild non-mobile atherosclerotic  plaque in the aortic arch.  Severe, bulky, mobile  atherosclerotic plaque in the descending thoracic aorta.  4. Mildly dilated left atrium.  LA volume index = 35 cc/m2.   A very large (about  3.0 cm X 1.4 cm) thrombus is  visualized in the left atrial appendage.  5. Normal left ventricular internal dimensions and wall  thicknesses.  6. Normal left ventricular systolic function. No segmental  wall motion abnormalities.  7. Normal right ventricular size and function.  8. Contrast injection demonstrates no evidence of a patent  foramen ovale.  *** No previous Echo exam.  -------------------------------------------------------    < end of copied text >

## 2019-01-13 NOTE — PROGRESS NOTE ADULT - SUBJECTIVE AND OBJECTIVE BOX
Patient is a 78y old  Female who presents with a chief complaint of Mitral regurgitation, CAD, ANDREW thrombus (13 Jan 2019 09:24)    Any change in ROS: feels better. no dizziness episodes. no SOB, no cough, no sputum.     MEDICATIONS  (STANDING):  aspirin enteric coated 81 milliGRAM(s) Oral daily  atorvastatin 20 milliGRAM(s) Oral at bedtime  buDESOnide  80 MICROgram(s)/formoterol 4.5 MICROgram(s) Inhaler 2 Puff(s) Inhalation two times a day  dextrose 5%. 1000 milliLiter(s) (50 mL/Hr) IV Continuous <Continuous>  dextrose 50% Injectable 25 Gram(s) IV Push once  diltiazem    milliGRAM(s) Oral daily  docusate sodium 100 milliGRAM(s) Oral three times a day  dorzolamide 2% Ophthalmic Solution 1 Drop(s) Both EYES <User Schedule>  furosemide    Tablet 20 milliGRAM(s) Oral daily  heparin  Infusion 500 Unit(s)/Hr (5 mL/Hr) IV Continuous <Continuous>  insulin lispro (HumaLOG) corrective regimen sliding scale   SubCutaneous three times a day before meals  insulin lispro (HumaLOG) corrective regimen sliding scale   SubCutaneous at bedtime  montelukast 10 milliGRAM(s) Oral daily  sodium chloride 0.9% lock flush 3 milliLiter(s) IV Push every 8 hours    MEDICATIONS  (PRN):  dextrose 40% Gel 15 Gram(s) Oral once PRN Blood Glucose LESS THAN 70 milliGRAM(s)/deciliter    Vital Signs Last 24 Hrs  T(C): 36.4 (13 Jan 2019 04:55), Max: 36.9 (12 Jan 2019 14:01)  T(F): 97.5 (13 Jan 2019 04:55), Max: 98.5 (12 Jan 2019 14:01)  HR: 77 (13 Jan 2019 04:55) (62 - 80)  BP: 106/70 (13 Jan 2019 04:55) (105/72 - 121/70)  BP(mean): --  RR: 18 (13 Jan 2019 04:55) (18 - 22)  SpO2: 99% (13 Jan 2019 04:55) (99% - 100%)    I&O's Summary    12 Jan 2019 07:01  -  13 Jan 2019 07:00  --------------------------------------------------------  IN: 540 mL / OUT: 350 mL / NET: 190 mL      Physical Exam:   GENERAL: The patient comfortable with no apparent distress.   HEENT: Head is normocephalic and atraumatic.    NECK: Supple with no elevated JVP.  LUNGS: Clear to auscultation without wheeze and rhonchi.  HEART: S1 and S2 present without murmur.  ABDOMEN: Soft, nontender, and nondistended.   EXTREMITIES: No edema or calf tenderness.  NEUROLOGIC: Grossly intact.    Labs:                              12.5   8.4   )-----------( 320      ( 13 Jan 2019 06:50 )             38.8                         11.8   9.8   )-----------( 310      ( 12 Jan 2019 03:24 )             36.0                         10.7   8.55  )-----------( 348      ( 11 Jan 2019 08:14 )             33.8                         11.4   9.3   )-----------( 272      ( 11 Jan 2019 00:22 )             34.3                         10.4   9.74  )-----------( 309      ( 10 Joseph 2019 04:30 )             33.4     01-13    134<L>  |  101  |  13  ----------------------------<  115<H>  4.1   |  22  |  0.73  01-12    135  |  98  |  16  ----------------------------<  105<H>  3.7   |  23  |  0.67  01-11    135  |  102  |  14  ----------------------------<  112<H>  3.7   |  20<L>  |  0.66  01-11    134<L>  |  99  |  15  ----------------------------<  87  3.8   |  21<L>  |  0.68  01-10    136  |  104  |  11  ----------------------------<  104<H>  4.6   |  21<L>  |  0.64    Ca    9.3      13 Jan 2019 06:50  Ca    9.2      12 Jan 2019 03:24  Phos  3.9     01-12  Mg     2.2     01-12    TPro  7.7  /  Alb  3.7  /  TBili  0.7  /  DBili  x   /  AST  40  /  ALT  29  /  AlkPhos  265<H>  01-13  TPro  7.7  /  Alb  3.7  /  TBili  0.7  /  DBili  x   /  AST  34  /  ALT  23  /  AlkPhos  252<H>  01-11  TPro  8.6<H>  /  Alb  4.3  /  TBili  0.7  /  DBili  x   /  AST  37  /  ALT  30  /  AlkPhos  279<H>  01-11    CAPILLARY BLOOD GLUCOSE      POCT Blood Glucose.: 123 mg/dL (13 Jan 2019 07:55)  POCT Blood Glucose.: 152 mg/dL (12 Jan 2019 21:38)  POCT Blood Glucose.: 112 mg/dL (12 Jan 2019 17:06)  POCT Blood Glucose.: 124 mg/dL (12 Jan 2019 13:11)  POCT Blood Glucose.: 190 mg/dL (12 Jan 2019 11:51)      LIVER FUNCTIONS - ( 13 Jan 2019 06:50 )  Alb: 3.7 g/dL / Pro: 7.7 g/dL / ALK PHOS: 265 U/L / ALT: 29 U/L / AST: 40 U/L / GGT: x           PTT - ( 13 Jan 2019 06:50 )  PTT:64.3 sec    Serum Pro-Brain Natriuretic Peptide: 316 pg/mL (01-11 @ 00:22)      Studies  Chest X-RAY   < from: Xray Chest 1 View AP/PA (01.10.19 @ 23:38) >  EXAM:  XR CHEST AP OR PA 1V                            PROCEDURE DATE:  01/10/2019            INTERPRETATION:  CLINICAL INFORMATION: Chest pain.    EXAM: Frontal radiograph of the chest.    COMPARISON: Chest radiograph from 1/4/2019, CT chest 1/5/2019.    FINDINGS:  Heart size is normal. Loop recorder is present.    A 4 mm nodule in the right lower lung field corresponds to calcified   granuloma seen on recent CT chest from 1/5/2019. Small left pleural   effusion. No pneumothorax.    IMPRESSION: Small left pleural effusion. No pneumothorax.    < end of copied text >    CT SCAN Chest   < from: CT Angio Chest w/ IV Cont (01.05.19 @ 08:39) >  EXAM:  CT ANGIO CHEST (W)AW IC        PROCEDURE DATE:  Jan 5 2019         INTERPRETATION:  CLINICAL INFORMATION: Shortness of breath and chest   palpitations. History of DVT. Assess for pulmonary embolism.    COMPARISON: None.    PROCEDURE:   CT Angiography of the Chest.  80 ml of Omnipaque 350 was injected intravenously. 20 ml were discarded.  Sagittal and coronal reformats were performed as well as 3D (MIP)   reconstructions.      FINDINGS:    CTA: Streak and motion artifact limits evaluation of the distal segmental   and subsegmental pulmonary arteries at the right upper lobe and left   lower lobe. No central, main, lobar or proximal segmental pulmonary   embolism. Thoracic aorta and main pulmonary artery are normal in caliber.   Atherosclerotic calcification of the thoracic aorta.    CHEST:     LUNGS AND LARGE AIRWAYS: Evaluation limited by respiratory motion. Patent   central airways. Mild interlobular septal thickening with nonspecific   groundglass opacities and peribronchial thickening at the lung bases, may   reflect pulmonary edema. A calcified granuloma in the right lower lobe.  PLEURA: Small bilateral pleural effusions.  VESSELS: As above.  HEART: Heart size is normal. No pericardial effusion. Dense mitral   annular calcification. Aortic valve and coronary artery atherosclerotic   calcifications.  MEDIASTINUM AND VIKTORIYA: No lymphadenopathy.  CHEST WALL AND LOWER NECK: Within normal limits.  VISUALIZED UPPER ABDOMEN: Small hiatal hernia. A partially imaged fat   density lesion at the periphery of the right renal interpole.  BONES: Multilevel degenerative change.    IMPRESSION:     Limited evaluation of the distal segmental and subsegmental pulmonary   arteries due to streak and motion artifact. No central, main, lobar or   proximal segmental pulmonary embolism.     Small bilateral pleural effusions with likely pulmonary edema.    A partially imaged fat density lesion in the periphery of the right renal   interpole is indeterminate, may reflect an angiomyolipoma. For more   definitive characterization, consider nonurgent contrast-enhanced renal   protocol CT on an outpatient basis.    < end of copied text >    Venous Dopplers: LE: < from: VA Duplex Ext Veins Lower Comp, Bilat. (01.05.19 @ 10:22) >  ------------------------------------------------------------------------  Summary/Impressions:  1.  Chronic deep veinthrombosis noted in the left femoral  vein.  No evidence of DVT in the right lower extremity.  2.  No evidence of  superficial venous insufficiency noted  in the right and left lower extremities.  ----------------------------------------------------------    < end of copied text >    CT Abdomen n/a   Others  < from: Transthoracic Echocardiogram (01.11.19 @ 09:33) >  Conclusions:  1. Mitral annular calcification and calcified mitral  leaflets with decreased diastolic opening. Peak mitral  valve gradient equals 9 mm Hg, mean transmitral valve  gradient equals 4 mm Hg, consistent with mild mitral  stenosis.  2. Calcified aortic valve with decreased opening. Peak  transaortic valve gradient equals 21 mm Hg, mean  transaortic valve gradient equals 12 mm Hg, estimated  aortic valve area equals 1.2 sqcm (by continuity equation),  aortic valve velocity time integral equals 46 cm,  consistent with moderate aortic stenosis.  3. Normal left ventricular internal dimensions and wall  thicknesses.  4. Normal left ventricular systolic function. No segmental  wall motion abnormalities.  *** No previous Echo exam.  --------------------------------------------------    < end of copied text >

## 2019-01-14 ENCOUNTER — APPOINTMENT (OUTPATIENT)
Dept: CARDIOTHORACIC SURGERY | Facility: HOSPITAL | Age: 79
End: 2019-01-14

## 2019-01-14 LAB
ALBUMIN SERPL ELPH-MCNC: 4 G/DL — SIGNIFICANT CHANGE UP (ref 3.3–5)
ALP SERPL-CCNC: 285 U/L — HIGH (ref 40–120)
ALT FLD-CCNC: 38 U/L — SIGNIFICANT CHANGE UP (ref 10–45)
ANION GAP SERPL CALC-SCNC: 17 MMOL/L — SIGNIFICANT CHANGE UP (ref 5–17)
APTT BLD: 32.6 SEC — SIGNIFICANT CHANGE UP (ref 27.5–36.3)
APTT BLD: 50.3 SEC — HIGH (ref 27.5–36.3)
APTT BLD: 80.5 SEC — HIGH (ref 27.5–36.3)
AST SERPL-CCNC: 45 U/L — HIGH (ref 10–40)
BASOPHILS # BLD AUTO: 0.1 K/UL — SIGNIFICANT CHANGE UP (ref 0–0.2)
BASOPHILS NFR BLD AUTO: 0.5 % — SIGNIFICANT CHANGE UP (ref 0–2)
BILIRUB SERPL-MCNC: 0.9 MG/DL — SIGNIFICANT CHANGE UP (ref 0.2–1.2)
BUN SERPL-MCNC: 19 MG/DL — SIGNIFICANT CHANGE UP (ref 7–23)
CALCIUM SERPL-MCNC: 9.4 MG/DL — SIGNIFICANT CHANGE UP (ref 8.4–10.5)
CHLORIDE SERPL-SCNC: 99 MMOL/L — SIGNIFICANT CHANGE UP (ref 96–108)
CK MB BLD-MCNC: 2.6 % — SIGNIFICANT CHANGE UP (ref 0–3.5)
CK MB CFR SERPL CALC: 1 NG/ML — SIGNIFICANT CHANGE UP (ref 0–3.8)
CK SERPL-CCNC: 39 U/L — SIGNIFICANT CHANGE UP (ref 25–170)
CO2 SERPL-SCNC: 18 MMOL/L — LOW (ref 22–31)
CREAT SERPL-MCNC: 0.73 MG/DL — SIGNIFICANT CHANGE UP (ref 0.5–1.3)
EOSINOPHIL # BLD AUTO: 0.4 K/UL — SIGNIFICANT CHANGE UP (ref 0–0.5)
EOSINOPHIL NFR BLD AUTO: 3.4 % — SIGNIFICANT CHANGE UP (ref 0–6)
FIBRINOGEN PPP-MCNC: 886 MG/DL — HIGH (ref 350–510)
GAS PNL BLDA: SIGNIFICANT CHANGE UP
GLUCOSE BLDC GLUCOMTR-MCNC: 111 MG/DL — HIGH (ref 70–99)
GLUCOSE BLDC GLUCOMTR-MCNC: 112 MG/DL — HIGH (ref 70–99)
GLUCOSE BLDC GLUCOMTR-MCNC: 122 MG/DL — HIGH (ref 70–99)
GLUCOSE BLDC GLUCOMTR-MCNC: 130 MG/DL — HIGH (ref 70–99)
GLUCOSE BLDC GLUCOMTR-MCNC: 136 MG/DL — HIGH (ref 70–99)
GLUCOSE SERPL-MCNC: 206 MG/DL — HIGH (ref 70–99)
HCT VFR BLD CALC: 35.4 % — SIGNIFICANT CHANGE UP (ref 34.5–45)
HCT VFR BLD CALC: 36.7 % — SIGNIFICANT CHANGE UP (ref 34.5–45)
HGB BLD-MCNC: 11.7 G/DL — SIGNIFICANT CHANGE UP (ref 11.5–15.5)
HGB BLD-MCNC: 12.1 G/DL — SIGNIFICANT CHANGE UP (ref 11.5–15.5)
INR BLD: 1.13 RATIO — SIGNIFICANT CHANGE UP (ref 0.88–1.16)
LYMPHOCYTES # BLD AUTO: 1.8 K/UL — SIGNIFICANT CHANGE UP (ref 1–3.3)
LYMPHOCYTES # BLD AUTO: 14.6 % — SIGNIFICANT CHANGE UP (ref 13–44)
MAGNESIUM SERPL-MCNC: 2 MG/DL — SIGNIFICANT CHANGE UP (ref 1.6–2.6)
MCHC RBC-ENTMCNC: 27.3 PG — SIGNIFICANT CHANGE UP (ref 27–34)
MCHC RBC-ENTMCNC: 27.4 PG — SIGNIFICANT CHANGE UP (ref 27–34)
MCHC RBC-ENTMCNC: 33 GM/DL — SIGNIFICANT CHANGE UP (ref 32–36)
MCHC RBC-ENTMCNC: 33 GM/DL — SIGNIFICANT CHANGE UP (ref 32–36)
MCV RBC AUTO: 82.8 FL — SIGNIFICANT CHANGE UP (ref 80–100)
MCV RBC AUTO: 83.1 FL — SIGNIFICANT CHANGE UP (ref 80–100)
MONOCYTES # BLD AUTO: 1 K/UL — HIGH (ref 0–0.9)
MONOCYTES NFR BLD AUTO: 8 % — SIGNIFICANT CHANGE UP (ref 2–14)
NEUTROPHILS # BLD AUTO: 9.3 K/UL — HIGH (ref 1.8–7.4)
NEUTROPHILS NFR BLD AUTO: 73.4 % — SIGNIFICANT CHANGE UP (ref 43–77)
PHOSPHATE SERPL-MCNC: 4.8 MG/DL — HIGH (ref 2.5–4.5)
PLATELET # BLD AUTO: 326 K/UL — SIGNIFICANT CHANGE UP (ref 150–400)
PLATELET # BLD AUTO: 342 K/UL — SIGNIFICANT CHANGE UP (ref 150–400)
POTASSIUM SERPL-MCNC: 4.2 MMOL/L — SIGNIFICANT CHANGE UP (ref 3.5–5.3)
POTASSIUM SERPL-SCNC: 4.2 MMOL/L — SIGNIFICANT CHANGE UP (ref 3.5–5.3)
PROT SERPL-MCNC: 8.3 G/DL — SIGNIFICANT CHANGE UP (ref 6–8.3)
PROTHROM AB SERPL-ACNC: 13 SEC — HIGH (ref 10–12.9)
RBC # BLD: 4.26 M/UL — SIGNIFICANT CHANGE UP (ref 3.8–5.2)
RBC # BLD: 4.43 M/UL — SIGNIFICANT CHANGE UP (ref 3.8–5.2)
RBC # FLD: 14.7 % — HIGH (ref 10.3–14.5)
RBC # FLD: 15.1 % — HIGH (ref 10.3–14.5)
SODIUM SERPL-SCNC: 134 MMOL/L — LOW (ref 135–145)
TROPONIN T, HIGH SENSITIVITY RESULT: 41 NG/L — SIGNIFICANT CHANGE UP (ref 0–51)
WBC # BLD: 10.4 K/UL — SIGNIFICANT CHANGE UP (ref 3.8–10.5)
WBC # BLD: 12.7 K/UL — HIGH (ref 3.8–10.5)
WBC # FLD AUTO: 10.4 K/UL — SIGNIFICANT CHANGE UP (ref 3.8–10.5)
WBC # FLD AUTO: 12.7 K/UL — HIGH (ref 3.8–10.5)

## 2019-01-14 PROCEDURE — 71045 X-RAY EXAM CHEST 1 VIEW: CPT | Mod: 26

## 2019-01-14 PROCEDURE — 99291 CRITICAL CARE FIRST HOUR: CPT

## 2019-01-14 RX ORDER — HEPARIN SODIUM 5000 [USP'U]/ML
500 INJECTION INTRAVENOUS; SUBCUTANEOUS
Qty: 25000 | Refills: 0 | Status: DISCONTINUED | OUTPATIENT
Start: 2019-01-14 | End: 2019-01-14

## 2019-01-14 RX ORDER — ONDANSETRON 8 MG/1
4 TABLET, FILM COATED ORAL ONCE
Qty: 0 | Refills: 0 | Status: COMPLETED | OUTPATIENT
Start: 2019-01-14 | End: 2019-01-14

## 2019-01-14 RX ORDER — OXYCODONE AND ACETAMINOPHEN 5; 325 MG/1; MG/1
1 TABLET ORAL EVERY 4 HOURS
Qty: 0 | Refills: 0 | Status: DISCONTINUED | OUTPATIENT
Start: 2019-01-14 | End: 2019-01-15

## 2019-01-14 RX ORDER — ALBUMIN HUMAN 25 %
250 VIAL (ML) INTRAVENOUS ONCE
Qty: 0 | Refills: 0 | Status: COMPLETED | OUTPATIENT
Start: 2019-01-14 | End: 2019-01-14

## 2019-01-14 RX ORDER — DEXTROSE 50 % IN WATER 50 %
25 SYRINGE (ML) INTRAVENOUS
Qty: 0 | Refills: 0 | Status: DISCONTINUED | OUTPATIENT
Start: 2019-01-14 | End: 2019-01-23

## 2019-01-14 RX ORDER — HEPARIN SODIUM 5000 [USP'U]/ML
600 INJECTION INTRAVENOUS; SUBCUTANEOUS
Qty: 25000 | Refills: 0 | Status: DISCONTINUED | OUTPATIENT
Start: 2019-01-14 | End: 2019-01-16

## 2019-01-14 RX ORDER — ASPIRIN/CALCIUM CARB/MAGNESIUM 324 MG
325 TABLET ORAL DAILY
Qty: 0 | Refills: 0 | Status: DISCONTINUED | OUTPATIENT
Start: 2019-01-14 | End: 2019-01-18

## 2019-01-14 RX ORDER — POTASSIUM CHLORIDE 20 MEQ
10 PACKET (EA) ORAL ONCE
Qty: 0 | Refills: 0 | Status: COMPLETED | OUTPATIENT
Start: 2019-01-14 | End: 2019-01-14

## 2019-01-14 RX ORDER — INSULIN LISPRO 100/ML
VIAL (ML) SUBCUTANEOUS AT BEDTIME
Qty: 0 | Refills: 0 | Status: DISCONTINUED | OUTPATIENT
Start: 2019-01-14 | End: 2019-01-23

## 2019-01-14 RX ORDER — MEPERIDINE HYDROCHLORIDE 50 MG/ML
25 INJECTION INTRAMUSCULAR; INTRAVENOUS; SUBCUTANEOUS ONCE
Qty: 0 | Refills: 0 | Status: DISCONTINUED | OUTPATIENT
Start: 2019-01-14 | End: 2019-01-14

## 2019-01-14 RX ORDER — ONDANSETRON 8 MG/1
4 TABLET, FILM COATED ORAL ONCE
Qty: 0 | Refills: 0 | Status: DISCONTINUED | OUTPATIENT
Start: 2019-01-14 | End: 2019-01-14

## 2019-01-14 RX ORDER — INSULIN HUMAN 100 [IU]/ML
3 INJECTION, SOLUTION SUBCUTANEOUS
Qty: 100 | Refills: 0 | Status: DISCONTINUED | OUTPATIENT
Start: 2019-01-14 | End: 2019-01-14

## 2019-01-14 RX ORDER — POTASSIUM CHLORIDE 20 MEQ
10 PACKET (EA) ORAL
Qty: 0 | Refills: 0 | Status: DISCONTINUED | OUTPATIENT
Start: 2019-01-14 | End: 2019-01-14

## 2019-01-14 RX ORDER — NOREPINEPHRINE BITARTRATE/D5W 8 MG/250ML
0.08 PLASTIC BAG, INJECTION (ML) INTRAVENOUS
Qty: 8 | Refills: 0 | Status: DISCONTINUED | OUTPATIENT
Start: 2019-01-14 | End: 2019-01-14

## 2019-01-14 RX ORDER — DEXTROSE 50 % IN WATER 50 %
50 SYRINGE (ML) INTRAVENOUS
Qty: 0 | Refills: 0 | Status: DISCONTINUED | OUTPATIENT
Start: 2019-01-14 | End: 2019-01-23

## 2019-01-14 RX ORDER — OXYCODONE AND ACETAMINOPHEN 5; 325 MG/1; MG/1
2 TABLET ORAL EVERY 6 HOURS
Qty: 0 | Refills: 0 | Status: DISCONTINUED | OUTPATIENT
Start: 2019-01-14 | End: 2019-01-15

## 2019-01-14 RX ORDER — MONTELUKAST 4 MG/1
10 TABLET, CHEWABLE ORAL DAILY
Qty: 0 | Refills: 0 | Status: DISCONTINUED | OUTPATIENT
Start: 2019-01-14 | End: 2019-01-23

## 2019-01-14 RX ORDER — DOCUSATE SODIUM 100 MG
100 CAPSULE ORAL THREE TIMES A DAY
Qty: 0 | Refills: 0 | Status: DISCONTINUED | OUTPATIENT
Start: 2019-01-14 | End: 2019-01-23

## 2019-01-14 RX ORDER — CHLORHEXIDINE GLUCONATE 213 G/1000ML
5 SOLUTION TOPICAL EVERY 4 HOURS
Qty: 0 | Refills: 0 | Status: DISCONTINUED | OUTPATIENT
Start: 2019-01-14 | End: 2019-01-14

## 2019-01-14 RX ORDER — NOREPINEPHRINE BITARTRATE/D5W 8 MG/250ML
0.03 PLASTIC BAG, INJECTION (ML) INTRAVENOUS
Qty: 8 | Refills: 0 | Status: DISCONTINUED | OUTPATIENT
Start: 2019-01-14 | End: 2019-01-15

## 2019-01-14 RX ORDER — PANTOPRAZOLE SODIUM 20 MG/1
40 TABLET, DELAYED RELEASE ORAL DAILY
Qty: 0 | Refills: 0 | Status: DISCONTINUED | OUTPATIENT
Start: 2019-01-14 | End: 2019-01-14

## 2019-01-14 RX ORDER — BUDESONIDE AND FORMOTEROL FUMARATE DIHYDRATE 160; 4.5 UG/1; UG/1
2 AEROSOL RESPIRATORY (INHALATION)
Qty: 0 | Refills: 0 | Status: DISCONTINUED | OUTPATIENT
Start: 2019-01-14 | End: 2019-01-23

## 2019-01-14 RX ORDER — DEXTROSE 50 % IN WATER 50 %
25 SYRINGE (ML) INTRAVENOUS
Qty: 0 | Refills: 0 | Status: DISCONTINUED | OUTPATIENT
Start: 2019-01-14 | End: 2019-01-14

## 2019-01-14 RX ORDER — ATORVASTATIN CALCIUM 80 MG/1
20 TABLET, FILM COATED ORAL AT BEDTIME
Qty: 0 | Refills: 0 | Status: DISCONTINUED | OUTPATIENT
Start: 2019-01-14 | End: 2019-01-23

## 2019-01-14 RX ORDER — DOPAMINE HYDROCHLORIDE 40 MG/ML
3 INJECTION, SOLUTION, CONCENTRATE INTRAVENOUS
Qty: 400 | Refills: 0 | Status: DISCONTINUED | OUTPATIENT
Start: 2019-01-14 | End: 2019-01-14

## 2019-01-14 RX ORDER — ASPIRIN/CALCIUM CARB/MAGNESIUM 324 MG
300 TABLET ORAL ONCE
Qty: 0 | Refills: 0 | Status: DISCONTINUED | OUTPATIENT
Start: 2019-01-14 | End: 2019-01-14

## 2019-01-14 RX ORDER — INSULIN HUMAN 100 [IU]/ML
2 INJECTION, SOLUTION SUBCUTANEOUS
Qty: 100 | Refills: 0 | Status: DISCONTINUED | OUTPATIENT
Start: 2019-01-14 | End: 2019-01-14

## 2019-01-14 RX ORDER — DEXTROSE 50 % IN WATER 50 %
50 SYRINGE (ML) INTRAVENOUS
Qty: 0 | Refills: 0 | Status: DISCONTINUED | OUTPATIENT
Start: 2019-01-14 | End: 2019-01-14

## 2019-01-14 RX ORDER — INSULIN LISPRO 100/ML
VIAL (ML) SUBCUTANEOUS
Qty: 0 | Refills: 0 | Status: DISCONTINUED | OUTPATIENT
Start: 2019-01-14 | End: 2019-01-23

## 2019-01-14 RX ORDER — SODIUM CHLORIDE 9 MG/ML
1000 INJECTION INTRAMUSCULAR; INTRAVENOUS; SUBCUTANEOUS
Qty: 0 | Refills: 0 | Status: DISCONTINUED | OUTPATIENT
Start: 2019-01-14 | End: 2019-01-15

## 2019-01-14 RX ORDER — ONDANSETRON 8 MG/1
4 TABLET, FILM COATED ORAL ONCE
Qty: 0 | Refills: 0 | Status: DISCONTINUED | OUTPATIENT
Start: 2019-01-14 | End: 2019-01-15

## 2019-01-14 RX ADMIN — Medication 4 MICROGRAM(S)/KG/MIN: at 22:13

## 2019-01-14 RX ADMIN — CHLORHEXIDINE GLUCONATE 1 APPLICATION(S): 213 SOLUTION TOPICAL at 06:03

## 2019-01-14 RX ADMIN — ATORVASTATIN CALCIUM 20 MILLIGRAM(S): 80 TABLET, FILM COATED ORAL at 22:13

## 2019-01-14 RX ADMIN — HEPARIN SODIUM 6 UNIT(S)/HR: 5000 INJECTION INTRAVENOUS; SUBCUTANEOUS at 22:13

## 2019-01-14 RX ADMIN — DOPAMINE HYDROCHLORIDE 8.67 MICROGRAM(S)/KG/MIN: 40 INJECTION, SOLUTION, CONCENTRATE INTRAVENOUS at 17:43

## 2019-01-14 RX ADMIN — PANTOPRAZOLE SODIUM 40 MILLIGRAM(S): 20 TABLET, DELAYED RELEASE ORAL at 11:19

## 2019-01-14 RX ADMIN — Medication 100 MILLIGRAM(S): at 22:13

## 2019-01-14 RX ADMIN — Medication 50 MILLIEQUIVALENT(S): at 11:19

## 2019-01-14 RX ADMIN — CHLORHEXIDINE GLUCONATE 15 MILLILITER(S): 213 SOLUTION TOPICAL at 06:01

## 2019-01-14 RX ADMIN — MONTELUKAST 10 MILLIGRAM(S): 4 TABLET, CHEWABLE ORAL at 22:13

## 2019-01-14 RX ADMIN — INSULIN HUMAN 2 UNIT(S)/HR: 100 INJECTION, SOLUTION SUBCUTANEOUS at 17:43

## 2019-01-14 RX ADMIN — Medication 11.56 MICROGRAM(S)/KG/MIN: at 11:20

## 2019-01-14 RX ADMIN — CHLORHEXIDINE GLUCONATE 5 MILLILITER(S): 213 SOLUTION TOPICAL at 17:40

## 2019-01-14 RX ADMIN — Medication 125 MILLILITER(S): at 17:41

## 2019-01-14 RX ADMIN — Medication 240 MILLIGRAM(S): at 05:06

## 2019-01-14 RX ADMIN — SODIUM CHLORIDE 3 MILLILITER(S): 9 INJECTION INTRAMUSCULAR; INTRAVENOUS; SUBCUTANEOUS at 01:00

## 2019-01-14 RX ADMIN — CARVEDILOL PHOSPHATE 3.12 MILLIGRAM(S): 80 CAPSULE, EXTENDED RELEASE ORAL at 05:06

## 2019-01-14 RX ADMIN — HEPARIN SODIUM 500 UNIT(S)/HR: 5000 INJECTION INTRAVENOUS; SUBCUTANEOUS at 11:20

## 2019-01-14 RX ADMIN — CHLORHEXIDINE GLUCONATE 5 MILLILITER(S): 213 SOLUTION TOPICAL at 17:42

## 2019-01-14 RX ADMIN — DOPAMINE HYDROCHLORIDE 14.46 MICROGRAM(S)/KG/MIN: 40 INJECTION, SOLUTION, CONCENTRATE INTRAVENOUS at 11:20

## 2019-01-14 RX ADMIN — Medication 20 MILLIGRAM(S): at 05:05

## 2019-01-14 RX ADMIN — Medication 500 MILLILITER(S): at 22:58

## 2019-01-14 RX ADMIN — ONDANSETRON 4 MILLIGRAM(S): 8 TABLET, FILM COATED ORAL at 16:00

## 2019-01-14 NOTE — PROGRESS NOTE ADULT - SUBJECTIVE AND OBJECTIVE BOX
JEANNE TRUJILLO  78y Female  MRN:[id number]    Patient is a 78y old  Female who presents with a chief complaint of Palpitations and SOB (06 Jan 2019 14:36)    HPI:  79 y/o F with PMH of Left LE DVT diagnosed on Aug 2018(on Coumadin), COPD, DM, Asthma presented with the complaint of SOB/GRADY and palpitations. As per the patient she has been having intermittent SOB but it worsened the past few days which prompted her to come to the ER. Patient stated that any minimal exertion she would have to stop to catch her breath. Patient stated that she sleeps with 2-3 pillows at night and endorsed of orthopnea and PND. Patient also endorsed of intermittent LE swelling and stated that she is complaint with her medications. Patient also endorsed of midsternal chest pain, characterized as a  pressure like sensation, without any aggravating factors, alleviated when she would massage her chest, without any radiations of the chest pain, with a severity of 5/10. Patient also endorsed of palpitations for the past few days. Patient denied any fevers, chills, N/V/D/C, abdominal pain, dysuria, melena, hematochezia, recent travel, sick contact, pleuritic or positional chest pain.     On ED admission EKG revealed Atrial fibrillation with RVR at a rate of 147 with QTc of 466, CE x 1: Trop: 8, CE x 2: Trop: 9, H&H: 10.4/33.5, Na: 133, Gluc: 104, Alk Phos: 285, AST: 38. Prelim CXR: 6 mm right lung nodular opacity of uncertain significance. Follow up to resolution. Hazy right lung opacities may be secondary to rotation versus atelectasis and/or pneumonia. In the ED patient received IV Cardizem and PO Cardizem which improved her rate. (05 Jan 2019 05:47)      Patient seen and evaluated at bedside in CTU.    Interval HPI: OR cancelled as pt found to have less severe valvular dz    PAST MEDICAL & SURGICAL HISTORY:  DVT (deep venous thrombosis)  Asthma  COPD (chronic obstructive pulmonary disease)  DM (diabetes mellitus)  History of embolectomy: LLE    SOC:  non smoker  no drug or alcohol abuse    fam hx:  non cont     REVIEW OF SYSTEMS:  as per hpi    VITALS:   Vital Signs Last 24 Hrs  T(C): 37.3 (14 Jan 2019 20:00), Max: 37.5 (14 Jan 2019 16:00)  T(F): 99.1 (14 Jan 2019 20:00), Max: 99.5 (14 Jan 2019 16:00)  HR: 73 (14 Jan 2019 22:45) (51 - 106)  BP: 116/82 (14 Jan 2019 07:36) (116/82 - 116/82)  BP(mean): --  RR: 15 (14 Jan 2019 22:45) (0 - 88)  SpO2: 99% (14 Jan 2019 22:45) (98% - 100%)      PHYSICAL EXAM:  GENERAL: NAD, well-developed, intubated,    HEAD:  Atraumatic, Normocephalic  EYES: EOMI, PERRLA, conjunctiva and sclera clear  NECK: Supple, No JVD  CHEST/LUNG: Clear to auscultation bilaterally; No wheeze  HEART: S1, S2;   ABDOMEN: Soft, Nontender, Nondistended; Bowel sounds present  EXTREMITIES:  2+ Peripheral Pulses, No clubbing, cyanosis, or edema  NEUROLOGY: sedated  SKIN: No rashes or lesions    Consultant(s) Notes Reviewed:  [x ] YES  [ ] NO  Care Discussed with Consultants/Other Providers [ x] YES  [ ] NO    MEDS:  MEDICATIONS  (STANDING):  albumin human  5% IVPB 250 milliLiter(s) IV Intermittent once  aspirin enteric coated 325 milliGRAM(s) Oral daily  atorvastatin 20 milliGRAM(s) Oral at bedtime  buDESOnide  80 MICROgram(s)/formoterol 4.5 MICROgram(s) Inhaler 2 Puff(s) Inhalation two times a day  dextrose 50% Injectable 50 milliLiter(s) IV Push every 15 minutes  dextrose 50% Injectable 25 milliLiter(s) IV Push every 15 minutes  docusate sodium 100 milliGRAM(s) Oral three times a day  heparin  Infusion 600 Unit(s)/Hr (6 mL/Hr) IV Continuous <Continuous>  insulin lispro (HumaLOG) corrective regimen sliding scale   SubCutaneous three times a day before meals  insulin lispro (HumaLOG) corrective regimen sliding scale   SubCutaneous at bedtime  montelukast 10 milliGRAM(s) Oral daily  norepinephrine Infusion 0.028 MICROgram(s)/kG/Min (4 mL/Hr) IV Continuous <Continuous>  sodium chloride 0.9%. 1000 milliLiter(s) (10 mL/Hr) IV Continuous <Continuous>    MEDICATIONS  (PRN):  ondansetron Injectable 4 milliGRAM(s) IV Push once PRN Nausea and/or Vomiting  oxyCODONE    5 mG/acetaminophen 325 mG 1 Tablet(s) Oral every 4 hours PRN Mild Pain (1 - 3)  oxyCODONE    5 mG/acetaminophen 325 mG 2 Tablet(s) Oral every 6 hours PRN Moderate Pain (4 - 6)        ALLERGIES:  No Known Allergies      LABS:                                           12.1   12.7  )-----------( 342      ( 14 Jan 2019 11:11 )             36.7   01-14    134<L>  |  99  |  19  ----------------------------<  206<H>  4.2   |  18<L>  |  0.73    Ca    9.4      14 Jan 2019 11:11  Phos  4.8     01-14  Mg     2.0     01-14    TPro  8.3  /  Alb  4.0  /  TBili  0.9  /  DBili  x   /  AST  45<H>  /  ALT  38  /  AlkPhos  285<H>  01-14      < from: Cardiac Cath Lab - Adult (01.10.19 @ 10:54) >  VENTRICLES: Global left ventricular function was normal. EF estimated was  60 %.  VALVES: AORTIC VALVE: There was mild aortic insufficiency. MITRAL VALVE:  The mitral valve exhibited mild regurgitation.  CORONARY VESSELS: The coronary circulation is right dominant.  LM:   --  LM: Normal.  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 There was a 75 % stenosis. Lesion is at the  bifurcation of a large diagonal branch. Lesion difficult to visual with  significant vessel overlap. IFR was 0.89 but IVUS imaging revealed severe  atherosclerotic disease with calcium and IVUS mla of 4.1 mm2. IVUS  catheter was occlusive at the lesion and unable to be advanced past  lesion.   D1: There was a 50 % stenosis in the proximal third of the vessel  segment.  CX:   --  Circumflex: Angiography showed mild atherosclerosis with no flow  limiting lesions.  RCA:   --  RCA: Angiography showed no evidence of disease. 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  --  RPDA: Angiography showed no evidence of disease.  --  RPL1:Angiography showed no evidence of disease.  AORTA: Ascending aorta: Normal. No evidence of any aortic fistula. No  aneurysm. The PA fills from coronary flow from root injection.  COMPLICATIONS: There were no complications.  DIAGNOSTIC IMPRESSIONS: New onset chest pain, AF with RVR, and acute  diastolic HF. EMILI with mod-severe mitral stenosis and very large thrombus  in DANTE. Cath with severe proximal LAD lesion with correlating IVUS  findings and moderate diagonal disease. 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 LV function preserved.  DIAGNOSTIC RECOMMENDATIONS: Continue medical treatment of CAD, AF. CTS  evaluation for MVR/CABG possible fistula ligation/dante thrombus  extraction/dante ligation.  INTERVENTIONALIMPRESSIONS: New onset chest pain, AF with RVR, and acute  diastolic HF. EMILI with mod-severe mitral stenosis and very large thrombus  in DANTE. Cath with severe proximal LAD lesion with correlating IVUS  findings and moderate diagonal disease. 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 LV function preserved.  INTERVENTIONAL RECOMMENDATIONS: Continue medical treatment of CAD, AF. CTS  evaluation for MVR/CABG possible fistula ligation/dante thrombus  extraction/dante ligation.    < end of copied text >          < from: EMILI w/TTE (w/3D Echo) (01.07.19 @ 15:03) >  ------------------------------  CONCLUSIONS:  1. Mitral annular calcification and calcified mitral  leaflets with decreased diastolic opening.  In some views  the valve has a rheumatic appearance. Mild-moderate mitral  regurgitation. Mean transmitral valve gradient equals 8 mm  Hg, estimated mitral valve area equals 1.1 sqcm (by  pressure half time equation), consistent with moderate to  severe mitral stenosis.  2. Calcified trileaflet aortic valve with decreased  opening. Peak transaortic valve gradient equals 17 mm Hg,  mean transaortic valve gradient equals 12 mm Hg, consistent  with mild aortic stenosis. No aortic valve regurgitation  seen.  3. Normal aortic root.  Mild non-mobile atherosclerotic  plaque in the aortic arch.  Severe, bulky, mobile  atherosclerotic plaque in the descending thoracic aorta.  4. Mildly dilated left atrium.  LA volume index = 35 cc/m2.   A very large (about  3.0 cm X 1.4 cm) thrombus is  visualized in the left atrial appendage.  5. Normal left ventricular internal dimensions and wall  thicknesses.  6. Normal left ventricular systolic function. No segmental  wall motion abnormalities.  7. Normal right ventricular size and function.  8. Contrast injection demonstrates no evidence of a patent  foramen ovale.  *** No previous Echo exam.  -------------------------------------------------------    < end of copied text >

## 2019-01-14 NOTE — PROGRESS NOTE ADULT - SUBJECTIVE AND OBJECTIVE BOX
CARDIOLOGY FOLLOW UP - Dr. Fitch    CC intubated/ sedated       PHYSICAL EXAM:  T(C): 37.5 (01-14-19 @ 16:00), Max: 37.5 (01-14-19 @ 16:00)  HR: 79 (01-14-19 @ 17:15) (51 - 106)  BP: 116/82 (01-14-19 @ 07:36) (116/82 - 120/73)  RR: 10 (01-14-19 @ 17:15) (0 - 22)  SpO2: 100% (01-14-19 @ 17:15) (98% - 100%)  Wt(kg): --  I&O's Summary    13 Jan 2019 07:01  -  14 Jan 2019 07:00  --------------------------------------------------------  IN: 720 mL / OUT: 750 mL / NET: -30 mL    14 Jan 2019 07:01  -  14 Jan 2019 17:28  --------------------------------------------------------  IN: 291.4 mL / OUT: 265 mL / NET: 26.4 mL        Appearance: intubated and sedated   Cardiovascular: Normal S1 S2, irregular   Respiratory: Lungs clear to auscultation	  Gastrointestinal:  Soft, Non-tender, + BS	  Extremities: Normal range of motion, No clubbing, cyanosis or edema        MEDICATIONS  (STANDING):  albumin human  5% IVPB 250 milliLiter(s) IV Intermittent once  aspirin enteric coated 325 milliGRAM(s) Oral daily  chlorhexidine 0.12% Liquid 5 milliLiter(s) Oral Mucosa every 4 hours  dextrose 50% Injectable 50 milliLiter(s) IV Push every 15 minutes  dextrose 50% Injectable 25 milliLiter(s) IV Push every 15 minutes  docusate sodium 100 milliGRAM(s) Oral three times a day  DOPamine Infusion 3 MICROgram(s)/kG/Min (8.674 mL/Hr) IV Continuous <Continuous>  heparin  Infusion. 500 Unit(s)/Hr (5 mL/Hr) IV Continuous <Continuous>  insulin Infusion 2 Unit(s)/Hr (2 mL/Hr) IV Continuous <Continuous>  norepinephrine Infusion 0.08 MICROgram(s)/kG/Min (11.565 mL/Hr) IV Continuous <Continuous>  ondansetron Injectable 4 milliGRAM(s) IV Push once  pantoprazole  Injectable 40 milliGRAM(s) IV Push daily  sodium chloride 0.9%. 1000 milliLiter(s) (10 mL/Hr) IV Continuous <Continuous>      TELEMETRY: 	    ECG:  	  RADIOLOGY:   DIAGNOSTIC TESTING:  [ ] Echocardiogram:  [ ]  Catheterization:  [ ] Stress Test:    OTHER: 	    LABS:	 	                                12.1   12.7  )-----------( 342      ( 14 Jan 2019 11:11 )             36.7     01-14    134<L>  |  99  |  19  ----------------------------<  206<H>  4.2   |  18<L>  |  0.73    Ca    9.4      14 Jan 2019 11:11  Phos  4.8     01-14  Mg     2.0     01-14    TPro  8.3  /  Alb  4.0  /  TBili  0.9  /  DBili  x   /  AST  45<H>  /  ALT  38  /  AlkPhos  285<H>  01-14    PT/INR - ( 14 Jan 2019 11:11 )   PT: 13.0 sec;   INR: 1.13 ratio         PTT - ( 14 Jan 2019 11:11 )  PTT:32.6 sec

## 2019-01-14 NOTE — PROGRESS NOTE ADULT - ASSESSMENT
EMILI 1/7/9:  EF 66%, normal LV fx, Mod to sev MS, mild AS, severe mobile atherosclerotic plaque in the descending thoracic aorta, Large ANDREW thrombus ( 3.0 x 1.4 cm)  [facility] 1/10/19: LAD 75% stenosis and a fistula to the PA, and 50% stenosis of D1    a/p    78y Female w/ PMH of LLE DVT 8/2018, on coumadin, COPD, DM, Asthma, presented to [facility] ER with new onset Atrial fibrillation and dyspnea on exertion worsening over the past few days prior to presentation on 1/5. Patient underwent EMILI and DCC at [facility] and was found to have a large ANDREW thrombus as well as mild to moderate MR with mild Mitral stenosis, EF 66%. CT PA was negative for PE. Left Heart Cath revealed LAD 75% stenosis and a fistula to the PA, and 50% stenosis of D1. The patient was then transferred to [facility] under Dr Ingram's Service for evaluation and pre-operative work up for CABG/MVR with ANDREW ligation and thrombus removal.    1. CAD  Left Heart Cath revealed LAD 75% stenosis and a fistula to the PA, and 50% stenosis of D1.   cv stable, no decomp CHF on exam   surgery cancelled, care per CTS   on dopamine/ levophed gtt,     2. Mitral valve stenosis   EMILI revealing mild-mod MR with mod-sev mitral stenosis   CTs f/u, based on intraoperative echo, surgery cancelled today     3. ANDREW   EMILI revealing large ANDREW thrombus   continue AC with hep gtt  care per CTS       4. New onset Afib with RVR  currently in Afib, rate controlled    CHADS=3-4 cont a/c on hep gtt  care per CTS    5. DVT, hx  cont a/c, repeat duplex no changes    dvt ppx

## 2019-01-14 NOTE — PROGRESS NOTE ADULT - SUBJECTIVE AND OBJECTIVE BOX
JEANNE TRUJILLO  MRN#:  [id number]    The patient is a 78y Female with PMH of LLE DVT 8/2018, on coumadin, COPD, DM, Asthma, presented with new onset Atrial fibrillation and dyspnea on exertion worsening over the past few days and found to have MS/MR and ANDREW thrombus and taken to the OR today for valve replacement, but found not to have significant MS and brought back to CTU for recovery who was seen, evaluated, & examined with the CTICU staff on rounds and later in the day with a multidisciplinary care plan formulated & implemented.  All available clinical, laboratory, radiographic, pharmacologic, and electrocardiographic data were reviewed & analyzed.    The patient was in the CTICU in critical condition secondary to persistent cardiopulmonary dysfunction, hypovolemia-shock, hemodynamically significant bradycardia, persistent cardiovascular dysfunction, & hyperglycemia.      Respiratory status required full ventilatory support and subsequent weaning to extubation, the following of ABG’s with A-line monitoring, continuous pulse oximetry monitoring for support & to evaluate for & prevent further decompensation secondary to persistent cardiopulmonary dysfunction.     Invasive hemodynamic monitoring with a central venous catheter & an A-line were required for the continuous central venous and MAP/BP monitoring to ensure adequate cardiovascular support and to evaluate for & help prevent decompensation while receiving intermittent volume expansion, an IV Levophed drip, an IV Dopamine drip, secondary to hypovolemia-shock and hemodynamically significant bradycardia.       Metabolic stability, uncontrolled type 2 diabetes-hyperglycemia, & infection prophylaxis required an IV regular Insulin drip & the following of serial glucose levels to help achieve & maintain euglycemia.      Patient required acute postoperative critical care management and I provided 30 minutes of non-continuous care to the patient.  Discussed at length with the CTICU staff and helped coordinate care. JEANNE TRUJILLO  MRN#:  [id number]    The patient is a 78y Female with PMH of LLE DVT 8/2018, on coumadin, COPD, DM, Asthma, presented with new onset Atrial fibrillation and dyspnea on exertion worsening over the past few days and found to have MS/MR and ANDREW thrombus and taken to the OR today for valve replacement, but found not to have significant MS and brought back to CTU for recovery who was seen, evaluated, & examined with the CTICU staff on rounds and later in the day with a multidisciplinary care plan formulated & implemented.  All available clinical, laboratory, radiographic, pharmacologic, and electrocardiographic data were reviewed & analyzed.    The patient was in the CTICU in critical condition secondary to persistent cardiopulmonary dysfunction, hypovolemia-shock, hemodynamically significant bradycardia, persistent cardiovascular dysfunction, & hyperglycemia.      Respiratory status required full ventilatory support and subsequent weaning to extubation, the following of ABG’s with A-line monitoring, continuous pulse oximetry monitoring for support & to evaluate for & prevent further decompensation secondary to persistent cardiopulmonary dysfunction.     Invasive hemodynamic monitoring with a central venous catheter & an A-line were required for the continuous central venous and MAP/BP monitoring to ensure adequate cardiovascular support and to evaluate for & help prevent decompensation while receiving intermittent volume expansion, an IV Levophed drip, an IV Dopamine drip, and IV Heparin drip secondary to hypovolemia-shock, hemodynamically significant bradycardia, and ANDREW thrombus.       Metabolic stability, uncontrolled type 2 diabetes-hyperglycemia, & infection prophylaxis required an IV regular Insulin drip & the following of serial glucose levels to help achieve & maintain euglycemia.      Patient required acute postoperative critical care management and I provided 30 minutes of non-continuous care to the patient.  Discussed at length with the CTICU staff and helped coordinate care.

## 2019-01-14 NOTE — PROGRESS NOTE ADULT - ATTENDING COMMENTS
Patient seen and examined.  Agree with above NP note.  cv stable  events noted  or cancelled today, intraop lou revealed less mitral stenosis  now extubated   no symptoms   will plan on eventual pci to lad this week   will review films and decide on timing once on regular tele floors   cont iv heparin   cont asa  will load plavix pre pci   d/w dtr on phone         DR. LUKASZ MORILLO  CARDIOLOGY     office [phone]  cell [phone]

## 2019-01-14 NOTE — PROGRESS NOTE ADULT - ASSESSMENT
77 yo female h/o dvt on coumadin, copd, dm, asthma, a/w c.o sob, curtis.  found to be in afib with rvr and acute heart failure  PE ruled out  ACS ruled out    afib  cards following  converted to NSR  cont with cardizem for now  tele monitor  found to have atrial thrombus on echo  dccv cancelled  s/p cath. results noted with LAD dz, valvular dz, and fistula  now tx to mnt for CTSX    OR cancelled today after finding out valvular dz not severe  to proceed with possible pci and medical mngt  plan to extubate today        acute heart failure  diuresis with lasix   strict i/o    DVT   on AC with hep gtt    asthma/copd  stable  nebs prn  pulm f/u    mngt as per ctsx

## 2019-01-14 NOTE — PROGRESS NOTE ADULT - SUBJECTIVE AND OBJECTIVE BOX
Chief complaint  Patient is a 78y old  Female who presents with a chief complaint of Mitral regurgitation, CAD, ANDREW thrombus (14 Jan 2019 17:28)   Review of systems  Patient in bed, looks comfortable, no fever, no hypoglycemia.    Labs and Fingersticks  CAPILLARY BLOOD GLUCOSE      POCT Blood Glucose.: 112 mg/dL (14 Jan 2019 17:20)  POCT Blood Glucose.: 111 mg/dL (14 Jan 2019 16:06)  POCT Blood Glucose.: 122 mg/dL (14 Jan 2019 14:49)  POCT Blood Glucose.: 130 mg/dL (14 Jan 2019 14:21)  POCT Blood Glucose.: 135 mg/dL (13 Jan 2019 22:39)      Anion Gap, Serum: 17 (01-14 @ 11:11)  Anion Gap, Serum: 11 (01-13 @ 06:50)      Calcium, Total Serum: 9.4 (01-14 @ 11:11)  Calcium, Total Serum: 9.3 (01-13 @ 06:50)  Albumin, Serum: 4.0 (01-14 @ 11:11)  Albumin, Serum: 3.7 (01-13 @ 06:50)    Alanine Aminotransferase (ALT/SGPT): 38 (01-14 @ 11:11)  Alanine Aminotransferase (ALT/SGPT): 29 (01-13 @ 06:50)  Alkaline Phosphatase, Serum: 285 <H> (01-14 @ 11:11)  Alkaline Phosphatase, Serum: 265 <H> (01-13 @ 06:50)  Aspartate Aminotransferase (AST/SGOT): 45 <H> (01-14 @ 11:11)  Aspartate Aminotransferase (AST/SGOT): 40 (01-13 @ 06:50)        01-14    134<L>  |  99  |  19  ----------------------------<  206<H>  4.2   |  18<L>  |  0.73    Ca    9.4      14 Jan 2019 11:11  Phos  4.8     01-14  Mg     2.0     01-14    TPro  8.3  /  Alb  4.0  /  TBili  0.9  /  DBili  x   /  AST  45<H>  /  ALT  38  /  AlkPhos  285<H>  01-14                        12.1   12.7  )-----------( 342      ( 14 Jan 2019 11:11 )             36.7     Medications  MEDICATIONS  (STANDING):  aspirin enteric coated 325 milliGRAM(s) Oral daily  dextrose 50% Injectable 50 milliLiter(s) IV Push every 15 minutes  dextrose 50% Injectable 25 milliLiter(s) IV Push every 15 minutes  docusate sodium 100 milliGRAM(s) Oral three times a day  heparin  Infusion. 500 Unit(s)/Hr (5 mL/Hr) IV Continuous <Continuous>  insulin Infusion 2 Unit(s)/Hr (2 mL/Hr) IV Continuous <Continuous>  pantoprazole  Injectable 40 milliGRAM(s) IV Push daily  sodium chloride 0.9%. 1000 milliLiter(s) (10 mL/Hr) IV Continuous <Continuous>      Physical Exam  General: Patient comfortable in bed  Vital Signs Last 12 Hrs  T(F): 99.5 (01-14-19 @ 16:00), Max: 99.5 (01-14-19 @ 16:00)  HR: 87 (01-14-19 @ 17:45) (51 - 106)  BP: 116/82 (01-14-19 @ 07:36) (116/82 - 116/82)  BP(mean): --  RR: 16 (01-14-19 @ 17:45) (0 - 22)  SpO2: 100% (01-14-19 @ 17:45) (98% - 100%)  Neck: No palpable thyroid nodules.  CVS: S1S2, No murmurs  Respiratory: No wheezing, no crepitations  GI: Abdomen soft, bowel sounds positive  Musculoskeletal:  edema lower extremities.   Skin: No skin rashes, no ecchymosis    Diagnostics

## 2019-01-14 NOTE — AIRWAY REMOVAL NOTE  ADULT & PEDS - ARTIFICAL AIRWAY REMOVAL COMMENTS
Written order for extubation verified. The patient was identified by full name and birth date compared to the identification band. Present during the procedure was Sariah Joya RN

## 2019-01-14 NOTE — PROGRESS NOTE ADULT - ASSESSMENT
Assessment  DMT2: 78y Female with DM T2 with hyperglycemia on insulin, s/p surgery, blood sugars improving, no hypoglycemic episode,  NPO now..  CAD: S/P CABG, on medications,  no chest pain, stable, monitored.  HLD: On statin.  HTN: Controlled, no headaches, on medications.        Sarah Foley MD  Cell: [phone]  Office: [phone] Assessment  DMT2: 78y Female with DM T2 with hyperglycemia on insulin, s/p surgery, blood sugars improving, no hypoglycemic episode,  NPO now..  CAD: on medications,  no chest pain, stable, monitored.  HLD: On statin.  HTN: Controlled, no headaches, on medications.        Sarah Foley MD  Cell: [phone]  Office: [phone]

## 2019-01-15 DIAGNOSIS — I48.91 UNSPECIFIED ATRIAL FIBRILLATION: ICD-10-CM

## 2019-01-15 DIAGNOSIS — J45.909 UNSPECIFIED ASTHMA, UNCOMPLICATED: ICD-10-CM

## 2019-01-15 LAB
ALBUMIN SERPL ELPH-MCNC: 4 G/DL — SIGNIFICANT CHANGE UP (ref 3.3–5)
ALP SERPL-CCNC: 200 U/L — HIGH (ref 40–120)
ALT FLD-CCNC: 28 U/L — SIGNIFICANT CHANGE UP (ref 10–45)
ANION GAP SERPL CALC-SCNC: 17 MMOL/L — SIGNIFICANT CHANGE UP (ref 5–17)
APTT BLD: 113.6 SEC — HIGH (ref 27.5–36.3)
APTT BLD: 73.3 SEC — HIGH (ref 27.5–36.3)
APTT BLD: 75.8 SEC — HIGH (ref 27.5–36.3)
AST SERPL-CCNC: 28 U/L — SIGNIFICANT CHANGE UP (ref 10–40)
BILIRUB SERPL-MCNC: 0.6 MG/DL — SIGNIFICANT CHANGE UP (ref 0.2–1.2)
BUN SERPL-MCNC: 20 MG/DL — SIGNIFICANT CHANGE UP (ref 7–23)
CALCIUM SERPL-MCNC: 8.8 MG/DL — SIGNIFICANT CHANGE UP (ref 8.4–10.5)
CHLORIDE SERPL-SCNC: 100 MMOL/L — SIGNIFICANT CHANGE UP (ref 96–108)
CO2 SERPL-SCNC: 20 MMOL/L — LOW (ref 22–31)
CREAT SERPL-MCNC: 0.79 MG/DL — SIGNIFICANT CHANGE UP (ref 0.5–1.3)
GAS PNL BLDA: SIGNIFICANT CHANGE UP
GLUCOSE BLDC GLUCOMTR-MCNC: 105 MG/DL — HIGH (ref 70–99)
GLUCOSE BLDC GLUCOMTR-MCNC: 130 MG/DL — HIGH (ref 70–99)
GLUCOSE BLDC GLUCOMTR-MCNC: 163 MG/DL — HIGH (ref 70–99)
GLUCOSE BLDC GLUCOMTR-MCNC: 188 MG/DL — HIGH (ref 70–99)
GLUCOSE SERPL-MCNC: 121 MG/DL — HIGH (ref 70–99)
HCT VFR BLD CALC: 28.6 % — LOW (ref 34.5–45)
HGB BLD-MCNC: 9.7 G/DL — LOW (ref 11.5–15.5)
INR BLD: 1.19 RATIO — HIGH (ref 0.88–1.16)
MAGNESIUM SERPL-MCNC: 1.9 MG/DL — SIGNIFICANT CHANGE UP (ref 1.6–2.6)
MCHC RBC-ENTMCNC: 27.9 PG — SIGNIFICANT CHANGE UP (ref 27–34)
MCHC RBC-ENTMCNC: 33.8 GM/DL — SIGNIFICANT CHANGE UP (ref 32–36)
MCV RBC AUTO: 82.7 FL — SIGNIFICANT CHANGE UP (ref 80–100)
PHOSPHATE SERPL-MCNC: 4.9 MG/DL — HIGH (ref 2.5–4.5)
PLATELET # BLD AUTO: 283 K/UL — SIGNIFICANT CHANGE UP (ref 150–400)
POTASSIUM SERPL-MCNC: 4.2 MMOL/L — SIGNIFICANT CHANGE UP (ref 3.5–5.3)
POTASSIUM SERPL-SCNC: 4.2 MMOL/L — SIGNIFICANT CHANGE UP (ref 3.5–5.3)
PROT SERPL-MCNC: 7.4 G/DL — SIGNIFICANT CHANGE UP (ref 6–8.3)
PROTHROM AB SERPL-ACNC: 13.7 SEC — HIGH (ref 10–12.9)
RBC # BLD: 3.47 M/UL — LOW (ref 3.8–5.2)
RBC # FLD: 14.7 % — HIGH (ref 10.3–14.5)
SODIUM SERPL-SCNC: 137 MMOL/L — SIGNIFICANT CHANGE UP (ref 135–145)
WBC # BLD: 10.4 K/UL — SIGNIFICANT CHANGE UP (ref 3.8–10.5)
WBC # FLD AUTO: 10.4 K/UL — SIGNIFICANT CHANGE UP (ref 3.8–10.5)

## 2019-01-15 PROCEDURE — 99291 CRITICAL CARE FIRST HOUR: CPT

## 2019-01-15 PROCEDURE — 71045 X-RAY EXAM CHEST 1 VIEW: CPT | Mod: 26

## 2019-01-15 PROCEDURE — 99232 SBSQ HOSP IP/OBS MODERATE 35: CPT | Mod: 24

## 2019-01-15 RX ORDER — SODIUM CHLORIDE 9 MG/ML
3 INJECTION INTRAMUSCULAR; INTRAVENOUS; SUBCUTANEOUS EVERY 8 HOURS
Qty: 0 | Refills: 0 | Status: DISCONTINUED | OUTPATIENT
Start: 2019-01-15 | End: 2019-01-23

## 2019-01-15 RX ORDER — PANTOPRAZOLE SODIUM 20 MG/1
40 TABLET, DELAYED RELEASE ORAL
Qty: 0 | Refills: 0 | Status: DISCONTINUED | OUTPATIENT
Start: 2019-01-15 | End: 2019-01-23

## 2019-01-15 RX ORDER — GLUCAGON INJECTION, SOLUTION 0.5 MG/.1ML
1 INJECTION, SOLUTION SUBCUTANEOUS ONCE
Qty: 0 | Refills: 0 | Status: DISCONTINUED | OUTPATIENT
Start: 2019-01-15 | End: 2019-01-23

## 2019-01-15 RX ORDER — INSULIN GLARGINE 100 [IU]/ML
10 INJECTION, SOLUTION SUBCUTANEOUS AT BEDTIME
Qty: 0 | Refills: 0 | Status: DISCONTINUED | OUTPATIENT
Start: 2019-01-15 | End: 2019-01-23

## 2019-01-15 RX ORDER — DEXTROSE 50 % IN WATER 50 %
15 SYRINGE (ML) INTRAVENOUS ONCE
Qty: 0 | Refills: 0 | Status: DISCONTINUED | OUTPATIENT
Start: 2019-01-15 | End: 2019-01-23

## 2019-01-15 RX ORDER — INSULIN LISPRO 100/ML
7 VIAL (ML) SUBCUTANEOUS
Qty: 0 | Refills: 0 | Status: DISCONTINUED | OUTPATIENT
Start: 2019-01-15 | End: 2019-01-23

## 2019-01-15 RX ORDER — SODIUM CHLORIDE 9 MG/ML
1000 INJECTION, SOLUTION INTRAVENOUS
Qty: 0 | Refills: 0 | Status: DISCONTINUED | OUTPATIENT
Start: 2019-01-15 | End: 2019-01-23

## 2019-01-15 RX ORDER — ALBUMIN HUMAN 25 %
250 VIAL (ML) INTRAVENOUS ONCE
Qty: 0 | Refills: 0 | Status: COMPLETED | OUTPATIENT
Start: 2019-01-15 | End: 2019-01-15

## 2019-01-15 RX ADMIN — INSULIN GLARGINE 10 UNIT(S): 100 INJECTION, SOLUTION SUBCUTANEOUS at 22:14

## 2019-01-15 RX ADMIN — HEPARIN SODIUM 6 UNIT(S)/HR: 5000 INJECTION INTRAVENOUS; SUBCUTANEOUS at 15:30

## 2019-01-15 RX ADMIN — BUDESONIDE AND FORMOTEROL FUMARATE DIHYDRATE 2 PUFF(S): 160; 4.5 AEROSOL RESPIRATORY (INHALATION) at 21:19

## 2019-01-15 RX ADMIN — HEPARIN SODIUM 6 UNIT(S)/HR: 5000 INJECTION INTRAVENOUS; SUBCUTANEOUS at 03:29

## 2019-01-15 RX ADMIN — SODIUM CHLORIDE 3 MILLILITER(S): 9 INJECTION INTRAMUSCULAR; INTRAVENOUS; SUBCUTANEOUS at 15:20

## 2019-01-15 RX ADMIN — Medication 325 MILLIGRAM(S): at 11:00

## 2019-01-15 RX ADMIN — Medication 100 MILLIGRAM(S): at 21:19

## 2019-01-15 RX ADMIN — Medication 100 MILLIGRAM(S): at 05:56

## 2019-01-15 RX ADMIN — Medication 100 MILLIGRAM(S): at 16:51

## 2019-01-15 RX ADMIN — HEPARIN SODIUM 6 UNIT(S)/HR: 5000 INJECTION INTRAVENOUS; SUBCUTANEOUS at 22:51

## 2019-01-15 RX ADMIN — Medication 1: at 16:50

## 2019-01-15 RX ADMIN — PANTOPRAZOLE SODIUM 40 MILLIGRAM(S): 20 TABLET, DELAYED RELEASE ORAL at 12:30

## 2019-01-15 RX ADMIN — Medication 4 MICROGRAM(S)/KG/MIN: at 07:15

## 2019-01-15 RX ADMIN — HEPARIN SODIUM 6 UNIT(S)/HR: 5000 INJECTION INTRAVENOUS; SUBCUTANEOUS at 07:15

## 2019-01-15 RX ADMIN — ATORVASTATIN CALCIUM 20 MILLIGRAM(S): 80 TABLET, FILM COATED ORAL at 21:18

## 2019-01-15 RX ADMIN — Medication 500 MILLILITER(S): at 01:39

## 2019-01-15 RX ADMIN — SODIUM CHLORIDE 3 MILLILITER(S): 9 INJECTION INTRAMUSCULAR; INTRAVENOUS; SUBCUTANEOUS at 21:20

## 2019-01-15 RX ADMIN — Medication 1: at 12:30

## 2019-01-15 RX ADMIN — SODIUM CHLORIDE 10 MILLILITER(S): 9 INJECTION INTRAMUSCULAR; INTRAVENOUS; SUBCUTANEOUS at 07:15

## 2019-01-15 RX ADMIN — MONTELUKAST 10 MILLIGRAM(S): 4 TABLET, CHEWABLE ORAL at 12:30

## 2019-01-15 NOTE — PROGRESS NOTE ADULT - PROBLEM SELECTOR PLAN 5
Continue Singulair and Symbicort
controlled: no exacerbation: cont BD and singulair: no wheezing  1/8: No wheezing: not SOB due to copd  1/10: quiet vesicular without wheezing  1/11: no wheezing
controlled: no exacerbation: cont BD and singulair: no wheezing  1/8: No wheezing: not SOB due to copd  1/10: quiet vesicular without wheezing  1/11: no wheezing  1/12 stable.
controlled: no exacerbation: cont BD and singulair: no wheezing  1/8: No wheezing: not SOB due to copd  1/10: quiet vesicular without wheezing  1/11: no wheezing  1/12 stable.  1/13 no wheezing. continue current meds. keep O2 sat greater than 88%

## 2019-01-15 NOTE — PROGRESS NOTE ADULT - PROBLEM SELECTOR PROBLEM 5
Asthma
COPD (chronic obstructive pulmonary disease)

## 2019-01-15 NOTE — PROGRESS NOTE ADULT - SUBJECTIVE AND OBJECTIVE BOX
Chief complaint  Patient is a 78y old  Female who presents with a chief complaint of Mitral regurgitation, CAD, ANDREW thrombus (15 Joseph 2019 14:59)   Review of systems  Patient in bed, looks comfortable, no fever, no hypoglycemia.    Labs and Fingersticks  CAPILLARY BLOOD GLUCOSE      POCT Blood Glucose.: 163 mg/dL (15 Joseph 2019 16:34)  POCT Blood Glucose.: 188 mg/dL (15 Joseph 2019 11:35)  POCT Blood Glucose.: 105 mg/dL (15 Joseph 2019 08:43)  POCT Blood Glucose.: 136 mg/dL (14 Jan 2019 22:10)      Anion Gap, Serum: 17 (01-15 @ 01:52)  Anion Gap, Serum: 17 (01-14 @ 11:11)      Calcium, Total Serum: 8.8 (01-15 @ 01:52)  Calcium, Total Serum: 9.4 (01-14 @ 11:11)  Albumin, Serum: 4.0 (01-15 @ 01:52)  Albumin, Serum: 4.0 (01-14 @ 11:11)    Alanine Aminotransferase (ALT/SGPT): 28 (01-15 @ 01:52)  Alanine Aminotransferase (ALT/SGPT): 38 (01-14 @ 11:11)  Alkaline Phosphatase, Serum: 200 <H> (01-15 @ 01:52)  Alkaline Phosphatase, Serum: 285 <H> (01-14 @ 11:11)  Aspartate Aminotransferase (AST/SGOT): 28 (01-15 @ 01:52)  Aspartate Aminotransferase (AST/SGOT): 45 <H> (01-14 @ 11:11)        01-15    137  |  100  |  20  ----------------------------<  121<H>  4.2   |  20<L>  |  0.79    Ca    8.8      15 Joseph 2019 01:52  Phos  4.9     01-15  Mg     1.9     01-15    TPro  7.4  /  Alb  4.0  /  TBili  0.6  /  DBili  x   /  AST  28  /  ALT  28  /  AlkPhos  200<H>  01-15                        9.7    10.4  )-----------( 283      ( 15 Joseph 2019 01:52 )             28.6     Medications  MEDICATIONS  (STANDING):  aspirin enteric coated 325 milliGRAM(s) Oral daily  atorvastatin 20 milliGRAM(s) Oral at bedtime  buDESOnide  80 MICROgram(s)/formoterol 4.5 MICROgram(s) Inhaler 2 Puff(s) Inhalation two times a day  dextrose 5%. 1000 milliLiter(s) (50 mL/Hr) IV Continuous <Continuous>  dextrose 50% Injectable 50 milliLiter(s) IV Push every 15 minutes  dextrose 50% Injectable 25 milliLiter(s) IV Push every 15 minutes  docusate sodium 100 milliGRAM(s) Oral three times a day  heparin  Infusion 600 Unit(s)/Hr (6 mL/Hr) IV Continuous <Continuous>  insulin glargine Injectable (LANTUS) 10 Unit(s) SubCutaneous at bedtime  insulin lispro (HumaLOG) corrective regimen sliding scale   SubCutaneous three times a day before meals  insulin lispro (HumaLOG) corrective regimen sliding scale   SubCutaneous at bedtime  insulin lispro Injectable (HumaLOG) 7 Unit(s) SubCutaneous three times a day before meals  montelukast 10 milliGRAM(s) Oral daily  pantoprazole    Tablet 40 milliGRAM(s) Oral before breakfast  sodium chloride 0.9% lock flush 3 milliLiter(s) IV Push every 8 hours      Physical Exam  General: Patient comfortable in bed  Vital Signs Last 12 Hrs  T(F): 98.2 (01-15-19 @ 11:00), Max: 98.2 (01-15-19 @ 11:00)  HR: 88 (01-15-19 @ 11:00) (84 - 100)  BP: 116/66 (01-15-19 @ 11:00) (115/68 - 116/66)  BP(mean): 85 (01-15-19 @ 10:00) (85 - 85)  RR: 921 (01-15-19 @ 11:00) (14 - 921)  SpO2: 98% (01-15-19 @ 11:00) (98% - 100%)  Neck: No palpable thyroid nodules.  CVS: S1S2, No murmurs  Respiratory: No wheezing, no crepitations  GI: Abdomen soft, bowel sounds positive  Musculoskeletal:  edema lower extremities.   Skin: No skin rashes, no ecchymosis    Diagnostics

## 2019-01-15 NOTE — PROGRESS NOTE ADULT - ASSESSMENT
EMILI 1/7/9:  EF 66%, normal LV fx, Mod to sev MS, mild AS, severe mobile atherosclerotic plaque in the descending thoracic aorta, Large ANDREW thrombus ( 3.0 x 1.4 cm)  [facility] 1/10/19: LAD 75% stenosis and a fistula to the PA, and 50% stenosis of D1  intraop EMILI revealing mild-mod MR with mod-sev mitral stenosis  a/p    78y Female w/ PMH of LLE DVT 8/2018, on coumadin, COPD, DM, Asthma, presented to [facility] ER with new onset Atrial fibrillation and dyspnea on exertion worsening over the past few days prior to presentation on 1/5. Patient underwent EMILI and DCC at [facility] and was found to have a large ANDREW thrombus as well as mild to moderate MR with mild Mitral stenosis, EF 66%. CT PA was negative for PE. Left Heart Cath revealed LAD 75% stenosis and a fistula to the PA, and 50% stenosis of D1. The patient was then transferred to [facility] under Dr Ingram's Service for evaluation and pre-operative work up for CABG/MVR with ANDREW ligation and thrombus removal.    1. CAD  Left Heart Cath revealed LAD 75% stenosis and a fistula to the PA, and 50% stenosis of D1.   cv stable, no decomp CHF on exam   surgery cancelled, care per CTS   willl plan for PCI to LAD this week, will load plavix pre pci     2. Mitral valve stenosis   EMILI revealing mild-mod MR with mod-sev mitral stenosis   CTs f/u, based on intraoperative echo, surgery cancelled     3. ANDREW   EMILI revealing large ANDREW thrombus   continue AC with hep gtt  care per CTS       4. New onset Afib with RVR  currently in Afib, rate controlled    CHADS=3-4 cont a/c on hep gtt  care per CTS    5. DVT, hx  cont a/c, repeat duplex no changes    dvt ppx

## 2019-01-15 NOTE — PROGRESS NOTE ADULT - ASSESSMENT
77 yo female h/o dvt on coumadin, copd, dm, asthma, a/w c.o sob, curtis.  found to be in afib with rvr and acute heart failure  PE ruled out  ACS ruled out    afib  cards following  AC with hep gtt  rate controlled  tele monitor    found to have atrial thrombus on echo  dccv cancelled  s/p cath. results noted with LAD dz, valvular dz, and fistula  [facility]    OR cancelled  after finding out mitral stenosis not significant  to proceed with possible pci and medical mngt    DVT LLE  on AC with hep gtt    asthma/copd  stable  nebs prn  pulm f/u    mngt as per ctsx

## 2019-01-15 NOTE — PROGRESS NOTE ADULT - SUBJECTIVE AND OBJECTIVE BOX
JEANNE TRUJILLO  78y Female  MRN:[id number]    Patient is a 78y old  Female who presents with a chief complaint of Palpitations and SOB (06 Jan 2019 14:36)    HPI:  79 y/o F with PMH of Left LE DVT diagnosed on Aug 2018(on Coumadin), COPD, DM, Asthma presented with the complaint of SOB/GRADY and palpitations. As per the patient she has been having intermittent SOB but it worsened the past few days which prompted her to come to the ER. Patient stated that any minimal exertion she would have to stop to catch her breath. Patient stated that she sleeps with 2-3 pillows at night and endorsed of orthopnea and PND. Patient also endorsed of intermittent LE swelling and stated that she is complaint with her medications. Patient also endorsed of midsternal chest pain, characterized as a  pressure like sensation, without any aggravating factors, alleviated when she would massage her chest, without any radiations of the chest pain, with a severity of 5/10. Patient also endorsed of palpitations for the past few days. Patient denied any fevers, chills, N/V/D/C, abdominal pain, dysuria, melena, hematochezia, recent travel, sick contact, pleuritic or positional chest pain.     On ED admission EKG revealed Atrial fibrillation with RVR at a rate of 147 with QTc of 466, CE x 1: Trop: 8, CE x 2: Trop: 9, H&H: 10.4/33.5, Na: 133, Gluc: 104, Alk Phos: 285, AST: 38. Prelim CXR: 6 mm right lung nodular opacity of uncertain significance. Follow up to resolution. Hazy right lung opacities may be secondary to rotation versus atelectasis and/or pneumonia. In the ED patient received IV Cardizem and PO Cardizem which improved her rate. (05 Jan 2019 05:47)      Patient seen and evaluated at bedside.  tx out of CTU    Interval HPI: OR cancelled as pt found to have not significant MS    PAST MEDICAL & SURGICAL HISTORY:  DVT (deep venous thrombosis)  Asthma  COPD (chronic obstructive pulmonary disease)  DM (diabetes mellitus)  History of embolectomy: LLE    SOC:  non smoker  no drug or alcohol abuse    fam hx:  non cont     REVIEW OF SYSTEMS:  as per hpi    VITALS:  Vital Signs Last 24 Hrs  T(C): 36.5 (15 Joseph 2019 08:00), Max: 37.5 (14 Jan 2019 16:00)  T(F): 97.7 (15 Joseph 2019 08:00), Max: 99.5 (14 Jan 2019 16:00)  HR: 100 (15 Joseph 2019 10:22) (65 - 100)  BP: 115/68 (15 Joseph 2019 10:00) (115/68 - 115/68)  BP(mean): 85 (15 Joseph 2019 10:00) (85 - 85)  RR: 36 (15 Joseph 2019 10:22) (0 - 88)  SpO2: 99% (15 Joseph 2019 10:22) (97% - 100%)      PHYSICAL EXAM:  GENERAL: NAD, well-developed,   HEAD:  Atraumatic, Normocephalic  EYES: EOMI, PERRLA, conjunctiva and sclera clear  NECK: Supple, No JVD  CHEST/LUNG: Clear to auscultation bilaterally; No wheeze  HEART: S1, S2;   ABDOMEN: Soft, Nontender, Nondistended; Bowel sounds present  EXTREMITIES:  2+ Peripheral Pulses, No clubbing, cyanosis, or edema  NEUROLOGY: AOx3  SKIN: No rashes or lesions    Consultant(s) Notes Reviewed:  [x ] YES  [ ] NO  Care Discussed with Consultants/Other Providers [ x] YES  [ ] NO    MEDS:  MEDICATIONS  (STANDING):  aspirin enteric coated 325 milliGRAM(s) Oral daily  atorvastatin 20 milliGRAM(s) Oral at bedtime  buDESOnide  80 MICROgram(s)/formoterol 4.5 MICROgram(s) Inhaler 2 Puff(s) Inhalation two times a day  dextrose 50% Injectable 50 milliLiter(s) IV Push every 15 minutes  dextrose 50% Injectable 25 milliLiter(s) IV Push every 15 minutes  docusate sodium 100 milliGRAM(s) Oral three times a day  heparin  Infusion 600 Unit(s)/Hr (6 mL/Hr) IV Continuous <Continuous>  insulin lispro (HumaLOG) corrective regimen sliding scale   SubCutaneous three times a day before meals  insulin lispro (HumaLOG) corrective regimen sliding scale   SubCutaneous at bedtime  montelukast 10 milliGRAM(s) Oral daily  pantoprazole    Tablet 40 milliGRAM(s) Oral before breakfast  sodium chloride 0.9% lock flush 3 milliLiter(s) IV Push every 8 hours    MEDICATIONS  (PRN):          ALLERGIES:  No Known Allergies      LABS:                                                   9.7    10.4  )-----------( 283      ( 15 Joseph 2019 01:52 )             28.6   01-15    137  |  100  |  20  ----------------------------<  121<H>  4.2   |  20<L>  |  0.79    Ca    8.8      15 Joseph 2019 01:52  Phos  4.9     01-15  Mg     1.9     01-15    TPro  7.4  /  Alb  4.0  /  TBili  0.6  /  DBili  x   /  AST  28  /  ALT  28  /  AlkPhos  200<H>  01-15        < from: Cardiac Cath Lab - Adult (01.10.19 @ 10:54) >  VENTRICLES: Global left ventricular function was normal. EF estimated was  60 %.  VALVES: AORTIC VALVE: There was mild aortic insufficiency. MITRAL VALVE:  The mitral valve exhibited mild regurgitation.  CORONARY VESSELS: The coronary circulation is right dominant.  LM:   --  LM: Normal.  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 There was a 75 % stenosis. Lesion is at the  bifurcation of a large diagonal branch. Lesion difficult to visual with  significant vessel overlap. IFR was 0.89 but IVUS imaging revealed severe  atherosclerotic disease with calcium and IVUS mla of 4.1 mm2. IVUS  catheter was occlusive at the lesion and unable to be advanced past  lesion.   D1: There was a 50 % stenosis in the proximal third of the vessel  segment.  CX:   --  Circumflex: Angiography showed mild atherosclerosis with no flow  limiting lesions.  RCA:   --  RCA: Angiography showed no evidence of disease. 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  --  RPDA: Angiography showed no evidence of disease.  --  RPL1:Angiography showed no evidence of disease.  AORTA: Ascending aorta: Normal. No evidence of any aortic fistula. No  aneurysm. The PA fills from coronary flow from root injection.  COMPLICATIONS: There were no complications.  DIAGNOSTIC IMPRESSIONS: New onset chest pain, AF with RVR, and acute  diastolic HF. EMILI with mod-severe mitral stenosis and very large thrombus  in DANTE. Cath with severe proximal LAD lesion with correlating IVUS  findings and moderate diagonal disease. 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 LV function preserved.  DIAGNOSTIC RECOMMENDATIONS: Continue medical treatment of CAD, AF. CTS  evaluation for MVR/CABG possible fistula ligation/dante thrombus  extraction/dante ligation.  INTERVENTIONALIMPRESSIONS: New onset chest pain, AF with RVR, and acute  diastolic HF. EMILI with mod-severe mitral stenosis and very large thrombus  in DANTE. Cath with severe proximal LAD lesion with correlating IVUS  findings and moderate diagonal disease. 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 LV function preserved.  INTERVENTIONAL RECOMMENDATIONS: Continue medical treatment of CAD, AF. CTS  evaluation for MVR/CABG possible fistula ligation/dante thrombus  extraction/dante ligation.    < end of copied text >          < from: EMILI w/TTE (w/3D Echo) (01.07.19 @ 15:03) >  ------------------------------  CONCLUSIONS:  1. Mitral annular calcification and calcified mitral  leaflets with decreased diastolic opening.  In some views  the valve has a rheumatic appearance. Mild-moderate mitral  regurgitation. Mean transmitral valve gradient equals 8 mm  Hg, estimated mitral valve area equals 1.1 sqcm (by  pressure half time equation), consistent with moderate to  severe mitral stenosis.  2. Calcified trileaflet aortic valve with decreased  opening. Peak transaortic valve gradient equals 17 mm Hg,  mean transaortic valve gradient equals 12 mm Hg, consistent  with mild aortic stenosis. No aortic valve regurgitation  seen.  3. Normal aortic root.  Mild non-mobile atherosclerotic  plaque in the aortic arch.  Severe, bulky, mobile  atherosclerotic plaque in the descending thoracic aorta.  4. Mildly dilated left atrium.  LA volume index = 35 cc/m2.   A very large (about  3.0 cm X 1.4 cm) thrombus is  visualized in the left atrial appendage.  5. Normal left ventricular internal dimensions and wall  thicknesses.  6. Normal left ventricular systolic function. No segmental  wall motion abnormalities.  7. Normal right ventricular size and function.  8. Contrast injection demonstrates no evidence of a patent  foramen ovale.  *** No previous Echo exam.  -------------------------------------------------------    < end of copied text >

## 2019-01-15 NOTE — PROGRESS NOTE ADULT - PROBLEM SELECTOR PLAN 2
On medications, monitored and followed up by cardiothoracic team/cardiology team On medications, monitored and followed up by cardiology team

## 2019-01-15 NOTE — PROGRESS NOTE ADULT - SUBJECTIVE AND OBJECTIVE BOX
Subjective: Pt states "I'm ok" denies any CP, SOB, N/V and palpitations. No acute events overnight.     Telemetry:  Afib 90s  Vital Signs Last 24 Hrs  T(C): 36.5 (01-15-19 @ 08:00), Max: 37.5 (19 @ 16:00)  T(F): 97.7 (01-15-19 @ 08:00), Max: 99.5 (19 @ 16:00)  HR: 100 (01-15-19 @ 10:22) (65 - 100)  BP: 115/68 (01-15-19 @ 10:00) (115/68 - 115/68)  RR: 36 (01-15-19 @ 10:22) (0 - 88)  SpO2: 99% (01-15-19 @ 10:22) (97% - 100%)             01-15 @ 07:01  -  01-15 @ 11:49  --------------------------------------------------------  IN: 150 mL / OUT: 80 mL / NET: 70 mL       Daily Weight in k.1 (15 Joseph 2019 00:00)                        9.7    10.4  )-----------( 283      ( 15 Joseph 2019 01:52 )             28.6     137  |  100  |  20  ----------------------------<  121<H>  4.2   |  20<L>  |  0.79    AST  28  /  ALT  28  /  AlkPhos  200<H>  01-15        CAPILLARY BLOOD GLUCOSE  105 - 188          PHYSICAL EXAM  Neurology: A&Ox3, nonfocal, no gross deficits  CV : Irregular, +S1S2 +murmur  Lungs: Respirations non-labored, B/L BS clear, diminished at bases  Abdomen: Soft, NT/ND, +BSx4Q, last BM   (-)N/V/D  : Voiding without difficulty  Extremities: B/L LE no edema, negative calf tenderness, +PP         MEDICATIONS  aspirin enteric coated 325 milliGRAM(s) Oral daily  atorvastatin 20 milliGRAM(s) Oral at bedtime  buDESOnide  80 MICROgram(s)/formoterol 4.5 MICROgram(s) Inhaler 2 Puff(s) Inhalation two times a day  dextrose 50% Injectable 50 milliLiter(s) IV Push every 15 minutes  dextrose 50% Injectable 25 milliLiter(s) IV Push every 15 minutes  docusate sodium 100 milliGRAM(s) Oral three times a day  heparin  Infusion 600 Unit(s)/Hr IV Continuous <Continuous>  insulin lispro (HumaLOG) corrective regimen sliding scale   SubCutaneous three times a day before meals  insulin lispro (HumaLOG) corrective regimen sliding scale   SubCutaneous at bedtime  montelukast 10 milliGRAM(s) Oral daily  pantoprazole    Tablet 40 milliGRAM(s) Oral before breakfast  sodium chloride 0.9% lock flush 3 milliLiter(s) IV Push every 8 hours      Physical Therapy Rec:   Home  [  ]   Home w/ PT  [ X ]  Rehab  [  ]    Discussed with Cardiothoracic Team at AM rounds.

## 2019-01-15 NOTE — PROGRESS NOTE ADULT - SUBJECTIVE AND OBJECTIVE BOX
CARDIOLOGY FOLLOW UP - Dr. Fitch    CC no cp or sob       PHYSICAL EXAM:  T(C): 36.5 (01-15-19 @ 08:00), Max: 37.5 (01-14-19 @ 16:00)  HR: 100 (01-15-19 @ 10:22) (65 - 100)  BP: 115/68 (01-15-19 @ 10:00) (115/68 - 115/68)  RR: 36 (01-15-19 @ 10:22) (6 - 88)  SpO2: 99% (01-15-19 @ 10:22) (97% - 100%)  Wt(kg): --  I&O's Summary    14 Jan 2019 07:01  -  15 Joseph 2019 07:00  --------------------------------------------------------  IN: 1336.4 mL / OUT: 630 mL / NET: 706.4 mL    15 Joseph 2019 07:01  -  15 Joseph 2019 14:59  --------------------------------------------------------  IN: 150 mL / OUT: 80 mL / NET: 70 mL        Appearance: Normal	  Cardiovascular: Normal S1 S2, irregular   Respiratory: Lungs clear to auscultation	  Gastrointestinal:  Soft, Non-tender, + BS	  Extremities: Normal range of motion, No clubbing, cyanosis or edema        MEDICATIONS  (STANDING):  aspirin enteric coated 325 milliGRAM(s) Oral daily  atorvastatin 20 milliGRAM(s) Oral at bedtime  buDESOnide  80 MICROgram(s)/formoterol 4.5 MICROgram(s) Inhaler 2 Puff(s) Inhalation two times a day  dextrose 50% Injectable 50 milliLiter(s) IV Push every 15 minutes  dextrose 50% Injectable 25 milliLiter(s) IV Push every 15 minutes  docusate sodium 100 milliGRAM(s) Oral three times a day  heparin  Infusion 600 Unit(s)/Hr (6 mL/Hr) IV Continuous <Continuous>  insulin lispro (HumaLOG) corrective regimen sliding scale   SubCutaneous three times a day before meals  insulin lispro (HumaLOG) corrective regimen sliding scale   SubCutaneous at bedtime  montelukast 10 milliGRAM(s) Oral daily  pantoprazole    Tablet 40 milliGRAM(s) Oral before breakfast  sodium chloride 0.9% lock flush 3 milliLiter(s) IV Push every 8 hours      TELEMETRY: afib HR 70-80	    ECG:  	  RADIOLOGY:   DIAGNOSTIC TESTING:  [ ] Echocardiogram:  [ ]  Catheterization:  [ ] Stress Test:    OTHER: 	    LABS:	 	                                9.7    10.4  )-----------( 283      ( 15 Joseph 2019 01:52 )             28.6     01-15    137  |  100  |  20  ----------------------------<  121<H>  4.2   |  20<L>  |  0.79    Ca    8.8      15 Joseph 2019 01:52  Phos  4.9     01-15  Mg     1.9     01-15    TPro  7.4  /  Alb  4.0  /  TBili  0.6  /  DBili  x   /  AST  28  /  ALT  28  /  AlkPhos  200<H>  01-15    PT/INR - ( 15 Joseph 2019 01:52 )   PT: 13.7 sec;   INR: 1.19 ratio         PTT - ( 15 Joseph 2019 01:52 )  PTT:73.3 sec

## 2019-01-15 NOTE — PROGRESS NOTE ADULT - ASSESSMENT
78y Female w/ PMH of LLE DVT 8/2018, on coumadin, COPD, DM2 - A1c 6.4, Asthma, presented to [facility] ER with new onset Atrial fibrillation and dyspnea on exertion worsening over the past few days prior to presentation on 1/5. Patient underwent EMILI and DCC at [facility] and was found to have a large ANDREW thrombus as well as mild to moderate MR with mild Mitral stenosis, EF 66%. CT PA was negative for PE. Left Heart Cath revealed LAD 75% stenosis and a fistula to the PA, and 50% stenosis of D1. The patient was then transferred to [facility] under Dr Ingram's Service for evaluation and pre-operative work up for CABG/MVR with ANDREW ligation and thrombus removal.  1/11 pending carotid doppler. Anticipate OHS Monday 1/12 VSS, no distress overnight, pt OOB to bedside chair, heparin gtt maintained. No chest pain, no curtis, no resp distress. For OR monday.   1/13 Preop for OR Monday 1/14 Surgery cancelled - intraop EMILI revealing mild-mod MR with mod-sev mitral stenosis. Pt transferred to CTU for recovery. Weaned  1/15 Transferred to floor, VSS, on Heparin drip for afib and ANDREW thrombus - plan for PCI to LAD this week. 78y Female w/ PMH of LLE DVT 8/2018, on coumadin, COPD, DM2 - A1c 6.4, Asthma, presented to [facility] ER with new onset Atrial fibrillation and dyspnea on exertion worsening over the past few days prior to presentation on 1/5. Patient underwent EMILI and DCC at [facility] and was found to have a large ANDREW thrombus as well as mild to moderate MR with mild Mitral stenosis, EF 66%. CT PA was negative for PE. Left Heart Cath revealed LAD 75% stenosis and a fistula to the PA, and 50% stenosis of D1. The patient was then transferred to [facility] under Dr Ingram's Service for evaluation and pre-operative work up for CABG/MVR with ANDREW ligation and thrombus removal.  1/11 pending carotid doppler. Anticipate OHS Monday 1/12 VSS, no distress overnight, pt OOB to bedside chair, heparin gtt maintained. No chest pain, no curtis, no resp distress. For OR monday.   1/13 Preop for OR Monday 1/14 Surgery cancelled - intraop EMILI revealing mild-mod MR with mod-sev mitral stenosis. Pt transferred to CTU for recovery. Weaned off levo/dopamine. Off B-blocker for bradycardia.  1/15 Transferred to floor, VSS, on Heparin drip for afib and ANDREW thrombus - plan for PCI to LAD this week.

## 2019-01-15 NOTE — PROGRESS NOTE ADULT - ASSESSMENT
Assessment  DMT2: 78y Female with DM T2 with hyperglycemia on insulin, s/p surgery, blood sugars trending up, started eating full meals.  CAD: S/P CABG, on medications,  no chest pain, stable, monitored.  HLD: On statin.  HTN: Controlled, no headaches, on medications.        Sarah Foley MD  Cell: [phone]  Office: [phone] Assessment  DMT2: 78y Female with DM T2 with hyperglycemia on insulin, s/p surgery, blood sugars trending up, started eating full meals.  CAD: on medications,  no chest pain, stable, monitored.  HLD: On statin.  HTN: Controlled, no headaches, on medications.        Sarah Foley MD  Cell: [phone]  Office: [phone]

## 2019-01-15 NOTE — PROGRESS NOTE ADULT - SUBJECTIVE AND OBJECTIVE BOX
JEANNE TRUJILLO  MRN#:  [id number]    The patient is a 78y Female with PMH of LLE DVT 8/2018, on coumadin, COPD, DM, Asthma, presented with new onset Atrial fibrillation and dyspnea on exertion worsening over the past few days and found to have MS/MR and ANDREW thrombus and taken to the OR today for valve replacement, but found not to have significant MS and brought back to CTU for recovery who was seen, evaluated, & examined with the CTICU staff on rounds and later in the day with a multidisciplinary care plan formulated & implemented.  All available clinical, laboratory, radiographic, pharmacologic, and electrocardiographic data were reviewed & analyzed.    The patient was in the CTICU in critical condition secondary to persistent cardiopulmonary dysfunction, hypovolemia-shock, hemodynamically significant bradycardia, persistent cardiovascular dysfunction, & hyperglycemia.      Respiratory status required supplemental oxygen and subsequent weaning to extubation, the following of ABG’s with A-line monitoring, continuous pulse oximetry monitoring for support & to evaluate for & prevent further decompensation secondary to persistent cardiopulmonary dysfunction.     Patient required acute postoperative critical care management and I provided 30 minutes of non-continuous care to the patient.  Discussed at length with the CTICU staff and helped coordinate care.

## 2019-01-16 LAB
ANION GAP SERPL CALC-SCNC: 11 MMOL/L — SIGNIFICANT CHANGE UP (ref 5–17)
APTT BLD: 104.6 SEC — HIGH (ref 27.5–36.3)
APTT BLD: 42 SEC — HIGH (ref 27.5–36.3)
APTT BLD: 52.8 SEC — HIGH (ref 27.5–36.3)
BUN SERPL-MCNC: 16 MG/DL — SIGNIFICANT CHANGE UP (ref 7–23)
CALCIUM SERPL-MCNC: 9.1 MG/DL — SIGNIFICANT CHANGE UP (ref 8.4–10.5)
CHLORIDE SERPL-SCNC: 103 MMOL/L — SIGNIFICANT CHANGE UP (ref 96–108)
CO2 SERPL-SCNC: 24 MMOL/L — SIGNIFICANT CHANGE UP (ref 22–31)
CREAT SERPL-MCNC: 0.71 MG/DL — SIGNIFICANT CHANGE UP (ref 0.5–1.3)
GLUCOSE BLDC GLUCOMTR-MCNC: 110 MG/DL — HIGH (ref 70–99)
GLUCOSE BLDC GLUCOMTR-MCNC: 119 MG/DL — HIGH (ref 70–99)
GLUCOSE BLDC GLUCOMTR-MCNC: 135 MG/DL — HIGH (ref 70–99)
GLUCOSE BLDC GLUCOMTR-MCNC: 75 MG/DL — SIGNIFICANT CHANGE UP (ref 70–99)
GLUCOSE SERPL-MCNC: 113 MG/DL — HIGH (ref 70–99)
HCT VFR BLD CALC: 32 % — LOW (ref 34.5–45)
HGB BLD-MCNC: 10.6 G/DL — LOW (ref 11.5–15.5)
INR BLD: 1.23 RATIO — HIGH (ref 0.88–1.16)
MCHC RBC-ENTMCNC: 27.9 PG — SIGNIFICANT CHANGE UP (ref 27–34)
MCHC RBC-ENTMCNC: 33.2 GM/DL — SIGNIFICANT CHANGE UP (ref 32–36)
MCV RBC AUTO: 84.1 FL — SIGNIFICANT CHANGE UP (ref 80–100)
PLATELET # BLD AUTO: 266 K/UL — SIGNIFICANT CHANGE UP (ref 150–400)
POTASSIUM SERPL-MCNC: 4.1 MMOL/L — SIGNIFICANT CHANGE UP (ref 3.5–5.3)
POTASSIUM SERPL-SCNC: 4.1 MMOL/L — SIGNIFICANT CHANGE UP (ref 3.5–5.3)
PROTHROM AB SERPL-ACNC: 14.1 SEC — HIGH (ref 10–12.9)
RBC # BLD: 3.81 M/UL — SIGNIFICANT CHANGE UP (ref 3.8–5.2)
RBC # FLD: 14.9 % — HIGH (ref 10.3–14.5)
SODIUM SERPL-SCNC: 138 MMOL/L — SIGNIFICANT CHANGE UP (ref 135–145)
WBC # BLD: 8.4 K/UL — SIGNIFICANT CHANGE UP (ref 3.8–10.5)
WBC # FLD AUTO: 8.4 K/UL — SIGNIFICANT CHANGE UP (ref 3.8–10.5)

## 2019-01-16 PROCEDURE — 71045 X-RAY EXAM CHEST 1 VIEW: CPT | Mod: 26

## 2019-01-16 RX ORDER — DILTIAZEM HCL 120 MG
240 CAPSULE, EXT RELEASE 24 HR ORAL DAILY
Qty: 0 | Refills: 0 | Status: DISCONTINUED | OUTPATIENT
Start: 2019-01-16 | End: 2019-01-23

## 2019-01-16 RX ORDER — HEPARIN SODIUM 5000 [USP'U]/ML
3000 INJECTION INTRAVENOUS; SUBCUTANEOUS EVERY 6 HOURS
Qty: 0 | Refills: 0 | Status: DISCONTINUED | OUTPATIENT
Start: 2019-01-16 | End: 2019-01-18

## 2019-01-16 RX ORDER — HEPARIN SODIUM 5000 [USP'U]/ML
200 INJECTION INTRAVENOUS; SUBCUTANEOUS
Qty: 25000 | Refills: 0 | Status: DISCONTINUED | OUTPATIENT
Start: 2019-01-16 | End: 2019-01-18

## 2019-01-16 RX ORDER — HEPARIN SODIUM 5000 [USP'U]/ML
6500 INJECTION INTRAVENOUS; SUBCUTANEOUS EVERY 6 HOURS
Qty: 0 | Refills: 0 | Status: DISCONTINUED | OUTPATIENT
Start: 2019-01-16 | End: 2019-01-18

## 2019-01-16 RX ORDER — LANOLIN ALCOHOL/MO/W.PET/CERES
3 CREAM (GRAM) TOPICAL AT BEDTIME
Qty: 0 | Refills: 0 | Status: DISCONTINUED | OUTPATIENT
Start: 2019-01-16 | End: 2019-01-23

## 2019-01-16 RX ADMIN — HEPARIN SODIUM 600 UNIT(S)/HR: 5000 INJECTION INTRAVENOUS; SUBCUTANEOUS at 23:31

## 2019-01-16 RX ADMIN — Medication 240 MILLIGRAM(S): at 13:18

## 2019-01-16 RX ADMIN — SODIUM CHLORIDE 3 MILLILITER(S): 9 INJECTION INTRAMUSCULAR; INTRAVENOUS; SUBCUTANEOUS at 15:41

## 2019-01-16 RX ADMIN — Medication 100 MILLIGRAM(S): at 21:08

## 2019-01-16 RX ADMIN — Medication 7 UNIT(S): at 07:56

## 2019-01-16 RX ADMIN — SODIUM CHLORIDE 3 MILLILITER(S): 9 INJECTION INTRAMUSCULAR; INTRAVENOUS; SUBCUTANEOUS at 06:52

## 2019-01-16 RX ADMIN — HEPARIN SODIUM 400 UNIT(S)/HR: 5000 INJECTION INTRAVENOUS; SUBCUTANEOUS at 16:53

## 2019-01-16 RX ADMIN — ATORVASTATIN CALCIUM 20 MILLIGRAM(S): 80 TABLET, FILM COATED ORAL at 21:07

## 2019-01-16 RX ADMIN — Medication 100 MILLIGRAM(S): at 16:10

## 2019-01-16 RX ADMIN — HEPARIN SODIUM 3000 UNIT(S): 5000 INJECTION INTRAVENOUS; SUBCUTANEOUS at 23:36

## 2019-01-16 RX ADMIN — Medication 100 MILLIGRAM(S): at 05:41

## 2019-01-16 RX ADMIN — Medication 7 UNIT(S): at 11:57

## 2019-01-16 RX ADMIN — MONTELUKAST 10 MILLIGRAM(S): 4 TABLET, CHEWABLE ORAL at 11:57

## 2019-01-16 RX ADMIN — Medication 3 MILLIGRAM(S): at 23:22

## 2019-01-16 RX ADMIN — BUDESONIDE AND FORMOTEROL FUMARATE DIHYDRATE 2 PUFF(S): 160; 4.5 AEROSOL RESPIRATORY (INHALATION) at 05:41

## 2019-01-16 RX ADMIN — PANTOPRAZOLE SODIUM 40 MILLIGRAM(S): 20 TABLET, DELAYED RELEASE ORAL at 05:41

## 2019-01-16 RX ADMIN — INSULIN GLARGINE 10 UNIT(S): 100 INJECTION, SOLUTION SUBCUTANEOUS at 22:08

## 2019-01-16 RX ADMIN — HEPARIN SODIUM 200 UNIT(S)/HR: 5000 INJECTION INTRAVENOUS; SUBCUTANEOUS at 09:30

## 2019-01-16 RX ADMIN — SODIUM CHLORIDE 3 MILLILITER(S): 9 INJECTION INTRAMUSCULAR; INTRAVENOUS; SUBCUTANEOUS at 21:04

## 2019-01-16 RX ADMIN — Medication 325 MILLIGRAM(S): at 11:57

## 2019-01-16 NOTE — PROGRESS NOTE ADULT - ATTENDING COMMENTS
Patient seen and examined.  Agree with above NP note.  cv stable  plan for pci to lad tomorrow   cont iv heparin   cont asa  will load plavix pre pci           DR. LUKASZ MORILLO  CARDIOLOGY     office [phone]  cell [phone] Patient seen and examined.  Agree with above NP note.  cv stable  plan for pci to lad Friday  cont iv heparin   cont asa  will load plavix pre pci           DR. LUKASZ MORILLO  CARDIOLOGY     office [phone]  cell [phone]

## 2019-01-16 NOTE — PROGRESS NOTE ADULT - SUBJECTIVE AND OBJECTIVE BOX
CARDIOLOGY FOLLOW UP - Dr. Fitch    CC no cp or sob      PHYSICAL EXAM:  T(C): 36.4 (01-16-19 @ 04:32), Max: 36.8 (01-15-19 @ 11:00)  HR: 82 (01-16-19 @ 04:32) (82 - 140)  BP: 112/74 (01-16-19 @ 04:32) (107/72 - 116/66)  RR: 18 (01-16-19 @ 04:32) (18 - 921)  SpO2: 99% (01-16-19 @ 04:32) (98% - 100%)  Wt(kg): --  I&O's Summary    15 Joseph 2019 07:01  -  16 Jan 2019 07:00  --------------------------------------------------------  IN: 962 mL / OUT: 780 mL / NET: 182 mL        Appearance: Normal	  Cardiovascular: Normal S1 S2,irregular  Respiratory: Lungs clear to auscultation	  Gastrointestinal:  Soft, Non-tender, + BS	  Extremities: Normal range of motion, No clubbing, cyanosis or edema        MEDICATIONS  (STANDING):  aspirin enteric coated 325 milliGRAM(s) Oral daily  atorvastatin 20 milliGRAM(s) Oral at bedtime  buDESOnide  80 MICROgram(s)/formoterol 4.5 MICROgram(s) Inhaler 2 Puff(s) Inhalation two times a day  dextrose 5%. 1000 milliLiter(s) (50 mL/Hr) IV Continuous <Continuous>  dextrose 50% Injectable 50 milliLiter(s) IV Push every 15 minutes  dextrose 50% Injectable 25 milliLiter(s) IV Push every 15 minutes  docusate sodium 100 milliGRAM(s) Oral three times a day  heparin  Infusion. 200 Unit(s)/Hr (2 mL/Hr) IV Continuous <Continuous>  insulin glargine Injectable (LANTUS) 10 Unit(s) SubCutaneous at bedtime  insulin lispro (HumaLOG) corrective regimen sliding scale   SubCutaneous three times a day before meals  insulin lispro (HumaLOG) corrective regimen sliding scale   SubCutaneous at bedtime  insulin lispro Injectable (HumaLOG) 7 Unit(s) SubCutaneous three times a day before meals  melatonin 3 milliGRAM(s) Oral at bedtime  montelukast 10 milliGRAM(s) Oral daily  pantoprazole    Tablet 40 milliGRAM(s) Oral before breakfast  sodium chloride 0.9% lock flush 3 milliLiter(s) IV Push every 8 hours      TELEMETRY: Afib -110	    ECG:  	  RADIOLOGY:   DIAGNOSTIC TESTING:  [ ] Echocardiogram:  [ ]  Catheterization:  [ ] Stress Test:    OTHER: 	    LABS:	 	                                10.6   8.4   )-----------( 266      ( 16 Jan 2019 06:35 )             32.0     01-16    138  |  103  |  16  ----------------------------<  113<H>  4.1   |  24  |  0.71    Ca    9.1      16 Jan 2019 06:35  Phos  4.9     01-15  Mg     1.9     01-15    TPro  7.4  /  Alb  4.0  /  TBili  0.6  /  DBili  x   /  AST  28  /  ALT  28  /  AlkPhos  200<H>  01-15    PT/INR - ( 16 Jan 2019 06:35 )   PT: 14.1 sec;   INR: 1.23 ratio         PTT - ( 16 Jan 2019 06:35 )  PTT:104.6 sec

## 2019-01-16 NOTE — PROGRESS NOTE ADULT - ASSESSMENT
77 yo female h/o dvt on coumadin, copd, dm, asthma, a/w c.o sob, curtis.  found to be in afib with rvr and acute heart failure  PE ruled out  ACS ruled out    afib  cards following  AC with hep gtt  rate controlled  tele monitor    found to have atrial thrombus on echo  dccv cancelled  s/p cath. results noted with LAD dz, valvular dz, and fistula  [facility]    OR cancelled  after finding out mitral stenosis not significant  to proceed with LAD pci later this week    DVT LLE  on AC with hep gtt    asthma/copd  stable  nebs prn  pulm f/u 79 yo female h/o dvt on coumadin, copd, dm, asthma, a/w c.o sob, curtis.  found to be in afib with rvr and acute heart failure  PE ruled out  ACS ruled out    afib  cards following  AC with hep gtt  rate control - restart Cardizem   tele monitor    found to have atrial thrombus on echo  dccv cancelled  s/p cath. results noted with LAD dz, valvular dz, and fistula  [facility]    OR cancelled  after finding out mitral stenosis not significant  to proceed with LAD pci later this week    DVT LLE  on AC with hep gtt    asthma/copd  stable  nebs prn  pulm f/u

## 2019-01-16 NOTE — PROGRESS NOTE ADULT - SUBJECTIVE AND OBJECTIVE BOX
JEANNE TRUJILLO  78y Female  MRN:[id number]    Patient is a 78y old  Female who presents with a chief complaint of Palpitations and SOB (06 Jan 2019 14:36)    HPI:  77 y/o F with PMH of Left LE DVT diagnosed on Aug 2018(on Coumadin), COPD, DM, Asthma presented with the complaint of SOB/GRADY and palpitations. As per the patient she has been having intermittent SOB but it worsened the past few days which prompted her to come to the ER. Patient stated that any minimal exertion she would have to stop to catch her breath. Patient stated that she sleeps with 2-3 pillows at night and endorsed of orthopnea and PND. Patient also endorsed of intermittent LE swelling and stated that she is complaint with her medications. Patient also endorsed of midsternal chest pain, characterized as a  pressure like sensation, without any aggravating factors, alleviated when she would massage her chest, without any radiations of the chest pain, with a severity of 5/10. Patient also endorsed of palpitations for the past few days. Patient denied any fevers, chills, N/V/D/C, abdominal pain, dysuria, melena, hematochezia, recent travel, sick contact, pleuritic or positional chest pain.     On ED admission EKG revealed Atrial fibrillation with RVR at a rate of 147 with QTc of 466, CE x 1: Trop: 8, CE x 2: Trop: 9, H&H: 10.4/33.5, Na: 133, Gluc: 104, Alk Phos: 285, AST: 38. Prelim CXR: 6 mm right lung nodular opacity of uncertain significance. Follow up to resolution. Hazy right lung opacities may be secondary to rotation versus atelectasis and/or pneumonia. In the ED patient received IV Cardizem and PO Cardizem which improved her rate. (05 Jan 2019 05:47)      Patient seen and evaluated at bedside.       Interval HPI: no acute events o/n     PAST MEDICAL & SURGICAL HISTORY:  DVT (deep venous thrombosis)  Asthma  COPD (chronic obstructive pulmonary disease)  DM (diabetes mellitus)  History of embolectomy: LLE    SOC:  non smoker  no drug or alcohol abuse    fam hx:  non cont     REVIEW OF SYSTEMS:  as per hpi    VITALS:  Vital Signs Last 24 Hrs  T(C): 36.4 (16 Jan 2019 04:32), Max: 36.5 (15 Joseph 2019 20:25)  T(F): 97.5 (16 Jan 2019 04:32), Max: 97.7 (15 Joseph 2019 20:25)  HR: 82 (16 Jan 2019 04:32) (82 - 140)  BP: 112/74 (16 Jan 2019 04:32) (107/72 - 112/74)  BP(mean): --  RR: 18 (16 Jan 2019 04:32) (18 - 18)  SpO2: 99% (16 Jan 2019 04:32) (99% - 100%)      PHYSICAL EXAM:  GENERAL: NAD, well-developed,   HEAD:  Atraumatic, Normocephalic  EYES: EOMI, PERRLA, conjunctiva and sclera clear  NECK: Supple, No JVD  CHEST/LUNG: Clear to auscultation bilaterally; No wheeze  HEART: S1, S2;   ABDOMEN: Soft, Nontender, Nondistended; Bowel sounds present  EXTREMITIES:  2+ Peripheral Pulses, No clubbing, cyanosis, or edema  NEUROLOGY: AOx3  SKIN: No rashes or lesions    Consultant(s) Notes Reviewed:  [x ] YES  [ ] NO  Care Discussed with Consultants/Other Providers [ x] YES  [ ] NO    MEDS:  MEDICATIONS  (STANDING):  aspirin enteric coated 325 milliGRAM(s) Oral daily  atorvastatin 20 milliGRAM(s) Oral at bedtime  buDESOnide  80 MICROgram(s)/formoterol 4.5 MICROgram(s) Inhaler 2 Puff(s) Inhalation two times a day  dextrose 5%. 1000 milliLiter(s) (50 mL/Hr) IV Continuous <Continuous>  dextrose 50% Injectable 50 milliLiter(s) IV Push every 15 minutes  dextrose 50% Injectable 25 milliLiter(s) IV Push every 15 minutes  docusate sodium 100 milliGRAM(s) Oral three times a day  heparin  Infusion. 200 Unit(s)/Hr (2 mL/Hr) IV Continuous <Continuous>  insulin glargine Injectable (LANTUS) 10 Unit(s) SubCutaneous at bedtime  insulin lispro (HumaLOG) corrective regimen sliding scale   SubCutaneous three times a day before meals  insulin lispro (HumaLOG) corrective regimen sliding scale   SubCutaneous at bedtime  insulin lispro Injectable (HumaLOG) 7 Unit(s) SubCutaneous three times a day before meals  melatonin 3 milliGRAM(s) Oral at bedtime  montelukast 10 milliGRAM(s) Oral daily  pantoprazole    Tablet 40 milliGRAM(s) Oral before breakfast  sodium chloride 0.9% lock flush 3 milliLiter(s) IV Push every 8 hours    MEDICATIONS  (PRN):  dextrose 40% Gel 15 Gram(s) Oral once PRN Blood Glucose LESS THAN 70 milliGRAM(s)/deciLiter  glucagon  Injectable 1 milliGRAM(s) IntraMuscular once PRN Glucose <70 milliGRAM(s)/deciLiter  heparin  Injectable 6500 Unit(s) IV Push every 6 hours PRN For aPTT less than 40  heparin  Injectable 3000 Unit(s) IV Push every 6 hours PRN For aPTT between 40 - 57            ALLERGIES:  No Known Allergies      LABS:                                                          10.6   8.4   )-----------( 266      ( 16 Jan 2019 06:35 )             32.0   01-16    138  |  103  |  16  ----------------------------<  113<H>  4.1   |  24  |  0.71    Ca    9.1      16 Jan 2019 06:35  Phos  4.9     01-15  Mg     1.9     01-15    TPro  7.4  /  Alb  4.0  /  TBili  0.6  /  DBili  x   /  AST  28  /  ALT  28  /  AlkPhos  200<H>  01-15        < from: Cardiac Cath Lab - Adult (01.10.19 @ 10:54) >  VENTRICLES: Global left ventricular function was normal. EF estimated was  60 %.  VALVES: AORTIC VALVE: There was mild aortic insufficiency. MITRAL VALVE:  The mitral valve exhibited mild regurgitation.  CORONARY VESSELS: The coronary circulation is right dominant.  LM:   --  LM: Normal.  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 There was a 75 % stenosis. Lesion is at the  bifurcation of a large diagonal branch. Lesion difficult to visual with  significant vessel overlap. IFR was 0.89 but IVUS imaging revealed severe  atherosclerotic disease with calcium and IVUS mla of 4.1 mm2. IVUS  catheter was occlusive at the lesion and unable to be advanced past  lesion.   D1: There was a 50 % stenosis in the proximal third of the vessel  segment.  CX:   --  Circumflex: Angiography showed mild atherosclerosis with no flow  limiting lesions.  RCA:   --  RCA: Angiography showed no evidence of disease. 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  --  RPDA: Angiography showed no evidence of disease.  --  RPL1:Angiography showed no evidence of disease.  AORTA: Ascending aorta: Normal. No evidence of any aortic fistula. No  aneurysm. The PA fills from coronary flow from root injection.  COMPLICATIONS: There were no complications.  DIAGNOSTIC IMPRESSIONS: New onset chest pain, AF with RVR, and acute  diastolic HF. EMILI with mod-severe mitral stenosis and very large thrombus  in DANTE. Cath with severe proximal LAD lesion with correlating IVUS  findings and moderate diagonal disease. 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 LV function preserved.  DIAGNOSTIC RECOMMENDATIONS: Continue medical treatment of CAD, AF. CTS  evaluation for MVR/CABG possible fistula ligation/dante thrombus  extraction/dante ligation.  INTERVENTIONALIMPRESSIONS: New onset chest pain, AF with RVR, and acute  diastolic HF. EMILI with mod-severe mitral stenosis and very large thrombus  in DANTE. Cath with severe proximal LAD lesion with correlating IVUS  findings and moderate diagonal disease. 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 LV function preserved.  INTERVENTIONAL RECOMMENDATIONS: Continue medical treatment of CAD, AF. CTS  evaluation for MVR/CABG possible fistula ligation/dante thrombus  extraction/dante ligation.    < end of copied text >          < from: EMILI w/TTE (w/3D Echo) (01.07.19 @ 15:03) >  ------------------------------  CONCLUSIONS:  1. Mitral annular calcification and calcified mitral  leaflets with decreased diastolic opening.  In some views  the valve has a rheumatic appearance. Mild-moderate mitral  regurgitation. Mean transmitral valve gradient equals 8 mm  Hg, estimated mitral valve area equals 1.1 sqcm (by  pressure half time equation), consistent with moderate to  severe mitral stenosis.  2. Calcified trileaflet aortic valve with decreased  opening. Peak transaortic valve gradient equals 17 mm Hg,  mean transaortic valve gradient equals 12 mm Hg, consistent  with mild aortic stenosis. No aortic valve regurgitation  seen.  3. Normal aortic root.  Mild non-mobile atherosclerotic  plaque in the aortic arch.  Severe, bulky, mobile  atherosclerotic plaque in the descending thoracic aorta.  4. Mildly dilated left atrium.  LA volume index = 35 cc/m2.   A very large (about  3.0 cm X 1.4 cm) thrombus is  visualized in the left atrial appendage.  5. Normal left ventricular internal dimensions and wall  thicknesses.  6. Normal left ventricular systolic function. No segmental  wall motion abnormalities.  7. Normal right ventricular size and function.  8. Contrast injection demonstrates no evidence of a patent  foramen ovale.  *** No previous Echo exam.  -------------------------------------------------------    < end of copied text >

## 2019-01-16 NOTE — PROGRESS NOTE ADULT - SUBJECTIVE AND OBJECTIVE BOX
Chief complaint  Patient is a 78y old  Female who presents with a chief complaint of Mitral regurgitation, CAD, ANDREW thrombus (16 Jan 2019 12:44)   Review of systems  Patient in bed, looks comfortable, no fever, no hypoglycemia.    Labs and Fingersticks  CAPILLARY BLOOD GLUCOSE      POCT Blood Glucose.: 110 mg/dL (16 Jan 2019 11:30)  POCT Blood Glucose.: 119 mg/dL (16 Jan 2019 07:32)  POCT Blood Glucose.: 130 mg/dL (15 Joseph 2019 21:47)  POCT Blood Glucose.: 163 mg/dL (15 Joseph 2019 16:34)      Anion Gap, Serum: 11 (01-16 @ 06:35)  Anion Gap, Serum: 17 (01-15 @ 01:52)      Calcium, Total Serum: 9.1 (01-16 @ 06:35)  Calcium, Total Serum: 8.8 (01-15 @ 01:52)  Albumin, Serum: 4.0 (01-15 @ 01:52)    Alanine Aminotransferase (ALT/SGPT): 28 (01-15 @ 01:52)  Alkaline Phosphatase, Serum: 200 <H> (01-15 @ 01:52)  Aspartate Aminotransferase (AST/SGOT): 28 (01-15 @ 01:52)        01-16    138  |  103  |  16  ----------------------------<  113<H>  4.1   |  24  |  0.71    Ca    9.1      16 Jan 2019 06:35  Phos  4.9     01-15  Mg     1.9     01-15    TPro  7.4  /  Alb  4.0  /  TBili  0.6  /  DBili  x   /  AST  28  /  ALT  28  /  AlkPhos  200<H>  01-15                        10.6   8.4   )-----------( 266      ( 16 Jan 2019 06:35 )             32.0     Medications  MEDICATIONS  (STANDING):  aspirin enteric coated 325 milliGRAM(s) Oral daily  atorvastatin 20 milliGRAM(s) Oral at bedtime  buDESOnide  80 MICROgram(s)/formoterol 4.5 MICROgram(s) Inhaler 2 Puff(s) Inhalation two times a day  dextrose 5%. 1000 milliLiter(s) (50 mL/Hr) IV Continuous <Continuous>  dextrose 50% Injectable 50 milliLiter(s) IV Push every 15 minutes  dextrose 50% Injectable 25 milliLiter(s) IV Push every 15 minutes  diltiazem    milliGRAM(s) Oral daily  docusate sodium 100 milliGRAM(s) Oral three times a day  heparin  Infusion. 200 Unit(s)/Hr (2 mL/Hr) IV Continuous <Continuous>  insulin glargine Injectable (LANTUS) 10 Unit(s) SubCutaneous at bedtime  insulin lispro (HumaLOG) corrective regimen sliding scale   SubCutaneous three times a day before meals  insulin lispro (HumaLOG) corrective regimen sliding scale   SubCutaneous at bedtime  insulin lispro Injectable (HumaLOG) 7 Unit(s) SubCutaneous three times a day before meals  melatonin 3 milliGRAM(s) Oral at bedtime  montelukast 10 milliGRAM(s) Oral daily  pantoprazole    Tablet 40 milliGRAM(s) Oral before breakfast  sodium chloride 0.9% lock flush 3 milliLiter(s) IV Push every 8 hours      Physical Exam  General: Patient comfortable in bed  Vital Signs Last 12 Hrs  T(F): 97.3 (01-16-19 @ 12:00), Max: 97.5 (01-16-19 @ 03:24)  HR: 112 (01-16-19 @ 12:00) (82 - 140)  BP: 109/73 (01-16-19 @ 12:00) (107/72 - 112/74)  BP(mean): --  RR: 18 (01-16-19 @ 12:00) (18 - 18)  SpO2: 99% (01-16-19 @ 12:00) (99% - 100%)  Neck: No palpable thyroid nodules.  CVS: S1S2, No murmurs  Respiratory: No wheezing, no crepitations  GI: Abdomen soft, bowel sounds positive  Musculoskeletal:  edema lower extremities.   Skin: No skin rashes, no ecchymosis    Diagnostics

## 2019-01-16 NOTE — PROGRESS NOTE ADULT - ASSESSMENT
Assessment  DMT2: 78y Female with DM T2 with hyperglycemia on basal bolus insulin, s/p surgery, blood sugars trending down, started eating full meals.  CAD: S/P CABG, on medications,  no chest pain, stable, monitored.  HLD: On statin.  HTN: Controlled, no headaches, on medications.        Sarah Foley MD  Cell: [phone]  Office: [phone] Assessment  DMT2: 78y Female with DM T2 with hyperglycemia on basal bolus insulin, s/p surgery, blood sugars trending down, started eating full meals.  CAD: on medications,  no chest pain, stable, monitored.  HLD: On statin.  HTN: Controlled, no headaches, on medications.        Sarah Foley MD  Cell: [phone]  Office: [phone]

## 2019-01-16 NOTE — PROGRESS NOTE ADULT - ASSESSMENT
EMILI 1/7/9:  EF 66%, normal LV fx, Mod to sev MS, mild AS, severe mobile atherosclerotic plaque in the descending thoracic aorta, Large ANDREW thrombus ( 3.0 x 1.4 cm)  [facility] 1/10/19: LAD 75% stenosis and a fistula to the PA, and 50% stenosis of D1  intraop EMILI revealing mild-mod MR with mod-sev mitral stenosis  a/p    78y Female w/ PMH of LLE DVT 8/2018, on coumadin, COPD, DM, Asthma, presented to [facility] ER with new onset Atrial fibrillation and dyspnea on exertion worsening over the past few days prior to presentation on 1/5. Patient underwent EMILI and DCC at [facility] and was found to have a large ANDREW thrombus as well as mild to moderate MR with mild Mitral stenosis, EF 66%. CT PA was negative for PE. Left Heart Cath revealed LAD 75% stenosis and a fistula to the PA, and 50% stenosis of D1. The patient was then transferred to [facility] under Dr Ingram's Service for evaluation and pre-operative work up for CABG/MVR with ANDREW ligation and thrombus removal.    1. CAD  Left Heart Cath revealed LAD 75% stenosis and a fistula to the PA, and 50% stenosis of D1.   cv stable, no decomp CHF on exam   surgery cancelled, care per CTS   willl plan for PCI to LAD this week, will load plavix pre pci     2. Mitral valve stenosis   intraop EMILI revealing mild-mod MR with mod-sev mitral stenosis  surgery cancelled, will medically manage for now    3. ANDREW   EMILI revealing large ANDREW thrombus   continue AC with hep gtt    4. New onset Afib with RVR  currently in Afib, with episodes of RVR  Resume outpt cardizem dose for rate control   CHADS=3-4 cont a/c on hep gtt    5. DVT, hx  cont a/c, repeat duplex no changes    dvt ppx

## 2019-01-16 NOTE — PROVIDER CONTACT NOTE (OTHER) - BACKGROUND
PMH of LLE DVT 8/2018. 1/14 CABG surgery cancelled - intraop EMILI revealing mild-mod MR with mod-sev mitral stenosis. 1/15 on Heparin drip for afib and ANDREW thrombus - plan for PCI to LAD this week.

## 2019-01-17 LAB
ANION GAP SERPL CALC-SCNC: 13 MMOL/L — SIGNIFICANT CHANGE UP (ref 5–17)
APTT BLD: 69.4 SEC — HIGH (ref 27.5–36.3)
APTT BLD: >200 SEC — CRITICAL HIGH (ref 27.5–36.3)
BUN SERPL-MCNC: 15 MG/DL — SIGNIFICANT CHANGE UP (ref 7–23)
CALCIUM SERPL-MCNC: 9 MG/DL — SIGNIFICANT CHANGE UP (ref 8.4–10.5)
CHLORIDE SERPL-SCNC: 103 MMOL/L — SIGNIFICANT CHANGE UP (ref 96–108)
CO2 SERPL-SCNC: 22 MMOL/L — SIGNIFICANT CHANGE UP (ref 22–31)
CREAT SERPL-MCNC: 0.66 MG/DL — SIGNIFICANT CHANGE UP (ref 0.5–1.3)
GLUCOSE BLDC GLUCOMTR-MCNC: 108 MG/DL — HIGH (ref 70–99)
GLUCOSE BLDC GLUCOMTR-MCNC: 208 MG/DL — HIGH (ref 70–99)
GLUCOSE BLDC GLUCOMTR-MCNC: 66 MG/DL — LOW (ref 70–99)
GLUCOSE BLDC GLUCOMTR-MCNC: 70 MG/DL — SIGNIFICANT CHANGE UP (ref 70–99)
GLUCOSE BLDC GLUCOMTR-MCNC: 74 MG/DL — SIGNIFICANT CHANGE UP (ref 70–99)
GLUCOSE BLDC GLUCOMTR-MCNC: 77 MG/DL — SIGNIFICANT CHANGE UP (ref 70–99)
GLUCOSE BLDC GLUCOMTR-MCNC: 82 MG/DL — SIGNIFICANT CHANGE UP (ref 70–99)
GLUCOSE BLDC GLUCOMTR-MCNC: 90 MG/DL — SIGNIFICANT CHANGE UP (ref 70–99)
GLUCOSE BLDC GLUCOMTR-MCNC: 93 MG/DL — SIGNIFICANT CHANGE UP (ref 70–99)
GLUCOSE SERPL-MCNC: 95 MG/DL — SIGNIFICANT CHANGE UP (ref 70–99)
HCT VFR BLD CALC: 32 % — LOW (ref 34.5–45)
HGB BLD-MCNC: 10.1 G/DL — LOW (ref 11.5–15.5)
INR BLD: 1.2 RATIO — HIGH (ref 0.88–1.16)
INR BLD: 1.25 RATIO — HIGH (ref 0.88–1.16)
MAGNESIUM SERPL-MCNC: 2 MG/DL — SIGNIFICANT CHANGE UP (ref 1.6–2.6)
MCHC RBC-ENTMCNC: 26.6 PG — LOW (ref 27–34)
MCHC RBC-ENTMCNC: 31.6 GM/DL — LOW (ref 32–36)
MCV RBC AUTO: 84.4 FL — SIGNIFICANT CHANGE UP (ref 80–100)
PLATELET # BLD AUTO: 308 K/UL — SIGNIFICANT CHANGE UP (ref 150–400)
POTASSIUM SERPL-MCNC: 3.8 MMOL/L — SIGNIFICANT CHANGE UP (ref 3.5–5.3)
POTASSIUM SERPL-SCNC: 3.8 MMOL/L — SIGNIFICANT CHANGE UP (ref 3.5–5.3)
PROTHROM AB SERPL-ACNC: 13.9 SEC — HIGH (ref 10–12.9)
PROTHROM AB SERPL-ACNC: 14.3 SEC — HIGH (ref 10–12.9)
RBC # BLD: 3.79 M/UL — LOW (ref 3.8–5.2)
RBC # FLD: 15.8 % — HIGH (ref 10.3–14.5)
SODIUM SERPL-SCNC: 138 MMOL/L — SIGNIFICANT CHANGE UP (ref 135–145)
WBC # BLD: 8.73 K/UL — SIGNIFICANT CHANGE UP (ref 3.8–10.5)
WBC # FLD AUTO: 8.73 K/UL — SIGNIFICANT CHANGE UP (ref 3.8–10.5)

## 2019-01-17 PROCEDURE — 71045 X-RAY EXAM CHEST 1 VIEW: CPT | Mod: 26

## 2019-01-17 RX ORDER — CLOPIDOGREL BISULFATE 75 MG/1
600 TABLET, FILM COATED ORAL ONCE
Qty: 0 | Refills: 0 | Status: COMPLETED | OUTPATIENT
Start: 2019-01-17 | End: 2019-01-17

## 2019-01-17 RX ORDER — INSULIN GLARGINE 100 [IU]/ML
8 INJECTION, SOLUTION SUBCUTANEOUS ONCE
Qty: 0 | Refills: 0 | Status: COMPLETED | OUTPATIENT
Start: 2019-01-17 | End: 2019-01-17

## 2019-01-17 RX ORDER — CLOPIDOGREL BISULFATE 75 MG/1
75 TABLET, FILM COATED ORAL DAILY
Qty: 0 | Refills: 0 | Status: DISCONTINUED | OUTPATIENT
Start: 2019-01-18 | End: 2019-01-23

## 2019-01-17 RX ADMIN — Medication 2: at 17:01

## 2019-01-17 RX ADMIN — ATORVASTATIN CALCIUM 20 MILLIGRAM(S): 80 TABLET, FILM COATED ORAL at 21:46

## 2019-01-17 RX ADMIN — SODIUM CHLORIDE 3 MILLILITER(S): 9 INJECTION INTRAMUSCULAR; INTRAVENOUS; SUBCUTANEOUS at 06:52

## 2019-01-17 RX ADMIN — HEPARIN SODIUM 0 UNIT(S)/HR: 5000 INJECTION INTRAVENOUS; SUBCUTANEOUS at 07:59

## 2019-01-17 RX ADMIN — Medication 7 UNIT(S): at 17:02

## 2019-01-17 RX ADMIN — CLOPIDOGREL BISULFATE 600 MILLIGRAM(S): 75 TABLET, FILM COATED ORAL at 13:07

## 2019-01-17 RX ADMIN — INSULIN GLARGINE 8 UNIT(S): 100 INJECTION, SOLUTION SUBCUTANEOUS at 23:22

## 2019-01-17 RX ADMIN — BUDESONIDE AND FORMOTEROL FUMARATE DIHYDRATE 2 PUFF(S): 160; 4.5 AEROSOL RESPIRATORY (INHALATION) at 18:12

## 2019-01-17 RX ADMIN — SODIUM CHLORIDE 3 MILLILITER(S): 9 INJECTION INTRAMUSCULAR; INTRAVENOUS; SUBCUTANEOUS at 15:00

## 2019-01-17 RX ADMIN — Medication 7 UNIT(S): at 07:57

## 2019-01-17 RX ADMIN — Medication 325 MILLIGRAM(S): at 11:12

## 2019-01-17 RX ADMIN — SODIUM CHLORIDE 3 MILLILITER(S): 9 INJECTION INTRAMUSCULAR; INTRAVENOUS; SUBCUTANEOUS at 21:45

## 2019-01-17 RX ADMIN — Medication 100 MILLIGRAM(S): at 06:54

## 2019-01-17 RX ADMIN — BUDESONIDE AND FORMOTEROL FUMARATE DIHYDRATE 2 PUFF(S): 160; 4.5 AEROSOL RESPIRATORY (INHALATION) at 06:56

## 2019-01-17 RX ADMIN — Medication 7 UNIT(S): at 12:08

## 2019-01-17 RX ADMIN — Medication 240 MILLIGRAM(S): at 06:53

## 2019-01-17 RX ADMIN — MONTELUKAST 10 MILLIGRAM(S): 4 TABLET, CHEWABLE ORAL at 11:12

## 2019-01-17 RX ADMIN — PANTOPRAZOLE SODIUM 40 MILLIGRAM(S): 20 TABLET, DELAYED RELEASE ORAL at 06:54

## 2019-01-17 RX ADMIN — Medication 3 MILLIGRAM(S): at 21:46

## 2019-01-17 NOTE — PROGRESS NOTE ADULT - ASSESSMENT
79 yo female h/o dvt on coumadin, copd, dm, asthma, a/w c.o sob, curtis.  found to be in afib with rvr and acute heart failure  PE ruled out  ACS ruled out    afib  cards following  AC with hep gtt  rate control - on Cardizem   tele monitor    found to have atrial thrombus on echo  dccv cancelled  s/p cath. results noted with LAD dz, valvular dz, and fistula  [facility]    OR cancelled  after finding out mitral stenosis not significant  to proceed with LAD pci later this week    DVT LLE  on AC with hep gtt    asthma/copd  stable  nebs prn  pulm f/u

## 2019-01-17 NOTE — PROGRESS NOTE ADULT - SUBJECTIVE AND OBJECTIVE BOX
CARDIOLOGY FOLLOW UP - Dr. Fitch    CC no cp/sob       PHYSICAL EXAM:  T(C): 36.4 (01-17-19 @ 06:10), Max: 36.5 (01-16-19 @ 20:09)  HR: 77 (01-17-19 @ 06:10) (77 - 112)  BP: 105/73 (01-17-19 @ 06:10) (105/73 - 115/54)  RR: 18 (01-17-19 @ 06:10) (18 - 18)  SpO2: 99% (01-17-19 @ 06:10) (98% - 99%)  Wt(kg): --  I&O's Summary    16 Jan 2019 07:01  -  17 Jan 2019 07:00  --------------------------------------------------------  IN: 952 mL / OUT: 1550 mL / NET: -598 mL    17 Jan 2019 07:01  -  17 Jan 2019 11:49  --------------------------------------------------------  IN: 360 mL / OUT: 250 mL / NET: 110 mL        Appearance: Normal	  Cardiovascular: Normal S1 S2,irreg   Respiratory: Lungs clear to auscultation	  Gastrointestinal:  Soft, Non-tender, + BS	  Extremities: Normal range of motion, No clubbing, cyanosis or edema        MEDICATIONS  (STANDING):  aspirin enteric coated 325 milliGRAM(s) Oral daily  atorvastatin 20 milliGRAM(s) Oral at bedtime  buDESOnide  80 MICROgram(s)/formoterol 4.5 MICROgram(s) Inhaler 2 Puff(s) Inhalation two times a day  dextrose 5%. 1000 milliLiter(s) (50 mL/Hr) IV Continuous <Continuous>  dextrose 50% Injectable 50 milliLiter(s) IV Push every 15 minutes  dextrose 50% Injectable 25 milliLiter(s) IV Push every 15 minutes  diltiazem    milliGRAM(s) Oral daily  docusate sodium 100 milliGRAM(s) Oral three times a day  heparin  Infusion. 200 Unit(s)/Hr (2 mL/Hr) IV Continuous <Continuous>  insulin glargine Injectable (LANTUS) 10 Unit(s) SubCutaneous at bedtime  insulin lispro (HumaLOG) corrective regimen sliding scale   SubCutaneous three times a day before meals  insulin lispro (HumaLOG) corrective regimen sliding scale   SubCutaneous at bedtime  insulin lispro Injectable (HumaLOG) 7 Unit(s) SubCutaneous three times a day before meals  melatonin 3 milliGRAM(s) Oral at bedtime  montelukast 10 milliGRAM(s) Oral daily  pantoprazole    Tablet 40 milliGRAM(s) Oral before breakfast  sodium chloride 0.9% lock flush 3 milliLiter(s) IV Push every 8 hours      TELEMETRY: afib  	    ECG:  	  RADIOLOGY:   DIAGNOSTIC TESTING:  [ ] Echocardiogram:  [ ]  Catheterization:  [ ] Stress Test:    OTHER: 	    LABS:	 	                                10.1   8.73  )-----------( 308      ( 17 Jan 2019 07:58 )             32.0     01-17    138  |  103  |  15  ----------------------------<  95  3.8   |  22  |  0.66    Ca    9.0      17 Jan 2019 06:20  Mg     2.0     01-17      PT/INR - ( 17 Jan 2019 06:25 )   PT: 13.9 sec;   INR: 1.20 ratio         PTT - ( 17 Jan 2019 06:25 )  PTT:>200.0 sec

## 2019-01-17 NOTE — DIETITIAN INITIAL EVALUATION ADULT. - OTHER INFO
Pt seen for LOS admission. Pt seen for LOS admission. Pt currently reports good po intake/appetite at this time and per her baseline, denies GI distress no chewing/swallowing difficulty, no N+V, diarrhea or constipation. Pt reports weight stable PTA at around 169 pounds, does fluctuate slightly 2-3 pounds. In terms of DM control PTA, pt was on Metformin reports good adherence to daily regimen. Owns glucometer and checks her FS daily fasting with results often <105, occasionally will check post-prandial FS with result often under 130. Pt reports consuming a general healthful diet PTA, cooks most of the meals herself, eats most lean proteins (chicken, fish) uses salt sparingly. Admits to limited physical activity PTA however, feels it is related to her medical issues. Repeatedly states need for an aide to come and help her around the house. Pt amenable to discuss heart healthy and T2DM diet/management recommendations at this time.

## 2019-01-17 NOTE — DIETITIAN INITIAL EVALUATION ADULT. - ORAL INTAKE PTA
good/Pt reports good po intake PTA. Typical meal intake consistent of traditional [place] meals, often chicken or fish link, roti, vegetables, squash and pumpkin. Pt drinks mostly water, occasionally drinks tea or coffee and applejuice. Denies taking vitamin/mineral/herbal supplements PTA. Denies food allergy or intolerance. Denies EtOH use.

## 2019-01-17 NOTE — PROGRESS NOTE ADULT - ASSESSMENT
Assessment  DMT2: 78y Female with DM T2 with hyperglycemia on basal bolus insulin, s/p surgery, blood sugars trending down, eating full meals.  CAD: S/P CABG, on medications, planing procedure, no chest pain, stable, monitored.  HLD: On statin.  HTN: Controlled, no headaches, on medications.        Sarah Foley MD  Cell: [phone]  Office: [phone] Assessment  DMT2: 78y Female with DM T2 with hyperglycemia on basal bolus insulin, s/p surgery, blood sugars trending down, eating full meals.  CAD: on medications, planing procedure, no chest pain, stable, monitored.  HLD: On statin.  HTN: Controlled, no headaches, on medications.        Sarah Foley MD  Cell: [phone]  Office: [phone]

## 2019-01-17 NOTE — DIETITIAN INITIAL EVALUATION ADULT. - ADHERENCE
fair/Pt states she cooks all of her meals at home, does not add a lot of salt to her meals. Per diet recall and assessment, suspect kcal intake in excess of needs.

## 2019-01-17 NOTE — PROGRESS NOTE ADULT - SUBJECTIVE AND OBJECTIVE BOX
Chief complaint  Patient is a 78y old  Female who presents with a chief complaint of Mitral regurgitation, CAD, ANDREW thrombus (16 Jan 2019 14:07)   Review of systems  Patient in bed, looks comfortable, no fever,  no hypoglycemia.    Labs and Fingersticks  CAPILLARY BLOOD GLUCOSE      POCT Blood Glucose.: 82 mg/dL (17 Jan 2019 11:35)  POCT Blood Glucose.: 90 mg/dL (17 Jan 2019 07:26)  POCT Blood Glucose.: 135 mg/dL (16 Jan 2019 21:48)  POCT Blood Glucose.: 75 mg/dL (16 Jan 2019 16:32)      Anion Gap, Serum: 13 (01-17 @ 06:20)  Anion Gap, Serum: 11 (01-16 @ 06:35)      Calcium, Total Serum: 9.0 (01-17 @ 06:20)  Calcium, Total Serum: 9.1 (01-16 @ 06:35)          01-17    138  |  103  |  15  ----------------------------<  95  3.8   |  22  |  0.66    Ca    9.0      17 Jan 2019 06:20  Mg     2.0     01-17                          10.1   8.73  )-----------( 308      ( 17 Jan 2019 07:58 )             32.0     Medications  MEDICATIONS  (STANDING):  aspirin enteric coated 325 milliGRAM(s) Oral daily  atorvastatin 20 milliGRAM(s) Oral at bedtime  buDESOnide  80 MICROgram(s)/formoterol 4.5 MICROgram(s) Inhaler 2 Puff(s) Inhalation two times a day  dextrose 5%. 1000 milliLiter(s) (50 mL/Hr) IV Continuous <Continuous>  dextrose 50% Injectable 50 milliLiter(s) IV Push every 15 minutes  dextrose 50% Injectable 25 milliLiter(s) IV Push every 15 minutes  diltiazem    milliGRAM(s) Oral daily  docusate sodium 100 milliGRAM(s) Oral three times a day  heparin  Infusion. 200 Unit(s)/Hr (2 mL/Hr) IV Continuous <Continuous>  insulin glargine Injectable (LANTUS) 10 Unit(s) SubCutaneous at bedtime  insulin lispro (HumaLOG) corrective regimen sliding scale   SubCutaneous three times a day before meals  insulin lispro (HumaLOG) corrective regimen sliding scale   SubCutaneous at bedtime  insulin lispro Injectable (HumaLOG) 7 Unit(s) SubCutaneous three times a day before meals  melatonin 3 milliGRAM(s) Oral at bedtime  montelukast 10 milliGRAM(s) Oral daily  pantoprazole    Tablet 40 milliGRAM(s) Oral before breakfast  sodium chloride 0.9% lock flush 3 milliLiter(s) IV Push every 8 hours      Physical Exam  General: Patient comfortable in bed  Vital Signs Last 12 Hrs  T(F): 97.5 (01-17-19 @ 06:10), Max: 97.5 (01-17-19 @ 06:10)  HR: 77 (01-17-19 @ 06:10) (77 - 77)  BP: 105/73 (01-17-19 @ 06:10) (105/73 - 105/73)  BP(mean): --  RR: 18 (01-17-19 @ 06:10) (18 - 18)  SpO2: 99% (01-17-19 @ 06:10) (99% - 99%)  Neck: No palpable thyroid nodules.  CVS: S1S2, No murmurs  Respiratory: No wheezing, no crepitations  GI: Abdomen soft, bowel sounds positive  Musculoskeletal:  edema lower extremities.   Skin: No skin rashes, no ecchymosis    Diagnostics

## 2019-01-17 NOTE — DIETITIAN INITIAL EVALUATION ADULT. - ENERGY NEEDS
ht: 64 inches. wt: 164.2 pounds (current, standing, no edema noted). BMI: 28.2 kG/m2. UBW:  IBW: 120 pounds +/- 10%. %IBW: 137%  Other pertinent objective information: 78 year old female pt with PMH LLE DVT, on Coumadin, COPD, T2DM, Asthma, presented to [facility] ER with new onset Atrial fibrillation and dyspnea on exertion worsening over the past few days prior to presentation on 1/5. Underwent EMILI and DCC at [facility] and was found to have a large ANDREW thrombus as well as mild to moderate MR with mild Mitral stenosis, EF 66%. Left Heart Cath revealed LAD 75% stenosis and a fistula to the PA, and 50% stenosis of D1. The patient was then transferred to [facility] for further care. Deemed not to require surgical intervention at this time, noted with plans for PCI to LAD this week. Medical management of mitral valve stenosis noted. ht: 64 inches. wt: 164.2 pounds (current, standing, no edema noted). BMI: 28.2 kG/m2. UBW: 169 pounds per pt. IBW: 120 pounds +/- 10%. %IBW: 137%  Other pertinent objective information: 78 year old female pt with PMH LLE DVT, on Coumadin, COPD, T2DM, Asthma, presented to [facility] ER with new onset Atrial fibrillation and dyspnea on exertion worsening over the past few days prior to presentation on 1/5. Underwent EMILI and DCC at [facility] and was found to have a large ANDREW thrombus as well as mild to moderate MR with mild Mitral stenosis, EF 66%. Left Heart Cath revealed LAD 75% stenosis and a fistula to the PA, and 50% stenosis of D1. The patient was then transferred to [facility] for further care. Deemed not to require surgical intervention at this time, noted with plans for PCI to LAD this week. Medical management of mitral valve stenosis noted.

## 2019-01-17 NOTE — DIETITIAN INITIAL EVALUATION ADULT. - NS AS NUTRI INTERV MEALS SNACK
General/healthful diet/Recommend continue current consistent CHO, low Na diet to promote glucose control and heart health. Pt's food preferences obtained and honored in menu.

## 2019-01-17 NOTE — PROGRESS NOTE ADULT - ASSESSMENT
EMILI 1/7/9:  EF 66%, normal LV fx, Mod to sev MS, mild AS, severe mobile atherosclerotic plaque in the descending thoracic aorta, Large ANDREW thrombus ( 3.0 x 1.4 cm)  [place] 1/10/19: LAD 75% stenosis and a fistula to the PA, and 50% stenosis of D1  intraop EMILI revealing mild-mod MR with mod-sev mitral stenosis  a/p    78y Female w/ PMH of LLE DVT 8/2018, on coumadin, COPD, DM, Asthma, presented to [facility] ER with new onset Atrial fibrillation and dyspnea on exertion worsening over the past few days prior to presentation on 1/5. Patient underwent EMILI and DCC at [facility] and was found to have a large ANDREW thrombus as well as mild to moderate MR with mild Mitral stenosis, EF 66%. CT PA was negative for PE. Left Heart Cath revealed LAD 75% stenosis and a fistula to the PA, and 50% stenosis of D1. The patient was then transferred to [facility] under Dr Ingram's Service for evaluation and pre-operative work up for CABG/MVR with ANDREW ligation and thrombus removal.    1. CAD  Left Heart Cath revealed LAD 75% stenosis and a fistula to the PA, and 50% stenosis of D1.   cv stable, no decomp CHF on exam   surgery cancelled, care per CTS   willl plan for PCI to LAD tomorrow, load plavix pre pci     2. Mitral valve stenosis   intraop EMILI revealing mild-mod MR with mod-sev mitral stenosis  surgery cancelled, will medically manage for now    3. ANDREW   EMILI revealing large ANDREW thrombus   continue AC with hep gtt    4. New onset Afib with RVR  currently in Afib, rates improved  continue cardizem,   CHADS=3-4 cont a/c on hep gtt    5. DVT, hx  cont a/c, repeat duplex no changes    dvt ppx

## 2019-01-17 NOTE — PROGRESS NOTE ADULT - ATTENDING COMMENTS
Patient seen and examined.  Agree with above NP note.  cv stable  pci to lad gissel am  plavix load  asa   cont iv heparin  post pci d/c planning per inr

## 2019-01-17 NOTE — DIETITIAN INITIAL EVALUATION ADULT. - NS AS NUTRI INTERV ED CONTENT
Educated pt on heart healthy and T2DM diet/management recommendations. Reviewed low sodium and lower saturated fat intake, consistent CHO diet, SMBG and general goal fingerstick ranges, avoiding sugar-sweetened beverage intake as able, wt loss to promote overall improved metabolic profile, 1800 kcal recommendations, physical actitivyt as tolerated and per MD discretion upon d/c. Pt voiced understanding and accepted Low sodium nutrition therapy handout; Heart-healthy nutrition therapy handout, 1800 kcal sample meal plan for review at her leisure./Nutrition relationship to health/disease/Recommended modifications/Purpose of the nutrition education

## 2019-01-17 NOTE — PROGRESS NOTE ADULT - SUBJECTIVE AND OBJECTIVE BOX
JEANNE TRUJILLO  78y Female  MRN:[id number]    Patient is a 78y old  Female who presents with a chief complaint of Palpitations and SOB (06 Jan 2019 14:36)    HPI:  79 y/o F with PMH of Left LE DVT diagnosed on Aug 2018(on Coumadin), COPD, DM, Asthma presented with the complaint of SOB/GRADY and palpitations. As per the patient she has been having intermittent SOB but it worsened the past few days which prompted her to come to the ER. Patient stated that any minimal exertion she would have to stop to catch her breath. Patient stated that she sleeps with 2-3 pillows at night and endorsed of orthopnea and PND. Patient also endorsed of intermittent LE swelling and stated that she is complaint with her medications. Patient also endorsed of midsternal chest pain, characterized as a  pressure like sensation, without any aggravating factors, alleviated when she would massage her chest, without any radiations of the chest pain, with a severity of 5/10. Patient also endorsed of palpitations for the past few days. Patient denied any fevers, chills, N/V/D/C, abdominal pain, dysuria, melena, hematochezia, recent travel, sick contact, pleuritic or positional chest pain.     On ED admission EKG revealed Atrial fibrillation with RVR at a rate of 147 with QTc of 466, CE x 1: Trop: 8, CE x 2: Trop: 9, H&H: 10.4/33.5, Na: 133, Gluc: 104, Alk Phos: 285, AST: 38. Prelim CXR: 6 mm right lung nodular opacity of uncertain significance. Follow up to resolution. Hazy right lung opacities may be secondary to rotation versus atelectasis and/or pneumonia. In the ED patient received IV Cardizem and PO Cardizem which improved her rate. (05 Jan 2019 05:47)      Patient seen and evaluated at bedside.       Interval HPI: no acute events o/n     PAST MEDICAL & SURGICAL HISTORY:  DVT (deep venous thrombosis)  Asthma  COPD (chronic obstructive pulmonary disease)  DM (diabetes mellitus)  History of embolectomy: LLE    SOC:  non smoker  no drug or alcohol abuse    fam hx:  non cont     REVIEW OF SYSTEMS:  as per hpi    VITALS:  Vital Signs Last 24 Hrs  T(C): 36.6 (17 Jan 2019 12:00), Max: 36.6 (17 Jan 2019 12:00)  T(F): 97.8 (17 Jan 2019 12:00), Max: 97.8 (17 Jan 2019 12:00)  HR: 73 (17 Jan 2019 12:00) (73 - 91)  BP: 102/65 (17 Jan 2019 12:00) (102/65 - 115/54)  BP(mean): --  RR: 18 (17 Jan 2019 12:00) (18 - 18)  SpO2: 99% (17 Jan 2019 12:00) (98% - 99%)    PHYSICAL EXAM:  GENERAL: NAD, well-developed,   HEAD:  Atraumatic, Normocephalic  EYES: EOMI, PERRLA, conjunctiva and sclera clear  NECK: Supple, No JVD  CHEST/LUNG: Clear to auscultation bilaterally; No wheeze  HEART: S1, S2;   ABDOMEN: Soft, Nontender, Nondistended; Bowel sounds present  EXTREMITIES:  2+ Peripheral Pulses, No clubbing, cyanosis, or edema  NEUROLOGY: AOx3  SKIN: No rashes or lesions    Consultant(s) Notes Reviewed:  [x ] YES  [ ] NO  Care Discussed with Consultants/Other Providers [ x] YES  [ ] NO    MEDS:  MEDICATIONS  (STANDING):  aspirin enteric coated 325 milliGRAM(s) Oral daily  atorvastatin 20 milliGRAM(s) Oral at bedtime  buDESOnide  80 MICROgram(s)/formoterol 4.5 MICROgram(s) Inhaler 2 Puff(s) Inhalation two times a day  dextrose 5%. 1000 milliLiter(s) (50 mL/Hr) IV Continuous <Continuous>  dextrose 50% Injectable 50 milliLiter(s) IV Push every 15 minutes  dextrose 50% Injectable 25 milliLiter(s) IV Push every 15 minutes  diltiazem    milliGRAM(s) Oral daily  docusate sodium 100 milliGRAM(s) Oral three times a day  heparin  Infusion. 200 Unit(s)/Hr (2 mL/Hr) IV Continuous <Continuous>  insulin glargine Injectable (LANTUS) 10 Unit(s) SubCutaneous at bedtime  insulin lispro (HumaLOG) corrective regimen sliding scale   SubCutaneous three times a day before meals  insulin lispro (HumaLOG) corrective regimen sliding scale   SubCutaneous at bedtime  insulin lispro Injectable (HumaLOG) 7 Unit(s) SubCutaneous three times a day before meals  melatonin 3 milliGRAM(s) Oral at bedtime  montelukast 10 milliGRAM(s) Oral daily  pantoprazole    Tablet 40 milliGRAM(s) Oral before breakfast  sodium chloride 0.9% lock flush 3 milliLiter(s) IV Push every 8 hours    MEDICATIONS  (PRN):  dextrose 40% Gel 15 Gram(s) Oral once PRN Blood Glucose LESS THAN 70 milliGRAM(s)/deciLiter  glucagon  Injectable 1 milliGRAM(s) IntraMuscular once PRN Glucose <70 milliGRAM(s)/deciLiter  heparin  Injectable 6500 Unit(s) IV Push every 6 hours PRN For aPTT less than 40  heparin  Injectable 3000 Unit(s) IV Push every 6 hours PRN For aPTT between 40 - 57      ALLERGIES:  No Known Allergies      LABS:                                                               10.1   8.73  )-----------( 308      ( 17 Jan 2019 07:58 )             32.0   01-17    138  |  103  |  15  ----------------------------<  95  3.8   |  22  |  0.66    Ca    9.0      17 Jan 2019 06:20  Mg     2.0     01-17        < from: Cardiac Cath Lab - Adult (01.10.19 @ 10:54) >  VENTRICLES: Global left ventricular function was normal. EF estimated was  60 %.  VALVES: AORTIC VALVE: There was mild aortic insufficiency. MITRAL VALVE:  The mitral valve exhibited mild regurgitation.  CORONARY VESSELS: The coronary circulation is right dominant.  LM:   --  LM: Normal.  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 There was a 75 % stenosis. Lesion is at the  bifurcation of a large diagonal branch. Lesion difficult to visual with  significant vessel overlap. IFR was 0.89 but IVUS imaging revealed severe  atherosclerotic disease with calcium and IVUS mla of 4.1 mm2. IVUS  catheter was occlusive at the lesion and unable to be advanced past  lesion.   D1: There was a 50 % stenosis in the proximal third of the vessel  segment.  CX:   --  Circumflex: Angiography showed mild atherosclerosis with no flow  limiting lesions.  RCA:   --  RCA: Angiography showed no evidence of disease. 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  --  RPDA: Angiography showed no evidence of disease.  --  RPL1:Angiography showed no evidence of disease.  AORTA: Ascending aorta: Normal. No evidence of any aortic fistula. No  aneurysm. The PA fills from coronary flow from root injection.  COMPLICATIONS: There were no complications.  DIAGNOSTIC IMPRESSIONS: New onset chest pain, AF with RVR, and acute  diastolic HF. EMILI with mod-severe mitral stenosis and very large thrombus  in DANTE. Cath with severe proximal LAD lesion with correlating IVUS  findings and moderate diagonal disease. 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 LV function preserved.  DIAGNOSTIC RECOMMENDATIONS: Continue medical treatment of CAD, AF. CTS  evaluation for MVR/CABG possible fistula ligation/dante thrombus  extraction/dante ligation.  INTERVENTIONALIMPRESSIONS: New onset chest pain, AF with RVR, and acute  diastolic HF. EMILI with mod-severe mitral stenosis and very large thrombus  in DANTE. Cath with severe proximal LAD lesion with correlating IVUS  findings and moderate diagonal disease. 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 LV function preserved.  INTERVENTIONAL RECOMMENDATIONS: Continue medical treatment of CAD, AF. CTS  evaluation for MVR/CABG possible fistula ligation/dante thrombus  extraction/dante ligation.    < end of copied text >          < from: EIMLI w/TTE (w/3D Echo) (01.07.19 @ 15:03) >  ------------------------------  CONCLUSIONS:  1. Mitral annular calcification and calcified mitral  leaflets with decreased diastolic opening.  In some views  the valve has a rheumatic appearance. Mild-moderate mitral  regurgitation. Mean transmitral valve gradient equals 8 mm  Hg, estimated mitral valve area equals 1.1 sqcm (by  pressure half time equation), consistent with moderate to  severe mitral stenosis.  2. Calcified trileaflet aortic valve with decreased  opening. Peak transaortic valve gradient equals 17 mm Hg,  mean transaortic valve gradient equals 12 mm Hg, consistent  with mild aortic stenosis. No aortic valve regurgitation  seen.  3. Normal aortic root.  Mild non-mobile atherosclerotic  plaque in the aortic arch.  Severe, bulky, mobile  atherosclerotic plaque in the descending thoracic aorta.  4. Mildly dilated left atrium.  LA volume index = 35 cc/m2.   A very large (about  3.0 cm X 1.4 cm) thrombus is  visualized in the left atrial appendage.  5. Normal left ventricular internal dimensions and wall  thicknesses.  6. Normal left ventricular systolic function. No segmental  wall motion abnormalities.  7. Normal right ventricular size and function.  8. Contrast injection demonstrates no evidence of a patent  foramen ovale.  *** No previous Echo exam.  -------------------------------------------------------    < end of copied text >

## 2019-01-18 ENCOUNTER — TRANSCRIPTION ENCOUNTER (OUTPATIENT)
Age: 79
End: 2019-01-18

## 2019-01-18 LAB
APTT BLD: 61.4 SEC — HIGH (ref 27.5–36.3)
APTT BLD: 77 SEC — HIGH (ref 27.5–36.3)
GLUCOSE BLDC GLUCOMTR-MCNC: 151 MG/DL — HIGH (ref 70–99)
GLUCOSE BLDC GLUCOMTR-MCNC: 184 MG/DL — HIGH (ref 70–99)
GLUCOSE BLDC GLUCOMTR-MCNC: 87 MG/DL — SIGNIFICANT CHANGE UP (ref 70–99)
GLUCOSE BLDC GLUCOMTR-MCNC: 94 MG/DL — SIGNIFICANT CHANGE UP (ref 70–99)
HCT VFR BLD CALC: 31.1 % — LOW (ref 34.5–45)
HGB BLD-MCNC: 10 G/DL — LOW (ref 11.5–15.5)
MCHC RBC-ENTMCNC: 27.1 PG — SIGNIFICANT CHANGE UP (ref 27–34)
MCHC RBC-ENTMCNC: 32.3 GM/DL — SIGNIFICANT CHANGE UP (ref 32–36)
MCV RBC AUTO: 83.8 FL — SIGNIFICANT CHANGE UP (ref 80–100)
PLATELET # BLD AUTO: 276 K/UL — SIGNIFICANT CHANGE UP (ref 150–400)
RBC # BLD: 3.71 M/UL — LOW (ref 3.8–5.2)
RBC # FLD: 14.7 % — HIGH (ref 10.3–14.5)
WBC # BLD: 9.9 K/UL — SIGNIFICANT CHANGE UP (ref 3.8–10.5)
WBC # FLD AUTO: 9.9 K/UL — SIGNIFICANT CHANGE UP (ref 3.8–10.5)

## 2019-01-18 PROCEDURE — 93010 ELECTROCARDIOGRAM REPORT: CPT | Mod: 76

## 2019-01-18 RX ORDER — HEPARIN SODIUM 5000 [USP'U]/ML
6500 INJECTION INTRAVENOUS; SUBCUTANEOUS EVERY 6 HOURS
Qty: 0 | Refills: 0 | Status: DISCONTINUED | OUTPATIENT
Start: 2019-01-18 | End: 2019-01-23

## 2019-01-18 RX ORDER — WARFARIN SODIUM 2.5 MG/1
10 TABLET ORAL ONCE
Qty: 0 | Refills: 0 | Status: DISCONTINUED | OUTPATIENT
Start: 2019-01-18 | End: 2019-01-18

## 2019-01-18 RX ORDER — ASPIRIN/CALCIUM CARB/MAGNESIUM 324 MG
81 TABLET ORAL DAILY
Qty: 0 | Refills: 0 | Status: DISCONTINUED | OUTPATIENT
Start: 2019-01-19 | End: 2019-01-23

## 2019-01-18 RX ORDER — HEPARIN SODIUM 5000 [USP'U]/ML
400 INJECTION INTRAVENOUS; SUBCUTANEOUS
Qty: 25000 | Refills: 0 | Status: DISCONTINUED | OUTPATIENT
Start: 2019-01-18 | End: 2019-01-23

## 2019-01-18 RX ORDER — HEPARIN SODIUM 5000 [USP'U]/ML
3000 INJECTION INTRAVENOUS; SUBCUTANEOUS EVERY 6 HOURS
Qty: 0 | Refills: 0 | Status: DISCONTINUED | OUTPATIENT
Start: 2019-01-18 | End: 2019-01-18

## 2019-01-18 RX ORDER — WARFARIN SODIUM 2.5 MG/1
10 TABLET ORAL ONCE
Qty: 0 | Refills: 0 | Status: COMPLETED | OUTPATIENT
Start: 2019-01-18 | End: 2019-01-18

## 2019-01-18 RX ORDER — HEPARIN SODIUM 5000 [USP'U]/ML
400 INJECTION INTRAVENOUS; SUBCUTANEOUS
Qty: 25000 | Refills: 0 | Status: DISCONTINUED | OUTPATIENT
Start: 2019-01-18 | End: 2019-01-18

## 2019-01-18 RX ORDER — HEPARIN SODIUM 5000 [USP'U]/ML
6500 INJECTION INTRAVENOUS; SUBCUTANEOUS EVERY 6 HOURS
Qty: 0 | Refills: 0 | Status: DISCONTINUED | OUTPATIENT
Start: 2019-01-18 | End: 2019-01-18

## 2019-01-18 RX ORDER — HEPARIN SODIUM 5000 [USP'U]/ML
3000 INJECTION INTRAVENOUS; SUBCUTANEOUS EVERY 6 HOURS
Qty: 0 | Refills: 0 | Status: DISCONTINUED | OUTPATIENT
Start: 2019-01-18 | End: 2019-01-23

## 2019-01-18 RX ADMIN — Medication 100 MILLIGRAM(S): at 06:22

## 2019-01-18 RX ADMIN — ATORVASTATIN CALCIUM 20 MILLIGRAM(S): 80 TABLET, FILM COATED ORAL at 21:18

## 2019-01-18 RX ADMIN — PANTOPRAZOLE SODIUM 40 MILLIGRAM(S): 20 TABLET, DELAYED RELEASE ORAL at 06:22

## 2019-01-18 RX ADMIN — HEPARIN SODIUM 400 UNIT(S)/HR: 5000 INJECTION INTRAVENOUS; SUBCUTANEOUS at 22:21

## 2019-01-18 RX ADMIN — SODIUM CHLORIDE 3 MILLILITER(S): 9 INJECTION INTRAMUSCULAR; INTRAVENOUS; SUBCUTANEOUS at 21:21

## 2019-01-18 RX ADMIN — SODIUM CHLORIDE 3 MILLILITER(S): 9 INJECTION INTRAMUSCULAR; INTRAVENOUS; SUBCUTANEOUS at 06:20

## 2019-01-18 RX ADMIN — Medication 3 MILLIGRAM(S): at 21:18

## 2019-01-18 RX ADMIN — WARFARIN SODIUM 10 MILLIGRAM(S): 2.5 TABLET ORAL at 21:18

## 2019-01-18 RX ADMIN — HEPARIN SODIUM 400 UNIT(S)/HR: 5000 INJECTION INTRAVENOUS; SUBCUTANEOUS at 00:37

## 2019-01-18 RX ADMIN — SODIUM CHLORIDE 3 MILLILITER(S): 9 INJECTION INTRAMUSCULAR; INTRAVENOUS; SUBCUTANEOUS at 16:14

## 2019-01-18 RX ADMIN — Medication 240 MILLIGRAM(S): at 06:22

## 2019-01-18 RX ADMIN — INSULIN GLARGINE 10 UNIT(S): 100 INJECTION, SOLUTION SUBCUTANEOUS at 22:01

## 2019-01-18 NOTE — DISCHARGE NOTE ADULT - HOSPITAL COURSE
EMILI 1/7/9:  EF 66%, normal LV fx, Mod to sev MS, mild AS, severe mobile atherosclerotic plaque in the descending thoracic aorta, Large ANDREW thrombus ( 3.0 x 1.4 cm)  [facility] 1/10/19: LAD 75% stenosis and a fistula to the PA, and 50% stenosis of D1  initial EMILI revealing mild-mod MR with mod-sev mitral stenosis  a/p    78 year old woman with history of LLE DVT 8/2018, on coumadin, COPD, DM, Asthma, presented to [facility] ER with new onset Atrial fibrillation and dyspnea on exertion worsening over the past few days prior to presentation on 1/5. Patient underwent EMILI and DCC at [facility] and was found to have a large ANDREW thrombus as well as mild to moderate MR with mild Mitral stenosis, EF 66%. CT PA was negative for PE. Left Heart Cath revealed LAD 75% stenosis and a fistula to the PA, and 50% stenosis of D1. The patient was then transferred to [facility] under Dr Ingram's Service for evaluation and pre-operative work up for CABG/MVR with ANDREW ligation and thrombus removal. Preop EMILI revealed less mitral stenosis, or cancelled.     1. CAD  s/p PCI to proximal LAD 1/18  stable, no cp  cont asa, plavix     2. Mitral valve stenosis, regurgitation  cont to manage medically    3. ANDREW thrombus, atrial fibrillation  rates improved continue ccb, bb   CHADS=3-4 - INR 2.04 today, d/c home on coumadin 5mg daily , home INR check set up for Monday 1/28   no dccv due to thrombus   4. DVT, hx  cont a/c, repeat duplex no changes  stable for d/c today   f/u apt. scheduled for 1/31 @ 3pm   INR check set up for monday 1/28   discussed plan w/ Medicine NP   Attending Attestation:   Patient seen and examined, agree with the above assessment and plan by DIONY Castro.  AF rate controlled  INR therapuetic  cont current meds  DC planning to followup with us and INR check on Monday.

## 2019-01-18 NOTE — DISCHARGE NOTE ADULT - MEDICATION SUMMARY - MEDICATIONS TO STOP TAKING
I will STOP taking the medications listed below when I get home from the hospital:    Lasix 20 mg oral tablet  -- 1 tab(s) by mouth once a day    heparin 100 units/mL-D5% intravenous solution  -- 4 milliliter(s) intravenous

## 2019-01-18 NOTE — DISCHARGE NOTE ADULT - SECONDARY DIAGNOSIS.
Chronic obstructive pulmonary disease, unspecified COPD type Type 2 diabetes mellitus with complication, unspecified whether long term insulin use Coronary artery disease involving native coronary artery of native heart with other form of angina pectoris Thrombosis/embolism, arterial

## 2019-01-18 NOTE — DISCHARGE NOTE ADULT - ADDITIONAL INSTRUCTIONS
Follow up with Cardiology on 1/31/19 at 3pm Follow up with Cardiology on 1/31/19 at 3pm  - home INR check set up for Monday 1/28

## 2019-01-18 NOTE — DISCHARGE NOTE ADULT - PATIENT PORTAL LINK FT
You can access the [facility] Patient Portal, offered by [facility], by registering with the following website: [email or web address]

## 2019-01-18 NOTE — DISCHARGE NOTE ADULT - MEDICATION SUMMARY - MEDICATIONS TO TAKE
I will START or STAY ON the medications listed below when I get home from the hospital:    Aspirin Enteric Coated 81 mg oral delayed release tablet  -- 1 tab(s) by mouth once a day  -- Indication: For Coronary artery disease involving native coronary artery of native heart with other form of angina pectoris    Cardizem  mg/24 hours oral capsule, extended release  -- 1 cap(s) by mouth once a day  -- Indication: For Coronary artery disease involving native coronary artery of native heart with other form of angina pectoris    Janumet 50 mg-1000 mg oral tablet  -- 1 tab(s) by mouth 2 times a day   -- Do not drink alcoholic beverages when taking this medication.  Take with food or milk.    -- Indication: For DM (diabetes mellitus)    Lipitor 20 mg oral tablet  -- 1 tab(s) by mouth once a day  -- Indication: For HLD    clopidogrel 75 mg oral tablet  -- 1 tab(s) by mouth once a day  -- Indication: For Coronary artery disease involving native coronary artery of native heart with other form of angina pectoris    metoprolol tartrate 25 mg oral tablet  -- 0.5 tab(s) by mouth 2 times a day   -- It is very important that you take or use this exactly as directed.  Do not skip doses or discontinue unless directed by your doctor.  May cause drowsiness.  Alcohol may intensify this effect.  Use care when operating dangerous machinery.  Some non-prescription drugs may aggravate your condition.  Read all labels carefully.  If a warning appears, check with your doctor before taking.  Take with food or milk.  This drug may impair the ability to drive or operate machinery.  Use care until you become familiar with its effects.    -- Indication: For HTN    Symbicort 80 mcg-4.5 mcg/inh inhalation aerosol  -- 2 puff(s) inhaled 2 times a day  -- Indication: For Asthma    docusate sodium 100 mg oral capsule  -- 1 cap(s) by mouth 3 times a day  -- Indication: For ConSTIPATION    montelukast 10 mg oral tablet  -- 1 tab(s) by mouth once a day  -- Indication: For Asthma    azelastine 0.05% ophthalmic solution  -- 1 drop(s) to each affected eye 2 times a day, As Needed for tears  -- Indication: For EYE DROPS     Azopt 1% ophthalmic suspension  -- 1 drop(s) to each affected eye once a day (at bedtime)  -- Indication: For EYE DROPS     pantoprazole 40 mg oral delayed release tablet  -- 1 tab(s) by mouth once a day (before a meal)  -- Indication: For GERD I will START or STAY ON the medications listed below when I get home from the hospital:    Aspirin Enteric Coated 81 mg oral delayed release tablet  -- 1 tab(s) by mouth once a day  -- Indication: For Coronary artery disease involving native coronary artery of native heart with other form of angina pectoris    Cardizem  mg/24 hours oral capsule, extended release  -- 1 cap(s) by mouth once a day  -- Indication: For Coronary artery disease involving native coronary artery of native heart with other form of angina pectoris    Coumadin 5 mg oral tablet  -- 1 tab(s) by mouth once a day   -- Do not take this drug if you are pregnant.  It is very important that you take or use this exactly as directed.  Do not skip doses or discontinue unless directed by your doctor.  Obtain medical advice before taking any non-prescription drugs as some may affect the action of this medication.    -- Indication: For AF (atrial fibrillation)    Janumet 50 mg-1000 mg oral tablet  -- 1 tab(s) by mouth 2 times a day   -- Do not drink alcoholic beverages when taking this medication.  Take with food or milk.    -- Indication: For DM (diabetes mellitus)    Lipitor 20 mg oral tablet  -- 1 tab(s) by mouth once a day  -- Indication: For HLD    clopidogrel 75 mg oral tablet  -- 1 tab(s) by mouth once a day  -- Indication: For Coronary artery disease involving native coronary artery of native heart with other form of angina pectoris    metoprolol tartrate 25 mg oral tablet  -- 0.5 tab(s) by mouth 2 times a day   -- It is very important that you take or use this exactly as directed.  Do not skip doses or discontinue unless directed by your doctor.  May cause drowsiness.  Alcohol may intensify this effect.  Use care when operating dangerous machinery.  Some non-prescription drugs may aggravate your condition.  Read all labels carefully.  If a warning appears, check with your doctor before taking.  Take with food or milk.  This drug may impair the ability to drive or operate machinery.  Use care until you become familiar with its effects.    -- Indication: For HTN    Symbicort 80 mcg-4.5 mcg/inh inhalation aerosol  -- 2 puff(s) inhaled 2 times a day  -- Indication: For Asthma    docusate sodium 100 mg oral capsule  -- 1 cap(s) by mouth 3 times a day  -- Indication: For ConSTIPATION    montelukast 10 mg oral tablet  -- 1 tab(s) by mouth once a day  -- Indication: For Asthma    azelastine 0.05% ophthalmic solution  -- 1 drop(s) to each affected eye 2 times a day, As Needed for tears  -- Indication: For EYE DROPS     Azopt 1% ophthalmic suspension  -- 1 drop(s) to each affected eye once a day (at bedtime)  -- Indication: For EYE DROPS     pantoprazole 40 mg oral delayed release tablet  -- 1 tab(s) by mouth once a day (before a meal)  -- Indication: For GERD

## 2019-01-18 NOTE — DISCHARGE NOTE ADULT - PLAN OF CARE
Continue Coumadin: Symptoms improved Atrial fibrillation is the most common heart rhythm problem.  The condition puts you at risk for has stroke and heart attack  It helps if you control your blood pressure, not drink more than 1-2 alcohol drinks per day, cut down on caffeine, getting treatment for over active thyroid gland, and get regular exercise  Call your doctor if you feel your heart racing or beating unusually, chest tightness or pain, lightheaded, faint, shortness of breath especially with exercise  It is important to take your heart medication as prescribed  You may be on anticoagulation which is very important to take as directed - you may need blood work to monitor drug levels HgA1C this admission.  Make sure you get your HgA1c checked every three months.  If you take oral diabetes medications, check your blood glucose two times a day.  If you take insulin, check your blood glucose before meals and at bedtime.  It's important not to skip any meals.  Keep a log of your blood glucose results and always take it with you to your doctor appointments.  Keep a list of your current medications including injectables and over the counter medications and bring this medication list with you to all your doctor appointments.  If you have not seen your ophthalmologist this year call for appointment.  Check your feet daily for redness, sores, or openings. Do not self treat. If no improvement in two days call your primary care physician for an appointment.  Low blood sugar (hypoglycemia) is a blood sugar below 70mg/dl. Check your blood sugar if you feel signs/symptoms of hypoglycemia. If your blood sugar is below 70 take 15 grams of carbohydrates (ex 4 oz of apple juice, 3-4 glucose tablets, or 4-6 oz of regular soda) wait 15 minutes and repeat blood sugar to make sure it comes up above 70.  If your blood sugar is above 70 and you are due for a meal, have a meal.  If you are not due for a meal have a snack.  This snack helps keeps your blood sugar at a safe range. s/p pci, stent : large thrombus Coronary artery disease is a condition where the arteries the supply the heart muscle get clogges with fatty deposits & puts you at risk for a heart attack  Call your doctor if you have any new pain, pressure, or discomfort in the center of your chest, pain, tingling or discomfort in arms, back, neck, jaw, or stomach, shortness of breath, nausea, vomiting, burping or heartburn, sweating, cold and clammy skin, racing or abnormal heartbeat for more than 10 minutes or if they keep coming & going.  Call 911 and do not tr to get to hospital by care  You can help yourself with lefestyle changes (quitting smoking if you smoke), eat lots of fruits & vegetables & low fat dairy products, not a lot of meat & fatty foods, walk or some form of physical activity most days of the week, lose weight if you are overweight  Take your cardiac medication as prescribed to lower cholesterol, to lower blood pressure, aspirin to prevent blood clots, and diabetes control  Make sure to keep appointments with doctor for cardiac follow up care Continue Coumadin

## 2019-01-18 NOTE — PROGRESS NOTE ADULT - SUBJECTIVE AND OBJECTIVE BOX
Chief complaint  Patient is a 78y old  Female who presents with a chief complaint of Mitral regurgitation, CAD, ANDREW thrombus (18 Jan 2019 10:30)   Review of systems  Patient in bed, looks comfortable, no fever, no hypoglycemia.    Labs and Fingersticks  CAPILLARY BLOOD GLUCOSE      POCT Blood Glucose.: 94 mg/dL (18 Jan 2019 14:52)  POCT Blood Glucose.: 87 mg/dL (18 Jan 2019 07:46)  POCT Blood Glucose.: 184 mg/dL (18 Jan 2019 02:23)  POCT Blood Glucose.: 108 mg/dL (17 Jan 2019 22:37)  POCT Blood Glucose.: 77 mg/dL (17 Jan 2019 22:21)  POCT Blood Glucose.: 93 mg/dL (17 Jan 2019 22:04)  POCT Blood Glucose.: 74 mg/dL (17 Jan 2019 21:45)  POCT Blood Glucose.: 70 mg/dL (17 Jan 2019 21:33)  POCT Blood Glucose.: 66 mg/dL (17 Jan 2019 21:12)  POCT Blood Glucose.: 208 mg/dL (17 Jan 2019 16:38)      Anion Gap, Serum: 13 (01-17 @ 06:20)      Calcium, Total Serum: 9.0 (01-17 @ 06:20)          01-17    138  |  103  |  15  ----------------------------<  95  3.8   |  22  |  0.66    Ca    9.0      17 Jan 2019 06:20  Mg     2.0     01-17                          10.0   9.9   )-----------( 276      ( 18 Jan 2019 07:21 )             31.1     Medications  MEDICATIONS  (STANDING):  atorvastatin 20 milliGRAM(s) Oral at bedtime  buDESOnide  80 MICROgram(s)/formoterol 4.5 MICROgram(s) Inhaler 2 Puff(s) Inhalation two times a day  clopidogrel Tablet 75 milliGRAM(s) Oral daily  dextrose 5%. 1000 milliLiter(s) (50 mL/Hr) IV Continuous <Continuous>  dextrose 50% Injectable 50 milliLiter(s) IV Push every 15 minutes  dextrose 50% Injectable 25 milliLiter(s) IV Push every 15 minutes  diltiazem    milliGRAM(s) Oral daily  docusate sodium 100 milliGRAM(s) Oral three times a day  heparin  Infusion. 400 Unit(s)/Hr (4 mL/Hr) IV Continuous <Continuous>  insulin glargine Injectable (LANTUS) 10 Unit(s) SubCutaneous at bedtime  insulin lispro (HumaLOG) corrective regimen sliding scale   SubCutaneous three times a day before meals  insulin lispro (HumaLOG) corrective regimen sliding scale   SubCutaneous at bedtime  insulin lispro Injectable (HumaLOG) 7 Unit(s) SubCutaneous three times a day before meals  melatonin 3 milliGRAM(s) Oral at bedtime  montelukast 10 milliGRAM(s) Oral daily  pantoprazole    Tablet 40 milliGRAM(s) Oral before breakfast  sodium chloride 0.9% lock flush 3 milliLiter(s) IV Push every 8 hours  warfarin 10 milliGRAM(s) Oral once      Physical Exam  General: Patient comfortable in bed  Vital Signs Last 12 Hrs  T(F): 97.5 (01-18-19 @ 05:49), Max: 97.5 (01-18-19 @ 05:49)  HR: 104 (01-18-19 @ 05:49) (104 - 104)  BP: 120/71 (01-18-19 @ 05:49) (120/71 - 120/71)  BP(mean): --  RR: 18 (01-18-19 @ 05:49) (18 - 18)  SpO2: 99% (01-18-19 @ 05:49) (99% - 99%)  Neck: No palpable thyroid nodules.  CVS: S1S2, No murmurs  Respiratory: No wheezing, no crepitations  GI: Abdomen soft, bowel sounds positive  Musculoskeletal:  edema lower extremities.   Skin: No skin rashes, no ecchymosis    Diagnostics

## 2019-01-18 NOTE — PROGRESS NOTE ADULT - ASSESSMENT
Assessment  DMT2: 78y Female with DM T2 with hyperglycemia on basal bolus insulin, s/p surgery, blood sugars in acceptable range, eating full meals.  CAD: S/P CABG, on medications, planing procedure, no chest pain, stable, monitored.  HLD: On statin.  HTN: Controlled, no headaches, on medications.        Sarah Foley MD  Cell: [phone]  Office: [phone] Assessment  DMT2: 78y Female with DM T2 with hyperglycemia on basal bolus insulin, s/p surgery, blood sugars in acceptable range, eating full meals.  CAD:  on medications, planing procedure, no chest pain, stable, monitored.  HLD: On statin.  HTN: Controlled, no headaches, on medications.        Sarah Foley MD  Cell: [phone]  Office: [phone]

## 2019-01-18 NOTE — DISCHARGE NOTE ADULT - CARE PROVIDER_API CALL
Michael Fitch (MD), Cardiovascular Disease; Internal Medicine; Interventional Cardiology; Nuclear Cardiology  [address] [facility] [address]  [place] [address]  Phone: [phone]  Fax: [phone]

## 2019-01-18 NOTE — DISCHARGE NOTE ADULT - CARE PLAN
Principal Discharge DX:	Atrial fibrillation, unspecified type  Goal:	Continue Coumadin: Symptoms improved  Assessment and plan of treatment:	Atrial fibrillation is the most common heart rhythm problem.  The condition puts you at risk for has stroke and heart attack  It helps if you control your blood pressure, not drink more than 1-2 alcohol drinks per day, cut down on caffeine, getting treatment for over active thyroid gland, and get regular exercise  Call your doctor if you feel your heart racing or beating unusually, chest tightness or pain, lightheaded, faint, shortness of breath especially with exercise  It is important to take your heart medication as prescribed  You may be on anticoagulation which is very important to take as directed - you may need blood work to monitor drug levels  Secondary Diagnosis:	Chronic obstructive pulmonary disease, unspecified COPD type  Secondary Diagnosis:	Type 2 diabetes mellitus with complication, unspecified whether long term insulin use  Assessment and plan of treatment:	HgA1C this admission.  Make sure you get your HgA1c checked every three months.  If you take oral diabetes medications, check your blood glucose two times a day.  If you take insulin, check your blood glucose before meals and at bedtime.  It's important not to skip any meals.  Keep a log of your blood glucose results and always take it with you to your doctor appointments.  Keep a list of your current medications including injectables and over the counter medications and bring this medication list with you to all your doctor appointments.  If you have not seen your ophthalmologist this year call for appointment.  Check your feet daily for redness, sores, or openings. Do not self treat. If no improvement in two days call your primary care physician for an appointment.  Low blood sugar (hypoglycemia) is a blood sugar below 70mg/dl. Check your blood sugar if you feel signs/symptoms of hypoglycemia. If your blood sugar is below 70 take 15 grams of carbohydrates (ex 4 oz of apple juice, 3-4 glucose tablets, or 4-6 oz of regular soda) wait 15 minutes and repeat blood sugar to make sure it comes up above 70.  If your blood sugar is above 70 and you are due for a meal, have a meal.  If you are not due for a meal have a snack.  This snack helps keeps your blood sugar at a safe range.  Secondary Diagnosis:	Coronary artery disease involving native coronary artery of native heart with other form of angina pectoris  Goal:	s/p pci, stent : large thrombus  Assessment and plan of treatment:	Coronary artery disease is a condition where the arteries the supply the heart muscle get clogges with fatty deposits & puts you at risk for a heart attack  Call your doctor if you have any new pain, pressure, or discomfort in the center of your chest, pain, tingling or discomfort in arms, back, neck, jaw, or stomach, shortness of breath, nausea, vomiting, burping or heartburn, sweating, cold and clammy skin, racing or abnormal heartbeat for more than 10 minutes or if they keep coming & going.  Call 911 and do not tr to get to hospital by care  You can help yourself with lefestyle changes (quitting smoking if you smoke), eat lots of fruits & vegetables & low fat dairy products, not a lot of meat & fatty foods, walk or some form of physical activity most days of the week, lose weight if you are overweight  Take your cardiac medication as prescribed to lower cholesterol, to lower blood pressure, aspirin to prevent blood clots, and diabetes control  Make sure to keep appointments with doctor for cardiac follow up care  Secondary Diagnosis:	Thrombosis/embolism, arterial  Assessment and plan of treatment:	Continue Coumadin

## 2019-01-19 LAB
ANION GAP SERPL CALC-SCNC: 12 MMOL/L — SIGNIFICANT CHANGE UP (ref 5–17)
APTT BLD: 166.2 SEC — CRITICAL HIGH (ref 27.5–36.3)
APTT BLD: 42.4 SEC — HIGH (ref 27.5–36.3)
APTT BLD: 58.9 SEC — HIGH (ref 27.5–36.3)
BUN SERPL-MCNC: 13 MG/DL — SIGNIFICANT CHANGE UP (ref 7–23)
CALCIUM SERPL-MCNC: 9.1 MG/DL — SIGNIFICANT CHANGE UP (ref 8.4–10.5)
CHLORIDE SERPL-SCNC: 106 MMOL/L — SIGNIFICANT CHANGE UP (ref 96–108)
CO2 SERPL-SCNC: 22 MMOL/L — SIGNIFICANT CHANGE UP (ref 22–31)
CREAT SERPL-MCNC: 0.71 MG/DL — SIGNIFICANT CHANGE UP (ref 0.5–1.3)
GLUCOSE BLDC GLUCOMTR-MCNC: 110 MG/DL — HIGH (ref 70–99)
GLUCOSE BLDC GLUCOMTR-MCNC: 132 MG/DL — HIGH (ref 70–99)
GLUCOSE BLDC GLUCOMTR-MCNC: 283 MG/DL — HIGH (ref 70–99)
GLUCOSE BLDC GLUCOMTR-MCNC: 63 MG/DL — LOW (ref 70–99)
GLUCOSE BLDC GLUCOMTR-MCNC: 98 MG/DL — SIGNIFICANT CHANGE UP (ref 70–99)
GLUCOSE SERPL-MCNC: 102 MG/DL — HIGH (ref 70–99)
HCT VFR BLD CALC: 30.8 % — LOW (ref 34.5–45)
HGB BLD-MCNC: 10.1 G/DL — LOW (ref 11.5–15.5)
INR BLD: 1.18 RATIO — HIGH (ref 0.88–1.16)
MCHC RBC-ENTMCNC: 27.8 PG — SIGNIFICANT CHANGE UP (ref 27–34)
MCHC RBC-ENTMCNC: 32.9 GM/DL — SIGNIFICANT CHANGE UP (ref 32–36)
MCV RBC AUTO: 84.6 FL — SIGNIFICANT CHANGE UP (ref 80–100)
PLATELET # BLD AUTO: 292 K/UL — SIGNIFICANT CHANGE UP (ref 150–400)
POTASSIUM SERPL-MCNC: 4.3 MMOL/L — SIGNIFICANT CHANGE UP (ref 3.5–5.3)
POTASSIUM SERPL-SCNC: 4.3 MMOL/L — SIGNIFICANT CHANGE UP (ref 3.5–5.3)
PROTHROM AB SERPL-ACNC: 13.6 SEC — HIGH (ref 10–12.9)
RBC # BLD: 3.64 M/UL — LOW (ref 3.8–5.2)
RBC # FLD: 15.1 % — HIGH (ref 10.3–14.5)
SODIUM SERPL-SCNC: 140 MMOL/L — SIGNIFICANT CHANGE UP (ref 135–145)
WBC # BLD: 8.9 K/UL — SIGNIFICANT CHANGE UP (ref 3.8–10.5)
WBC # FLD AUTO: 8.9 K/UL — SIGNIFICANT CHANGE UP (ref 3.8–10.5)

## 2019-01-19 PROCEDURE — 93010 ELECTROCARDIOGRAM REPORT: CPT

## 2019-01-19 RX ORDER — WARFARIN SODIUM 2.5 MG/1
8 TABLET ORAL ONCE
Qty: 0 | Refills: 0 | Status: COMPLETED | OUTPATIENT
Start: 2019-01-19 | End: 2019-01-19

## 2019-01-19 RX ADMIN — Medication 100 MILLIGRAM(S): at 22:18

## 2019-01-19 RX ADMIN — CLOPIDOGREL BISULFATE 75 MILLIGRAM(S): 75 TABLET, FILM COATED ORAL at 11:51

## 2019-01-19 RX ADMIN — INSULIN GLARGINE 10 UNIT(S): 100 INJECTION, SOLUTION SUBCUTANEOUS at 22:20

## 2019-01-19 RX ADMIN — Medication 3 MILLIGRAM(S): at 22:18

## 2019-01-19 RX ADMIN — PANTOPRAZOLE SODIUM 40 MILLIGRAM(S): 20 TABLET, DELAYED RELEASE ORAL at 05:46

## 2019-01-19 RX ADMIN — WARFARIN SODIUM 8 MILLIGRAM(S): 2.5 TABLET ORAL at 22:18

## 2019-01-19 RX ADMIN — SODIUM CHLORIDE 3 MILLILITER(S): 9 INJECTION INTRAMUSCULAR; INTRAVENOUS; SUBCUTANEOUS at 05:48

## 2019-01-19 RX ADMIN — HEPARIN SODIUM 300 UNIT(S)/HR: 5000 INJECTION INTRAVENOUS; SUBCUTANEOUS at 16:14

## 2019-01-19 RX ADMIN — HEPARIN SODIUM 300 UNIT(S)/HR: 5000 INJECTION INTRAVENOUS; SUBCUTANEOUS at 23:00

## 2019-01-19 RX ADMIN — HEPARIN SODIUM 3000 UNIT(S): 5000 INJECTION INTRAVENOUS; SUBCUTANEOUS at 08:04

## 2019-01-19 RX ADMIN — Medication 7 UNIT(S): at 16:49

## 2019-01-19 RX ADMIN — Medication 81 MILLIGRAM(S): at 11:51

## 2019-01-19 RX ADMIN — Medication 3: at 16:49

## 2019-01-19 RX ADMIN — ATORVASTATIN CALCIUM 20 MILLIGRAM(S): 80 TABLET, FILM COATED ORAL at 22:18

## 2019-01-19 RX ADMIN — MONTELUKAST 10 MILLIGRAM(S): 4 TABLET, CHEWABLE ORAL at 11:51

## 2019-01-19 RX ADMIN — SODIUM CHLORIDE 3 MILLILITER(S): 9 INJECTION INTRAMUSCULAR; INTRAVENOUS; SUBCUTANEOUS at 14:55

## 2019-01-19 RX ADMIN — SODIUM CHLORIDE 3 MILLILITER(S): 9 INJECTION INTRAMUSCULAR; INTRAVENOUS; SUBCUTANEOUS at 22:22

## 2019-01-19 RX ADMIN — HEPARIN SODIUM 600 UNIT(S)/HR: 5000 INJECTION INTRAVENOUS; SUBCUTANEOUS at 08:00

## 2019-01-19 RX ADMIN — Medication 240 MILLIGRAM(S): at 05:46

## 2019-01-19 RX ADMIN — HEPARIN SODIUM 0 UNIT(S)/HR: 5000 INJECTION INTRAVENOUS; SUBCUTANEOUS at 15:26

## 2019-01-19 NOTE — PROGRESS NOTE ADULT - SUBJECTIVE AND OBJECTIVE BOX
Chief complaint  Patient is a 78y old  Female who presents with a chief complaint of Mitral regurgitation, CAD, ANDREW thrombus (19 Jan 2019 12:13)   Review of systems  Patient in bed, looks comfortable, no fever, no hypoglycemia.    Labs and Fingersticks  CAPILLARY BLOOD GLUCOSE      POCT Blood Glucose.: 132 mg/dL (19 Jan 2019 11:28)  POCT Blood Glucose.: 98 mg/dL (19 Jan 2019 07:42)  POCT Blood Glucose.: 151 mg/dL (18 Jan 2019 21:24)  POCT Blood Glucose.: 94 mg/dL (18 Jan 2019 14:52)      Anion Gap, Serum: 12 (01-19 @ 06:00)      Calcium, Total Serum: 9.1 (01-19 @ 06:00)          01-19    140  |  106  |  13  ----------------------------<  102<H>  4.3   |  22  |  0.71    Ca    9.1      19 Jan 2019 06:00                          10.1   8.9   )-----------( 292      ( 19 Jan 2019 06:00 )             30.8     Medications  MEDICATIONS  (STANDING):  aspirin enteric coated 81 milliGRAM(s) Oral daily  atorvastatin 20 milliGRAM(s) Oral at bedtime  buDESOnide  80 MICROgram(s)/formoterol 4.5 MICROgram(s) Inhaler 2 Puff(s) Inhalation two times a day  clopidogrel Tablet 75 milliGRAM(s) Oral daily  dextrose 5%. 1000 milliLiter(s) (50 mL/Hr) IV Continuous <Continuous>  dextrose 50% Injectable 50 milliLiter(s) IV Push every 15 minutes  dextrose 50% Injectable 25 milliLiter(s) IV Push every 15 minutes  diltiazem    milliGRAM(s) Oral daily  docusate sodium 100 milliGRAM(s) Oral three times a day  heparin  Infusion. 400 Unit(s)/Hr (4 mL/Hr) IV Continuous <Continuous>  insulin glargine Injectable (LANTUS) 10 Unit(s) SubCutaneous at bedtime  insulin lispro (HumaLOG) corrective regimen sliding scale   SubCutaneous three times a day before meals  insulin lispro (HumaLOG) corrective regimen sliding scale   SubCutaneous at bedtime  insulin lispro Injectable (HumaLOG) 7 Unit(s) SubCutaneous three times a day before meals  melatonin 3 milliGRAM(s) Oral at bedtime  montelukast 10 milliGRAM(s) Oral daily  pantoprazole    Tablet 40 milliGRAM(s) Oral before breakfast  sodium chloride 0.9% lock flush 3 milliLiter(s) IV Push every 8 hours      Physical Exam  General: Patient comfortable in bed  Vital Signs Last 12 Hrs  T(F): 98 (01-19-19 @ 13:43), Max: 98.2 (01-19-19 @ 04:25)  HR: 86 (01-19-19 @ 13:43) (86 - 93)  BP: 118/73 (01-19-19 @ 13:43) (105/70 - 118/73)  BP(mean): --  RR: 18 (01-19-19 @ 13:43) (18 - 18)  SpO2: 100% (01-19-19 @ 13:43) (98% - 100%)  Neck: No palpable thyroid nodules.  CVS: S1S2, No murmurs  Respiratory: No wheezing, no crepitations  GI: Abdomen soft, bowel sounds positive  Musculoskeletal:  edema lower extremities.   Skin: No skin rashes, no ecchymosis    Diagnostics

## 2019-01-19 NOTE — PROGRESS NOTE ADULT - SUBJECTIVE AND OBJECTIVE BOX
JEANNE TRUJILLO  78y Female  MRN:[id number]    Patient is a 78y old  Female who presents with a chief complaint of Palpitations and SOB (06 Jan 2019 14:36)    HPI:  79 y/o F with PMH of Left LE DVT diagnosed on Aug 2018(on Coumadin), COPD, DM, Asthma presented with the complaint of SOB/GRADY and palpitations. As per the patient she has been having intermittent SOB but it worsened the past few days which prompted her to come to the ER. Patient stated that any minimal exertion she would have to stop to catch her breath. Patient stated that she sleeps with 2-3 pillows at night and endorsed of orthopnea and PND. Patient also endorsed of intermittent LE swelling and stated that she is complaint with her medications. Patient also endorsed of midsternal chest pain, characterized as a  pressure like sensation, without any aggravating factors, alleviated when she would massage her chest, without any radiations of the chest pain, with a severity of 5/10. Patient also endorsed of palpitations for the past few days. Patient denied any fevers, chills, N/V/D/C, abdominal pain, dysuria, melena, hematochezia, recent travel, sick contact, pleuritic or positional chest pain.     On ED admission EKG revealed Atrial fibrillation with RVR at a rate of 147 with QTc of 466, CE x 1: Trop: 8, CE x 2: Trop: 9, H&H: 10.4/33.5, Na: 133, Gluc: 104, Alk Phos: 285, AST: 38. Prelim CXR: 6 mm right lung nodular opacity of uncertain significance. Follow up to resolution. Hazy right lung opacities may be secondary to rotation versus atelectasis and/or pneumonia. In the ED patient received IV Cardizem and PO Cardizem which improved her rate. (05 Jan 2019 05:47)      Patient seen and evaluated at bedside.       Interval HPI: no acute events o/n     PAST MEDICAL & SURGICAL HISTORY:  DVT (deep venous thrombosis)  Asthma  COPD (chronic obstructive pulmonary disease)  DM (diabetes mellitus)  History of embolectomy: LLE    SOC:  non smoker  no drug or alcohol abuse    fam hx:  non cont     REVIEW OF SYSTEMS:  as per hpi    VITALS:  Vital Signs Last 24 Hrs  T(C): 36.6 (19 Jan 2019 19:37), Max: 36.8 (19 Jan 2019 04:25)  T(F): 97.9 (19 Jan 2019 19:37), Max: 98.2 (19 Jan 2019 04:25)  HR: 106 (19 Jan 2019 19:37) (86 - 106)  BP: 126/81 (19 Jan 2019 19:37) (105/70 - 126/81)  BP(mean): --  RR: 18 (19 Jan 2019 19:37) (18 - 18)  SpO2: 100% (19 Jan 2019 19:37) (98% - 100%)    PHYSICAL EXAM:  GENERAL: NAD, well-developed,   HEAD:  Atraumatic, Normocephalic  EYES: EOMI, PERRLA, conjunctiva and sclera clear  NECK: Supple, No JVD  CHEST/LUNG: Clear to auscultation bilaterally; No wheeze  HEART: S1, S2;   ABDOMEN: Soft, Nontender, Nondistended; Bowel sounds present  EXTREMITIES:  2+ Peripheral Pulses, No clubbing, cyanosis, or edema  NEUROLOGY: AOx3  SKIN: No rashes or lesions    Consultant(s) Notes Reviewed:  [x ] YES  [ ] NO  Care Discussed with Consultants/Other Providers [ x] YES  [ ] NO    MEDS:  MEDICATIONS  (STANDING):  aspirin enteric coated 81 milliGRAM(s) Oral daily  atorvastatin 20 milliGRAM(s) Oral at bedtime  buDESOnide  80 MICROgram(s)/formoterol 4.5 MICROgram(s) Inhaler 2 Puff(s) Inhalation two times a day  clopidogrel Tablet 75 milliGRAM(s) Oral daily  dextrose 5%. 1000 milliLiter(s) (50 mL/Hr) IV Continuous <Continuous>  dextrose 50% Injectable 50 milliLiter(s) IV Push every 15 minutes  dextrose 50% Injectable 25 milliLiter(s) IV Push every 15 minutes  diltiazem    milliGRAM(s) Oral daily  docusate sodium 100 milliGRAM(s) Oral three times a day  heparin  Infusion. 400 Unit(s)/Hr (4 mL/Hr) IV Continuous <Continuous>  insulin glargine Injectable (LANTUS) 10 Unit(s) SubCutaneous at bedtime  insulin lispro (HumaLOG) corrective regimen sliding scale   SubCutaneous three times a day before meals  insulin lispro (HumaLOG) corrective regimen sliding scale   SubCutaneous at bedtime  insulin lispro Injectable (HumaLOG) 7 Unit(s) SubCutaneous three times a day before meals  melatonin 3 milliGRAM(s) Oral at bedtime  montelukast 10 milliGRAM(s) Oral daily  pantoprazole    Tablet 40 milliGRAM(s) Oral before breakfast  sodium chloride 0.9% lock flush 3 milliLiter(s) IV Push every 8 hours  warfarin 8 milliGRAM(s) Oral once    MEDICATIONS  (PRN):  dextrose 40% Gel 15 Gram(s) Oral once PRN Blood Glucose LESS THAN 70 milliGRAM(s)/deciLiter  glucagon  Injectable 1 milliGRAM(s) IntraMuscular once PRN Glucose <70 milliGRAM(s)/deciLiter  heparin  Injectable 6500 Unit(s) IV Push every 6 hours PRN For aPTT less than 40  heparin  Injectable 3000 Unit(s) IV Push every 6 hours PRN For aPTT between 40 - 57      ALLERGIES:  No Known Allergies      LABS:                                          10.1   8.9   )-----------( 292      ( 19 Jan 2019 06:00 )             30.8   01-19    140  |  106  |  13  ----------------------------<  102<H>  4.3   |  22  |  0.71    Ca    9.1      19 Jan 2019 06:00    PT/INR - ( 19 Jan 2019 06:00 )   PT: 13.6 sec;   INR: 1.18 ratio         PTT - ( 19 Jan 2019 14:30 )  PTT:166.2 sec      < from: Cardiac Cath Lab - Adult (01.18.19 @ 11:34) >  INTERVENTIONAL RECOMMENDATIONS: PCI to severe proximal LAD lesion with BRENDA  in setting of new onset angina, acute diastolic HF. Continue ASA + plavix  daily. Restart IV heparin 6 hours post radial band removal if site ok.  Restart Coumadin tonight for AF/DANTE thrombus.    < end of copied text >        < from: Cardiac Cath Lab - Adult (01.10.19 @ 10:54) >  VENTRICLES: Global left ventricular function was normal. EF estimated was  60 %.  VALVES: AORTIC VALVE: There was mild aortic insufficiency. MITRAL VALVE:  The mitral valve exhibited mild regurgitation.  CORONARY VESSELS: The coronary circulation is right dominant.  LM:   --  LM: Normal.  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 There was a 75 % stenosis. Lesion is at the  bifurcation of a large diagonal branch. Lesion difficult to visual with  significant vessel overlap. IFR was 0.89 but IVUS imaging revealed severe  atherosclerotic disease with calcium and IVUS mla of 4.1 mm2. IVUS  catheter was occlusive at the lesion and unable to be advanced past  lesion.   D1: There was a 50 % stenosis in the proximal third of the vessel  segment.  CX:   --  Circumflex: Angiography showed mild atherosclerosis with no flow  limiting lesions.  RCA:   --  RCA: Angiography showed no evidence of disease. 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  --  RPDA: Angiography showed no evidence of disease.  --  RPL1:Angiography showed no evidence of disease.  AORTA: Ascending aorta: Normal. No evidence of any aortic fistula. No  aneurysm. The PA fills from coronary flow from root injection.  COMPLICATIONS: There were no complications.  DIAGNOSTIC IMPRESSIONS: New onset chest pain, AF with RVR, and acute  diastolic HF. EMILI with mod-severe mitral stenosis and very large thrombus  in DANTE. Cath with severe proximal LAD lesion with correlating IVUS  findings and moderate diagonal disease. 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 LV function preserved.  DIAGNOSTIC RECOMMENDATIONS: Continue medical treatment of CAD, AF. CTS  evaluation for MVR/CABG possible fistula ligation/dante thrombus  extraction/dante ligation.  INTERVENTIONALIMPRESSIONS: New onset chest pain, AF with RVR, and acute  diastolic HF. EMILI with mod-severe mitral stenosis and very large thrombus  in DANTE. Cath with severe proximal LAD lesion with correlating IVUS  findings and moderate diagonal disease. 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 LV function preserved.  INTERVENTIONAL RECOMMENDATIONS: Continue medical treatment of CAD, AF. CTS  evaluation for MVR/CABG possible fistula ligation/dante thrombus  extraction/dante ligation.    < end of copied text >          < from: EMILI w/TTE (w/3D Echo) (01.07.19 @ 15:03) >  ------------------------------  CONCLUSIONS:  1. Mitral annular calcification and calcified mitral  leaflets with decreased diastolic opening.  In some views  the valve has a rheumatic appearance. Mild-moderate mitral  regurgitation. Mean transmitral valve gradient equals 8 mm  Hg, estimated mitral valve area equals 1.1 sqcm (by  pressure half time equation), consistent with moderate to  severe mitral stenosis.  2. Calcified trileaflet aortic valve with decreased  opening. Peak transaortic valve gradient equals 17 mm Hg,  mean transaortic valve gradient equals 12 mm Hg, consistent  with mild aortic stenosis. No aortic valve regurgitation  seen.  3. Normal aortic root.  Mild non-mobile atherosclerotic  plaque in the aortic arch.  Severe, bulky, mobile  atherosclerotic plaque in the descending thoracic aorta.  4. Mildly dilated left atrium.  LA volume index = 35 cc/m2.   A very large (about  3.0 cm X 1.4 cm) thrombus is  visualized in the left atrial appendage.  5. Normal left ventricular internal dimensions and wall  thicknesses.  6. Normal left ventricular systolic function. No segmental  wall motion abnormalities.  7. Normal right ventricular size and function.  8. Contrast injection demonstrates no evidence of a patent  foramen ovale.  *** No previous Echo exam.  -------------------------------------------------------    < end of copied text >

## 2019-01-19 NOTE — PROGRESS NOTE ADULT - SUBJECTIVE AND OBJECTIVE BOX
CARDIOLOGY FOLLOW UP NOTE - DR. MORILLO    Subjective:    no chest pain, sob      PHYSICAL EXAM:  T(C): 36.8 (01-19-19 @ 04:25), Max: 36.8 (01-18-19 @ 17:00)  HR: 93 (01-19-19 @ 04:25) (88 - 102)  BP: 105/70 (01-19-19 @ 04:25) (105/70 - 130/70)  RR: 18 (01-19-19 @ 04:25) (16 - 18)  SpO2: 98% (01-19-19 @ 04:25) (98% - 100%)  Wt(kg): --  I&O's Summary    18 Jan 2019 07:01  -  19 Jan 2019 07:00  --------------------------------------------------------  IN: 500 mL / OUT: 150 mL / NET: 350 mL    19 Jan 2019 07:01  -  19 Jan 2019 12:13  --------------------------------------------------------  IN: 240 mL / OUT: 0 mL / NET: 240 mL        Appearance: Normal	  Cardiovascular: Normal S1 S2, irreg, sm  Respiratory: Lungs clear to auscultation	  Gastrointestinal:  Soft, Non-tender, + BS	  Extremities: Normal range of motion, No clubbing, cyanosis or edema    MEDICATIONS  (STANDING):  aspirin enteric coated 81 milliGRAM(s) Oral daily  atorvastatin 20 milliGRAM(s) Oral at bedtime  buDESOnide  80 MICROgram(s)/formoterol 4.5 MICROgram(s) Inhaler 2 Puff(s) Inhalation two times a day  clopidogrel Tablet 75 milliGRAM(s) Oral daily  dextrose 5%. 1000 milliLiter(s) (50 mL/Hr) IV Continuous <Continuous>  dextrose 50% Injectable 50 milliLiter(s) IV Push every 15 minutes  dextrose 50% Injectable 25 milliLiter(s) IV Push every 15 minutes  diltiazem    milliGRAM(s) Oral daily  docusate sodium 100 milliGRAM(s) Oral three times a day  heparin  Infusion. 400 Unit(s)/Hr (4 mL/Hr) IV Continuous <Continuous>  insulin glargine Injectable (LANTUS) 10 Unit(s) SubCutaneous at bedtime  insulin lispro (HumaLOG) corrective regimen sliding scale   SubCutaneous three times a day before meals  insulin lispro (HumaLOG) corrective regimen sliding scale   SubCutaneous at bedtime  insulin lispro Injectable (HumaLOG) 7 Unit(s) SubCutaneous three times a day before meals  melatonin 3 milliGRAM(s) Oral at bedtime  montelukast 10 milliGRAM(s) Oral daily  pantoprazole    Tablet 40 milliGRAM(s) Oral before breakfast  sodium chloride 0.9% lock flush 3 milliLiter(s) IV Push every 8 hours      TELEMETRY: 	    ECG:  	  RADIOLOGY:   DIAGNOSTIC TESTING:  [ ] Echocardiogram:  [ ] Catheterization:  [ ] Stress Test:    OTHER: 	    LABS:	 	    CARDIAC MARKERS:                                10.1   8.9   )-----------( 292      ( 19 Jan 2019 06:00 )             30.8     01-19    140  |  106  |  13  ----------------------------<  102<H>  4.3   |  22  |  0.71    Ca    9.1      19 Jan 2019 06:00      proBNP:   PT/INR - ( 19 Jan 2019 06:00 )   PT: 13.6 sec;   INR: 1.18 ratio         PTT - ( 19 Jan 2019 06:00 )  PTT:42.4 sec  Lipid Profile:   HgA1c:

## 2019-01-19 NOTE — PROGRESS NOTE ADULT - ASSESSMENT
Assessment  DMT2: 78y Female with DM T2 with hyperglycemia on basal bolus insulin, s/p surgery, blood sugars in acceptable range, eating full meals, ambulating.  CAD: S/P CABG, on medications, planing procedure, no chest pain, stable, monitored.  HLD: On statin.  HTN: Controlled, no headaches, on medications.        Sarah Foley MD  Cell: [phone]  Office: [phone] Assessment  DMT2: 78y Female with DM T2 with hyperglycemia on basal bolus insulin, s/p surgery, blood sugars in acceptable range, eating full meals, ambulating.  CAD: on medications, planing procedure, no chest pain, stable, monitored.  HLD: On statin.  HTN: Controlled, no headaches, on medications.        Sarah Foley MD  Cell: [phone]  Office: [phone]

## 2019-01-19 NOTE — PROGRESS NOTE ADULT - ASSESSMENT
77 yo female h/o dvt on coumadin, copd, dm, asthma, a/w c.o sob, curtis.  found to be in afib with rvr and acute heart failure  PE ruled out  ACS ruled out    afib  cards following  AC with hep gtt to coumadin  rate control - on Cardizem   tele monitor    found to have atrial thrombus on echo  dccv cancelled  s/p cath. results noted with LAD dz, valvular dz, and fistula  [facility]    OR cancelled  after finding out mitral stenosis not significant  s/p cath with pci to LAD  asa/plavix as per cards  hep gtt to coumadin for LV thrombus     DVT LLE  on AC with hep gtt to coumadin     asthma/copd  stable  nebs prn  pulm f/u     dc pending therapeutic inr

## 2019-01-19 NOTE — PROGRESS NOTE ADULT - ASSESSMENT
EMILI 1/7/9:  EF 66%, normal LV fx, Mod to sev MS, mild AS, severe mobile atherosclerotic plaque in the descending thoracic aorta, Large ANDREW thrombus ( 3.0 x 1.4 cm)  [facility] 1/10/19: LAD 75% stenosis and a fistula to the PA, and 50% stenosis of D1  initial EMILI revealing mild-mod MR with mod-sev mitral stenosis  a/p    78 year old woman with history of LLE DVT 8/2018, on coumadin, COPD, DM, Asthma, presented to [facility] ER with new onset Atrial fibrillation and dyspnea on exertion worsening over the past few days prior to presentation on 1/5. Patient underwent EMILI and DCC at [facility] and was found to have a large ANDREW thrombus as well as mild to moderate MR with mild Mitral stenosis, EF 66%. CT PA was negative for PE. Left Heart Cath revealed LAD 75% stenosis and a fistula to the PA, and 50% stenosis of D1. The patient was then transferred to [facility] under Dr Ingram's Service for evaluation and pre-operative work up for CABG/MVR with ANDREW ligation and thrombus removal. Preop EMILI revealed less mitral stenosis, or cancelled    1. CAD  s/p PCI to proximal LAD 1/18  stable, no cp  cont asa, plavix     2. Mitral valve stenosis, regurgitation  cont to manage medically    3. ANDREW thrombus, atrial fibrillation  continue AC with hep gtt, cont coumadin dosing   cont rate control with ccb  no dccv due to thrombus   CHADS=3-4    4. DVT, hx  cont a/c, repeat duplex no changes    d/c plan when inr > 2  attempt to obtain home services

## 2019-01-20 LAB
ANION GAP SERPL CALC-SCNC: 12 MMOL/L — SIGNIFICANT CHANGE UP (ref 5–17)
APTT BLD: 43.6 SEC — HIGH (ref 27.5–36.3)
APTT BLD: 88.3 SEC — HIGH (ref 27.5–36.3)
APTT BLD: 88.9 SEC — HIGH (ref 27.5–36.3)
BUN SERPL-MCNC: 11 MG/DL — SIGNIFICANT CHANGE UP (ref 7–23)
CALCIUM SERPL-MCNC: 9.4 MG/DL — SIGNIFICANT CHANGE UP (ref 8.4–10.5)
CHLORIDE SERPL-SCNC: 104 MMOL/L — SIGNIFICANT CHANGE UP (ref 96–108)
CO2 SERPL-SCNC: 23 MMOL/L — SIGNIFICANT CHANGE UP (ref 22–31)
CREAT SERPL-MCNC: 0.69 MG/DL — SIGNIFICANT CHANGE UP (ref 0.5–1.3)
GLUCOSE BLDC GLUCOMTR-MCNC: 112 MG/DL — HIGH (ref 70–99)
GLUCOSE BLDC GLUCOMTR-MCNC: 126 MG/DL — HIGH (ref 70–99)
GLUCOSE BLDC GLUCOMTR-MCNC: 143 MG/DL — HIGH (ref 70–99)
GLUCOSE BLDC GLUCOMTR-MCNC: 87 MG/DL — SIGNIFICANT CHANGE UP (ref 70–99)
GLUCOSE SERPL-MCNC: 96 MG/DL — SIGNIFICANT CHANGE UP (ref 70–99)
HCT VFR BLD CALC: 30.9 % — LOW (ref 34.5–45)
HGB BLD-MCNC: 9.9 G/DL — LOW (ref 11.5–15.5)
INR BLD: 1.22 RATIO — HIGH (ref 0.88–1.16)
MCHC RBC-ENTMCNC: 27.3 PG — SIGNIFICANT CHANGE UP (ref 27–34)
MCHC RBC-ENTMCNC: 32 GM/DL — SIGNIFICANT CHANGE UP (ref 32–36)
MCV RBC AUTO: 85.1 FL — SIGNIFICANT CHANGE UP (ref 80–100)
PLATELET # BLD AUTO: 322 K/UL — SIGNIFICANT CHANGE UP (ref 150–400)
POTASSIUM SERPL-MCNC: 4.1 MMOL/L — SIGNIFICANT CHANGE UP (ref 3.5–5.3)
POTASSIUM SERPL-SCNC: 4.1 MMOL/L — SIGNIFICANT CHANGE UP (ref 3.5–5.3)
PROTHROM AB SERPL-ACNC: 14 SEC — HIGH (ref 10–12.9)
RBC # BLD: 3.63 M/UL — LOW (ref 3.8–5.2)
RBC # FLD: 16.2 % — HIGH (ref 10.3–14.5)
SODIUM SERPL-SCNC: 139 MMOL/L — SIGNIFICANT CHANGE UP (ref 135–145)
WBC # BLD: 9.45 K/UL — SIGNIFICANT CHANGE UP (ref 3.8–10.5)
WBC # FLD AUTO: 9.45 K/UL — SIGNIFICANT CHANGE UP (ref 3.8–10.5)

## 2019-01-20 RX ORDER — WARFARIN SODIUM 2.5 MG/1
8 TABLET ORAL ONCE
Qty: 0 | Refills: 0 | Status: COMPLETED | OUTPATIENT
Start: 2019-01-20 | End: 2019-01-20

## 2019-01-20 RX ADMIN — Medication 81 MILLIGRAM(S): at 11:57

## 2019-01-20 RX ADMIN — HEPARIN SODIUM 500 UNIT(S)/HR: 5000 INJECTION INTRAVENOUS; SUBCUTANEOUS at 15:47

## 2019-01-20 RX ADMIN — SODIUM CHLORIDE 3 MILLILITER(S): 9 INJECTION INTRAMUSCULAR; INTRAVENOUS; SUBCUTANEOUS at 15:06

## 2019-01-20 RX ADMIN — Medication 100 MILLIGRAM(S): at 22:03

## 2019-01-20 RX ADMIN — SODIUM CHLORIDE 3 MILLILITER(S): 9 INJECTION INTRAMUSCULAR; INTRAVENOUS; SUBCUTANEOUS at 22:10

## 2019-01-20 RX ADMIN — Medication 3 MILLIGRAM(S): at 22:03

## 2019-01-20 RX ADMIN — MONTELUKAST 10 MILLIGRAM(S): 4 TABLET, CHEWABLE ORAL at 11:57

## 2019-01-20 RX ADMIN — Medication 240 MILLIGRAM(S): at 05:48

## 2019-01-20 RX ADMIN — ATORVASTATIN CALCIUM 20 MILLIGRAM(S): 80 TABLET, FILM COATED ORAL at 22:02

## 2019-01-20 RX ADMIN — HEPARIN SODIUM 500 UNIT(S)/HR: 5000 INJECTION INTRAVENOUS; SUBCUTANEOUS at 07:53

## 2019-01-20 RX ADMIN — HEPARIN SODIUM 500 UNIT(S)/HR: 5000 INJECTION INTRAVENOUS; SUBCUTANEOUS at 23:10

## 2019-01-20 RX ADMIN — INSULIN GLARGINE 10 UNIT(S): 100 INJECTION, SOLUTION SUBCUTANEOUS at 22:03

## 2019-01-20 RX ADMIN — PANTOPRAZOLE SODIUM 40 MILLIGRAM(S): 20 TABLET, DELAYED RELEASE ORAL at 05:48

## 2019-01-20 RX ADMIN — SODIUM CHLORIDE 3 MILLILITER(S): 9 INJECTION INTRAMUSCULAR; INTRAVENOUS; SUBCUTANEOUS at 05:49

## 2019-01-20 RX ADMIN — WARFARIN SODIUM 8 MILLIGRAM(S): 2.5 TABLET ORAL at 22:03

## 2019-01-20 RX ADMIN — CLOPIDOGREL BISULFATE 75 MILLIGRAM(S): 75 TABLET, FILM COATED ORAL at 11:58

## 2019-01-20 NOTE — PROGRESS NOTE ADULT - SUBJECTIVE AND OBJECTIVE BOX
CARDIOLOGY FOLLOW UP NOTE - DR. MORILLO    Subjective:    no cp, sob    PHYSICAL EXAM:  T(C): 36.4 (01-20-19 @ 04:28), Max: 36.7 (01-19-19 @ 13:43)  HR: 110 (01-20-19 @ 04:28) (86 - 110)  BP: 104/69 (01-20-19 @ 04:28) (104/69 - 126/81)  RR: 18 (01-20-19 @ 04:28) (18 - 18)  SpO2: 100% (01-20-19 @ 04:28) (100% - 100%)  Wt(kg): --  I&O's Summary    19 Jan 2019 07:01  -  20 Jan 2019 07:00  --------------------------------------------------------  IN: 1008 mL / OUT: 1700 mL / NET: -692 mL    20 Jan 2019 07:01  -  20 Jan 2019 10:21  --------------------------------------------------------  IN: 360 mL / OUT: 0 mL / NET: 360 mL        Appearance: Normal	  Cardiovascular: Normal S1 S2, irreg, smn   Respiratory: Lungs clear to auscultation	  Gastrointestinal:  Soft, Non-tender, + BS	  Extremities: Normal range of motion, No clubbing, cyanosis or edema      MEDICATIONS  (STANDING):  aspirin enteric coated 81 milliGRAM(s) Oral daily  atorvastatin 20 milliGRAM(s) Oral at bedtime  buDESOnide  80 MICROgram(s)/formoterol 4.5 MICROgram(s) Inhaler 2 Puff(s) Inhalation two times a day  clopidogrel Tablet 75 milliGRAM(s) Oral daily  dextrose 5%. 1000 milliLiter(s) (50 mL/Hr) IV Continuous <Continuous>  dextrose 50% Injectable 50 milliLiter(s) IV Push every 15 minutes  dextrose 50% Injectable 25 milliLiter(s) IV Push every 15 minutes  diltiazem    milliGRAM(s) Oral daily  docusate sodium 100 milliGRAM(s) Oral three times a day  heparin  Infusion. 400 Unit(s)/Hr (4 mL/Hr) IV Continuous <Continuous>  insulin glargine Injectable (LANTUS) 10 Unit(s) SubCutaneous at bedtime  insulin lispro (HumaLOG) corrective regimen sliding scale   SubCutaneous three times a day before meals  insulin lispro (HumaLOG) corrective regimen sliding scale   SubCutaneous at bedtime  insulin lispro Injectable (HumaLOG) 7 Unit(s) SubCutaneous three times a day before meals  melatonin 3 milliGRAM(s) Oral at bedtime  montelukast 10 milliGRAM(s) Oral daily  pantoprazole    Tablet 40 milliGRAM(s) Oral before breakfast  sodium chloride 0.9% lock flush 3 milliLiter(s) IV Push every 8 hours  warfarin 8 milliGRAM(s) Oral once      TELEMETRY: 	    ECG:  	  RADIOLOGY:   DIAGNOSTIC TESTING:  [ ] Echocardiogram:  [ ] Catheterization:  [ ] Stress Test:    OTHER: 	    LABS:	 	    CARDIAC MARKERS:                                9.9    9.45  )-----------( 322      ( 20 Jan 2019 08:24 )             30.9     01-20    139  |  104  |  11  ----------------------------<  96  4.1   |  23  |  0.69    Ca    9.4      20 Jan 2019 06:10      proBNP:   PT/INR - ( 20 Jan 2019 06:12 )   PT: 14.0 sec;   INR: 1.22 ratio         PTT - ( 20 Jan 2019 06:12 )  PTT:43.6 sec  Lipid Profile:   HgA1c:

## 2019-01-20 NOTE — PROGRESS NOTE ADULT - ASSESSMENT
EMILI 1/7/9:  EF 66%, normal LV fx, Mod to sev MS, mild AS, severe mobile atherosclerotic plaque in the descending thoracic aorta, Large ANDREW thrombus ( 3.0 x 1.4 cm)  [facility] 1/10/19: LAD 75% stenosis and a fistula to the PA, and 50% stenosis of D1  initial EMILI revealing mild-mod MR with mod-sev mitral stenosis  a/p    78 year old woman with history of LLE DVT 8/2018, on coumadin, COPD, DM, Asthma, presented to [facility] ER with new onset Atrial fibrillation and dyspnea on exertion worsening over the past few days prior to presentation on 1/5. Patient underwent EMILI and DCC at [facility] and was found to have a large ANDREW thrombus as well as mild to moderate MR with mild Mitral stenosis, EF 66%. CT PA was negative for PE. Left Heart Cath revealed LAD 75% stenosis and a fistula to the PA, and 50% stenosis of D1. The patient was then transferred to [facility] under Dr Ingram's Service for evaluation and pre-operative work up for CABG/MVR with ANDREW ligation and thrombus removal. Preop EMILI revealed less mitral stenosis, or cancelled    1. CAD  s/p PCI to proximal LAD 1/18  stable, no cp  cont asa, plavix     2. Mitral valve stenosis, regurgitation  cont to manage medically    3. ANDREW thrombus, atrial fibrillation  continue AC with hep gtt, cont coumadin dosing   8mg tonight   cont rate control with ccb  no dccv due to thrombus   CHADS=3-4    4. DVT, hx  cont a/c, repeat duplex no changes    d/c plan when inr > 2  attempt to obtain home services

## 2019-01-20 NOTE — PROGRESS NOTE ADULT - SUBJECTIVE AND OBJECTIVE BOX
Chief complaint  Patient is a 78y old  Female who presents with a chief complaint of Mitral regurgitation, CAD, ANDREW thrombus (20 Jan 2019 13:01)   Review of systems  Patient in bed, looks comfortable, no fever,  eating and ambulating, no hypoglycemia.    Labs and Fingersticks  CAPILLARY BLOOD GLUCOSE      POCT Blood Glucose.: 126 mg/dL (20 Jan 2019 11:51)  POCT Blood Glucose.: 87 mg/dL (20 Jan 2019 07:36)  POCT Blood Glucose.: 110 mg/dL (19 Jan 2019 22:05)  POCT Blood Glucose.: 63 mg/dL (19 Jan 2019 21:29)  POCT Blood Glucose.: 283 mg/dL (19 Jan 2019 16:25)      Anion Gap, Serum: 12 (01-20 @ 06:10)  Anion Gap, Serum: 12 (01-19 @ 06:00)      Calcium, Total Serum: 9.4 (01-20 @ 06:10)  Calcium, Total Serum: 9.1 (01-19 @ 06:00)          01-20    139  |  104  |  11  ----------------------------<  96  4.1   |  23  |  0.69    Ca    9.4      20 Jan 2019 06:10                          9.9    9.45  )-----------( 322      ( 20 Jan 2019 08:24 )             30.9     Medications  MEDICATIONS  (STANDING):  aspirin enteric coated 81 milliGRAM(s) Oral daily  atorvastatin 20 milliGRAM(s) Oral at bedtime  buDESOnide  80 MICROgram(s)/formoterol 4.5 MICROgram(s) Inhaler 2 Puff(s) Inhalation two times a day  clopidogrel Tablet 75 milliGRAM(s) Oral daily  dextrose 5%. 1000 milliLiter(s) (50 mL/Hr) IV Continuous <Continuous>  dextrose 50% Injectable 50 milliLiter(s) IV Push every 15 minutes  dextrose 50% Injectable 25 milliLiter(s) IV Push every 15 minutes  diltiazem    milliGRAM(s) Oral daily  docusate sodium 100 milliGRAM(s) Oral three times a day  heparin  Infusion. 400 Unit(s)/Hr (4 mL/Hr) IV Continuous <Continuous>  insulin glargine Injectable (LANTUS) 10 Unit(s) SubCutaneous at bedtime  insulin lispro (HumaLOG) corrective regimen sliding scale   SubCutaneous three times a day before meals  insulin lispro (HumaLOG) corrective regimen sliding scale   SubCutaneous at bedtime  insulin lispro Injectable (HumaLOG) 7 Unit(s) SubCutaneous three times a day before meals  melatonin 3 milliGRAM(s) Oral at bedtime  montelukast 10 milliGRAM(s) Oral daily  pantoprazole    Tablet 40 milliGRAM(s) Oral before breakfast  sodium chloride 0.9% lock flush 3 milliLiter(s) IV Push every 8 hours  warfarin 8 milliGRAM(s) Oral once      Physical Exam  General: Patient comfortable in bed  Vital Signs Last 12 Hrs  T(F): 98.2 (01-20-19 @ 13:37), Max: 98.2 (01-20-19 @ 13:37)  HR: 94 (01-20-19 @ 13:37) (94 - 110)  BP: 120/76 (01-20-19 @ 13:37) (104/69 - 120/76)  BP(mean): --  RR: 18 (01-20-19 @ 13:37) (18 - 18)  SpO2: 100% (01-20-19 @ 13:37) (100% - 100%)  Neck: No palpable thyroid nodules.  CVS: S1S2, No murmurs  Respiratory: No wheezing, no crepitations  GI: Abdomen soft, bowel sounds positive  Musculoskeletal:  edema lower extremities.   Skin: No skin rashes, no ecchymosis    Diagnostics

## 2019-01-20 NOTE — PROGRESS NOTE ADULT - ASSESSMENT
79 yo female h/o dvt on coumadin, copd, dm, asthma, a/w c.o sob, curtis.  found to be in afib with rvr and acute heart failure  PE ruled out  ACS ruled out    afib  cards following  AC with hep gtt to coumadin  rate control - on Cardizem   tele monitor    found to have atrial thrombus on echo  dccv cancelled  s/p cath. results noted with LAD dz, valvular dz, and fistula  [facility]    OR cancelled  after finding out mitral stenosis not significant  s/p cath with pci to LAD  asa/plavix as per cards  hep gtt to coumadin for LV thrombus     DVT LLE  on AC with hep gtt to coumadin     asthma/copd  stable  nebs prn  pulm f/u     dc pending therapeutic inr

## 2019-01-20 NOTE — PROGRESS NOTE ADULT - ASSESSMENT
Assessment  DMT2: 78y Female with DM T2 with hyperglycemia on basal bolus insulin, blood sugars in acceptable range, eating full meals, ambulating.  CAD: on medications, planing procedure, no chest pain, stable, monitored.  HLD: On statin.  HTN: Controlled, no headaches, on medications.        Sarah Foley MD  Cell: [phone]  Office: [phone]

## 2019-01-20 NOTE — PROGRESS NOTE ADULT - SUBJECTIVE AND OBJECTIVE BOX
JEANNE TRUJILLO  78y Female  MRN:[id number]    Patient is a 78y old  Female who presents with a chief complaint of Palpitations and SOB (06 Jan 2019 14:36)    HPI:  77 y/o F with PMH of Left LE DVT diagnosed on Aug 2018(on Coumadin), COPD, DM, Asthma presented with the complaint of SOB/GRADY and palpitations. As per the patient she has been having intermittent SOB but it worsened the past few days which prompted her to come to the ER. Patient stated that any minimal exertion she would have to stop to catch her breath. Patient stated that she sleeps with 2-3 pillows at night and endorsed of orthopnea and PND. Patient also endorsed of intermittent LE swelling and stated that she is complaint with her medications. Patient also endorsed of midsternal chest pain, characterized as a  pressure like sensation, without any aggravating factors, alleviated when she would massage her chest, without any radiations of the chest pain, with a severity of 5/10. Patient also endorsed of palpitations for the past few days. Patient denied any fevers, chills, N/V/D/C, abdominal pain, dysuria, melena, hematochezia, recent travel, sick contact, pleuritic or positional chest pain.     On ED admission EKG revealed Atrial fibrillation with RVR at a rate of 147 with QTc of 466, CE x 1: Trop: 8, CE x 2: Trop: 9, H&H: 10.4/33.5, Na: 133, Gluc: 104, Alk Phos: 285, AST: 38. Prelim CXR: 6 mm right lung nodular opacity of uncertain significance. Follow up to resolution. Hazy right lung opacities may be secondary to rotation versus atelectasis and/or pneumonia. In the ED patient received IV Cardizem and PO Cardizem which improved her rate. (05 Jan 2019 05:47)      Patient seen and evaluated at bedside.       Interval HPI: no acute events o/n     PAST MEDICAL & SURGICAL HISTORY:  DVT (deep venous thrombosis)  Asthma  COPD (chronic obstructive pulmonary disease)  DM (diabetes mellitus)  History of embolectomy: LLE    SOC:  non smoker  no drug or alcohol abuse    fam hx:  non cont     REVIEW OF SYSTEMS:  as per hpi    VITALS:  Vital Signs Last 24 Hrs  T(C): 36.4 (20 Jan 2019 04:28), Max: 36.7 (19 Jan 2019 13:43)  T(F): 97.5 (20 Jan 2019 04:28), Max: 98 (19 Jan 2019 13:43)  HR: 110 (20 Jan 2019 04:28) (86 - 110)  BP: 104/69 (20 Jan 2019 04:28) (104/69 - 126/81)  BP(mean): --  RR: 18 (20 Jan 2019 04:28) (18 - 18)  SpO2: 100% (20 Jan 2019 04:28) (100% - 100%)    PHYSICAL EXAM:  GENERAL: NAD, well-developed,   HEAD:  Atraumatic, Normocephalic  EYES: EOMI, PERRLA, conjunctiva and sclera clear  NECK: Supple, No JVD  CHEST/LUNG: Clear to auscultation bilaterally; No wheeze  HEART: S1, S2;   ABDOMEN: Soft, Nontender, Nondistended; Bowel sounds present  EXTREMITIES:  2+ Peripheral Pulses, No clubbing, cyanosis, or edema  NEUROLOGY: AOx3  SKIN: No rashes or lesions    Consultant(s) Notes Reviewed:  [x ] YES  [ ] NO  Care Discussed with Consultants/Other Providers [ x] YES  [ ] NO    MEDS:  MEDICATIONS  (STANDING):  aspirin enteric coated 81 milliGRAM(s) Oral daily  atorvastatin 20 milliGRAM(s) Oral at bedtime  buDESOnide  80 MICROgram(s)/formoterol 4.5 MICROgram(s) Inhaler 2 Puff(s) Inhalation two times a day  clopidogrel Tablet 75 milliGRAM(s) Oral daily  dextrose 5%. 1000 milliLiter(s) (50 mL/Hr) IV Continuous <Continuous>  dextrose 50% Injectable 50 milliLiter(s) IV Push every 15 minutes  dextrose 50% Injectable 25 milliLiter(s) IV Push every 15 minutes  diltiazem    milliGRAM(s) Oral daily  docusate sodium 100 milliGRAM(s) Oral three times a day  heparin  Infusion. 400 Unit(s)/Hr (4 mL/Hr) IV Continuous <Continuous>  insulin glargine Injectable (LANTUS) 10 Unit(s) SubCutaneous at bedtime  insulin lispro (HumaLOG) corrective regimen sliding scale   SubCutaneous three times a day before meals  insulin lispro (HumaLOG) corrective regimen sliding scale   SubCutaneous at bedtime  insulin lispro Injectable (HumaLOG) 7 Unit(s) SubCutaneous three times a day before meals  melatonin 3 milliGRAM(s) Oral at bedtime  montelukast 10 milliGRAM(s) Oral daily  pantoprazole    Tablet 40 milliGRAM(s) Oral before breakfast  sodium chloride 0.9% lock flush 3 milliLiter(s) IV Push every 8 hours  warfarin 8 milliGRAM(s) Oral once    MEDICATIONS  (PRN):  dextrose 40% Gel 15 Gram(s) Oral once PRN Blood Glucose LESS THAN 70 milliGRAM(s)/deciLiter  glucagon  Injectable 1 milliGRAM(s) IntraMuscular once PRN Glucose <70 milliGRAM(s)/deciLiter  heparin  Injectable 6500 Unit(s) IV Push every 6 hours PRN For aPTT less than 40  heparin  Injectable 3000 Unit(s) IV Push every 6 hours PRN For aPTT between 40 - 57        ALLERGIES:  No Known Allergies      LABS:                                        9.9    9.45  )-----------( 322      ( 20 Jan 2019 08:24 )             30.9   01-20    139  |  104  |  11  ----------------------------<  96  4.1   |  23  |  0.69    Ca    9.4      20 Jan 2019 06:10    PT/INR - ( 20 Jan 2019 06:12 )   PT: 14.0 sec;   INR: 1.22 ratio         PTT - ( 20 Jan 2019 06:12 )  PTT:43.6 sec      < from: Cardiac Cath Lab - Adult (01.18.19 @ 11:34) >  INTERVENTIONAL RECOMMENDATIONS: PCI to severe proximal LAD lesion with BRENDA  in setting of new onset angina, acute diastolic HF. Continue ASA + plavix  daily. Restart IV heparin 6 hours post radial band removal if site ok.  Restart Coumadin tonight for AF/DANTE thrombus.    < end of copied text >        < from: Cardiac Cath Lab - Adult (01.10.19 @ 10:54) >  VENTRICLES: Global left ventricular function was normal. EF estimated was  60 %.      VALVES: AORTIC VALVE: There was mild aortic insufficiency. MITRAL VALVE:  The mitral valve exhibited mild regurgitation.  CORONARY VESSELS: The coronary circulation is right dominant.  LM:   --  LM: Normal.  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 There was a 75 % stenosis. Lesion is at the  bifurcation of a large diagonal branch. Lesion difficult to visual with  significant vessel overlap. IFR was 0.89 but IVUS imaging revealed severe  atherosclerotic disease with calcium and IVUS mla of 4.1 mm2. IVUS  catheter was occlusive at the lesion and unable to be advanced past  lesion.   D1: There was a 50 % stenosis in the proximal third of the vessel  segment.  CX:   --  Circumflex: Angiography showed mild atherosclerosis with no flow  limiting lesions.  RCA:   --  RCA: Angiography showed no evidence of disease. 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  --  RPDA: Angiography showed no evidence of disease.  --  RPL1:Angiography showed no evidence of disease.  AORTA: Ascending aorta: Normal. No evidence of any aortic fistula. No  aneurysm. The PA fills from coronary flow from root injection.  COMPLICATIONS: There were no complications.  DIAGNOSTIC IMPRESSIONS: New onset chest pain, AF with RVR, and acute  diastolic HF. EMILI with mod-severe mitral stenosis and very large thrombus  in DANTE. Cath with severe proximal LAD lesion with correlating IVUS  findings and moderate diagonal disease. 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 LV function preserved.  DIAGNOSTIC RECOMMENDATIONS: Continue medical treatment of CAD, AF. CTS  evaluation for MVR/CABG possible fistula ligation/dante thrombus  extraction/dante ligation.  INTERVENTIONALIMPRESSIONS: New onset chest pain, AF with RVR, and acute  diastolic HF. EMILI with mod-severe mitral stenosis and very large thrombus  in DANTE. Cath with severe proximal LAD lesion with correlating IVUS  findings and moderate diagonal disease. 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 LV function preserved.  INTERVENTIONAL RECOMMENDATIONS: Continue medical treatment of CAD, AF. CTS  evaluation for MVR/CABG possible fistula ligation/dante thrombus  extraction/adnte ligation.    < end of copied text >          < from: EMILI w/TTE (w/3D Echo) (01.07.19 @ 15:03) >  ------------------------------  CONCLUSIONS:  1. Mitral annular calcification and calcified mitral  leaflets with decreased diastolic opening.  In some views  the valve has a rheumatic appearance. Mild-moderate mitral  regurgitation. Mean transmitral valve gradient equals 8 mm  Hg, estimated mitral valve area equals 1.1 sqcm (by  pressure half time equation), consistent with moderate to  severe mitral stenosis.  2. Calcified trileaflet aortic valve with decreased  opening. Peak transaortic valve gradient equals 17 mm Hg,  mean transaortic valve gradient equals 12 mm Hg, consistent  with mild aortic stenosis. No aortic valve regurgitation  seen.  3. Normal aortic root.  Mild non-mobile atherosclerotic  plaque in the aortic arch.  Severe, bulky, mobile  atherosclerotic plaque in the descending thoracic aorta.  4. Mildly dilated left atrium.  LA volume index = 35 cc/m2.   A very large (about  3.0 cm X 1.4 cm) thrombus is  visualized in the left atrial appendage.  5. Normal left ventricular internal dimensions and wall  thicknesses.  6. Normal left ventricular systolic function. No segmental  wall motion abnormalities.  7. Normal right ventricular size and function.  8. Contrast injection demonstrates no evidence of a patent  foramen ovale.  *** No previous Echo exam.  -------------------------------------------------------    < end of copied text >

## 2019-01-21 LAB
ANION GAP SERPL CALC-SCNC: 9 MMOL/L — SIGNIFICANT CHANGE UP (ref 5–17)
APTT BLD: 72.3 SEC — HIGH (ref 27.5–36.3)
BUN SERPL-MCNC: 14 MG/DL — SIGNIFICANT CHANGE UP (ref 7–23)
CALCIUM SERPL-MCNC: 9.5 MG/DL — SIGNIFICANT CHANGE UP (ref 8.4–10.5)
CHLORIDE SERPL-SCNC: 104 MMOL/L — SIGNIFICANT CHANGE UP (ref 96–108)
CO2 SERPL-SCNC: 23 MMOL/L — SIGNIFICANT CHANGE UP (ref 22–31)
CREAT SERPL-MCNC: 0.7 MG/DL — SIGNIFICANT CHANGE UP (ref 0.5–1.3)
GLUCOSE BLDC GLUCOMTR-MCNC: 105 MG/DL — HIGH (ref 70–99)
GLUCOSE BLDC GLUCOMTR-MCNC: 118 MG/DL — HIGH (ref 70–99)
GLUCOSE BLDC GLUCOMTR-MCNC: 78 MG/DL — SIGNIFICANT CHANGE UP (ref 70–99)
GLUCOSE BLDC GLUCOMTR-MCNC: 87 MG/DL — SIGNIFICANT CHANGE UP (ref 70–99)
GLUCOSE BLDC GLUCOMTR-MCNC: 96 MG/DL — SIGNIFICANT CHANGE UP (ref 70–99)
GLUCOSE SERPL-MCNC: 111 MG/DL — HIGH (ref 70–99)
HCT VFR BLD CALC: 31.1 % — LOW (ref 34.5–45)
HCT VFR BLD CALC: 31.9 % — LOW (ref 34.5–45)
HGB BLD-MCNC: 10.1 G/DL — LOW (ref 11.5–15.5)
HGB BLD-MCNC: 10.6 G/DL — LOW (ref 11.5–15.5)
INR BLD: 1.44 RATIO — HIGH (ref 0.88–1.16)
MCHC RBC-ENTMCNC: 27 PG — SIGNIFICANT CHANGE UP (ref 27–34)
MCHC RBC-ENTMCNC: 27.9 PG — SIGNIFICANT CHANGE UP (ref 27–34)
MCHC RBC-ENTMCNC: 32.5 GM/DL — SIGNIFICANT CHANGE UP (ref 32–36)
MCHC RBC-ENTMCNC: 33.1 GM/DL — SIGNIFICANT CHANGE UP (ref 32–36)
MCV RBC AUTO: 83.2 FL — SIGNIFICANT CHANGE UP (ref 80–100)
MCV RBC AUTO: 84.4 FL — SIGNIFICANT CHANGE UP (ref 80–100)
PLATELET # BLD AUTO: 279 K/UL — SIGNIFICANT CHANGE UP (ref 150–400)
PLATELET # BLD AUTO: 292 K/UL — SIGNIFICANT CHANGE UP (ref 150–400)
POTASSIUM SERPL-MCNC: 4.6 MMOL/L — SIGNIFICANT CHANGE UP (ref 3.5–5.3)
POTASSIUM SERPL-SCNC: 4.6 MMOL/L — SIGNIFICANT CHANGE UP (ref 3.5–5.3)
PROTHROM AB SERPL-ACNC: 16.6 SEC — HIGH (ref 10–13.1)
RBC # BLD: 3.74 M/UL — LOW (ref 3.8–5.2)
RBC # BLD: 3.78 M/UL — LOW (ref 3.8–5.2)
RBC # FLD: 14.7 % — HIGH (ref 10.3–14.5)
RBC # FLD: 16.5 % — HIGH (ref 10.3–14.5)
SODIUM SERPL-SCNC: 136 MMOL/L — SIGNIFICANT CHANGE UP (ref 135–145)
WBC # BLD: 8.52 K/UL — SIGNIFICANT CHANGE UP (ref 3.8–10.5)
WBC # BLD: 9.1 K/UL — SIGNIFICANT CHANGE UP (ref 3.8–10.5)
WBC # FLD AUTO: 8.52 K/UL — SIGNIFICANT CHANGE UP (ref 3.8–10.5)
WBC # FLD AUTO: 9.1 K/UL — SIGNIFICANT CHANGE UP (ref 3.8–10.5)

## 2019-01-21 PROCEDURE — 93010 ELECTROCARDIOGRAM REPORT: CPT

## 2019-01-21 RX ORDER — WARFARIN SODIUM 2.5 MG/1
8 TABLET ORAL ONCE
Qty: 0 | Refills: 0 | Status: COMPLETED | OUTPATIENT
Start: 2019-01-21 | End: 2019-01-21

## 2019-01-21 RX ADMIN — INSULIN GLARGINE 10 UNIT(S): 100 INJECTION, SOLUTION SUBCUTANEOUS at 21:52

## 2019-01-21 RX ADMIN — Medication 81 MILLIGRAM(S): at 12:30

## 2019-01-21 RX ADMIN — PANTOPRAZOLE SODIUM 40 MILLIGRAM(S): 20 TABLET, DELAYED RELEASE ORAL at 05:39

## 2019-01-21 RX ADMIN — ATORVASTATIN CALCIUM 20 MILLIGRAM(S): 80 TABLET, FILM COATED ORAL at 21:51

## 2019-01-21 RX ADMIN — WARFARIN SODIUM 8 MILLIGRAM(S): 2.5 TABLET ORAL at 21:52

## 2019-01-21 RX ADMIN — MONTELUKAST 10 MILLIGRAM(S): 4 TABLET, CHEWABLE ORAL at 12:30

## 2019-01-21 RX ADMIN — Medication 240 MILLIGRAM(S): at 05:39

## 2019-01-21 RX ADMIN — SODIUM CHLORIDE 3 MILLILITER(S): 9 INJECTION INTRAMUSCULAR; INTRAVENOUS; SUBCUTANEOUS at 13:36

## 2019-01-21 RX ADMIN — HEPARIN SODIUM 500 UNIT(S)/HR: 5000 INJECTION INTRAVENOUS; SUBCUTANEOUS at 12:31

## 2019-01-21 RX ADMIN — Medication 100 MILLIGRAM(S): at 21:51

## 2019-01-21 RX ADMIN — CLOPIDOGREL BISULFATE 75 MILLIGRAM(S): 75 TABLET, FILM COATED ORAL at 12:30

## 2019-01-21 RX ADMIN — Medication 3 MILLIGRAM(S): at 21:51

## 2019-01-21 RX ADMIN — SODIUM CHLORIDE 3 MILLILITER(S): 9 INJECTION INTRAMUSCULAR; INTRAVENOUS; SUBCUTANEOUS at 21:53

## 2019-01-21 RX ADMIN — SODIUM CHLORIDE 3 MILLILITER(S): 9 INJECTION INTRAMUSCULAR; INTRAVENOUS; SUBCUTANEOUS at 05:37

## 2019-01-21 NOTE — PROGRESS NOTE ADULT - SUBJECTIVE AND OBJECTIVE BOX
Chief complaint  Patient is a 78y old  Female who presents with a chief complaint of Mitral regurgitation, CAD, ANDREW thrombus (21 Jan 2019 10:22)   Review of systems  Patient in bed, looks comfortable, no fever,  no hypoglycemia.    Labs and Fingersticks  CAPILLARY BLOOD GLUCOSE      POCT Blood Glucose.: 78 mg/dL (21 Jan 2019 12:06)  POCT Blood Glucose.: 96 mg/dL (21 Jan 2019 07:44)  POCT Blood Glucose.: 118 mg/dL (21 Jan 2019 02:07)  POCT Blood Glucose.: 143 mg/dL (20 Jan 2019 21:31)  POCT Blood Glucose.: 112 mg/dL (20 Jan 2019 16:29)      Anion Gap, Serum: 9 (01-21 @ 05:27)  Anion Gap, Serum: 12 (01-20 @ 06:10)      Calcium, Total Serum: 9.5 (01-21 @ 05:27)  Calcium, Total Serum: 9.4 (01-20 @ 06:10)          01-21    136  |  104  |  14  ----------------------------<  111<H>  4.6   |  23  |  0.70    Ca    9.5      21 Jan 2019 05:27                          10.1   8.52  )-----------( 292      ( 21 Jan 2019 07:56 )             31.1     Medications  MEDICATIONS  (STANDING):  aspirin enteric coated 81 milliGRAM(s) Oral daily  atorvastatin 20 milliGRAM(s) Oral at bedtime  buDESOnide  80 MICROgram(s)/formoterol 4.5 MICROgram(s) Inhaler 2 Puff(s) Inhalation two times a day  clopidogrel Tablet 75 milliGRAM(s) Oral daily  dextrose 5%. 1000 milliLiter(s) (50 mL/Hr) IV Continuous <Continuous>  dextrose 50% Injectable 50 milliLiter(s) IV Push every 15 minutes  dextrose 50% Injectable 25 milliLiter(s) IV Push every 15 minutes  diltiazem    milliGRAM(s) Oral daily  docusate sodium 100 milliGRAM(s) Oral three times a day  heparin  Infusion. 400 Unit(s)/Hr (4 mL/Hr) IV Continuous <Continuous>  insulin glargine Injectable (LANTUS) 10 Unit(s) SubCutaneous at bedtime  insulin lispro (HumaLOG) corrective regimen sliding scale   SubCutaneous three times a day before meals  insulin lispro (HumaLOG) corrective regimen sliding scale   SubCutaneous at bedtime  insulin lispro Injectable (HumaLOG) 7 Unit(s) SubCutaneous three times a day before meals  melatonin 3 milliGRAM(s) Oral at bedtime  montelukast 10 milliGRAM(s) Oral daily  pantoprazole    Tablet 40 milliGRAM(s) Oral before breakfast  sodium chloride 0.9% lock flush 3 milliLiter(s) IV Push every 8 hours      Physical Exam  General: Patient comfortable in bed  Vital Signs Last 12 Hrs  T(F): 97.5 (01-21-19 @ 12:46), Max: 98 (01-21-19 @ 02:52)  HR: 79 (01-21-19 @ 12:46) (77 - 143)  BP: 119/76 (01-21-19 @ 12:46) (100/68 - 136/82)  BP(mean): --  RR: 18 (01-21-19 @ 12:46) (18 - 18)  SpO2: 99% (01-21-19 @ 12:46) (97% - 100%)  Neck: No palpable thyroid nodules.  CVS: S1S2, No murmurs  Respiratory: No wheezing, no crepitations  GI: Abdomen soft, bowel sounds positive  Musculoskeletal:  edema lower extremities.   Skin: No skin rashes, no ecchymosis    Diagnostics

## 2019-01-21 NOTE — PROGRESS NOTE ADULT - SUBJECTIVE AND OBJECTIVE BOX
CARDIOLOGY FOLLOW UP - Dr. Fitch    CC no chest pain or sob   c.o palps last night       PHYSICAL EXAM:  T(C): 36.7 (01-21-19 @ 05:00), Max: 36.8 (01-20-19 @ 13:37)  HR: 130 (01-21-19 @ 09:00) (77 - 143)  BP: 107/72 (01-21-19 @ 09:00) (100/68 - 136/82)  RR: 18 (01-21-19 @ 05:00) (18 - 18)  SpO2: 100% (01-21-19 @ 05:00) (97% - 100%)  Wt(kg): --  I&O's Summary    20 Jan 2019 07:01  -  21 Jan 2019 07:00  --------------------------------------------------------  IN: 1010 mL / OUT: 1650 mL / NET: -640 mL    21 Jan 2019 07:01  -  21 Jan 2019 10:22  --------------------------------------------------------  IN: 240 mL / OUT: 400 mL / NET: -160 mL        Appearance: Normal	  Cardiovascular: Normal S1 S2,irregular + murmur  Respiratory: Lungs clear to auscultation	  Gastrointestinal:  Soft, Non-tender, + BS	  Extremities: Normal range of motion, No clubbing, cyanosis or edema        MEDICATIONS  (STANDING):  aspirin enteric coated 81 milliGRAM(s) Oral daily  atorvastatin 20 milliGRAM(s) Oral at bedtime  buDESOnide  80 MICROgram(s)/formoterol 4.5 MICROgram(s) Inhaler 2 Puff(s) Inhalation two times a day  clopidogrel Tablet 75 milliGRAM(s) Oral daily  dextrose 5%. 1000 milliLiter(s) (50 mL/Hr) IV Continuous <Continuous>  dextrose 50% Injectable 50 milliLiter(s) IV Push every 15 minutes  dextrose 50% Injectable 25 milliLiter(s) IV Push every 15 minutes  diltiazem    milliGRAM(s) Oral daily  docusate sodium 100 milliGRAM(s) Oral three times a day  heparin  Infusion. 400 Unit(s)/Hr (4 mL/Hr) IV Continuous <Continuous>  insulin glargine Injectable (LANTUS) 10 Unit(s) SubCutaneous at bedtime  insulin lispro (HumaLOG) corrective regimen sliding scale   SubCutaneous three times a day before meals  insulin lispro (HumaLOG) corrective regimen sliding scale   SubCutaneous at bedtime  insulin lispro Injectable (HumaLOG) 7 Unit(s) SubCutaneous three times a day before meals  melatonin 3 milliGRAM(s) Oral at bedtime  montelukast 10 milliGRAM(s) Oral daily  pantoprazole    Tablet 40 milliGRAM(s) Oral before breakfast  sodium chloride 0.9% lock flush 3 milliLiter(s) IV Push every 8 hours      TELEMETRY: afib hr   as high as 140s  	    ECG:  	  RADIOLOGY:   DIAGNOSTIC TESTING:  [ ] Echocardiogram:  [ ]  Catheterization:  [ ] Stress Test:    OTHER: 	    LABS:	 	                                10.1   8.52  )-----------( 292      ( 21 Jan 2019 07:56 )             31.1     01-21    136  |  104  |  14  ----------------------------<  111<H>  4.6   |  23  |  0.70    Ca    9.5      21 Jan 2019 05:27      PT/INR - ( 21 Jan 2019 07:45 )   PT: 16.6 sec;   INR: 1.44 ratio         PTT - ( 21 Jan 2019 07:45 )  PTT:72.3 sec

## 2019-01-21 NOTE — PROGRESS NOTE ADULT - ASSESSMENT
Assessment  DMT2: 78y Female with DM T2 with hyperglycemia on insulin, blood sugars in acceptable range, eating full meals, ambulating.  CAD: on medications, planing procedure, no chest pain, stable, monitored.  HLD: On statin.  HTN: Controlled, no headaches, on medications.        Sarah Foley MD  Cell: [phone]  Office: [phone]

## 2019-01-21 NOTE — PROVIDER CONTACT NOTE (OTHER) - BACKGROUND
1/10 Transfer to [facility]/new onset afib + ANDREW thrombus/mod MR/+ CAD 1/14 surgery cancelled 1/18 PCI to LAD

## 2019-01-21 NOTE — PROVIDER CONTACT NOTE (OTHER) - ACTION/TREATMENT ORDERED:
Bedside commode. continue to monitor.
Orders received. Stat EKG. Cardizem 5mg IV x 1
as per hypogylcemia protocol 15 g carbs administered after each FS check until FS > 100
Give morning dose of Cardizem (240mg) PO early, draw morning labs (aPTT, BPM, CPC, mag, phos) Continue to monitor
Give morning medications, recheck blood pressure in one hr.
Keep rate at 6 cc/hr. Continue to monitor

## 2019-01-21 NOTE — PROGRESS NOTE ADULT - ATTENDING COMMENTS
Patient seen and examined, agree with the above assessment and plan by DIONY Ceballos.  AF w RVR overnight  BP soft  start digoxin load  cont to dose coumadin  DC planning when rate controlled and INR therapeutic

## 2019-01-21 NOTE — PROGRESS NOTE ADULT - ASSESSMENT
77 yo female h/o dvt on coumadin, copd, dm, asthma, a/w c.o sob, curtis.  found to be in afib with rvr and acute heart failure  PE ruled out  ACS ruled out    afib  cards following  AC with hep gtt to coumadin  rate control - on Cardizem   tele monitor    found to have atrial thrombus on echo  dccv cancelled  s/p cath. results noted with LAD dz, valvular dz, and fistula  now tx to [facility] for CTSX    OR cancelled  after finding out mitral stenosis not significant  s/p cath with pci to LAD  asa/plavix as per cards  hep gtt to coumadin for LV thrombus     DVT LLE  on AC with hep gtt to coumadin     asthma/copd  stable  nebs prn  pulm f/u     dc pending therapeutic inr

## 2019-01-21 NOTE — PROGRESS NOTE ADULT - SUBJECTIVE AND OBJECTIVE BOX
JEANNE TRUJILLO  78y Female  MRN:[id number]    Patient is a 78y old  Female who presents with a chief complaint of Palpitations and SOB (06 Jan 2019 14:36)    HPI:  79 y/o F with PMH of Left LE DVT diagnosed on Aug 2018(on Coumadin), COPD, DM, Asthma presented with the complaint of SOB/GRADY and palpitations. As per the patient she has been having intermittent SOB but it worsened the past few days which prompted her to come to the ER. Patient stated that any minimal exertion she would have to stop to catch her breath. Patient stated that she sleeps with 2-3 pillows at night and endorsed of orthopnea and PND. Patient also endorsed of intermittent LE swelling and stated that she is complaint with her medications. Patient also endorsed of midsternal chest pain, characterized as a  pressure like sensation, without any aggravating factors, alleviated when she would massage her chest, without any radiations of the chest pain, with a severity of 5/10. Patient also endorsed of palpitations for the past few days. Patient denied any fevers, chills, N/V/D/C, abdominal pain, dysuria, melena, hematochezia, recent travel, sick contact, pleuritic or positional chest pain.     On ED admission EKG revealed Atrial fibrillation with RVR at a rate of 147 with QTc of 466, CE x 1: Trop: 8, CE x 2: Trop: 9, H&H: 10.4/33.5, Na: 133, Gluc: 104, Alk Phos: 285, AST: 38. Prelim CXR: 6 mm right lung nodular opacity of uncertain significance. Follow up to resolution. Hazy right lung opacities may be secondary to rotation versus atelectasis and/or pneumonia. In the ED patient received IV Cardizem and PO Cardizem which improved her rate. (05 Jan 2019 05:47)      Patient seen and evaluated at bedside.       Interval HPI: no acute events o/n     PAST MEDICAL & SURGICAL HISTORY:  DVT (deep venous thrombosis)  Asthma  COPD (chronic obstructive pulmonary disease)  DM (diabetes mellitus)  History of embolectomy: LLE    SOC:  non smoker  no drug or alcohol abuse    fam hx:  non cont     REVIEW OF SYSTEMS:  as per hpi    VITALS:  Vital Signs Last 24 Hrs  T(C): 36.4 (21 Jan 2019 12:46), Max: 36.7 (20 Jan 2019 19:36)  T(F): 97.5 (21 Jan 2019 12:46), Max: 98.1 (20 Jan 2019 19:36)  HR: 79 (21 Jan 2019 12:46) (77 - 143)  BP: 119/76 (21 Jan 2019 12:46) (100/68 - 136/82)  BP(mean): --  RR: 18 (21 Jan 2019 12:46) (18 - 18)  SpO2: 99% (21 Jan 2019 12:46) (97% - 100%)    PHYSICAL EXAM:  GENERAL: NAD, well-developed,   HEAD:  Atraumatic, Normocephalic  EYES: EOMI, PERRLA, conjunctiva and sclera clear  NECK: Supple, No JVD  CHEST/LUNG: Clear to auscultation bilaterally; No wheeze  HEART: S1, S2;   ABDOMEN: Soft, Nontender, Nondistended; Bowel sounds present  EXTREMITIES:  2+ Peripheral Pulses, No clubbing, cyanosis, or edema  NEUROLOGY: AOx3  SKIN: No rashes or lesions    Consultant(s) Notes Reviewed:  [x ] YES  [ ] NO  Care Discussed with Consultants/Other Providers [ x] YES  [ ] NO    MEDS:  MEDICATIONS  (STANDING):  aspirin enteric coated 81 milliGRAM(s) Oral daily  atorvastatin 20 milliGRAM(s) Oral at bedtime  buDESOnide  80 MICROgram(s)/formoterol 4.5 MICROgram(s) Inhaler 2 Puff(s) Inhalation two times a day  clopidogrel Tablet 75 milliGRAM(s) Oral daily  dextrose 5%. 1000 milliLiter(s) (50 mL/Hr) IV Continuous <Continuous>  dextrose 50% Injectable 50 milliLiter(s) IV Push every 15 minutes  dextrose 50% Injectable 25 milliLiter(s) IV Push every 15 minutes  diltiazem    milliGRAM(s) Oral daily  docusate sodium 100 milliGRAM(s) Oral three times a day  heparin  Infusion. 400 Unit(s)/Hr (4 mL/Hr) IV Continuous <Continuous>  insulin glargine Injectable (LANTUS) 10 Unit(s) SubCutaneous at bedtime  insulin lispro (HumaLOG) corrective regimen sliding scale   SubCutaneous three times a day before meals  insulin lispro (HumaLOG) corrective regimen sliding scale   SubCutaneous at bedtime  insulin lispro Injectable (HumaLOG) 7 Unit(s) SubCutaneous three times a day before meals  melatonin 3 milliGRAM(s) Oral at bedtime  montelukast 10 milliGRAM(s) Oral daily  pantoprazole    Tablet 40 milliGRAM(s) Oral before breakfast  sodium chloride 0.9% lock flush 3 milliLiter(s) IV Push every 8 hours    MEDICATIONS  (PRN):  dextrose 40% Gel 15 Gram(s) Oral once PRN Blood Glucose LESS THAN 70 milliGRAM(s)/deciLiter  glucagon  Injectable 1 milliGRAM(s) IntraMuscular once PRN Glucose <70 milliGRAM(s)/deciLiter  heparin  Injectable 6500 Unit(s) IV Push every 6 hours PRN For aPTT less than 40  heparin  Injectable 3000 Unit(s) IV Push every 6 hours PRN For aPTT between 40 - 57        ALLERGIES:  No Known Allergies      LABS:                                          10.1   8.52  )-----------( 292      ( 21 Jan 2019 07:56 )             31.1   01-21    136  |  104  |  14  ----------------------------<  111<H>  4.6   |  23  |  0.70    Ca    9.5      21 Jan 2019 05:27    PT/INR - ( 21 Jan 2019 07:45 )   PT: 16.6 sec;   INR: 1.44 ratio         PTT - ( 21 Jan 2019 07:45 )  PTT:72.3 sec      < from: Cardiac Cath Lab - Adult (01.18.19 @ 11:34) >  INTERVENTIONAL RECOMMENDATIONS: PCI to severe proximal LAD lesion with BRENDA  in setting of new onset angina, acute diastolic HF. Continue ASA + plavix  daily. Restart IV heparin 6 hours post radial band removal if site ok.  Restart Coumadin tonight for AF/DANTE thrombus.    < end of copied text >        < from: Cardiac Cath Lab - Adult (01.10.19 @ 10:54) >  VENTRICLES: Global left ventricular function was normal. EF estimated was  60 %.      VALVES: AORTIC VALVE: There was mild aortic insufficiency. MITRAL VALVE:  The mitral valve exhibited mild regurgitation.  CORONARY VESSELS: The coronary circulation is right dominant.  LM:   --  LM: Normal.  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 There was a 75 % stenosis. Lesion is at the  bifurcation of a large diagonal branch. Lesion difficult to visual with  significant vessel overlap. IFR was 0.89 but IVUS imaging revealed severe  atherosclerotic disease with calcium and IVUS mla of 4.1 mm2. IVUS  catheter was occlusive at the lesion and unable to be advanced past  lesion.   D1: There was a 50 % stenosis in the proximal third of the vessel  segment.  CX:   --  Circumflex: Angiography showed mild atherosclerosis with no flow  limiting lesions.  RCA:   --  RCA: Angiography showed no evidence of disease. 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  --  RPDA: Angiography showed no evidence of disease.  --  RPL1:Angiography showed no evidence of disease.  AORTA: Ascending aorta: Normal. No evidence of any aortic fistula. No  aneurysm. The PA fills from coronary flow from root injection.  COMPLICATIONS: There were no complications.  DIAGNOSTIC IMPRESSIONS: New onset chest pain, AF with RVR, and acute  diastolic HF. EMILI with mod-severe mitral stenosis and very large thrombus  in DANTE. Cath with severe proximal LAD lesion with correlating IVUS  findings and moderate diagonal disease. 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 LV function preserved.  DIAGNOSTIC RECOMMENDATIONS: Continue medical treatment of CAD, AF. CTS  evaluation for MVR/CABG possible fistula ligation/dante thrombus  extraction/dante ligation.  INTERVENTIONALIMPRESSIONS: New onset chest pain, AF with RVR, and acute  diastolic HF. EMILI with mod-severe mitral stenosis and very large thrombus  in DANTE. Cath with severe proximal LAD lesion with correlating IVUS  findings and moderate diagonal disease. 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 LV function preserved.  INTERVENTIONAL RECOMMENDATIONS: Continue medical treatment of CAD, AF. CTS  evaluation for MVR/CABG possible fistula ligation/dante thrombus  extraction/dante ligation.    < end of copied text >          < from: EMILI w/TTE (w/3D Echo) (01.07.19 @ 15:03) >  ------------------------------  CONCLUSIONS:  1. Mitral annular calcification and calcified mitral  leaflets with decreased diastolic opening.  In some views  the valve has a rheumatic appearance. Mild-moderate mitral  regurgitation. Mean transmitral valve gradient equals 8 mm  Hg, estimated mitral valve area equals 1.1 sqcm (by  pressure half time equation), consistent with moderate to  severe mitral stenosis.  2. Calcified trileaflet aortic valve with decreased  opening. Peak transaortic valve gradient equals 17 mm Hg,  mean transaortic valve gradient equals 12 mm Hg, consistent  with mild aortic stenosis. No aortic valve regurgitation  seen.  3. Normal aortic root.  Mild non-mobile atherosclerotic  plaque in the aortic arch.  Severe, bulky, mobile  atherosclerotic plaque in the descending thoracic aorta.  4. Mildly dilated left atrium.  LA volume index = 35 cc/m2.   A very large (about  3.0 cm X 1.4 cm) thrombus is  visualized in the left atrial appendage.  5. Normal left ventricular internal dimensions and wall  thicknesses.  6. Normal left ventricular systolic function. No segmental  wall motion abnormalities.  7. Normal right ventricular size and function.  8. Contrast injection demonstrates no evidence of a patent  foramen ovale.  *** No previous Echo exam.  -------------------------------------------------------    < end of copied text >

## 2019-01-21 NOTE — PROGRESS NOTE ADULT - ASSESSMENT
EMILI 1/7/9:  EF 66%, normal LV fx, Mod to sev MS, mild AS, severe mobile atherosclerotic plaque in the descending thoracic aorta, Large ANDREW thrombus ( 3.0 x 1.4 cm)  [facility] 1/10/19: LAD 75% stenosis and a fistula to the PA, and 50% stenosis of D1  initial EMILI revealing mild-mod MR with mod-sev mitral stenosis  a/p    78 year old woman with history of LLE DVT 8/2018, on coumadin, COPD, DM, Asthma, presented to [facility] ER with new onset Atrial fibrillation and dyspnea on exertion worsening over the past few days prior to presentation on 1/5. Patient underwent EMILI and DCC at [facility] and was found to have a large ANDREW thrombus as well as mild to moderate MR with mild Mitral stenosis, EF 66%. CT PA was negative for PE. Left Heart Cath revealed LAD 75% stenosis and a fistula to the PA, and 50% stenosis of D1. The patient was then transferred to [facility] under Dr Ingram's Service for evaluation and pre-operative work up for CABG/MVR with ANDREW ligation and thrombus removal. Preop EMILI revealed less mitral stenosis, or cancelled    1. CAD  s/p PCI to proximal LAD 1/18  stable, no cp  cont asa, plavix     2. Mitral valve stenosis, regurgitation  cont to manage medically    3. ANDREW thrombus, atrial fibrillation  continue AC with hep gtt, cont coumadin dosing  8mg tonight   episodes on RVR, symptomatic, start dig load   continue with ccb  no dccv due to thrombus   CHADS=3-4    4. DVT, hx  cont a/c, repeat duplex no changes    d/c plan when inr > 2  attempt to obtain home services EMILI 1/7/9:  EF 66%, normal LV fx, Mod to sev MS, mild AS, severe mobile atherosclerotic plaque in the descending thoracic aorta, Large ANDREW thrombus ( 3.0 x 1.4 cm)  [facility] 1/10/19: LAD 75% stenosis and a fistula to the PA, and 50% stenosis of D1  initial EMILI revealing mild-mod MR with mod-sev mitral stenosis  a/p    78 year old woman with history of LLE DVT 8/2018, on coumadin, COPD, DM, Asthma, presented to [facility] ER with new onset Atrial fibrillation and dyspnea on exertion worsening over the past few days prior to presentation on 1/5. Patient underwent EMILI and DCC at [facility] and was found to have a large ANDREW thrombus as well as mild to moderate MR with mild Mitral stenosis, EF 66%. CT PA was negative for PE. Left Heart Cath revealed LAD 75% stenosis and a fistula to the PA, and 50% stenosis of D1. The patient was then transferred to [facility] under Dr Ingram's Service for evaluation and pre-operative work up for CABG/MVR with ANDREW ligation and thrombus removal. Preop EMILI revealed less mitral stenosis, or cancelled    1. CAD  s/p PCI to proximal LAD 1/18  stable, no cp  cont asa, plavix     2. Mitral valve stenosis, regurgitation  cont to manage medically    3. ANDREW thrombus, atrial fibrillation  continue AC with hep gtt, cont coumadin dosing  8mg tonight   episodes on RVR, symptomatic, start dig load o.25mg x 2 doses IVP, follow by 0.125 mg PO daily tomorrow   continue with ccb  no dccv due to thrombus   CHADS=3-4    4. DVT, hx  cont a/c, repeat duplex no changes    d/c plan when inr > 2  attempt to obtain home services

## 2019-01-22 LAB
APTT BLD: 68.4 SEC — HIGH (ref 27.5–36.3)
GLUCOSE BLDC GLUCOMTR-MCNC: 103 MG/DL — HIGH (ref 70–99)
GLUCOSE BLDC GLUCOMTR-MCNC: 170 MG/DL — HIGH (ref 70–99)
GLUCOSE BLDC GLUCOMTR-MCNC: 182 MG/DL — HIGH (ref 70–99)
GLUCOSE BLDC GLUCOMTR-MCNC: 85 MG/DL — SIGNIFICANT CHANGE UP (ref 70–99)
GLUCOSE BLDC GLUCOMTR-MCNC: 93 MG/DL — SIGNIFICANT CHANGE UP (ref 70–99)
HCT VFR BLD CALC: 31.3 % — LOW (ref 34.5–45)
HGB BLD-MCNC: 9.7 G/DL — LOW (ref 11.5–15.5)
INR BLD: 1.83 RATIO — HIGH (ref 0.88–1.16)
MCHC RBC-ENTMCNC: 26 PG — LOW (ref 27–34)
MCHC RBC-ENTMCNC: 31 GM/DL — LOW (ref 32–36)
MCV RBC AUTO: 83.9 FL — SIGNIFICANT CHANGE UP (ref 80–100)
PLATELET # BLD AUTO: 338 K/UL — SIGNIFICANT CHANGE UP (ref 150–400)
PROTHROM AB SERPL-ACNC: 21.3 SEC — HIGH (ref 10–13.1)
RBC # BLD: 3.73 M/UL — LOW (ref 3.8–5.2)
RBC # FLD: 16.2 % — HIGH (ref 10.3–14.5)
WBC # BLD: 10.04 K/UL — SIGNIFICANT CHANGE UP (ref 3.8–10.5)
WBC # FLD AUTO: 10.04 K/UL — SIGNIFICANT CHANGE UP (ref 3.8–10.5)

## 2019-01-22 RX ORDER — METOPROLOL TARTRATE 50 MG
12.5 TABLET ORAL EVERY 12 HOURS
Qty: 0 | Refills: 0 | Status: DISCONTINUED | OUTPATIENT
Start: 2019-01-22 | End: 2019-01-23

## 2019-01-22 RX ORDER — WARFARIN SODIUM 2.5 MG/1
8 TABLET ORAL ONCE
Qty: 0 | Refills: 0 | Status: COMPLETED | OUTPATIENT
Start: 2019-01-22 | End: 2019-01-22

## 2019-01-22 RX ADMIN — INSULIN GLARGINE 10 UNIT(S): 100 INJECTION, SOLUTION SUBCUTANEOUS at 22:27

## 2019-01-22 RX ADMIN — HEPARIN SODIUM 500 UNIT(S)/HR: 5000 INJECTION INTRAVENOUS; SUBCUTANEOUS at 12:28

## 2019-01-22 RX ADMIN — CLOPIDOGREL BISULFATE 75 MILLIGRAM(S): 75 TABLET, FILM COATED ORAL at 12:29

## 2019-01-22 RX ADMIN — Medication 7 UNIT(S): at 17:47

## 2019-01-22 RX ADMIN — WARFARIN SODIUM 8 MILLIGRAM(S): 2.5 TABLET ORAL at 22:51

## 2019-01-22 RX ADMIN — SODIUM CHLORIDE 3 MILLILITER(S): 9 INJECTION INTRAMUSCULAR; INTRAVENOUS; SUBCUTANEOUS at 21:15

## 2019-01-22 RX ADMIN — Medication 7 UNIT(S): at 07:46

## 2019-01-22 RX ADMIN — Medication 240 MILLIGRAM(S): at 06:12

## 2019-01-22 RX ADMIN — Medication 100 MILLIGRAM(S): at 06:12

## 2019-01-22 RX ADMIN — Medication 12.5 MILLIGRAM(S): at 12:31

## 2019-01-22 RX ADMIN — Medication 81 MILLIGRAM(S): at 12:29

## 2019-01-22 RX ADMIN — SODIUM CHLORIDE 3 MILLILITER(S): 9 INJECTION INTRAMUSCULAR; INTRAVENOUS; SUBCUTANEOUS at 06:14

## 2019-01-22 RX ADMIN — SODIUM CHLORIDE 3 MILLILITER(S): 9 INJECTION INTRAMUSCULAR; INTRAVENOUS; SUBCUTANEOUS at 12:30

## 2019-01-22 RX ADMIN — PANTOPRAZOLE SODIUM 40 MILLIGRAM(S): 20 TABLET, DELAYED RELEASE ORAL at 06:13

## 2019-01-22 RX ADMIN — ATORVASTATIN CALCIUM 20 MILLIGRAM(S): 80 TABLET, FILM COATED ORAL at 22:51

## 2019-01-22 RX ADMIN — Medication 3 MILLIGRAM(S): at 22:51

## 2019-01-22 RX ADMIN — MONTELUKAST 10 MILLIGRAM(S): 4 TABLET, CHEWABLE ORAL at 12:29

## 2019-01-22 RX ADMIN — Medication 7 UNIT(S): at 12:29

## 2019-01-22 RX ADMIN — Medication 12.5 MILLIGRAM(S): at 22:51

## 2019-01-22 NOTE — PROGRESS NOTE ADULT - ATTENDING COMMENTS
Patient seen and examined, agree with the above assessment and plan by DIONY Ceballos.  AF w RVR overnight  cont digoxin load  add low dose BB  cont to dose coumadin  DC planning when rate controlled and INR therapeutic

## 2019-01-22 NOTE — PROGRESS NOTE ADULT - SUBJECTIVE AND OBJECTIVE BOX
Chief complaint  Patient is a 78y old  Female who presents with a chief complaint of Mitral regurgitation, CAD, ANDREW thrombus (22 Jan 2019 12:19)   Review of systems  Patient in bed, looks comfortable, no fever, no hypoglycemia.    Labs and Fingersticks  CAPILLARY BLOOD GLUCOSE      POCT Blood Glucose.: 182 mg/dL (22 Jan 2019 11:56)  POCT Blood Glucose.: 103 mg/dL (22 Jan 2019 07:36)  POCT Blood Glucose.: 105 mg/dL (21 Jan 2019 21:47)  POCT Blood Glucose.: 87 mg/dL (21 Jan 2019 16:28)      Anion Gap, Serum: 9 (01-21 @ 05:27)      Calcium, Total Serum: 9.5 (01-21 @ 05:27)          01-21    136  |  104  |  14  ----------------------------<  111<H>  4.6   |  23  |  0.70    Ca    9.5      21 Jan 2019 05:27                          9.7    10.04 )-----------( 338      ( 22 Jan 2019 08:15 )             31.3     Medications  MEDICATIONS  (STANDING):  aspirin enteric coated 81 milliGRAM(s) Oral daily  atorvastatin 20 milliGRAM(s) Oral at bedtime  buDESOnide  80 MICROgram(s)/formoterol 4.5 MICROgram(s) Inhaler 2 Puff(s) Inhalation two times a day  clopidogrel Tablet 75 milliGRAM(s) Oral daily  dextrose 5%. 1000 milliLiter(s) (50 mL/Hr) IV Continuous <Continuous>  dextrose 50% Injectable 50 milliLiter(s) IV Push every 15 minutes  dextrose 50% Injectable 25 milliLiter(s) IV Push every 15 minutes  diltiazem    milliGRAM(s) Oral daily  docusate sodium 100 milliGRAM(s) Oral three times a day  heparin  Infusion. 400 Unit(s)/Hr (4 mL/Hr) IV Continuous <Continuous>  insulin glargine Injectable (LANTUS) 10 Unit(s) SubCutaneous at bedtime  insulin lispro (HumaLOG) corrective regimen sliding scale   SubCutaneous three times a day before meals  insulin lispro (HumaLOG) corrective regimen sliding scale   SubCutaneous at bedtime  insulin lispro Injectable (HumaLOG) 7 Unit(s) SubCutaneous three times a day before meals  melatonin 3 milliGRAM(s) Oral at bedtime  metoprolol tartrate 12.5 milliGRAM(s) Oral every 12 hours  montelukast 10 milliGRAM(s) Oral daily  pantoprazole    Tablet 40 milliGRAM(s) Oral before breakfast  sodium chloride 0.9% lock flush 3 milliLiter(s) IV Push every 8 hours  warfarin 8 milliGRAM(s) Oral once      Physical Exam  General: Patient comfortable in bed  Vital Signs Last 12 Hrs  T(F): 97.3 (01-22-19 @ 12:05), Max: 98 (01-22-19 @ 05:00)  HR: 101 (01-22-19 @ 12:05) (97 - 120)  BP: 113/67 (01-22-19 @ 12:05) (103/69 - 113/67)  BP(mean): --  RR: 18 (01-22-19 @ 12:05) (18 - 18)  SpO2: 100% (01-22-19 @ 12:05) (100% - 100%)  Neck: No palpable thyroid nodules.  CVS: S1S2, No murmurs  Respiratory: No wheezing, no crepitations  GI: Abdomen soft, bowel sounds positive  Musculoskeletal:  edema lower extremities.   Skin: No skin rashes, no ecchymosis    Diagnostics

## 2019-01-22 NOTE — PROGRESS NOTE ADULT - ASSESSMENT
79 yo female h/o dvt on coumadin, copd, dm, asthma, a/w c.o sob, curtis.  found to be in afib with rvr and acute heart failure  PE ruled out  ACS ruled out    afib  cards following  AC with hep gtt to coumadin  rate control - on Cardizem   tele monitor    found to have atrial thrombus on echo  dccv cancelled  s/p cath. results noted with LAD dz, valvular dz, and fistula  [facility]    OR cancelled  after finding out mitral stenosis not significant  s/p cath with pci to LAD  asa/plavix as per cards  hep gtt to coumadin for LV thrombus     DVT LLE  on AC with hep gtt to coumadin     asthma/copd  stable  nebs prn  pulm f/u     dc pending therapeutic inr

## 2019-01-22 NOTE — PROGRESS NOTE ADULT - ASSESSMENT
EMILI 1/7/9:  EF 66%, normal LV fx, Mod to sev MS, mild AS, severe mobile atherosclerotic plaque in the descending thoracic aorta, Large ANDREW thrombus ( 3.0 x 1.4 cm)  [facility] 1/10/19: LAD 75% stenosis and a fistula to the PA, and 50% stenosis of D1  initial EMILI revealing mild-mod MR with mod-sev mitral stenosis  a/p    78 year old woman with history of LLE DVT 8/2018, on coumadin, COPD, DM, Asthma, presented to [facility] ER with new onset Atrial fibrillation and dyspnea on exertion worsening over the past few days prior to presentation on 1/5. Patient underwent EMILI and DCC at [facility] and was found to have a large ANDREW thrombus as well as mild to moderate MR with mild Mitral stenosis, EF 66%. CT PA was negative for PE. Left Heart Cath revealed LAD 75% stenosis and a fistula to the PA, and 50% stenosis of D1. The patient was then transferred to [facility] under Dr Ingram's Service for evaluation and pre-operative work up for CABG/MVR with ANDREW ligation and thrombus removal. Preop EMILI revealed less mitral stenosis, or cancelled.     1. CAD  s/p PCI to proximal LAD 1/18  stable, no cp  cont asa, plavix     2. Mitral valve stenosis, regurgitation  cont to manage medically    3. ANDREW thrombus, atrial fibrillation  CHADS=3-4 - continue AC with hep gtt, cont coumadin dosing per INR   episodes on RVR, symptomatic, would recommend start dig load o.25mg x 2 doses IVP, follow by 0.125 mg PO daily tomorrow   continue with ccb  no dccv due to thrombus     4. DVT, hx  cont a/c, repeat duplex no changes    d/c plan when inr > 2  attempt to obtain home services

## 2019-01-22 NOTE — PROGRESS NOTE ADULT - SUBJECTIVE AND OBJECTIVE BOX
CARDIOLOGY FOLLOW UP - Dr. Fitch    CC no cp/sob   continues to C/O palpitations     PHYSICAL EXAM:  T(C): 36.7 (01-22-19 @ 05:00), Max: 36.9 (01-21-19 @ 19:31)  HR: 120 (01-22-19 @ 07:00) (79 - 120)  BP: 103/69 (01-22-19 @ 05:00) (103/69 - 128/83)  RR: 18 (01-22-19 @ 05:00) (18 - 18)  SpO2: 100% (01-22-19 @ 05:00) (99% - 100%)  Wt(kg): --  I&O's Summary    21 Jan 2019 07:01  -  22 Jan 2019 07:00  --------------------------------------------------------  IN: 765 mL / OUT: 400 mL / NET: 365 mL    22 Jan 2019 07:01  -  22 Jan 2019 10:46  --------------------------------------------------------  IN: 240 mL / OUT: 300 mL / NET: -60 mL        Appearance: Normal	  Cardiovascular: Normal S1 S2,irregular, +murmur   Respiratory: Lungs clear to auscultation	  Gastrointestinal:  Soft, Non-tender, + BS	  Extremities: Normal range of motion, No clubbing, cyanosis or edema        MEDICATIONS  (STANDING):  aspirin enteric coated 81 milliGRAM(s) Oral daily  atorvastatin 20 milliGRAM(s) Oral at bedtime  buDESOnide  80 MICROgram(s)/formoterol 4.5 MICROgram(s) Inhaler 2 Puff(s) Inhalation two times a day  clopidogrel Tablet 75 milliGRAM(s) Oral daily  dextrose 5%. 1000 milliLiter(s) (50 mL/Hr) IV Continuous <Continuous>  dextrose 50% Injectable 50 milliLiter(s) IV Push every 15 minutes  dextrose 50% Injectable 25 milliLiter(s) IV Push every 15 minutes  diltiazem    milliGRAM(s) Oral daily  docusate sodium 100 milliGRAM(s) Oral three times a day  heparin  Infusion. 400 Unit(s)/Hr (4 mL/Hr) IV Continuous <Continuous>  insulin glargine Injectable (LANTUS) 10 Unit(s) SubCutaneous at bedtime  insulin lispro (HumaLOG) corrective regimen sliding scale   SubCutaneous three times a day before meals  insulin lispro (HumaLOG) corrective regimen sliding scale   SubCutaneous at bedtime  insulin lispro Injectable (HumaLOG) 7 Unit(s) SubCutaneous three times a day before meals  melatonin 3 milliGRAM(s) Oral at bedtime  montelukast 10 milliGRAM(s) Oral daily  pantoprazole    Tablet 40 milliGRAM(s) Oral before breakfast  sodium chloride 0.9% lock flush 3 milliLiter(s) IV Push every 8 hours      TELEMETRY: AFIB 90-130s, as high as 150s 	    ECG:  	  RADIOLOGY:   DIAGNOSTIC TESTING:  [ ] Echocardiogram:  [ ]  Catheterization:  [ ] Stress Test:    OTHER: 	    LABS:	 	                                9.7    10.04 )-----------( 338      ( 22 Jan 2019 08:15 )             31.3     01-21    136  |  104  |  14  ----------------------------<  111<H>  4.6   |  23  |  0.70    Ca    9.5      21 Jan 2019 05:27      PT/INR - ( 22 Jan 2019 08:15 )   PT: 21.3 sec;   INR: 1.83 ratio         PTT - ( 22 Jan 2019 08:15 )  PTT:68.4 sec

## 2019-01-22 NOTE — PROGRESS NOTE ADULT - SUBJECTIVE AND OBJECTIVE BOX
JEANNE TRUJILLO  78y Female  MRN:[id number]    Patient is a 78y old  Female who presents with a chief complaint of Palpitations and SOB (06 Jan 2019 14:36)    HPI:  77 y/o F with PMH of Left LE DVT diagnosed on Aug 2018(on Coumadin), COPD, DM, Asthma presented with the complaint of SOB/GRADY and palpitations. As per the patient she has been having intermittent SOB but it worsened the past few days which prompted her to come to the ER. Patient stated that any minimal exertion she would have to stop to catch her breath. Patient stated that she sleeps with 2-3 pillows at night and endorsed of orthopnea and PND. Patient also endorsed of intermittent LE swelling and stated that she is complaint with her medications. Patient also endorsed of midsternal chest pain, characterized as a  pressure like sensation, without any aggravating factors, alleviated when she would massage her chest, without any radiations of the chest pain, with a severity of 5/10. Patient also endorsed of palpitations for the past few days. Patient denied any fevers, chills, N/V/D/C, abdominal pain, dysuria, melena, hematochezia, recent travel, sick contact, pleuritic or positional chest pain.     On ED admission EKG revealed Atrial fibrillation with RVR at a rate of 147 with QTc of 466, CE x 1: Trop: 8, CE x 2: Trop: 9, H&H: 10.4/33.5, Na: 133, Gluc: 104, Alk Phos: 285, AST: 38. Prelim CXR: 6 mm right lung nodular opacity of uncertain significance. Follow up to resolution. Hazy right lung opacities may be secondary to rotation versus atelectasis and/or pneumonia. In the ED patient received IV Cardizem and PO Cardizem which improved her rate. (05 Jan 2019 05:47)      Patient seen and evaluated at bedside.       Interval HPI: no acute events o/n     PAST MEDICAL & SURGICAL HISTORY:  DVT (deep venous thrombosis)  Asthma  COPD (chronic obstructive pulmonary disease)  DM (diabetes mellitus)  History of embolectomy: LLE    SOC:  non smoker  no drug or alcohol abuse    fam hx:  non cont     REVIEW OF SYSTEMS:  as per hpi    VITALS:  Vital Signs Last 24 Hrs  T(C): 36.3 (22 Jan 2019 12:05), Max: 36.9 (21 Jan 2019 19:31)  T(F): 97.3 (22 Jan 2019 12:05), Max: 98.4 (21 Jan 2019 19:31)  HR: 101 (22 Jan 2019 12:05) (79 - 120)  BP: 113/67 (22 Jan 2019 12:05) (103/69 - 128/83)  BP(mean): --  RR: 18 (22 Jan 2019 12:05) (18 - 18)  SpO2: 100% (22 Jan 2019 12:05) (99% - 100%)    PHYSICAL EXAM:  GENERAL: NAD, well-developed,   HEAD:  Atraumatic, Normocephalic  EYES: EOMI, PERRLA, conjunctiva and sclera clear  NECK: Supple, No JVD  CHEST/LUNG: Clear to auscultation bilaterally; No wheeze  HEART: S1, S2;   ABDOMEN: Soft, Nontender, Nondistended; Bowel sounds present  EXTREMITIES:  2+ Peripheral Pulses, No clubbing, cyanosis, or edema  NEUROLOGY: AOx3  SKIN: No rashes or lesions    Consultant(s) Notes Reviewed:  [x ] YES  [ ] NO  Care Discussed with Consultants/Other Providers [ x] YES  [ ] NO    MEDS:  MEDICATIONS  (STANDING):  aspirin enteric coated 81 milliGRAM(s) Oral daily  atorvastatin 20 milliGRAM(s) Oral at bedtime  buDESOnide  80 MICROgram(s)/formoterol 4.5 MICROgram(s) Inhaler 2 Puff(s) Inhalation two times a day  clopidogrel Tablet 75 milliGRAM(s) Oral daily  dextrose 5%. 1000 milliLiter(s) (50 mL/Hr) IV Continuous <Continuous>  dextrose 50% Injectable 50 milliLiter(s) IV Push every 15 minutes  dextrose 50% Injectable 25 milliLiter(s) IV Push every 15 minutes  diltiazem    milliGRAM(s) Oral daily  docusate sodium 100 milliGRAM(s) Oral three times a day  heparin  Infusion. 400 Unit(s)/Hr (4 mL/Hr) IV Continuous <Continuous>  insulin glargine Injectable (LANTUS) 10 Unit(s) SubCutaneous at bedtime  insulin lispro (HumaLOG) corrective regimen sliding scale   SubCutaneous three times a day before meals  insulin lispro (HumaLOG) corrective regimen sliding scale   SubCutaneous at bedtime  insulin lispro Injectable (HumaLOG) 7 Unit(s) SubCutaneous three times a day before meals  melatonin 3 milliGRAM(s) Oral at bedtime  metoprolol tartrate 12.5 milliGRAM(s) Oral every 12 hours  montelukast 10 milliGRAM(s) Oral daily  pantoprazole    Tablet 40 milliGRAM(s) Oral before breakfast  sodium chloride 0.9% lock flush 3 milliLiter(s) IV Push every 8 hours    MEDICATIONS  (PRN):  dextrose 40% Gel 15 Gram(s) Oral once PRN Blood Glucose LESS THAN 70 milliGRAM(s)/deciLiter  glucagon  Injectable 1 milliGRAM(s) IntraMuscular once PRN Glucose <70 milliGRAM(s)/deciLiter  heparin  Injectable 6500 Unit(s) IV Push every 6 hours PRN For aPTT less than 40  heparin  Injectable 3000 Unit(s) IV Push every 6 hours PRN For aPTT between 40 - 57        ALLERGIES:  No Known Allergies      LABS:                                    9.7    10.04 )-----------( 338      ( 22 Jan 2019 08:15 )             31.3   01-21    136  |  104  |  14  ----------------------------<  111<H>  4.6   |  23  |  0.70    Ca    9.5      21 Jan 2019 05:27    PT/INR - ( 22 Jan 2019 08:15 )   PT: 21.3 sec;   INR: 1.83 ratio         PTT - ( 22 Jan 2019 08:15 )  PTT:68.4 sec      < from: Cardiac Cath Lab - Adult (01.18.19 @ 11:34) >  INTERVENTIONAL RECOMMENDATIONS: PCI to severe proximal LAD lesion with BRENDA  in setting of new onset angina, acute diastolic HF. Continue ASA + plavix  daily. Restart IV heparin 6 hours post radial band removal if site ok.  Restart Coumadin tonight for AF/DANTE thrombus.    < end of copied text >        < from: Cardiac Cath Lab - Adult (01.10.19 @ 10:54) >  VENTRICLES: Global left ventricular function was normal. EF estimated was  60 %.      VALVES: AORTIC VALVE: There was mild aortic insufficiency. MITRAL VALVE:  The mitral valve exhibited mild regurgitation.  CORONARY VESSELS: The coronary circulation is right dominant.  LM:   --  LM: Normal.  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 There was a 75 % stenosis. Lesion is at the  bifurcation of a large diagonal branch. Lesion difficult to visual with  significant vessel overlap. IFR was 0.89 but IVUS imaging revealed severe  atherosclerotic disease with calcium and IVUS mla of 4.1 mm2. IVUS  catheter was occlusive at the lesion and unable to be advanced past  lesion.   D1: There was a 50 % stenosis in the proximal third of the vessel  segment.  CX:   --  Circumflex: Angiography showed mild atherosclerosis with no flow  limiting lesions.  RCA:   --  RCA: Angiography showed no evidence of disease. 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  --  RPDA: Angiography showed no evidence of disease.  --  RPL1:Angiography showed no evidence of disease.  AORTA: Ascending aorta: Normal. No evidence of any aortic fistula. No  aneurysm. The PA fills from coronary flow from root injection.  COMPLICATIONS: There were no complications.  DIAGNOSTIC IMPRESSIONS: New onset chest pain, AF with RVR, and acute  diastolic HF. EMILI with mod-severe mitral stenosis and very large thrombus  in DANTE. Cath with severe proximal LAD lesion with correlating IVUS  findings and moderate diagonal disease. 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 LV function preserved.  DIAGNOSTIC RECOMMENDATIONS: Continue medical treatment of CAD, AF. CTS  evaluation for MVR/CABG possible fistula ligation/dante thrombus  extraction/dante ligation.  INTERVENTIONALIMPRESSIONS: New onset chest pain, AF with RVR, and acute  diastolic HF. EMILI with mod-severe mitral stenosis and very large thrombus  in DANTE. Cath with severe proximal LAD lesion with correlating IVUS  findings and moderate diagonal disease. 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 LV function preserved.  INTERVENTIONAL RECOMMENDATIONS: Continue medical treatment of CAD, AF. CTS  evaluation for MVR/CABG possible fistula ligation/dante thrombus  extraction/dante ligation.    < end of copied text >          < from: EMILI w/TTE (w/3D Echo) (01.07.19 @ 15:03) >  ------------------------------  CONCLUSIONS:  1. Mitral annular calcification and calcified mitral  leaflets with decreased diastolic opening.  In some views  the valve has a rheumatic appearance. Mild-moderate mitral  regurgitation. Mean transmitral valve gradient equals 8 mm  Hg, estimated mitral valve area equals 1.1 sqcm (by  pressure half time equation), consistent with moderate to  severe mitral stenosis.  2. Calcified trileaflet aortic valve with decreased  opening. Peak transaortic valve gradient equals 17 mm Hg,  mean transaortic valve gradient equals 12 mm Hg, consistent  with mild aortic stenosis. No aortic valve regurgitation  seen.  3. Normal aortic root.  Mild non-mobile atherosclerotic  plaque in the aortic arch.  Severe, bulky, mobile  atherosclerotic plaque in the descending thoracic aorta.  4. Mildly dilated left atrium.  LA volume index = 35 cc/m2.   A very large (about  3.0 cm X 1.4 cm) thrombus is  visualized in the left atrial appendage.  5. Normal left ventricular internal dimensions and wall  thicknesses.  6. Normal left ventricular systolic function. No segmental  wall motion abnormalities.  7. Normal right ventricular size and function.  8. Contrast injection demonstrates no evidence of a patent  foramen ovale.  *** No previous Echo exam.  -------------------------------------------------------    < end of copied text >

## 2019-01-22 NOTE — PROGRESS NOTE ADULT - ASSESSMENT
Assessment  DMT2: 78y Female with DM T2 with hyperglycemia on insulin, blood sugars in acceptable range, eating full meals, ambulating.  CAD: on medications, planing procedure, no chest pain, stable, monitored.  HLD: On statin.  HTN: Controlled, no headaches, on medications.        Sarah Foley MD  Cell: [phone]  Office: [phone]

## 2019-01-23 VITALS
SYSTOLIC BLOOD PRESSURE: 108 MMHG | RESPIRATION RATE: 18 BRPM | TEMPERATURE: 98 F | HEART RATE: 76 BPM | DIASTOLIC BLOOD PRESSURE: 54 MMHG | OXYGEN SATURATION: 100 %

## 2019-01-23 LAB
APTT BLD: 78.6 SEC — HIGH (ref 27.5–36.3)
GLUCOSE BLDC GLUCOMTR-MCNC: 102 MG/DL — HIGH (ref 70–99)
GLUCOSE BLDC GLUCOMTR-MCNC: 119 MG/DL — HIGH (ref 70–99)
GLUCOSE BLDC GLUCOMTR-MCNC: 129 MG/DL — HIGH (ref 70–99)
HCT VFR BLD CALC: 30.1 % — LOW (ref 34.5–45)
HGB BLD-MCNC: 9.4 G/DL — LOW (ref 11.5–15.5)
INR BLD: 2.04 RATIO — HIGH (ref 0.88–1.16)
MCHC RBC-ENTMCNC: 26 PG — LOW (ref 27–34)
MCHC RBC-ENTMCNC: 31.2 GM/DL — LOW (ref 32–36)
MCV RBC AUTO: 83.4 FL — SIGNIFICANT CHANGE UP (ref 80–100)
PLATELET # BLD AUTO: 332 K/UL — SIGNIFICANT CHANGE UP (ref 150–400)
PROTHROM AB SERPL-ACNC: 23.8 SEC — HIGH (ref 10–13.1)
RBC # BLD: 3.61 M/UL — LOW (ref 3.8–5.2)
RBC # FLD: 15.9 % — HIGH (ref 10.3–14.5)
WBC # BLD: 8.63 K/UL — SIGNIFICANT CHANGE UP (ref 3.8–10.5)
WBC # FLD AUTO: 8.63 K/UL — SIGNIFICANT CHANGE UP (ref 3.8–10.5)

## 2019-01-23 PROCEDURE — C1894: CPT

## 2019-01-23 PROCEDURE — 86923 COMPATIBILITY TEST ELECTRIC: CPT

## 2019-01-23 PROCEDURE — 84100 ASSAY OF PHOSPHORUS: CPT

## 2019-01-23 PROCEDURE — 93306 TTE W/DOPPLER COMPLETE: CPT

## 2019-01-23 PROCEDURE — C1874: CPT

## 2019-01-23 PROCEDURE — 93880 EXTRACRANIAL BILAT STUDY: CPT

## 2019-01-23 PROCEDURE — C1751: CPT

## 2019-01-23 PROCEDURE — 82435 ASSAY OF BLOOD CHLORIDE: CPT

## 2019-01-23 PROCEDURE — C1887: CPT

## 2019-01-23 PROCEDURE — 86900 BLOOD TYPING SEROLOGIC ABO: CPT

## 2019-01-23 PROCEDURE — 84132 ASSAY OF SERUM POTASSIUM: CPT

## 2019-01-23 PROCEDURE — 86850 RBC ANTIBODY SCREEN: CPT

## 2019-01-23 PROCEDURE — 93005 ELECTROCARDIOGRAM TRACING: CPT

## 2019-01-23 PROCEDURE — 84484 ASSAY OF TROPONIN QUANT: CPT

## 2019-01-23 PROCEDURE — C1725: CPT

## 2019-01-23 PROCEDURE — 85014 HEMATOCRIT: CPT

## 2019-01-23 PROCEDURE — 97161 PT EVAL LOW COMPLEX 20 MIN: CPT

## 2019-01-23 PROCEDURE — 99153 MOD SED SAME PHYS/QHP EA: CPT

## 2019-01-23 PROCEDURE — 82565 ASSAY OF CREATININE: CPT

## 2019-01-23 PROCEDURE — 87641 MR-STAPH DNA AMP PROBE: CPT

## 2019-01-23 PROCEDURE — 82947 ASSAY GLUCOSE BLOOD QUANT: CPT

## 2019-01-23 PROCEDURE — 97116 GAIT TRAINING THERAPY: CPT

## 2019-01-23 PROCEDURE — 85610 PROTHROMBIN TIME: CPT

## 2019-01-23 PROCEDURE — 82550 ASSAY OF CK (CPK): CPT

## 2019-01-23 PROCEDURE — P9045: CPT

## 2019-01-23 PROCEDURE — 80053 COMPREHEN METABOLIC PANEL: CPT

## 2019-01-23 PROCEDURE — 71045 X-RAY EXAM CHEST 1 VIEW: CPT

## 2019-01-23 PROCEDURE — 84480 ASSAY TRIIODOTHYRONINE (T3): CPT

## 2019-01-23 PROCEDURE — 99152 MOD SED SAME PHYS/QHP 5/>YRS: CPT

## 2019-01-23 PROCEDURE — P9047: CPT

## 2019-01-23 PROCEDURE — 81001 URINALYSIS AUTO W/SCOPE: CPT

## 2019-01-23 PROCEDURE — 82330 ASSAY OF CALCIUM: CPT

## 2019-01-23 PROCEDURE — 83605 ASSAY OF LACTIC ACID: CPT

## 2019-01-23 PROCEDURE — 84436 ASSAY OF TOTAL THYROXINE: CPT

## 2019-01-23 PROCEDURE — 97530 THERAPEUTIC ACTIVITIES: CPT

## 2019-01-23 PROCEDURE — C1889: CPT

## 2019-01-23 PROCEDURE — 82962 GLUCOSE BLOOD TEST: CPT

## 2019-01-23 PROCEDURE — P9016: CPT

## 2019-01-23 PROCEDURE — 87640 STAPH A DNA AMP PROBE: CPT

## 2019-01-23 PROCEDURE — 83036 HEMOGLOBIN GLYCOSYLATED A1C: CPT

## 2019-01-23 PROCEDURE — 86901 BLOOD TYPING SEROLOGIC RH(D): CPT

## 2019-01-23 PROCEDURE — 82553 CREATINE MB FRACTION: CPT

## 2019-01-23 PROCEDURE — 82803 BLOOD GASES ANY COMBINATION: CPT

## 2019-01-23 PROCEDURE — 83735 ASSAY OF MAGNESIUM: CPT

## 2019-01-23 PROCEDURE — 83880 ASSAY OF NATRIURETIC PEPTIDE: CPT

## 2019-01-23 PROCEDURE — 85730 THROMBOPLASTIN TIME PARTIAL: CPT

## 2019-01-23 PROCEDURE — C1769: CPT

## 2019-01-23 PROCEDURE — 85027 COMPLETE CBC AUTOMATED: CPT

## 2019-01-23 PROCEDURE — 84443 ASSAY THYROID STIM HORMONE: CPT

## 2019-01-23 PROCEDURE — 94002 VENT MGMT INPAT INIT DAY: CPT

## 2019-01-23 PROCEDURE — 94640 AIRWAY INHALATION TREATMENT: CPT

## 2019-01-23 PROCEDURE — C9600: CPT | Mod: LD

## 2019-01-23 PROCEDURE — 84295 ASSAY OF SERUM SODIUM: CPT

## 2019-01-23 PROCEDURE — 85384 FIBRINOGEN ACTIVITY: CPT

## 2019-01-23 PROCEDURE — 80048 BASIC METABOLIC PNL TOTAL CA: CPT

## 2019-01-23 RX ORDER — WARFARIN SODIUM 2.5 MG/1
1 TABLET ORAL
Qty: 30 | Refills: 0 | OUTPATIENT
Start: 2019-01-23 | End: 2019-02-21

## 2019-01-23 RX ORDER — CLOPIDOGREL BISULFATE 75 MG/1
1 TABLET, FILM COATED ORAL
Qty: 30 | Refills: 0 | OUTPATIENT
Start: 2019-01-23 | End: 2019-02-21

## 2019-01-23 RX ORDER — SITAGLIPTIN AND METFORMIN HYDROCHLORIDE 500; 50 MG/1; MG/1
1 TABLET, FILM COATED ORAL
Qty: 60 | Refills: 0 | OUTPATIENT
Start: 2019-01-23 | End: 2019-02-21

## 2019-01-23 RX ORDER — PANTOPRAZOLE SODIUM 20 MG/1
1 TABLET, DELAYED RELEASE ORAL
Qty: 30 | Refills: 0 | OUTPATIENT
Start: 2019-01-23 | End: 2019-02-21

## 2019-01-23 RX ORDER — DOCUSATE SODIUM 100 MG
1 CAPSULE ORAL
Qty: 90 | Refills: 0 | OUTPATIENT
Start: 2019-01-23 | End: 2019-02-21

## 2019-01-23 RX ORDER — METOPROLOL TARTRATE 50 MG
0.5 TABLET ORAL
Qty: 30 | Refills: 0 | OUTPATIENT
Start: 2019-01-23 | End: 2019-02-21

## 2019-01-23 RX ADMIN — SODIUM CHLORIDE 3 MILLILITER(S): 9 INJECTION INTRAMUSCULAR; INTRAVENOUS; SUBCUTANEOUS at 11:02

## 2019-01-23 RX ADMIN — HEPARIN SODIUM 500 UNIT(S)/HR: 5000 INJECTION INTRAVENOUS; SUBCUTANEOUS at 11:01

## 2019-01-23 RX ADMIN — Medication 240 MILLIGRAM(S): at 05:50

## 2019-01-23 RX ADMIN — Medication 7 UNIT(S): at 12:31

## 2019-01-23 RX ADMIN — Medication 81 MILLIGRAM(S): at 11:03

## 2019-01-23 RX ADMIN — MONTELUKAST 10 MILLIGRAM(S): 4 TABLET, CHEWABLE ORAL at 11:03

## 2019-01-23 RX ADMIN — Medication 12.5 MILLIGRAM(S): at 11:03

## 2019-01-23 RX ADMIN — SODIUM CHLORIDE 3 MILLILITER(S): 9 INJECTION INTRAMUSCULAR; INTRAVENOUS; SUBCUTANEOUS at 05:51

## 2019-01-23 RX ADMIN — Medication 7 UNIT(S): at 08:13

## 2019-01-23 RX ADMIN — CLOPIDOGREL BISULFATE 75 MILLIGRAM(S): 75 TABLET, FILM COATED ORAL at 11:03

## 2019-01-23 RX ADMIN — PANTOPRAZOLE SODIUM 40 MILLIGRAM(S): 20 TABLET, DELAYED RELEASE ORAL at 05:50

## 2019-01-23 NOTE — PROGRESS NOTE ADULT - PROBLEM SELECTOR PLAN 4
Continue AC on Heparin drip
Continue medications, monitoring, primary team FU
on statins
on statins  1/12 continue statin
on statins  1/12 continue statin  1/13 continue statin

## 2019-01-23 NOTE — PROGRESS NOTE ADULT - PROVIDER SPECIALTY LIST ADULT
CT Surgery
CT Surgery
CTU
Cardiology
Dental
Endocrinology
Internal Medicine
Pulmonology
Cardiology
CT Surgery
Endocrinology
Endocrinology
CT Surgery

## 2019-01-23 NOTE — PROGRESS NOTE ADULT - PROBLEM SELECTOR PROBLEM 2
Coronary artery disease involving native coronary artery of native heart with other form of angina pectoris
Mitral valve insufficiency, unspecified etiology
Coronary artery disease involving native coronary artery of native heart with other form of angina pectoris
DVT (deep venous thrombosis)
Mitral valve insufficiency, unspecified etiology

## 2019-01-23 NOTE — PROGRESS NOTE ADULT - PROBLEM SELECTOR PROBLEM 3
DM (diabetes mellitus)
Mitral valve insufficiency, unspecified etiology
Type 2 diabetes mellitus with hyperglycemia, without long-term current use of insulin

## 2019-01-23 NOTE — PROGRESS NOTE ADULT - REASON FOR ADMISSION
Mitral regurgitation, CAD, ANDREW thrombus

## 2019-01-23 NOTE — PROGRESS NOTE ADULT - SUBJECTIVE AND OBJECTIVE BOX
CARDIOLOGY FOLLOW UP - Dr. Fitch    CC no cp/sob   rates improved  no further palpitations       PHYSICAL EXAM:  T(C): 36.4 (01-23-19 @ 12:00), Max: 36.6 (01-23-19 @ 05:13)  HR: 76 (01-23-19 @ 12:00) (76 - 111)  BP: 108/54 (01-23-19 @ 12:00) (106/71 - 109/60)  RR: 18 (01-23-19 @ 12:00) (18 - 18)  SpO2: 100% (01-23-19 @ 12:00) (100% - 100%)  Wt(kg): --  I&O's Summary    22 Jan 2019 07:01  -  23 Jan 2019 07:00  --------------------------------------------------------  IN: 1910 mL / OUT: 300 mL / NET: 1610 mL    23 Jan 2019 07:01  -  23 Jan 2019 13:37  --------------------------------------------------------  IN: 680 mL / OUT: 300 mL / NET: 380 mL        Appearance: Normal	  Cardiovascular: Normal S1 S2,irreg, No JVD, +murmur   Respiratory: Lungs clear to auscultation	  Gastrointestinal:  Soft, Non-tender, + BS	  Extremities: Normal range of motion, No clubbing, cyanosis or edema        MEDICATIONS  (STANDING):  aspirin enteric coated 81 milliGRAM(s) Oral daily  atorvastatin 20 milliGRAM(s) Oral at bedtime  buDESOnide  80 MICROgram(s)/formoterol 4.5 MICROgram(s) Inhaler 2 Puff(s) Inhalation two times a day  clopidogrel Tablet 75 milliGRAM(s) Oral daily  dextrose 5%. 1000 milliLiter(s) (50 mL/Hr) IV Continuous <Continuous>  dextrose 50% Injectable 50 milliLiter(s) IV Push every 15 minutes  dextrose 50% Injectable 25 milliLiter(s) IV Push every 15 minutes  diltiazem    milliGRAM(s) Oral daily  docusate sodium 100 milliGRAM(s) Oral three times a day  heparin  Infusion. 400 Unit(s)/Hr (4 mL/Hr) IV Continuous <Continuous>  insulin glargine Injectable (LANTUS) 10 Unit(s) SubCutaneous at bedtime  insulin lispro (HumaLOG) corrective regimen sliding scale   SubCutaneous three times a day before meals  insulin lispro (HumaLOG) corrective regimen sliding scale   SubCutaneous at bedtime  insulin lispro Injectable (HumaLOG) 7 Unit(s) SubCutaneous three times a day before meals  melatonin 3 milliGRAM(s) Oral at bedtime  metoprolol tartrate 12.5 milliGRAM(s) Oral every 12 hours  montelukast 10 milliGRAM(s) Oral daily  pantoprazole    Tablet 40 milliGRAM(s) Oral before breakfast  sodium chloride 0.9% lock flush 3 milliLiter(s) IV Push every 8 hours      TELEMETRY: afib 70-90, up to 110 , overall improved 	    ECG:  	  RADIOLOGY:   DIAGNOSTIC TESTING:  [ ] Echocardiogram:  [ ]  Catheterization:  [ ] Stress Test:    OTHER: 	    LABS:	 	                                9.4    8.63  )-----------( 332      ( 23 Jan 2019 07:57 )             30.1           PT/INR - ( 23 Jan 2019 07:54 )   PT: 23.8 sec;   INR: 2.04 ratio         PTT - ( 23 Jan 2019 07:54 )  PTT:78.6 sec

## 2019-01-23 NOTE — CHART NOTE - NSCHARTNOTEFT_GEN_A_CORE
[id number]  JEANNE TRUJILLO    Notified by RN patient with Elevated .6     Patient is a 78y old  Female who presents with a chief complaint of Mitral regurgitation, CAD, ANDREW thrombus (15 Joseph 2019 18:42). Found to have New onset Afib and ANDREW Thrombus on Heparin gtt at 4cc/hr. Now with .6   PT planned for PCI to LAD this week. Actual weight recheck today and pt is 74 kilos per RN documentation. Pt on patient specific heparin gtt. Will change to Full AC heparin normogram. RN informed to hold heparin for 1 hour and decrease gtt by 2 cc/hr per Full AC Heparin protocol. No bleeding noted per RN Staff. Advised staff to call with any additional concerns and continue to monitor patient closely.     Danna Gardner [facility]   Dept of Medicine   [id number] spectra
MEDICINE JEANNE BANUELOS  Patient is a 78 year old female who presents with a chief complaint of mitral regurgitation, CAD and ANDREW thrombus. (17 Jan 2019 14:06)       Event Summary:  Called by RN to report patient with hypoglycemia this evening - fingerstick of 66.  Patient seen and examined at the bedside.  She is alert.  Patient states that at time of hypoglycemic episode she felt dizzy, but her symptoms have now resolved S/P eating crackers and drinking juice.  Does not endorse any complaints at this time.  Denies dizziness, headache, chest pain, palpitations, shortness of breath, abdominal pain, nausea, vomiting and diarrhea.       CAPILLARY BLOOD GLUCOSE  POCT Blood Glucose.: 108 mg/dL (17 Jan 2019 22:37)  POCT Blood Glucose.: 77 mg/dL (17 Jan 2019 22:21)  POCT Blood Glucose.: 93 mg/dL (17 Jan 2019 22:04)  POCT Blood Glucose.: 74 mg/dL (17 Jan 2019 21:45)  POCT Blood Glucose.: 70 mg/dL (17 Jan 2019 21:33)  POCT Blood Glucose.: 66 mg/dL (17 Jan 2019 21:12)      PHYSICAL EXAM:  GENERAL: Laying down, in no acute distress  PSYCH: AAOx4  HEART: +S1/S2.  Regular rate and rhythm.  No murmurs, rubs or gallops  CHEST/LUNG: Breath sounds clear to auscultation bilaterally.  No wheezes, rales, rhonchi or crackles  ABDOMEN: Bowel sounds present in all 4 quadrants.  Soft, Nontender, Nondistended      HPI:  Patient is a 78 year old female with a PMHx of left lower extremity DVT (8/2018 - on Coumadin), COPD, DM2 and asthma who presented to [facility] ER with new onset atrial fibrillation and dyspnea on exertion worsening over the past few days prior to presentation on 1/5/19  . Patient underwent EMILI and DCC at [facility] and was found to have a large ANDREW thrombus as well as mild to moderate mitral regurgitation with mild mitral stenosis (EF 66%).  Patient ws transferred to [facility] under Dr Ingram's service for evaluation and pre-operative work up for CABG / MVR with ANDREW ligation and thrombus removal. (10 Joseph 2019 23:30)  Now with hypoglycemia.      PLAN: Hypoglycemia  - Now resolved - fingerstick 108  - Lantus decreased 20% tonight (10 units to 8 units) to prevent further episodes of hypoglycemia  - Will re-check fingerstick at 02:00  - Endocrinology following patient  - Will continue to monitor closely  - Primary team to follow up in AM      #[id number]  Marine Roach PA-C  Medicine Department
Spoke w  (endo) DC pt on Janumet 50/1000 bid, no insulin
MEDICINE PA       Event Summary: Notified by RN that patient with HR in 140s. Pt examined by me at bedside. States that she was on the phone with her daughter and having an upsetting conversation on the phone. Denies CP, SOB, nausea/ vomiting, diarrhea, hemoptysis, palpitations, cough, abdominal pain.    Vital Signs Last 24 Hrs  T(C): 36.7 (21 Jan 2019 02:52), Max: 36.8 (20 Jan 2019 13:37)  T(F): 98 (21 Jan 2019 02:52), Max: 98.2 (20 Jan 2019 13:37)  HR: 77 (21 Jan 2019 03:16) (77 - 143)  BP: 122/78 (21 Jan 2019 03:16) (100/68 - 130/81)  RR: 18 (21 Jan 2019 03:16) (18 - 18)  SpO2: 97% (21 Jan 2019 03:16) (97% - 100%)    Physical Assessment:  General: Awake, No acute distress.   Neurology: A&Ox3, nonfocal  CV: +S1S2, RRR.  No peripheral edema  Respiratory: Even, unlabored.  CTA B/L.    Abdomen:  +BS.  Soft, NT, ND.  No palpable mass  MSK: DEAN x4.   Skin: Warm and Dry     A/P:  HPI:  78y Female w/ PMH of LLE DVT 8/2018, on coumadin, COPD, DM, Asthma, presented to [facility] ER with new onset Atrial fibrillation and dyspnea on exertion worsening over the past few days prior to presentation on 1/5. Patient underwent EMILI and DCC at [facility] and was found to have a large ANDREW thrombus as well as mild to moderate MR with mild Mitral stenosis, EF 66%. CT PA was negative for PE. Left Heart Cath revealed LAD 75% stenosis and a fistula to the PA, and 50% stenosis of D1. The patient was then transferred to [facility] under Dr Ingram's Service for evaluation and pre-operative work up for CABG/MVR with ANDREW ligation and thrombus removal.  Preop EMILI revealed less mitral stenosis, OR cancelled. Acutely presenting with A fib RVR.     1. A fib RVR   - ECG confirming Afib RVR up to 140s    - 5 mg IV Cardizem given   - Continue close monitoring of HD status and vital signs  - Cardiology follow up   - Will endorse to primary team in AM. Attending to follow.    Leonor Christiansen PA-C   Department of Medicine   Spectra [phone]

## 2019-01-23 NOTE — PROGRESS NOTE ADULT - ASSESSMENT
Assessment  DMT2: 78y Female with DM T2 with hyperglycemia on insulin, blood sugars in acceptable range, eating full meals, ambulating, planing DC home.  CAD: on medications, planing procedure, no chest pain, stable, monitored.  HLD: On statin.  HTN: Controlled, no headaches, on medications.        Sarah Foley MD  Cell: [phone]  Office: [phone]

## 2019-01-23 NOTE — PROGRESS NOTE ADULT - PROBLEM SELECTOR PLAN 3
CTS team FU
CTS team FU
Endocrine following  Continue glycemic control on Humalog ISS   Continue diabetic diet
CTS team FU
per mo rodriguez  1/8: relatively stable!  1/11: follow per protocol;
per mo rodriguez  1/8: relatively stable!  1/11: follow per protocol;  1/12 FS with sliding scale
per mo rodriguez  1/8: relatively stable!  1/11: follow per protocol;  1/12 FS with sliding scale  1/13 stable with current management

## 2019-01-23 NOTE — PROGRESS NOTE ADULT - SUBJECTIVE AND OBJECTIVE BOX
Chief complaint  Patient is a 78y old  Female who presents with a chief complaint of Mitral regurgitation, CAD, ANDREW thrombus (22 Jan 2019 16:22)   Review of systems  Patient in bed, looks comfortable, no fever, no hypoglycemia.    Labs and Fingersticks  CAPILLARY BLOOD GLUCOSE      POCT Blood Glucose.: 102 mg/dL (23 Jan 2019 08:05)  POCT Blood Glucose.: 93 mg/dL (22 Jan 2019 21:37)  POCT Blood Glucose.: 170 mg/dL (22 Jan 2019 17:46)  POCT Blood Glucose.: 85 mg/dL (22 Jan 2019 16:27)  POCT Blood Glucose.: 182 mg/dL (22 Jan 2019 11:56)                          9.4    8.63  )-----------( 332      ( 23 Jan 2019 07:57 )             30.1     Medications  MEDICATIONS  (STANDING):  aspirin enteric coated 81 milliGRAM(s) Oral daily  atorvastatin 20 milliGRAM(s) Oral at bedtime  buDESOnide  80 MICROgram(s)/formoterol 4.5 MICROgram(s) Inhaler 2 Puff(s) Inhalation two times a day  clopidogrel Tablet 75 milliGRAM(s) Oral daily  dextrose 5%. 1000 milliLiter(s) (50 mL/Hr) IV Continuous <Continuous>  dextrose 50% Injectable 50 milliLiter(s) IV Push every 15 minutes  dextrose 50% Injectable 25 milliLiter(s) IV Push every 15 minutes  diltiazem    milliGRAM(s) Oral daily  docusate sodium 100 milliGRAM(s) Oral three times a day  heparin  Infusion. 400 Unit(s)/Hr (4 mL/Hr) IV Continuous <Continuous>  insulin glargine Injectable (LANTUS) 10 Unit(s) SubCutaneous at bedtime  insulin lispro (HumaLOG) corrective regimen sliding scale   SubCutaneous three times a day before meals  insulin lispro (HumaLOG) corrective regimen sliding scale   SubCutaneous at bedtime  insulin lispro Injectable (HumaLOG) 7 Unit(s) SubCutaneous three times a day before meals  melatonin 3 milliGRAM(s) Oral at bedtime  metoprolol tartrate 12.5 milliGRAM(s) Oral every 12 hours  montelukast 10 milliGRAM(s) Oral daily  pantoprazole    Tablet 40 milliGRAM(s) Oral before breakfast  sodium chloride 0.9% lock flush 3 milliLiter(s) IV Push every 8 hours      Physical Exam  General: Patient comfortable in bed  Vital Signs Last 12 Hrs  T(F): 97.8 (01-23-19 @ 05:13), Max: 97.8 (01-23-19 @ 05:13)  HR: 101 (01-23-19 @ 05:13) (101 - 101)  BP: 106/71 (01-23-19 @ 05:13) (106/71 - 106/71)  BP(mean): --  RR: 18 (01-23-19 @ 05:13) (18 - 18)  SpO2: 100% (01-23-19 @ 05:13) (100% - 100%)  Neck: No palpable thyroid nodules.  CVS: S1S2, No murmurs  Respiratory: No wheezing, no crepitations  GI: Abdomen soft, bowel sounds positive  Musculoskeletal:  edema lower extremities.   Skin: No skin rashes, no ecchymosis    Diagnostics

## 2019-01-23 NOTE — PROGRESS NOTE ADULT - PROBLEM SELECTOR PROBLEM 4
AF (atrial fibrillation)
COPD (chronic obstructive pulmonary disease)
HLD (hyperlipidemia)

## 2019-01-23 NOTE — PROGRESS NOTE ADULT - PROBLEM SELECTOR PROBLEM 1
Coronary artery disease involving native coronary artery of native heart with other form of angina pectoris
DM (diabetes mellitus)
DM (diabetes mellitus)
Mitral valve insufficiency, unspecified etiology
Coronary artery disease involving native coronary artery of native heart with other form of angina pectoris
DM (diabetes mellitus)
New onset atrial fibrillation

## 2019-01-23 NOTE — PROGRESS NOTE ADULT - ATTENDING COMMENTS
Patient seen and examined, agree with the above assessment and plan by DIONY Castro.  AF rate controlled  INR therapuetic  cont current meds  DC planning to followup with us and INR check on Monday.

## 2019-02-14 ENCOUNTER — INPATIENT (INPATIENT)
Facility: HOSPITAL | Age: 79
LOS: 9 days | Discharge: HOME HEALTH | DRG: 219 | End: 2019-02-24
Attending: THORACIC SURGERY (CARDIOTHORACIC VASCULAR SURGERY) | Admitting: THORACIC SURGERY (CARDIOTHORACIC VASCULAR SURGERY)
Payer: MEDICARE

## 2019-02-14 VITALS
DIASTOLIC BLOOD PRESSURE: 73 MMHG | OXYGEN SATURATION: 100 % | RESPIRATION RATE: 23 BRPM | HEART RATE: 88 BPM | TEMPERATURE: 96 F | SYSTOLIC BLOOD PRESSURE: 108 MMHG | HEIGHT: 64 IN

## 2019-02-14 DIAGNOSIS — Z95.9 PRESENCE OF CARDIAC AND VASCULAR IMPLANT AND GRAFT, UNSPECIFIED: Chronic | ICD-10-CM

## 2019-02-14 DIAGNOSIS — Z98.890 OTHER SPECIFIED POSTPROCEDURAL STATES: Chronic | ICD-10-CM

## 2019-02-14 LAB
ALBUMIN SERPL ELPH-MCNC: 3.5 G/DL — SIGNIFICANT CHANGE UP (ref 3.3–5)
ALBUMIN SERPL ELPH-MCNC: 3.6 G/DL — SIGNIFICANT CHANGE UP (ref 3.3–5)
ALP SERPL-CCNC: 245 U/L — HIGH (ref 40–120)
ALP SERPL-CCNC: SIGNIFICANT CHANGE UP U/L (ref 40–120)
ALT FLD-CCNC: 32 U/L — SIGNIFICANT CHANGE UP (ref 10–45)
ALT FLD-CCNC: SIGNIFICANT CHANGE UP U/L (ref 10–45)
ANION GAP SERPL CALC-SCNC: 10 MMOL/L — SIGNIFICANT CHANGE UP (ref 5–17)
ANION GAP SERPL CALC-SCNC: 12 MMOL/L — SIGNIFICANT CHANGE UP (ref 5–17)
APTT BLD: 39.3 SEC — HIGH (ref 27.5–36.3)
AST SERPL-CCNC: 30 U/L — SIGNIFICANT CHANGE UP (ref 10–40)
AST SERPL-CCNC: SIGNIFICANT CHANGE UP U/L (ref 10–40)
BASOPHILS # BLD AUTO: 0.03 K/UL — SIGNIFICANT CHANGE UP (ref 0–0.2)
BASOPHILS NFR BLD AUTO: 0.3 % — SIGNIFICANT CHANGE UP (ref 0–2)
BILIRUB SERPL-MCNC: 0.3 MG/DL — SIGNIFICANT CHANGE UP (ref 0.2–1.2)
BILIRUB SERPL-MCNC: 0.4 MG/DL — SIGNIFICANT CHANGE UP (ref 0.2–1.2)
BUN SERPL-MCNC: 16 MG/DL — SIGNIFICANT CHANGE UP (ref 7–23)
BUN SERPL-MCNC: 16 MG/DL — SIGNIFICANT CHANGE UP (ref 7–23)
CALCIUM SERPL-MCNC: 8.6 MG/DL — SIGNIFICANT CHANGE UP (ref 8.4–10.5)
CALCIUM SERPL-MCNC: 9 MG/DL — SIGNIFICANT CHANGE UP (ref 8.4–10.5)
CHLORIDE SERPL-SCNC: 102 MMOL/L — SIGNIFICANT CHANGE UP (ref 96–108)
CHLORIDE SERPL-SCNC: 102 MMOL/L — SIGNIFICANT CHANGE UP (ref 96–108)
CO2 SERPL-SCNC: 24 MMOL/L — SIGNIFICANT CHANGE UP (ref 22–31)
CO2 SERPL-SCNC: 25 MMOL/L — SIGNIFICANT CHANGE UP (ref 22–31)
CREAT SERPL-MCNC: 0.91 MG/DL — SIGNIFICANT CHANGE UP (ref 0.5–1.3)
CREAT SERPL-MCNC: 0.93 MG/DL — SIGNIFICANT CHANGE UP (ref 0.5–1.3)
EOSINOPHIL # BLD AUTO: 0.27 K/UL — SIGNIFICANT CHANGE UP (ref 0–0.5)
EOSINOPHIL NFR BLD AUTO: 2.5 % — SIGNIFICANT CHANGE UP (ref 0–6)
GLUCOSE BLDC GLUCOMTR-MCNC: 76 MG/DL — SIGNIFICANT CHANGE UP (ref 70–99)
GLUCOSE SERPL-MCNC: 74 MG/DL — SIGNIFICANT CHANGE UP (ref 70–99)
GLUCOSE SERPL-MCNC: 78 MG/DL — SIGNIFICANT CHANGE UP (ref 70–99)
HBA1C BLD-MCNC: 5.9 % — HIGH (ref 4–5.6)
HCT VFR BLD CALC: 30.4 % — LOW (ref 34.5–45)
HGB BLD-MCNC: 9.5 G/DL — LOW (ref 11.5–15.5)
IMM GRANULOCYTES NFR BLD AUTO: 0.4 % — SIGNIFICANT CHANGE UP (ref 0–1.5)
INR BLD: 3.38 — HIGH (ref 0.88–1.16)
LYMPHOCYTES # BLD AUTO: 1.17 K/UL — SIGNIFICANT CHANGE UP (ref 1–3.3)
LYMPHOCYTES # BLD AUTO: 10.9 % — LOW (ref 13–44)
MCHC RBC-ENTMCNC: 27.4 PG — SIGNIFICANT CHANGE UP (ref 27–34)
MCHC RBC-ENTMCNC: 31.3 GM/DL — LOW (ref 32–36)
MCV RBC AUTO: 87.6 FL — SIGNIFICANT CHANGE UP (ref 80–100)
MONOCYTES # BLD AUTO: 0.77 K/UL — SIGNIFICANT CHANGE UP (ref 0–0.9)
MONOCYTES NFR BLD AUTO: 7.2 % — SIGNIFICANT CHANGE UP (ref 2–14)
NEUTROPHILS # BLD AUTO: 8.48 K/UL — HIGH (ref 1.8–7.4)
NEUTROPHILS NFR BLD AUTO: 78.7 % — HIGH (ref 43–77)
NRBC # BLD: 0 /100 WBCS — SIGNIFICANT CHANGE UP (ref 0–0)
NT-PROBNP SERPL-SCNC: 1365 PG/ML — HIGH (ref 0–300)
PLATELET # BLD AUTO: 281 K/UL — SIGNIFICANT CHANGE UP (ref 150–400)
POTASSIUM SERPL-MCNC: 4.4 MMOL/L — SIGNIFICANT CHANGE UP (ref 3.5–5.3)
POTASSIUM SERPL-MCNC: SIGNIFICANT CHANGE UP MMOL/L (ref 3.5–5.3)
POTASSIUM SERPL-SCNC: 4.4 MMOL/L — SIGNIFICANT CHANGE UP (ref 3.5–5.3)
POTASSIUM SERPL-SCNC: SIGNIFICANT CHANGE UP MMOL/L (ref 3.5–5.3)
PROT SERPL-MCNC: 7.8 G/DL — SIGNIFICANT CHANGE UP (ref 6–8.3)
PROT SERPL-MCNC: 8.1 G/DL — SIGNIFICANT CHANGE UP (ref 6–8.3)
PROTHROM AB SERPL-ACNC: 39.6 SEC — HIGH (ref 10–12.9)
RBC # BLD: 3.47 M/UL — LOW (ref 3.8–5.2)
RBC # FLD: 15.7 % — HIGH (ref 10.3–14.5)
SODIUM SERPL-SCNC: 136 MMOL/L — SIGNIFICANT CHANGE UP (ref 135–145)
SODIUM SERPL-SCNC: 139 MMOL/L — SIGNIFICANT CHANGE UP (ref 135–145)
TSH SERPL-MCNC: 6.14 UIU/ML — HIGH (ref 0.35–4.94)
WBC # BLD: 10.76 K/UL — HIGH (ref 3.8–10.5)
WBC # FLD AUTO: 10.76 K/UL — HIGH (ref 3.8–10.5)

## 2019-02-14 PROCEDURE — 99222 1ST HOSP IP/OBS MODERATE 55: CPT

## 2019-02-14 PROCEDURE — 93010 ELECTROCARDIOGRAM REPORT: CPT

## 2019-02-14 PROCEDURE — 71045 X-RAY EXAM CHEST 1 VIEW: CPT | Mod: 26

## 2019-02-14 RX ORDER — INSULIN GLARGINE 100 [IU]/ML
10 INJECTION, SOLUTION SUBCUTANEOUS AT BEDTIME
Qty: 0 | Refills: 0 | Status: DISCONTINUED | OUTPATIENT
Start: 2019-02-14 | End: 2019-02-18

## 2019-02-14 RX ORDER — INSULIN LISPRO 100/ML
VIAL (ML) SUBCUTANEOUS
Qty: 0 | Refills: 0 | Status: DISCONTINUED | OUTPATIENT
Start: 2019-02-14 | End: 2019-02-18

## 2019-02-14 RX ORDER — SODIUM CHLORIDE 9 MG/ML
3 INJECTION INTRAMUSCULAR; INTRAVENOUS; SUBCUTANEOUS EVERY 8 HOURS
Qty: 0 | Refills: 0 | Status: DISCONTINUED | OUTPATIENT
Start: 2019-02-14 | End: 2019-02-18

## 2019-02-14 RX ORDER — BUDESONIDE AND FORMOTEROL FUMARATE DIHYDRATE 160; 4.5 UG/1; UG/1
2 AEROSOL RESPIRATORY (INHALATION)
Qty: 0 | Refills: 0 | Status: DISCONTINUED | OUTPATIENT
Start: 2019-02-14 | End: 2019-02-20

## 2019-02-14 RX ORDER — PANTOPRAZOLE SODIUM 20 MG/1
40 TABLET, DELAYED RELEASE ORAL
Qty: 0 | Refills: 0 | Status: DISCONTINUED | OUTPATIENT
Start: 2019-02-14 | End: 2019-02-18

## 2019-02-14 RX ORDER — POTASSIUM CHLORIDE 20 MEQ
20 PACKET (EA) ORAL DAILY
Qty: 0 | Refills: 0 | Status: DISCONTINUED | OUTPATIENT
Start: 2019-02-14 | End: 2019-02-16

## 2019-02-14 RX ORDER — INSULIN LISPRO 100/ML
3 VIAL (ML) SUBCUTANEOUS
Qty: 0 | Refills: 0 | Status: DISCONTINUED | OUTPATIENT
Start: 2019-02-14 | End: 2019-02-18

## 2019-02-14 RX ORDER — FUROSEMIDE 40 MG
20 TABLET ORAL DAILY
Qty: 0 | Refills: 0 | Status: DISCONTINUED | OUTPATIENT
Start: 2019-02-14 | End: 2019-02-15

## 2019-02-14 RX ORDER — CLOPIDOGREL BISULFATE 75 MG/1
75 TABLET, FILM COATED ORAL DAILY
Qty: 0 | Refills: 0 | Status: DISCONTINUED | OUTPATIENT
Start: 2019-02-14 | End: 2019-02-18

## 2019-02-14 RX ORDER — METOPROLOL TARTRATE 50 MG
5 TABLET ORAL EVERY 6 HOURS
Qty: 0 | Refills: 0 | Status: DISCONTINUED | OUTPATIENT
Start: 2019-02-14 | End: 2019-02-15

## 2019-02-14 RX ADMIN — Medication 5 MILLIGRAM(S): at 22:23

## 2019-02-14 RX ADMIN — SODIUM CHLORIDE 3 MILLILITER(S): 9 INJECTION INTRAMUSCULAR; INTRAVENOUS; SUBCUTANEOUS at 22:17

## 2019-02-14 NOTE — H&P ADULT - NSHPPHYSICALEXAM_GEN_ALL_CORE
Physical Exam  CONSTITUTIONAL: A&O x3, no focal deficits   NEURO: A&O x3, no focal deficits                     EYES: EOMI, PERRLA  ENMT: normocephalic, atraumatic, no JVD, no carotid bruit  CV: irregular rate/rhythm, +KIRSTIE II/VI  RESPIRATORY: slight crackles at bases   GI: +BS, soft, nontender  : no ortega  MUSKULOSKELETAL: FROM b/l, no joint swelling, 4/5 strength b/l UE and LE  SKIN / BREAST:  no lacerations/abrasions  Vasc/Ext: no calf tenderness, no peripheral edema, +doppler pulses b/l PT/DP, +2 pulses otherwise

## 2019-02-14 NOTE — H&P ADULT - NSHPREVIEWOFSYSTEMS_GEN_ALL_CORE
Review of Systems  CONSTITUTIONAL:  Denies Fevers / chills, sweats, fatigue, weight loss, weight gain                                      NEURO:  + syncope, Denies parathesias, seizures, , confusion                                                                                EYES:  Denies Blurry vision, discharge, pain, loss of vision                                                                                    ENMT:  Denies Difficulty hearing, vertigo, dysphagia, epistaxis, recent dental work                                       CV:  + chest tightness, GRADY, palpitations, orthopnea                                                                                      RESPIRATORY: +SOB, cough Denies Wheezing, , hemoptysis                                                                GI:  + nausea, Denies Nausea, , diarrhea, constipation, melena, difficulty swallowing                                               : Denies Hematuria, dysuria, urgency, incontinence                                                                                         MUSKULOSKELETAL:  Denies arthritis, joint swelling, muscle weakness                                                             SKIN/BREAST:  Denies rash, itching, jeremy loss, masses                                                                                            PSYCH:  Denies depresion, anxiety, suicidal ideation                                                                                               HEME/LYMPH:  Denies bruises easily, enlarged lymph nodes, tender lymph nodes                                        ENDOCRINE:  Denies cold intolerance, heat intolerance, polydipsia

## 2019-02-14 NOTE — H&P ADULT - PMH
Asthma    Atrial fibrillation    CAD (coronary artery disease)    DM (diabetes mellitus)    HLD (hyperlipidemia)    HTN (hypertension)

## 2019-02-14 NOTE — H&P ADULT - ASSESSMENT
79 y/o female with hx of HTN, HLD, DM, asthma, HLD, afib on coumadin, CAD s/p PCI 3 weeks ago, CHF, MR, who presented to [facility] with progressive SOB 3 weeks ago and found to have CAD sa/p PCI. Patient states that she has continued to have SOB and now has minimalexercise tolerance. She walks with a walker and wheelchair at home and is unable to do her daily activities due to her symptoms of SOB. She has also had multiple episodes of dizziness/syncope with multiple falls. Patient states last syncope 3 weeks ago and she has had 3 falls the last few months. She denies ever hitting her head during these falls. She also endorses frequent nausea, chest tightness and intermittent palpitations but denies any other abdominal symptoms. She presented to [place] with worsening SOB and was found to be in Afib with RVR and with pulmonary congestion. She was admitted to the ICU and was given IV lasix, esmolol drip for her afib and amiodarone. Admitted for further management 77 y/o female with hx of HTN, HLD, DM, asthma, HLD, afib on coumadin, CAD s/p PCI 3 weeks ago, CHF, MR, who presented to [facility] with progressive SOB 3 weeks ago and found to have CAD sa/p PCI. Patient states that she has continued to have SOB and now has minimalexercise tolerance. She walks with a walker and wheelchair at home and is unable to do her daily activities due to her symptoms of SOB. She has also had multiple episodes of dizziness/syncope with multiple falls. Patient states last syncope 3 weeks ago and she has had 3 falls the last few months. She denies ever hitting her head during these falls. She also endorses frequent nausea, chest tightness and intermittent palpitations but denies any other abdominal symptoms. She presented to [place] with worsening SOB and was found to be in Afib with RVR and with pulmonary congestion. She was admitted to the ICU and was given IV lasix, esmolol drip for her afib and amiodarone. Admitted for further management      Neuro:  - hx multiple fall/syncope  - f/u CT head?, carotid duplex ordered  - cont to monitor    CV:  - afib on admission, arrived on esmolol  - will continue Plavix for recent stents  - INR 3.8, when <2 will start heparin gtt  - f/u HR control  - echo in AM    Pulm:  - slight pulm congestion on xray, c/w diuresis  - symbicort for asmtha  - on 4L NC satting 100%, will continue to monitor       GI:  - c/w protonix, home med  - nausea on admission, f/u RUQ US, repeat CMP for LFTs    :  - c/w diuresis  - f/u electrolytes, BUN/Cr. stable on admission  - f/u UA, blood in urine at outside hospital     Heme:  - heparin gtt when INR lower    ID:  - no signs of infection, on abx at outside hospital, no obvious indication to continue    Endo:  - DM , Hgba1c 5.9  - low dose lantus/lispro    Dispo:  - cont workup for MR   - no evidence of infection, patient on

## 2019-02-14 NOTE — H&P ADULT - HISTORY OF PRESENT ILLNESS
79 y/o female with hx of HTN, HLD, DM, asthma, HLD, afib on coumadin, glaucoma, CAD s/p PCI 3 weeks ago, CHF, MR, who presented to [facility] with progressive SOB 3 weeks ago and found to have CAD sa/p PCI. Patient states that she has continued to have SOB and now has minimalexercise tolerance. She walks with a walker and wheelchair at home and is unable to do her daily activities due to her symptoms of SOB. She has also had multiple episodes of dizziness/syncope with multiple falls. Patient states last syncope 3 weeks ago and she has had 3 falls the last few months. She denies ever hitting her head during these falls. She also endorses frequent nausea, chest tightness and intermittent palpitations but denies any other abdominal symptoms. She presented to [place] with worsening SOB and was found to be in Afib with RVR and with pulmonary congestion. She was admitted to the ICU and was given IV lasix, esmolol drip for her afib and amiodarone. She is now on 4L NC sating well, HR in the 80s with BP in the 100s systolic and is transferred to [facility] for further evaluation and medical management for her mitral regurgitation.

## 2019-02-15 LAB
ANION GAP SERPL CALC-SCNC: 14 MMOL/L — SIGNIFICANT CHANGE UP (ref 5–17)
APPEARANCE UR: CLEAR — SIGNIFICANT CHANGE UP
BASOPHILS # BLD AUTO: 0.03 K/UL — SIGNIFICANT CHANGE UP (ref 0–0.2)
BASOPHILS NFR BLD AUTO: 0.3 % — SIGNIFICANT CHANGE UP (ref 0–2)
BILIRUB UR-MCNC: NEGATIVE — SIGNIFICANT CHANGE UP
BUN SERPL-MCNC: 15 MG/DL — SIGNIFICANT CHANGE UP (ref 7–23)
CALCIUM SERPL-MCNC: 8.9 MG/DL — SIGNIFICANT CHANGE UP (ref 8.4–10.5)
CHLORIDE SERPL-SCNC: 101 MMOL/L — SIGNIFICANT CHANGE UP (ref 96–108)
CO2 SERPL-SCNC: 24 MMOL/L — SIGNIFICANT CHANGE UP (ref 22–31)
COLOR SPEC: YELLOW — SIGNIFICANT CHANGE UP
CREAT SERPL-MCNC: 0.83 MG/DL — SIGNIFICANT CHANGE UP (ref 0.5–1.3)
DIFF PNL FLD: ABNORMAL
EOSINOPHIL # BLD AUTO: 0.34 K/UL — SIGNIFICANT CHANGE UP (ref 0–0.5)
EOSINOPHIL NFR BLD AUTO: 3.2 % — SIGNIFICANT CHANGE UP (ref 0–6)
GLUCOSE BLDC GLUCOMTR-MCNC: 100 MG/DL — HIGH (ref 70–99)
GLUCOSE BLDC GLUCOMTR-MCNC: 101 MG/DL — HIGH (ref 70–99)
GLUCOSE BLDC GLUCOMTR-MCNC: 101 MG/DL — HIGH (ref 70–99)
GLUCOSE BLDC GLUCOMTR-MCNC: 110 MG/DL — HIGH (ref 70–99)
GLUCOSE BLDC GLUCOMTR-MCNC: 138 MG/DL — HIGH (ref 70–99)
GLUCOSE SERPL-MCNC: 90 MG/DL — SIGNIFICANT CHANGE UP (ref 70–99)
GLUCOSE UR QL: NEGATIVE — SIGNIFICANT CHANGE UP
HBA1C BLD-MCNC: 5.6 % — SIGNIFICANT CHANGE UP (ref 4–5.6)
HCT VFR BLD CALC: 31.5 % — LOW (ref 34.5–45)
HGB BLD-MCNC: 9.8 G/DL — LOW (ref 11.5–15.5)
IMM GRANULOCYTES NFR BLD AUTO: 0.2 % — SIGNIFICANT CHANGE UP (ref 0–1.5)
INR BLD: 2.79 — HIGH (ref 0.88–1.16)
KETONES UR-MCNC: NEGATIVE — SIGNIFICANT CHANGE UP
LEUKOCYTE ESTERASE UR-ACNC: NEGATIVE — SIGNIFICANT CHANGE UP
LYMPHOCYTES # BLD AUTO: 1.6 K/UL — SIGNIFICANT CHANGE UP (ref 1–3.3)
LYMPHOCYTES # BLD AUTO: 15 % — SIGNIFICANT CHANGE UP (ref 13–44)
MAGNESIUM SERPL-MCNC: 1.7 MG/DL — SIGNIFICANT CHANGE UP (ref 1.6–2.6)
MCHC RBC-ENTMCNC: 26.8 PG — LOW (ref 27–34)
MCHC RBC-ENTMCNC: 31.1 GM/DL — LOW (ref 32–36)
MCV RBC AUTO: 86.3 FL — SIGNIFICANT CHANGE UP (ref 80–100)
MONOCYTES # BLD AUTO: 0.82 K/UL — SIGNIFICANT CHANGE UP (ref 0–0.9)
MONOCYTES NFR BLD AUTO: 7.7 % — SIGNIFICANT CHANGE UP (ref 2–14)
NEUTROPHILS # BLD AUTO: 7.83 K/UL — HIGH (ref 1.8–7.4)
NEUTROPHILS NFR BLD AUTO: 73.6 % — SIGNIFICANT CHANGE UP (ref 43–77)
NITRITE UR-MCNC: NEGATIVE — SIGNIFICANT CHANGE UP
NRBC # BLD: 0 /100 WBCS — SIGNIFICANT CHANGE UP (ref 0–0)
PH UR: 6 — SIGNIFICANT CHANGE UP (ref 5–8)
PLATELET # BLD AUTO: 250 K/UL — SIGNIFICANT CHANGE UP (ref 150–400)
POTASSIUM SERPL-MCNC: 3.8 MMOL/L — SIGNIFICANT CHANGE UP (ref 3.5–5.3)
POTASSIUM SERPL-SCNC: 3.8 MMOL/L — SIGNIFICANT CHANGE UP (ref 3.5–5.3)
PROT UR-MCNC: NEGATIVE MG/DL — SIGNIFICANT CHANGE UP
PROTHROM AB SERPL-ACNC: 32.6 SEC — HIGH (ref 10–12.9)
RBC # BLD: 3.65 M/UL — LOW (ref 3.8–5.2)
RBC # FLD: 15.2 % — HIGH (ref 10.3–14.5)
SODIUM SERPL-SCNC: 139 MMOL/L — SIGNIFICANT CHANGE UP (ref 135–145)
SP GR SPEC: 1.01 — SIGNIFICANT CHANGE UP (ref 1–1.03)
T3 SERPL-MCNC: 103 NG/DL — SIGNIFICANT CHANGE UP (ref 80–200)
T4 AB SER-ACNC: 8.41 UG/DL — SIGNIFICANT CHANGE UP (ref 3.17–11.72)
UROBILINOGEN FLD QL: 0.2 E.U./DL — SIGNIFICANT CHANGE UP
WBC # BLD: 10.64 K/UL — HIGH (ref 3.8–10.5)
WBC # FLD AUTO: 10.64 K/UL — HIGH (ref 3.8–10.5)

## 2019-02-15 PROCEDURE — 71045 X-RAY EXAM CHEST 1 VIEW: CPT | Mod: 26

## 2019-02-15 PROCEDURE — 71250 CT THORAX DX C-: CPT | Mod: 26

## 2019-02-15 PROCEDURE — 93306 TTE W/DOPPLER COMPLETE: CPT | Mod: 26

## 2019-02-15 RX ORDER — METOPROLOL TARTRATE 50 MG
5 TABLET ORAL ONCE
Qty: 0 | Refills: 0 | Status: COMPLETED | OUTPATIENT
Start: 2019-02-15 | End: 2019-02-15

## 2019-02-15 RX ORDER — METOPROLOL TARTRATE 50 MG
12.5 TABLET ORAL EVERY 6 HOURS
Qty: 0 | Refills: 0 | Status: DISCONTINUED | OUTPATIENT
Start: 2019-02-15 | End: 2019-02-15

## 2019-02-15 RX ORDER — ACETYLCYSTEINE 200 MG/ML
3 VIAL (ML) MISCELLANEOUS ONCE
Qty: 0 | Refills: 0 | Status: COMPLETED | OUTPATIENT
Start: 2019-02-15 | End: 2019-02-15

## 2019-02-15 RX ORDER — METOPROLOL TARTRATE 50 MG
12.5 TABLET ORAL EVERY 6 HOURS
Qty: 0 | Refills: 0 | Status: DISCONTINUED | OUTPATIENT
Start: 2019-02-15 | End: 2019-02-17

## 2019-02-15 RX ADMIN — Medication 20 MILLIEQUIVALENT(S): at 12:14

## 2019-02-15 RX ADMIN — INSULIN GLARGINE 10 UNIT(S): 100 INJECTION, SOLUTION SUBCUTANEOUS at 21:52

## 2019-02-15 RX ADMIN — Medication 3 MILLILITER(S): at 16:47

## 2019-02-15 RX ADMIN — PANTOPRAZOLE SODIUM 40 MILLIGRAM(S): 20 TABLET, DELAYED RELEASE ORAL at 06:51

## 2019-02-15 RX ADMIN — SODIUM CHLORIDE 3 MILLILITER(S): 9 INJECTION INTRAMUSCULAR; INTRAVENOUS; SUBCUTANEOUS at 06:51

## 2019-02-15 RX ADMIN — Medication 12.5 MILLIGRAM(S): at 18:37

## 2019-02-15 RX ADMIN — Medication 5 MILLIGRAM(S): at 10:25

## 2019-02-15 RX ADMIN — CLOPIDOGREL BISULFATE 75 MILLIGRAM(S): 75 TABLET, FILM COATED ORAL at 12:14

## 2019-02-15 RX ADMIN — Medication 5 MILLIGRAM(S): at 04:05

## 2019-02-15 RX ADMIN — Medication 5 MILLIGRAM(S): at 16:14

## 2019-02-15 RX ADMIN — Medication 3 UNIT(S): at 07:45

## 2019-02-15 RX ADMIN — Medication 5 MILLIGRAM(S): at 13:17

## 2019-02-15 RX ADMIN — SODIUM CHLORIDE 3 MILLILITER(S): 9 INJECTION INTRAMUSCULAR; INTRAVENOUS; SUBCUTANEOUS at 21:52

## 2019-02-15 RX ADMIN — Medication 20 MILLIGRAM(S): at 06:51

## 2019-02-15 NOTE — PROGRESS NOTE ADULT - ASSESSMENT
77 y/o female with hx of HTN, HLD, DM, asthma, HLD, afib on coumadin, CAD s/p PCI 3 weeks ago, CHF, MR, who presented to [facility] with progressive SOB 3 weeks ago and found to have CAD sa/p PCI. Patient states that she has continued to have SOB and now has minimalexercise tolerance. She walks with a walker and wheelchair at home and is unable to do her daily activities due to her symptoms of SOB. She has also had multiple episodes of dizziness/syncope with multiple falls. Patient states last syncope 3 weeks ago and she has had 3 falls the last few months. She denies ever hitting her head during these falls. She also endorses frequent nausea, chest tightness and intermittent palpitations but denies any other abdominal symptoms. She presented to [place] with worsening SOB and was found to be in Afib with RVR and with pulmonary congestion. She was admitted to the ICU and was given IV lasix, esmolol drip for her afib and amiodarone. Admitted for evaluation for MVR      Neuro:  - hx multiple fall/syncope  - f/u carotid duplex   - cont to monitor    CV:  - afib with rate in 120's  - switched from IV to PO lopressor, cont to titrate up as BP tolerates  - will continue Plavix for recent stents  - INR 2.8, when <2 will start heparin gtt  - mini-MVR Monday  - echo in AM    Pulm:  - PVC improved on AM CXR  - symbicort for asmtha  - on RA satting 98%, will continue to monitor     GI:  - c/w protonix, home med  - nausea on admission, now resolved  - f/u RUQ US   - repeat CMP shows chronically elevated alk phos, remainder of LFTs WNL    Renal/:  - appears clinically euvolemic this morning, held lasix after AM dose, consider giving back some albumin if needed to titrate up beta blocker  - f/u electrolytes, BUN/Cr. stable on admission  - f/u UA, blood in urine at outside hospital     Heme:  - heparin gtt when INR lower    ID:  - no signs of infection, on abx at outside hospital, no obvious indication to continue    Endo:  - DM , Hgba1c 5.9  - low dose lantus/lispro

## 2019-02-15 NOTE — PROGRESS NOTE ADULT - SUBJECTIVE AND OBJECTIVE BOX
Patient discussed on morning rounds with Dr. Salas     Operation / Date: pre-op for MVR    SUBJECTIVE ASSESSMENT:    Pt denies dyspnea/SOB, fevers/chills, or chest pain    Vital Signs Last 24 Hrs  T(C): 37 (15 Feb 2019 13:00), Max: 37 (15 Feb 2019 13:00)  T(F): 98.6 (15 Feb 2019 13:00), Max: 98.6 (15 Feb 2019 13:00)  HR: 124 (15 Feb 2019 13:30) (78 - 136)  BP: 108/81 (15 Feb 2019 13:30) (94/57 - 144/67)  BP(mean): 97 (15 Feb 2019 13:30) (73 - 111)  RR: 29 (15 Feb 2019 13:30) (15 - 32)  SpO2: 97% (15 Feb 2019 13:30) (92% - 100%)  I&O's Detail    2019 07:01  -  15 Feb 2019 07:00  --------------------------------------------------------  IN:    Oral Fluid: 450 mL  Total IN: 450 mL    OUT:    Voided: 1020 mL  Total OUT: 1020 mL    Total NET: -570 mL      15 Feb 2019 07:01  -  15 Feb 2019 13:52  --------------------------------------------------------  IN:    Oral Fluid: 320 mL  Total IN: 320 mL    OUT:    Voided: 350 mL  Total OUT: 350 mL    Total NET: -30 mL    PHYSICAL EXAM:    General: NAD    Neurological: A&Ox3    Cardiovascular: s1S2 RRR    Respiratory: CTA b/l no W/R/R    Gastrointestinal: soft NT/ND +BS    Extremities: no edema    Vascular: warm and well perfused bilaterally    Incision Sites: MSI C/D/I      LABS:                        9.8    10.64 )-----------( 250      ( 15 Feb 2019 04:35 )             31.5       COUMADIN:  No. REASON: holding for OR    PT/INR - ( 15 Feb 2019 04:35 )   PT: 32.6 sec;   INR: 2.79          PTT - ( 2019 19:57 )  PTT:39.3 sec    02-15    139  |  101  |  15  ----------------------------<  90  3.8   |  24  |  0.83    Ca    8.9      15 Feb 2019 04:35  Mg     1.7     -15    TPro  8.1  /  Alb  3.6  /  TBili  0.4  /  DBili  x   /  AST  30  /  ALT  32  /  AlkPhos  245<H>  -14      Urinalysis Basic - ( 15 Feb 2019 03:32 )    Color: Yellow / Appearance: Clear / S.015 / pH: x  Gluc: x / Ketone: NEGATIVE  / Bili: Negative / Urobili: 0.2 E.U./dL   Blood: x / Protein: NEGATIVE mg/dL / Nitrite: NEGATIVE   Leuk Esterase: NEGATIVE / RBC: > 10 /HPF / WBC 5-10 /HPF   Sq Epi: x / Non Sq Epi: 0-5 /HPF / Bacteria: Present /HPF        MEDICATIONS  (STANDING):  buDESOnide 160 MICROgram(s)/formoterol 4.5 MICROgram(s) Inhaler 2 Puff(s) Inhalation two times a day  clopidogrel Tablet 75 milliGRAM(s) Oral daily  furosemide   Injectable 20 milliGRAM(s) IV Push daily  insulin glargine Injectable (LANTUS) 10 Unit(s) SubCutaneous at bedtime  insulin lispro (HumaLOG) corrective regimen sliding scale   SubCutaneous Before meals and at bedtime  insulin lispro Injectable (HumaLOG) 3 Unit(s) SubCutaneous three times a day before meals  metoprolol tartrate 12.5 milliGRAM(s) Oral every 6 hours  metoprolol tartrate Injectable 5 milliGRAM(s) IV Push every 6 hours  pantoprazole    Tablet 40 milliGRAM(s) Oral before breakfast  potassium chloride    Tablet ER 20 milliEquivalent(s) Oral daily  sodium chloride 0.9% lock flush 3 milliLiter(s) IV Push every 8 hours    MEDICATIONS  (PRN):        RADIOLOGY & ADDITIONAL TESTS:    CXR: no signif effusions/infiltrates

## 2019-02-16 LAB
ANION GAP SERPL CALC-SCNC: 12 MMOL/L — SIGNIFICANT CHANGE UP (ref 5–17)
APTT BLD: 103.1 SEC — HIGH (ref 27.5–36.3)
APTT BLD: 32.7 SEC — SIGNIFICANT CHANGE UP (ref 27.5–36.3)
APTT BLD: 95.9 SEC — HIGH (ref 27.5–36.3)
BUN SERPL-MCNC: 18 MG/DL — SIGNIFICANT CHANGE UP (ref 7–23)
CALCIUM SERPL-MCNC: 9 MG/DL — SIGNIFICANT CHANGE UP (ref 8.4–10.5)
CHLORIDE SERPL-SCNC: 100 MMOL/L — SIGNIFICANT CHANGE UP (ref 96–108)
CO2 SERPL-SCNC: 26 MMOL/L — SIGNIFICANT CHANGE UP (ref 22–31)
CREAT SERPL-MCNC: 0.82 MG/DL — SIGNIFICANT CHANGE UP (ref 0.5–1.3)
GLUCOSE BLDC GLUCOMTR-MCNC: 129 MG/DL — HIGH (ref 70–99)
GLUCOSE BLDC GLUCOMTR-MCNC: 147 MG/DL — HIGH (ref 70–99)
GLUCOSE BLDC GLUCOMTR-MCNC: 158 MG/DL — HIGH (ref 70–99)
GLUCOSE BLDC GLUCOMTR-MCNC: 164 MG/DL — HIGH (ref 70–99)
GLUCOSE BLDC GLUCOMTR-MCNC: 98 MG/DL — SIGNIFICANT CHANGE UP (ref 70–99)
GLUCOSE SERPL-MCNC: 102 MG/DL — HIGH (ref 70–99)
HCT VFR BLD CALC: 31.5 % — LOW (ref 34.5–45)
HGB BLD-MCNC: 10 G/DL — LOW (ref 11.5–15.5)
INR BLD: 1.86 — HIGH (ref 0.88–1.16)
INR BLD: 1.96 — HIGH (ref 0.88–1.16)
INR BLD: 1.99 — HIGH (ref 0.88–1.16)
MAGNESIUM SERPL-MCNC: 1.9 MG/DL — SIGNIFICANT CHANGE UP (ref 1.6–2.6)
MCHC RBC-ENTMCNC: 27.1 PG — SIGNIFICANT CHANGE UP (ref 27–34)
MCHC RBC-ENTMCNC: 31.7 GM/DL — LOW (ref 32–36)
MCV RBC AUTO: 85.4 FL — SIGNIFICANT CHANGE UP (ref 80–100)
NRBC # BLD: 0 /100 WBCS — SIGNIFICANT CHANGE UP (ref 0–0)
PLATELET # BLD AUTO: 284 K/UL — SIGNIFICANT CHANGE UP (ref 150–400)
POTASSIUM SERPL-MCNC: 3.8 MMOL/L — SIGNIFICANT CHANGE UP (ref 3.5–5.3)
POTASSIUM SERPL-SCNC: 3.8 MMOL/L — SIGNIFICANT CHANGE UP (ref 3.5–5.3)
PROTHROM AB SERPL-ACNC: 21.4 SEC — HIGH (ref 10–12.9)
PROTHROM AB SERPL-ACNC: 22.6 SEC — HIGH (ref 10–12.9)
PROTHROM AB SERPL-ACNC: 23 SEC — HIGH (ref 10–12.9)
RBC # BLD: 3.69 M/UL — LOW (ref 3.8–5.2)
RBC # FLD: 15 % — HIGH (ref 10.3–14.5)
SODIUM SERPL-SCNC: 138 MMOL/L — SIGNIFICANT CHANGE UP (ref 135–145)
WBC # BLD: 8.3 K/UL — SIGNIFICANT CHANGE UP (ref 3.8–10.5)
WBC # FLD AUTO: 8.3 K/UL — SIGNIFICANT CHANGE UP (ref 3.8–10.5)

## 2019-02-16 RX ORDER — POTASSIUM CHLORIDE 20 MEQ
20 PACKET (EA) ORAL ONCE
Qty: 0 | Refills: 0 | Status: COMPLETED | OUTPATIENT
Start: 2019-02-16 | End: 2019-02-16

## 2019-02-16 RX ORDER — MAGNESIUM OXIDE 400 MG ORAL TABLET 241.3 MG
800 TABLET ORAL ONCE
Qty: 0 | Refills: 0 | Status: COMPLETED | OUTPATIENT
Start: 2019-02-16 | End: 2019-02-16

## 2019-02-16 RX ORDER — DOCUSATE SODIUM 100 MG
100 CAPSULE ORAL THREE TIMES A DAY
Qty: 0 | Refills: 0 | Status: DISCONTINUED | OUTPATIENT
Start: 2019-02-16 | End: 2019-02-18

## 2019-02-16 RX ORDER — SENNA PLUS 8.6 MG/1
2 TABLET ORAL AT BEDTIME
Qty: 0 | Refills: 0 | Status: DISCONTINUED | OUTPATIENT
Start: 2019-02-16 | End: 2019-02-18

## 2019-02-16 RX ORDER — METOPROLOL TARTRATE 50 MG
2.5 TABLET ORAL ONCE
Qty: 0 | Refills: 0 | Status: COMPLETED | OUTPATIENT
Start: 2019-02-16 | End: 2019-02-16

## 2019-02-16 RX ORDER — ATORVASTATIN CALCIUM 80 MG/1
20 TABLET, FILM COATED ORAL AT BEDTIME
Qty: 0 | Refills: 0 | Status: DISCONTINUED | OUTPATIENT
Start: 2019-02-16 | End: 2019-02-18

## 2019-02-16 RX ORDER — HEPARIN SODIUM 5000 [USP'U]/ML
800 INJECTION INTRAVENOUS; SUBCUTANEOUS
Qty: 25000 | Refills: 0 | Status: DISCONTINUED | OUTPATIENT
Start: 2019-02-16 | End: 2019-02-18

## 2019-02-16 RX ADMIN — Medication 3 UNIT(S): at 07:28

## 2019-02-16 RX ADMIN — MAGNESIUM OXIDE 400 MG ORAL TABLET 800 MILLIGRAM(S): 241.3 TABLET ORAL at 07:29

## 2019-02-16 RX ADMIN — Medication 2.5 MILLIGRAM(S): at 11:58

## 2019-02-16 RX ADMIN — ATORVASTATIN CALCIUM 20 MILLIGRAM(S): 80 TABLET, FILM COATED ORAL at 22:15

## 2019-02-16 RX ADMIN — INSULIN GLARGINE 10 UNIT(S): 100 INJECTION, SOLUTION SUBCUTANEOUS at 22:15

## 2019-02-16 RX ADMIN — Medication 12.5 MILLIGRAM(S): at 05:13

## 2019-02-16 RX ADMIN — Medication 100 MILLIGRAM(S): at 14:10

## 2019-02-16 RX ADMIN — Medication 3 UNIT(S): at 12:06

## 2019-02-16 RX ADMIN — Medication 20 MILLIEQUIVALENT(S): at 07:29

## 2019-02-16 RX ADMIN — CLOPIDOGREL BISULFATE 75 MILLIGRAM(S): 75 TABLET, FILM COATED ORAL at 08:54

## 2019-02-16 RX ADMIN — SENNA PLUS 2 TABLET(S): 8.6 TABLET ORAL at 22:15

## 2019-02-16 RX ADMIN — SODIUM CHLORIDE 3 MILLILITER(S): 9 INJECTION INTRAMUSCULAR; INTRAVENOUS; SUBCUTANEOUS at 05:00

## 2019-02-16 RX ADMIN — SODIUM CHLORIDE 3 MILLILITER(S): 9 INJECTION INTRAMUSCULAR; INTRAVENOUS; SUBCUTANEOUS at 13:49

## 2019-02-16 RX ADMIN — Medication 12.5 MILLIGRAM(S): at 17:22

## 2019-02-16 RX ADMIN — Medication 12.5 MILLIGRAM(S): at 11:20

## 2019-02-16 RX ADMIN — Medication 12.5 MILLIGRAM(S): at 00:17

## 2019-02-16 RX ADMIN — SODIUM CHLORIDE 3 MILLILITER(S): 9 INJECTION INTRAMUSCULAR; INTRAVENOUS; SUBCUTANEOUS at 22:26

## 2019-02-16 RX ADMIN — Medication 100 MILLIGRAM(S): at 22:15

## 2019-02-16 RX ADMIN — HEPARIN SODIUM 8 UNIT(S)/HR: 5000 INJECTION INTRAVENOUS; SUBCUTANEOUS at 08:46

## 2019-02-16 NOTE — PROGRESS NOTE ADULT - SUBJECTIVE AND OBJECTIVE BOX
Patient discussed on morning rounds with Dr. Meyer    Operation / Date: Pre-op MVR Monday    SUBJECTIVE ASSESSMENT:  78y Female seen and examined. Feels well, no acute complaints. Denies fever, chest pain, palpitations, SOB, abdominal pain, n/v.         Vital Signs Last 24 Hrs  T(C): 36.3 (2019 05:11), Max: 37.1 (15 Feb 2019 20:00)  T(F): 97.4 (2019 05:11), Max: 98.8 (15 Feb 2019 20:00)  HR: 118 (2019 09:00) (104 - 136)  BP: 102/52 (2019 09:00) (97/70 - 122/75)  BP(mean): 74 (2019 09:00) (74 - 111)  RR: 18 (2019 09:00) (18 - 36)  SpO2: 100% (2019 09:00) (95% - 100%)  I&O's Detail    15 Feb 2019 07:01  -  2019 07:00  --------------------------------------------------------  IN:    Oral Fluid: 620 mL  Total IN: 620 mL    OUT:    Voided: 450 mL  Total OUT: 450 mL    Total NET: 170 mL      2019 07:01  -  2019 10:11  --------------------------------------------------------  IN:    heparin Infusion: 16 mL  Total IN: 16 mL    OUT:    Voided: 250 mL  Total OUT: 250 mL    Total NET: -234 mL    PHYSICAL EXAM:    General: Patient lying comfortably in bed, no acute distress     Neurological: Alert and oriented. No focal neurological deficits     Cardiovascular: S1S2, RRR, no murmurs appreciated on exam     Respiratory: Clear to ausculation bilaterally, no wheeze/rhonchi/rales    Gastrointestinal: + BS, soft, non tender, non distended     Extremities: Warm and well perfused. No edema, no calf tenderness     Vascular: 1+ Peripheral pulses b/l       LABS:                        10.0   8.30  )-----------( 284      ( 2019 05:42 )             31.5       COUMADIN:  No    PT/INR - ( 2019 05:42 )   PT: 23.0 sec;   INR: 1.99          PTT - ( 2019 05:42 )  PTT:32.7 sec        138  |  100  |  18  ----------------------------<  102<H>  3.8   |  26  |  0.82    Ca    9.0      2019 05:42  Mg     1.9         TPro  8.1  /  Alb  3.6  /  TBili  0.4  /  DBili  x   /  AST  30  /  ALT  32  /  AlkPhos  245<H>  02-      Urinalysis Basic - ( 15 Feb 2019 03:32 )    Color: Yellow / Appearance: Clear / S.015 / pH: x  Gluc: x / Ketone: NEGATIVE  / Bili: Negative / Urobili: 0.2 E.U./dL   Blood: x / Protein: NEGATIVE mg/dL / Nitrite: NEGATIVE   Leuk Esterase: NEGATIVE / RBC: > 10 /HPF / WBC 5-10 /HPF   Sq Epi: x / Non Sq Epi: 0-5 /HPF / Bacteria: Present /HPF        MEDICATIONS  (STANDING):  buDESOnide 160 MICROgram(s)/formoterol 4.5 MICROgram(s) Inhaler 2 Puff(s) Inhalation two times a day  clopidogrel Tablet 75 milliGRAM(s) Oral daily  heparin  Infusion 800 Unit(s)/Hr (8 mL/Hr) IV Continuous <Continuous>  insulin glargine Injectable (LANTUS) 10 Unit(s) SubCutaneous at bedtime  insulin lispro (HumaLOG) corrective regimen sliding scale   SubCutaneous Before meals and at bedtime  insulin lispro Injectable (HumaLOG) 3 Unit(s) SubCutaneous three times a day before meals  metoprolol tartrate 12.5 milliGRAM(s) Oral every 6 hours  pantoprazole    Tablet 40 milliGRAM(s) Oral before breakfast  sodium chloride 0.9% lock flush 3 milliLiter(s) IV Push every 8 hours    MEDICATIONS  (PRN):        RADIOLOGY & ADDITIONAL TESTS:  < from: CT Chest No Cont (02.15.19 @ 19:03) >  IMPRESSION:    Large calcification of the mitral annulus.    Improved pulmonary congestion.    Persistent small to moderate bilateral pleural effusions.    8 mm groundglass opacity right upper lobe. Recommend follow-up in 3-6   months.    < end of copied text >    < from: [facility] Duplex Carotid, Bilat (19 @ 15:53) >    IMPRESSION:  No hemodynamically significant stenosis in the bilateral internal carotid   arteries.    < end of copied text >

## 2019-02-16 NOTE — PROGRESS NOTE ADULT - ASSESSMENT
78 year old F, PMHx HTN, HLD, DM, asthma, HLD, afib on coumadin, CAD s/p PCI 3 weeks ago, CHF, MR, who presented to [facility] with progressive SOB 3 weeks ago and found to have CAD s/p PCI. Patient states that she has continued to have SOB and now has minimal exercise tolerance. She walks with a walker and wheelchair at home and is unable to do her daily activities due to her symptoms of SOB. She has also had multiple episodes of dizziness/syncope with multiple falls. Patient states last syncope 3 weeks ago and she has had 3 falls the last few months. She denies ever hitting her head during these falls. She also endorses frequent nausea, chest tightness and intermittent palpitations but denies any other abdominal symptoms. She presented to [place] with worsening SOB and was found to be in Afib with RVR and with pulmonary congestion. She was admitted to the ICU and was given IV lasix, esmolol drip for her afib and amiodarone. Admitted to CT surgery under Dr. Salas, pre-op for MVR.    A/P:  Neurovascular: No delirium.   -Hx falls/syncope- carotids from january negative  -monitor neuro status    Cardiovascular: Hemodynamically stable.   -Hx HTN, HLD, afib, CAD s/p PCI, MR, pre-op for MVR  -continue plavix 75mg daily, atorvastatin 20mg qhs, metoprolol 12.5mg q 6 (titrate as tolerted)  -Afib- currently 90 to low 100s, started hep gtt this AM as INR < 2, f/u PTT 2pm.   -Echo: EF 50%, mod MR, Mod TR, PHTN  -monitor hr/bp/tele    Respiratory: 02 Sat = 98% on RA.  -If on oxygen wean to RA from for O2 Sat > 93%.  -Encourage ambulation, C+DB and Use of IS 10x / hr while awake.  -CXR- stable  -cont symbicort    GI: Stable.  -protonix for GI PPX.  -Continue bowel regimen  -PO Diet.    Renal / : BUN/Cr 18/0.82  -Monitor renal function.  -Monitor I/O's.  -Replete lytes PRN    Endocrine:    -A1c 5.6- continue 10U Lantus/Humalog 3U TID- AM FS 98  -TSH high, T3/T4 WNL    Hematologic: H&H 10/31  -f/u AM CBC    ID: Afebrile, WBC 8  -Observe for SIRS/Sepsis Syndrome.    Prophylaxis:  -Hep gtt  -SCD's    Disposition:  -OR monday

## 2019-02-17 LAB
ANION GAP SERPL CALC-SCNC: 12 MMOL/L — SIGNIFICANT CHANGE UP (ref 5–17)
APTT BLD: 128.1 SEC — CRITICAL HIGH (ref 27.5–36.3)
APTT BLD: 54.4 SEC — HIGH (ref 27.5–36.3)
APTT BLD: 68.6 SEC — HIGH (ref 27.5–36.3)
BLD GP AB SCN SERPL QL: NEGATIVE — SIGNIFICANT CHANGE UP
BUN SERPL-MCNC: 18 MG/DL — SIGNIFICANT CHANGE UP (ref 7–23)
CALCIUM SERPL-MCNC: 9.3 MG/DL — SIGNIFICANT CHANGE UP (ref 8.4–10.5)
CHLORIDE SERPL-SCNC: 100 MMOL/L — SIGNIFICANT CHANGE UP (ref 96–108)
CO2 SERPL-SCNC: 25 MMOL/L — SIGNIFICANT CHANGE UP (ref 22–31)
CREAT SERPL-MCNC: 0.84 MG/DL — SIGNIFICANT CHANGE UP (ref 0.5–1.3)
GLUCOSE BLDC GLUCOMTR-MCNC: 103 MG/DL — HIGH (ref 70–99)
GLUCOSE BLDC GLUCOMTR-MCNC: 122 MG/DL — HIGH (ref 70–99)
GLUCOSE BLDC GLUCOMTR-MCNC: 189 MG/DL — HIGH (ref 70–99)
GLUCOSE BLDC GLUCOMTR-MCNC: 96 MG/DL — SIGNIFICANT CHANGE UP (ref 70–99)
GLUCOSE SERPL-MCNC: 110 MG/DL — HIGH (ref 70–99)
HCT VFR BLD CALC: 34.4 % — LOW (ref 34.5–45)
HGB BLD-MCNC: 10.5 G/DL — LOW (ref 11.5–15.5)
INR BLD: 1.52 — HIGH (ref 0.88–1.16)
INR BLD: 1.56 — HIGH (ref 0.88–1.16)
INR BLD: 1.57 — HIGH (ref 0.88–1.16)
MAGNESIUM SERPL-MCNC: 2.2 MG/DL — SIGNIFICANT CHANGE UP (ref 1.6–2.6)
MCHC RBC-ENTMCNC: 26.7 PG — LOW (ref 27–34)
MCHC RBC-ENTMCNC: 30.5 GM/DL — LOW (ref 32–36)
MCV RBC AUTO: 87.5 FL — SIGNIFICANT CHANGE UP (ref 80–100)
NRBC # BLD: 0 /100 WBCS — SIGNIFICANT CHANGE UP (ref 0–0)
PLATELET # BLD AUTO: 295 K/UL — SIGNIFICANT CHANGE UP (ref 150–400)
POTASSIUM SERPL-MCNC: 3.9 MMOL/L — SIGNIFICANT CHANGE UP (ref 3.5–5.3)
POTASSIUM SERPL-SCNC: 3.9 MMOL/L — SIGNIFICANT CHANGE UP (ref 3.5–5.3)
PROTHROM AB SERPL-ACNC: 17.4 SEC — HIGH (ref 10–12.9)
PROTHROM AB SERPL-ACNC: 17.9 SEC — HIGH (ref 10–12.9)
PROTHROM AB SERPL-ACNC: 18 SEC — HIGH (ref 10–12.9)
RBC # BLD: 3.93 M/UL — SIGNIFICANT CHANGE UP (ref 3.8–5.2)
RBC # FLD: 14.8 % — HIGH (ref 10.3–14.5)
RH IG SCN BLD-IMP: POSITIVE — SIGNIFICANT CHANGE UP
SODIUM SERPL-SCNC: 137 MMOL/L — SIGNIFICANT CHANGE UP (ref 135–145)
WBC # BLD: 9.76 K/UL — SIGNIFICANT CHANGE UP (ref 3.8–10.5)
WBC # FLD AUTO: 9.76 K/UL — SIGNIFICANT CHANGE UP (ref 3.8–10.5)

## 2019-02-17 PROCEDURE — 71046 X-RAY EXAM CHEST 2 VIEWS: CPT | Mod: 26

## 2019-02-17 RX ORDER — CHLORHEXIDINE GLUCONATE 213 G/1000ML
1 SOLUTION TOPICAL ONCE
Qty: 0 | Refills: 0 | Status: COMPLETED | OUTPATIENT
Start: 2019-02-17 | End: 2019-02-17

## 2019-02-17 RX ORDER — CHLORHEXIDINE GLUCONATE 213 G/1000ML
1 SOLUTION TOPICAL ONCE
Qty: 0 | Refills: 0 | Status: COMPLETED | OUTPATIENT
Start: 2019-02-17 | End: 2019-02-18

## 2019-02-17 RX ORDER — METOPROLOL TARTRATE 50 MG
25 TABLET ORAL EVERY 8 HOURS
Qty: 0 | Refills: 0 | Status: DISCONTINUED | OUTPATIENT
Start: 2019-02-17 | End: 2019-02-18

## 2019-02-17 RX ORDER — METOPROLOL TARTRATE 50 MG
12.5 TABLET ORAL ONCE
Qty: 0 | Refills: 0 | Status: COMPLETED | OUTPATIENT
Start: 2019-02-17 | End: 2019-02-17

## 2019-02-17 RX ORDER — POTASSIUM CHLORIDE 20 MEQ
20 PACKET (EA) ORAL ONCE
Qty: 0 | Refills: 0 | Status: COMPLETED | OUTPATIENT
Start: 2019-02-17 | End: 2019-02-17

## 2019-02-17 RX ORDER — METOPROLOL TARTRATE 50 MG
2.5 TABLET ORAL ONCE
Qty: 0 | Refills: 0 | Status: COMPLETED | OUTPATIENT
Start: 2019-02-17 | End: 2019-02-17

## 2019-02-17 RX ORDER — CHLORHEXIDINE GLUCONATE 213 G/1000ML
10 SOLUTION TOPICAL ONCE
Qty: 0 | Refills: 0 | Status: DISCONTINUED | OUTPATIENT
Start: 2019-02-17 | End: 2019-02-18

## 2019-02-17 RX ADMIN — Medication 12.5 MILLIGRAM(S): at 00:32

## 2019-02-17 RX ADMIN — CLOPIDOGREL BISULFATE 75 MILLIGRAM(S): 75 TABLET, FILM COATED ORAL at 09:47

## 2019-02-17 RX ADMIN — Medication 20 MILLIEQUIVALENT(S): at 07:46

## 2019-02-17 RX ADMIN — Medication 12.5 MILLIGRAM(S): at 06:41

## 2019-02-17 RX ADMIN — SODIUM CHLORIDE 3 MILLILITER(S): 9 INJECTION INTRAMUSCULAR; INTRAVENOUS; SUBCUTANEOUS at 13:45

## 2019-02-17 RX ADMIN — Medication 12.5 MILLIGRAM(S): at 06:46

## 2019-02-17 RX ADMIN — Medication 3 UNIT(S): at 06:42

## 2019-02-17 RX ADMIN — SODIUM CHLORIDE 3 MILLILITER(S): 9 INJECTION INTRAMUSCULAR; INTRAVENOUS; SUBCUTANEOUS at 06:46

## 2019-02-17 RX ADMIN — Medication 25 MILLIGRAM(S): at 22:22

## 2019-02-17 RX ADMIN — Medication 2.5 MILLIGRAM(S): at 20:02

## 2019-02-17 RX ADMIN — Medication 25 MILLIGRAM(S): at 13:47

## 2019-02-17 RX ADMIN — Medication 100 MILLIGRAM(S): at 06:41

## 2019-02-17 RX ADMIN — PANTOPRAZOLE SODIUM 40 MILLIGRAM(S): 20 TABLET, DELAYED RELEASE ORAL at 06:41

## 2019-02-17 RX ADMIN — ATORVASTATIN CALCIUM 20 MILLIGRAM(S): 80 TABLET, FILM COATED ORAL at 22:22

## 2019-02-17 RX ADMIN — CHLORHEXIDINE GLUCONATE 1 APPLICATION(S): 213 SOLUTION TOPICAL at 21:00

## 2019-02-17 RX ADMIN — SODIUM CHLORIDE 3 MILLILITER(S): 9 INJECTION INTRAMUSCULAR; INTRAVENOUS; SUBCUTANEOUS at 22:14

## 2019-02-17 NOTE — PROGRESS NOTE ADULT - SUBJECTIVE AND OBJECTIVE BOX
Planned Date of Surgery:       2/18/19                                                                                                           Surgeon: Dr. Salas    Procedure: Minimally invasive MVR, cryomaze    HPI:  78 year old F, PMHx HTN, HLD, DM, asthma, HLD, afib on coumadin, CAD s/p PCI 3 weeks ago, CHF, MR, who presented to [facility] with progressive SOB 3 weeks ago and found to have CAD s/p PCI. Patient states that she has continued to have SOB and now has minimal exercise tolerance. She walks with a walker and wheelchair at home and is unable to do her daily activities due to her symptoms of SOB. She has also had multiple episodes of dizziness/syncope with multiple falls. Patient states last syncope 3 weeks ago and she has had 3 falls the last few months. She denies ever hitting her head during these falls. She also endorses frequent nausea, chest tightness and intermittent palpitations but denies any other abdominal symptoms. She presented to [place] with worsening SOB and was found to be in Afib with RVR and with pulmonary congestion. She was admitted to the ICU and was given IV lasix, esmolol drip for her afib and amiodarone. Admitted to CT surgery under Dr. Salas, pre-op for MVR.    PAST MEDICAL & SURGICAL HISTORY:  CAD (coronary artery disease)  Asthma  Atrial fibrillation  HLD (hyperlipidemia)  DM (diabetes mellitus)  HTN (hypertension)  DVT (deep venous thrombosis)  Asthma  COPD (chronic obstructive pulmonary disease)  DM (diabetes mellitus)  S/P arterial stent: left arterial thrombectomy  History of embolectomy: LLE      No Known Allergies      Physical Exam  T(C): 36.5 (02-17-19 @ 09:56), Max: 36.8 (02-16-19 @ 17:00)  HR: 104 (02-17-19 @ 09:42) (104 - 120)  BP: 111/64 (02-17-19 @ 09:42) (102/67 - 136/91)  RR: 16 (02-17-19 @ 09:42) (16 - 18)  SpO2: 98% (02-17-19 @ 09:42) (95% - 99%)    Constitutional: Sitting comfortably in bed, NAD  Neuro: AAOx3, no neuro deficits noted  CV: S1S2, irregular rhythm   Pulmonary: CTA b/l, no w/r/r  GI: +BS, soft nontender nondistended   Extremities warm and well perfused, no edema, no calf tenderness    MEDICATIONS  (STANDING):  atorvastatin 20 milliGRAM(s) Oral at bedtime  buDESOnide 160 MICROgram(s)/formoterol 4.5 MICROgram(s) Inhaler 2 Puff(s) Inhalation two times a day  clopidogrel Tablet 75 milliGRAM(s) Oral daily  docusate sodium 100 milliGRAM(s) Oral three times a day  heparin  Infusion 800 Unit(s)/Hr (4.5 mL/Hr) IV Continuous <Continuous>  insulin glargine Injectable (LANTUS) 10 Unit(s) SubCutaneous at bedtime  insulin lispro (HumaLOG) corrective regimen sliding scale   SubCutaneous Before meals and at bedtime  insulin lispro Injectable (HumaLOG) 3 Unit(s) SubCutaneous three times a day before meals  metoprolol tartrate 12.5 milliGRAM(s) Oral every 6 hours  pantoprazole    Tablet 40 milliGRAM(s) Oral before breakfast  senna 2 Tablet(s) Oral at bedtime  sodium chloride 0.9% lock flush 3 milliLiter(s) IV Push every 8 hours    MEDICATIONS  (PRN):      On Beta Blocker? YES    Labs:                        10.5   9.76  )-----------( 295      ( 17 Feb 2019 06:11 )             34.4     02-17    137  |  100  |  18  ----------------------------<  110<H>  3.9   |  25  |  0.84    Ca    9.3      17 Feb 2019 06:11  Mg     2.2     02-17      PT/INR - ( 17 Feb 2019 06:11 )   PT: 18.0 sec;   INR: 1.57          PTT - ( 17 Feb 2019 06:11 )  PTT:128.1 sec    ABO Interpretation: O (02-17-19 @ 05:30)        Hgb A1C: 5.6    EKG:  < from: 12 Lead ECG (02.14.19 @ 19:09) >  Ventricular Rate 94 BPM    Atrial Rate 79 BPM    QRS Duration 82 ms    Q-T Interval 392 ms    QTC Calculation(Bezet) 490 ms    R Axis 15 degrees    T Axis -2 degrees    Diagnosis Line Atrial fibrillation  Prolonged QT    Confirmed by FRANNY ANDERSON, APURVA ([id number]) on 2/15/2019 3:23:42 PM    < end of copied text >      CXR:  < from: Xray Chest 1 View- PORTABLE-Routine (02.15.19 @ 07:00) >  IMPRESSION: Findings suggestive of improving pulmonary vascular   congestion.    < end of copied text >      CT Scans:  < from: CT Chest No Cont (02.15.19 @ 19:03) >  IMPRESSION:    Large calcification of the mitral annulus.    Improved pulmonary congestion.    Persistent small to moderate bilateral pleural effusions.    8 mm groundglass opacity right upper lobe. Recommend follow-up in 3-6   months.    < end of copied text >    < from: CT Head No Cont (01.05.19 @ 08:39) >  IMPRESSION:     Volume loss and white matter decreased attenuation likely microvascular   disease, there is no acute hemorrhage or midline shift, if symptoms   persist consider follow up head CT or MRI if no contraindications    < end of copied text >      < from: CT Angio Chest w/ IV Cont (01.05.19 @ 08:39) >  IMPRESSION:     Limited evaluation of the distal segmental and subsegmental pulmonary   arteries due to streak and motion artifact. No central, main, lobar or   proximal segmental pulmonary embolism.     Small bilateral pleural effusions with likely pulmonary edema.    A partially imaged fat density lesion in the periphery of the right renal   interpole is indeterminate, may reflect an angiomyolipoma. For more   definitive characterization, consider nonurgent contrast-enhanced renal   protocol CT on an outpatient basis.    < end of copied text >      Cath Report:    Echo:  < from: Echocardiogram (02.15.19 @ 15:20) >  Patient was tachycardic during the study. There is mild concentric left   ventricular hypertrophy.Abnormal (paradoxical) septal motion consistent   with   abnormal conductionTachycardia precludes accurate assessment of LV   function.   Grossly, it appears to be mildly and globally reduced.  The left   ventricular   ejection fraction is    50%.The right ventricle is mildly dilated.The   right ventricular systolic function is normal.The left atrial size is   normal.Right atrial size is normal.Calcified aortic valve.No aortic   regurgitation noted.No hemodynamically significant valvular aortic   stenosis.There is severe mitral annular calcification.There is mild   mitral   valve thickening.There is moderate mitral regurgitation.Structurally   normal   tricuspid valve.There is moderate tricuspid regurgitation.There is mild   pulmonary hypertension.The pulmonary artery systolic pressure is   estimated to   be 41 mmHg.Structurally normal pulmonic valve.No pulmonic regurgitation   noted.There is no pericardial effusion.    < end of copied text >      PFT's: FEV1 41%, FVC 45%, FEV/FVC 97%    Carotid Duplex:  < from: VA Duplex Dana Middleton (01.11.19 @ 15:53) >  IMPRESSION:  No hemodynamically significant stenosis in the bilateral internal carotid   arteries.      < end of copied text >      Consult in Chart?  YES   Consent in Chart? YES  Pre-op Orders Placed? YES  Blood Products Ordered? YES   NPO ordered? YES

## 2019-02-18 ENCOUNTER — APPOINTMENT (OUTPATIENT)
Dept: CARDIOTHORACIC SURGERY | Facility: HOSPITAL | Age: 79
End: 2019-02-18

## 2019-02-18 ENCOUNTER — RESULT REVIEW (OUTPATIENT)
Age: 79
End: 2019-02-18

## 2019-02-18 LAB
ALBUMIN SERPL ELPH-MCNC: 2.7 G/DL — LOW (ref 3.3–5)
ALBUMIN SERPL ELPH-MCNC: 3 G/DL — LOW (ref 3.3–5)
ALBUMIN SERPL ELPH-MCNC: 3.2 G/DL — LOW (ref 3.3–5)
ALP SERPL-CCNC: 63 U/L — SIGNIFICANT CHANGE UP (ref 40–120)
ALP SERPL-CCNC: 69 U/L — SIGNIFICANT CHANGE UP (ref 40–120)
ALP SERPL-CCNC: 76 U/L — SIGNIFICANT CHANGE UP (ref 40–120)
ALT FLD-CCNC: 17 U/L — SIGNIFICANT CHANGE UP (ref 10–45)
ALT FLD-CCNC: 20 U/L — SIGNIFICANT CHANGE UP (ref 10–45)
ALT FLD-CCNC: 21 U/L — SIGNIFICANT CHANGE UP (ref 10–45)
ANION GAP SERPL CALC-SCNC: 11 MMOL/L — SIGNIFICANT CHANGE UP (ref 5–17)
ANION GAP SERPL CALC-SCNC: 13 MMOL/L — SIGNIFICANT CHANGE UP (ref 5–17)
ANION GAP SERPL CALC-SCNC: 13 MMOL/L — SIGNIFICANT CHANGE UP (ref 5–17)
ANION GAP SERPL CALC-SCNC: 14 MMOL/L — SIGNIFICANT CHANGE UP (ref 5–17)
APTT BLD: 33.3 SEC — SIGNIFICANT CHANGE UP (ref 27.5–36.3)
APTT BLD: 36.8 SEC — HIGH (ref 27.5–36.3)
APTT BLD: 44.6 SEC — HIGH (ref 27.5–36.3)
APTT BLD: 45.2 SEC — HIGH (ref 27.5–36.3)
APTT BLD: 51.2 SEC — HIGH (ref 27.5–36.3)
AST SERPL-CCNC: 101 U/L — HIGH (ref 10–40)
AST SERPL-CCNC: 102 U/L — HIGH (ref 10–40)
AST SERPL-CCNC: 68 U/L — HIGH (ref 10–40)
BASOPHILS # BLD AUTO: 0 K/UL — SIGNIFICANT CHANGE UP (ref 0–0.2)
BASOPHILS # BLD AUTO: 0.01 K/UL — SIGNIFICANT CHANGE UP (ref 0–0.2)
BASOPHILS # BLD AUTO: 0.01 K/UL — SIGNIFICANT CHANGE UP (ref 0–0.2)
BASOPHILS NFR BLD AUTO: 0 % — SIGNIFICANT CHANGE UP (ref 0–2)
BASOPHILS NFR BLD AUTO: 0.1 % — SIGNIFICANT CHANGE UP (ref 0–2)
BASOPHILS NFR BLD AUTO: 0.1 % — SIGNIFICANT CHANGE UP (ref 0–2)
BILIRUB SERPL-MCNC: 1.8 MG/DL — HIGH (ref 0.2–1.2)
BILIRUB SERPL-MCNC: 2.4 MG/DL — HIGH (ref 0.2–1.2)
BILIRUB SERPL-MCNC: 3.6 MG/DL — HIGH (ref 0.2–1.2)
BUN SERPL-MCNC: 11 MG/DL — SIGNIFICANT CHANGE UP (ref 7–23)
BUN SERPL-MCNC: 11 MG/DL — SIGNIFICANT CHANGE UP (ref 7–23)
BUN SERPL-MCNC: 12 MG/DL — SIGNIFICANT CHANGE UP (ref 7–23)
BUN SERPL-MCNC: 19 MG/DL — SIGNIFICANT CHANGE UP (ref 7–23)
CALCIUM SERPL-MCNC: 11.2 MG/DL — HIGH (ref 8.4–10.5)
CALCIUM SERPL-MCNC: 11.6 MG/DL — HIGH (ref 8.4–10.5)
CALCIUM SERPL-MCNC: 12.6 MG/DL — HIGH (ref 8.4–10.5)
CALCIUM SERPL-MCNC: 9.4 MG/DL — SIGNIFICANT CHANGE UP (ref 8.4–10.5)
CHLORIDE SERPL-SCNC: 101 MMOL/L — SIGNIFICANT CHANGE UP (ref 96–108)
CHLORIDE SERPL-SCNC: 108 MMOL/L — SIGNIFICANT CHANGE UP (ref 96–108)
CHLORIDE SERPL-SCNC: 109 MMOL/L — HIGH (ref 96–108)
CHLORIDE SERPL-SCNC: 111 MMOL/L — HIGH (ref 96–108)
CO2 SERPL-SCNC: 22 MMOL/L — SIGNIFICANT CHANGE UP (ref 22–31)
CO2 SERPL-SCNC: 23 MMOL/L — SIGNIFICANT CHANGE UP (ref 22–31)
CREAT SERPL-MCNC: 0.54 MG/DL — SIGNIFICANT CHANGE UP (ref 0.5–1.3)
CREAT SERPL-MCNC: 0.63 MG/DL — SIGNIFICANT CHANGE UP (ref 0.5–1.3)
CREAT SERPL-MCNC: 0.72 MG/DL — SIGNIFICANT CHANGE UP (ref 0.5–1.3)
CREAT SERPL-MCNC: 0.79 MG/DL — SIGNIFICANT CHANGE UP (ref 0.5–1.3)
EOSINOPHIL # BLD AUTO: 0.01 K/UL — SIGNIFICANT CHANGE UP (ref 0–0.5)
EOSINOPHIL # BLD AUTO: 0.02 K/UL — SIGNIFICANT CHANGE UP (ref 0–0.5)
EOSINOPHIL # BLD AUTO: 0.09 K/UL — SIGNIFICANT CHANGE UP (ref 0–0.5)
EOSINOPHIL NFR BLD AUTO: 0.1 % — SIGNIFICANT CHANGE UP (ref 0–6)
EOSINOPHIL NFR BLD AUTO: 0.3 % — SIGNIFICANT CHANGE UP (ref 0–6)
EOSINOPHIL NFR BLD AUTO: 0.9 % — SIGNIFICANT CHANGE UP (ref 0–6)
FIBRINOGEN PPP-MCNC: 196 MG/DL — LOW (ref 258–438)
GAS PNL BLDA: SIGNIFICANT CHANGE UP
GIANT PLATELETS BLD QL SMEAR: PRESENT — SIGNIFICANT CHANGE UP
GLUCOSE BLDC GLUCOMTR-MCNC: 106 MG/DL — HIGH (ref 70–99)
GLUCOSE BLDC GLUCOMTR-MCNC: 114 MG/DL — HIGH (ref 70–99)
GLUCOSE BLDC GLUCOMTR-MCNC: 121 MG/DL — HIGH (ref 70–99)
GLUCOSE BLDC GLUCOMTR-MCNC: 125 MG/DL — HIGH (ref 70–99)
GLUCOSE BLDC GLUCOMTR-MCNC: 143 MG/DL — HIGH (ref 70–99)
GLUCOSE BLDC GLUCOMTR-MCNC: 149 MG/DL — HIGH (ref 70–99)
GLUCOSE BLDC GLUCOMTR-MCNC: 154 MG/DL — HIGH (ref 70–99)
GLUCOSE BLDC GLUCOMTR-MCNC: 165 MG/DL — HIGH (ref 70–99)
GLUCOSE BLDC GLUCOMTR-MCNC: 188 MG/DL — HIGH (ref 70–99)
GLUCOSE BLDC GLUCOMTR-MCNC: 91 MG/DL — SIGNIFICANT CHANGE UP (ref 70–99)
GLUCOSE SERPL-MCNC: 106 MG/DL — HIGH (ref 70–99)
GLUCOSE SERPL-MCNC: 136 MG/DL — HIGH (ref 70–99)
GLUCOSE SERPL-MCNC: 168 MG/DL — HIGH (ref 70–99)
GLUCOSE SERPL-MCNC: 187 MG/DL — HIGH (ref 70–99)
HCT VFR BLD CALC: 28.4 % — LOW (ref 34.5–45)
HCT VFR BLD CALC: 29.4 % — LOW (ref 34.5–45)
HCT VFR BLD CALC: 30.5 % — LOW (ref 34.5–45)
HCT VFR BLD CALC: 32.1 % — LOW (ref 34.5–45)
HCT VFR BLD CALC: 34.4 % — LOW (ref 34.5–45)
HGB BLD-MCNC: 10.1 G/DL — LOW (ref 11.5–15.5)
HGB BLD-MCNC: 10.4 G/DL — LOW (ref 11.5–15.5)
HGB BLD-MCNC: 10.5 G/DL — LOW (ref 11.5–15.5)
HGB BLD-MCNC: 11.3 G/DL — LOW (ref 11.5–15.5)
HGB BLD-MCNC: 9.8 G/DL — LOW (ref 11.5–15.5)
IMM GRANULOCYTES NFR BLD AUTO: 1 % — SIGNIFICANT CHANGE UP (ref 0–1.5)
IMM GRANULOCYTES NFR BLD AUTO: 1.6 % — HIGH (ref 0–1.5)
INR BLD: 1.21 — HIGH (ref 0.88–1.16)
INR BLD: 1.27 — HIGH (ref 0.88–1.16)
INR BLD: 1.32 — HIGH (ref 0.88–1.16)
INR BLD: 1.54 — HIGH (ref 0.88–1.16)
INR BLD: 1.91 — HIGH (ref 0.88–1.16)
LACTATE SERPL-SCNC: 2.5 MMOL/L — HIGH (ref 0.5–2)
LACTATE SERPL-SCNC: 2.7 MMOL/L — HIGH (ref 0.5–2)
LACTATE SERPL-SCNC: 3 MMOL/L — HIGH (ref 0.5–2)
LACTATE SERPL-SCNC: 5 MMOL/L — CRITICAL HIGH (ref 0.5–2)
LYMPHOCYTES # BLD AUTO: 0.35 K/UL — LOW (ref 1–3.3)
LYMPHOCYTES # BLD AUTO: 0.62 K/UL — LOW (ref 1–3.3)
LYMPHOCYTES # BLD AUTO: 0.63 K/UL — LOW (ref 1–3.3)
LYMPHOCYTES # BLD AUTO: 3.4 % — LOW (ref 13–44)
LYMPHOCYTES # BLD AUTO: 7.2 % — LOW (ref 13–44)
LYMPHOCYTES # BLD AUTO: 8.2 % — LOW (ref 13–44)
MAGNESIUM SERPL-MCNC: 2 MG/DL — SIGNIFICANT CHANGE UP (ref 1.6–2.6)
MAGNESIUM SERPL-MCNC: 2.1 MG/DL — SIGNIFICANT CHANGE UP (ref 1.6–2.6)
MAGNESIUM SERPL-MCNC: 2.5 MG/DL — SIGNIFICANT CHANGE UP (ref 1.6–2.6)
MAGNESIUM SERPL-MCNC: 3 MG/DL — HIGH (ref 1.6–2.6)
MANUAL SMEAR VERIFICATION: SIGNIFICANT CHANGE UP
MCHC RBC-ENTMCNC: 26.8 PG — LOW (ref 27–34)
MCHC RBC-ENTMCNC: 29.3 PG — SIGNIFICANT CHANGE UP (ref 27–34)
MCHC RBC-ENTMCNC: 29.5 PG — SIGNIFICANT CHANGE UP (ref 27–34)
MCHC RBC-ENTMCNC: 29.8 PG — SIGNIFICANT CHANGE UP (ref 27–34)
MCHC RBC-ENTMCNC: 29.8 PG — SIGNIFICANT CHANGE UP (ref 27–34)
MCHC RBC-ENTMCNC: 30.5 GM/DL — LOW (ref 32–36)
MCHC RBC-ENTMCNC: 34.1 GM/DL — SIGNIFICANT CHANGE UP (ref 32–36)
MCHC RBC-ENTMCNC: 34.4 GM/DL — SIGNIFICANT CHANGE UP (ref 32–36)
MCHC RBC-ENTMCNC: 34.5 GM/DL — SIGNIFICANT CHANGE UP (ref 32–36)
MCHC RBC-ENTMCNC: 35.2 GM/DL — SIGNIFICANT CHANGE UP (ref 32–36)
MCV RBC AUTO: 84.7 FL — SIGNIFICANT CHANGE UP (ref 80–100)
MCV RBC AUTO: 85.2 FL — SIGNIFICANT CHANGE UP (ref 80–100)
MCV RBC AUTO: 86.3 FL — SIGNIFICANT CHANGE UP (ref 80–100)
MCV RBC AUTO: 86.6 FL — SIGNIFICANT CHANGE UP (ref 80–100)
MCV RBC AUTO: 87.8 FL — SIGNIFICANT CHANGE UP (ref 80–100)
MICROCYTES BLD QL: SLIGHT — SIGNIFICANT CHANGE UP
MONOCYTES # BLD AUTO: 0.18 K/UL — SIGNIFICANT CHANGE UP (ref 0–0.9)
MONOCYTES # BLD AUTO: 0.39 K/UL — SIGNIFICANT CHANGE UP (ref 0–0.9)
MONOCYTES # BLD AUTO: 0.46 K/UL — SIGNIFICANT CHANGE UP (ref 0–0.9)
MONOCYTES NFR BLD AUTO: 1.7 % — LOW (ref 2–14)
MONOCYTES NFR BLD AUTO: 5.1 % — SIGNIFICANT CHANGE UP (ref 2–14)
MONOCYTES NFR BLD AUTO: 5.4 % — SIGNIFICANT CHANGE UP (ref 2–14)
NEUTROPHILS # BLD AUTO: 6.47 K/UL — SIGNIFICANT CHANGE UP (ref 1.8–7.4)
NEUTROPHILS # BLD AUTO: 7.4 K/UL — SIGNIFICANT CHANGE UP (ref 1.8–7.4)
NEUTROPHILS # BLD AUTO: 9.72 K/UL — HIGH (ref 1.8–7.4)
NEUTROPHILS NFR BLD AUTO: 81.9 % — HIGH (ref 43–77)
NEUTROPHILS NFR BLD AUTO: 84.7 % — HIGH (ref 43–77)
NEUTROPHILS NFR BLD AUTO: 86.2 % — HIGH (ref 43–77)
NEUTS BAND # BLD: 12.1 % — HIGH (ref 0–8)
NRBC # BLD: 0 /100 WBCS — SIGNIFICANT CHANGE UP (ref 0–0)
PHOSPHATE SERPL-MCNC: 4.2 MG/DL — SIGNIFICANT CHANGE UP (ref 2.5–4.5)
PHOSPHATE SERPL-MCNC: 5.5 MG/DL — HIGH (ref 2.5–4.5)
PLAT MORPH BLD: NORMAL — SIGNIFICANT CHANGE UP
PLATELET # BLD AUTO: 294 K/UL — SIGNIFICANT CHANGE UP (ref 150–400)
PLATELET # BLD AUTO: 41 K/UL — LOW (ref 150–400)
PLATELET # BLD AUTO: 85 K/UL — LOW (ref 150–400)
PLATELET # BLD AUTO: 90 K/UL — LOW (ref 150–400)
PLATELET # BLD AUTO: 97 K/UL — LOW (ref 150–400)
POIKILOCYTOSIS BLD QL AUTO: SLIGHT — SIGNIFICANT CHANGE UP
POLYCHROMASIA BLD QL SMEAR: SLIGHT — SIGNIFICANT CHANGE UP
POTASSIUM SERPL-MCNC: 4 MMOL/L — SIGNIFICANT CHANGE UP (ref 3.5–5.3)
POTASSIUM SERPL-MCNC: 4 MMOL/L — SIGNIFICANT CHANGE UP (ref 3.5–5.3)
POTASSIUM SERPL-MCNC: 4.1 MMOL/L — SIGNIFICANT CHANGE UP (ref 3.5–5.3)
POTASSIUM SERPL-MCNC: 4.3 MMOL/L — SIGNIFICANT CHANGE UP (ref 3.5–5.3)
POTASSIUM SERPL-SCNC: 4 MMOL/L — SIGNIFICANT CHANGE UP (ref 3.5–5.3)
POTASSIUM SERPL-SCNC: 4 MMOL/L — SIGNIFICANT CHANGE UP (ref 3.5–5.3)
POTASSIUM SERPL-SCNC: 4.1 MMOL/L — SIGNIFICANT CHANGE UP (ref 3.5–5.3)
POTASSIUM SERPL-SCNC: 4.3 MMOL/L — SIGNIFICANT CHANGE UP (ref 3.5–5.3)
PROT SERPL-MCNC: 4.7 G/DL — LOW (ref 6–8.3)
PROT SERPL-MCNC: 5.3 G/DL — LOW (ref 6–8.3)
PROT SERPL-MCNC: 5.7 G/DL — LOW (ref 6–8.3)
PROTHROM AB SERPL-ACNC: 13.8 SEC — HIGH (ref 10–12.9)
PROTHROM AB SERPL-ACNC: 14.4 SEC — HIGH (ref 10–12.9)
PROTHROM AB SERPL-ACNC: 15 SEC — HIGH (ref 10–12.9)
PROTHROM AB SERPL-ACNC: 17.6 SEC — HIGH (ref 10–12.9)
PROTHROM AB SERPL-ACNC: 22 SEC — HIGH (ref 10–12.9)
RBC # BLD: 3.29 M/UL — LOW (ref 3.8–5.2)
RBC # BLD: 3.45 M/UL — LOW (ref 3.8–5.2)
RBC # BLD: 3.52 M/UL — LOW (ref 3.8–5.2)
RBC # BLD: 3.79 M/UL — LOW (ref 3.8–5.2)
RBC # BLD: 3.92 M/UL — SIGNIFICANT CHANGE UP (ref 3.8–5.2)
RBC # FLD: 14.8 % — HIGH (ref 10.3–14.5)
RBC # FLD: 14.9 % — HIGH (ref 10.3–14.5)
RBC # FLD: 14.9 % — HIGH (ref 10.3–14.5)
RBC # FLD: 15.1 % — HIGH (ref 10.3–14.5)
RBC # FLD: 15.2 % — HIGH (ref 10.3–14.5)
RBC BLD AUTO: ABNORMAL
SODIUM SERPL-SCNC: 134 MMOL/L — LOW (ref 135–145)
SODIUM SERPL-SCNC: 145 MMOL/L — SIGNIFICANT CHANGE UP (ref 135–145)
SODIUM SERPL-SCNC: 145 MMOL/L — SIGNIFICANT CHANGE UP (ref 135–145)
SODIUM SERPL-SCNC: 147 MMOL/L — HIGH (ref 135–145)
SPHEROCYTES BLD QL SMEAR: SLIGHT — SIGNIFICANT CHANGE UP
WBC # BLD: 10.34 K/UL — SIGNIFICANT CHANGE UP (ref 3.8–10.5)
WBC # BLD: 6.52 K/UL — SIGNIFICANT CHANGE UP (ref 3.8–10.5)
WBC # BLD: 7.64 K/UL — SIGNIFICANT CHANGE UP (ref 3.8–10.5)
WBC # BLD: 8.59 K/UL — SIGNIFICANT CHANGE UP (ref 3.8–10.5)
WBC # BLD: 9.25 K/UL — SIGNIFICANT CHANGE UP (ref 3.8–10.5)
WBC # FLD AUTO: 10.34 K/UL — SIGNIFICANT CHANGE UP (ref 3.8–10.5)
WBC # FLD AUTO: 6.52 K/UL — SIGNIFICANT CHANGE UP (ref 3.8–10.5)
WBC # FLD AUTO: 7.64 K/UL — SIGNIFICANT CHANGE UP (ref 3.8–10.5)
WBC # FLD AUTO: 8.59 K/UL — SIGNIFICANT CHANGE UP (ref 3.8–10.5)
WBC # FLD AUTO: 9.25 K/UL — SIGNIFICANT CHANGE UP (ref 3.8–10.5)

## 2019-02-18 PROCEDURE — 93010 ELECTROCARDIOGRAM REPORT: CPT

## 2019-02-18 PROCEDURE — 33430 REPLACEMENT OF MITRAL VALVE: CPT | Mod: AS

## 2019-02-18 PROCEDURE — 33430 REPLACEMENT OF MITRAL VALVE: CPT

## 2019-02-18 PROCEDURE — 33259 ABLATE ATRIA W/BYPASS ADD-ON: CPT

## 2019-02-18 PROCEDURE — 71045 X-RAY EXAM CHEST 1 VIEW: CPT | Mod: 26

## 2019-02-18 PROCEDURE — 33259 ABLATE ATRIA W/BYPASS ADD-ON: CPT | Mod: AS

## 2019-02-18 PROCEDURE — 99291 CRITICAL CARE FIRST HOUR: CPT

## 2019-02-18 PROCEDURE — 99292 CRITICAL CARE ADDL 30 MIN: CPT

## 2019-02-18 RX ORDER — NICARDIPINE HYDROCHLORIDE 30 MG/1
5 CAPSULE, EXTENDED RELEASE ORAL
Qty: 40 | Refills: 0 | Status: DISCONTINUED | OUTPATIENT
Start: 2019-02-18 | End: 2019-02-19

## 2019-02-18 RX ORDER — FUROSEMIDE 40 MG
20 TABLET ORAL ONCE
Qty: 0 | Refills: 0 | Status: COMPLETED | OUTPATIENT
Start: 2019-02-18 | End: 2019-02-18

## 2019-02-18 RX ORDER — INSULIN HUMAN 100 [IU]/ML
2 INJECTION, SOLUTION SUBCUTANEOUS
Qty: 100 | Refills: 0 | Status: DISCONTINUED | OUTPATIENT
Start: 2019-02-18 | End: 2019-02-20

## 2019-02-18 RX ORDER — FUROSEMIDE 40 MG
10 TABLET ORAL
Qty: 500 | Refills: 0 | Status: DISCONTINUED | OUTPATIENT
Start: 2019-02-18 | End: 2019-02-19

## 2019-02-18 RX ORDER — PROPOFOL 10 MG/ML
25 INJECTION, EMULSION INTRAVENOUS
Qty: 1000 | Refills: 0 | Status: DISCONTINUED | OUTPATIENT
Start: 2019-02-18 | End: 2019-02-19

## 2019-02-18 RX ORDER — CEFAZOLIN SODIUM 1 G
2000 VIAL (EA) INJECTION EVERY 8 HOURS
Qty: 0 | Refills: 0 | Status: COMPLETED | OUTPATIENT
Start: 2019-02-18 | End: 2019-02-20

## 2019-02-18 RX ORDER — ALBUMIN HUMAN 25 %
250 VIAL (ML) INTRAVENOUS ONCE
Qty: 0 | Refills: 0 | Status: COMPLETED | OUTPATIENT
Start: 2019-02-18 | End: 2019-02-18

## 2019-02-18 RX ORDER — PHENYLEPHRINE HYDROCHLORIDE 10 MG/ML
0.1 INJECTION INTRAVENOUS
Qty: 40 | Refills: 0 | Status: DISCONTINUED | OUTPATIENT
Start: 2019-02-18 | End: 2019-02-19

## 2019-02-18 RX ORDER — ACETAMINOPHEN 500 MG
1000 TABLET ORAL ONCE
Qty: 0 | Refills: 0 | Status: DISCONTINUED | OUTPATIENT
Start: 2019-02-18 | End: 2019-02-19

## 2019-02-18 RX ORDER — POTASSIUM CHLORIDE 20 MEQ
20 PACKET (EA) ORAL ONCE
Qty: 0 | Refills: 0 | Status: COMPLETED | OUTPATIENT
Start: 2019-02-18 | End: 2019-02-18

## 2019-02-18 RX ORDER — VASOPRESSIN 20 [USP'U]/ML
0.04 INJECTION INTRAVENOUS
Qty: 100 | Refills: 0 | Status: DISCONTINUED | OUTPATIENT
Start: 2019-02-18 | End: 2019-02-18

## 2019-02-18 RX ORDER — MILRINONE LACTATE 1 MG/ML
0.25 INJECTION, SOLUTION INTRAVENOUS
Qty: 20 | Refills: 0 | Status: DISCONTINUED | OUTPATIENT
Start: 2019-02-18 | End: 2019-02-19

## 2019-02-18 RX ORDER — NOREPINEPHRINE BITARTRATE/D5W 8 MG/250ML
0.02 PLASTIC BAG, INJECTION (ML) INTRAVENOUS
Qty: 8 | Refills: 0 | Status: DISCONTINUED | OUTPATIENT
Start: 2019-02-18 | End: 2019-02-18

## 2019-02-18 RX ORDER — CEFAZOLIN SODIUM 1 G
2000 VIAL (EA) INJECTION EVERY 8 HOURS
Qty: 0 | Refills: 0 | Status: DISCONTINUED | OUTPATIENT
Start: 2019-02-18 | End: 2019-02-18

## 2019-02-18 RX ORDER — FENTANYL CITRATE 50 UG/ML
25 INJECTION INTRAVENOUS EVERY 4 HOURS
Qty: 0 | Refills: 0 | Status: DISCONTINUED | OUTPATIENT
Start: 2019-02-18 | End: 2019-02-19

## 2019-02-18 RX ORDER — HEPARIN SODIUM 5000 [USP'U]/ML
5000 INJECTION INTRAVENOUS; SUBCUTANEOUS EVERY 8 HOURS
Qty: 0 | Refills: 0 | Status: DISCONTINUED | OUTPATIENT
Start: 2019-02-18 | End: 2019-02-23

## 2019-02-18 RX ORDER — DEXTROSE 50 % IN WATER 50 %
50 SYRINGE (ML) INTRAVENOUS
Qty: 0 | Refills: 0 | Status: DISCONTINUED | OUTPATIENT
Start: 2019-02-18 | End: 2019-02-24

## 2019-02-18 RX ORDER — DOPAMINE HYDROCHLORIDE 40 MG/ML
2.5 INJECTION, SOLUTION, CONCENTRATE INTRAVENOUS
Qty: 400 | Refills: 0 | Status: DISCONTINUED | OUTPATIENT
Start: 2019-02-18 | End: 2019-02-19

## 2019-02-18 RX ORDER — PANTOPRAZOLE SODIUM 20 MG/1
40 TABLET, DELAYED RELEASE ORAL DAILY
Qty: 0 | Refills: 0 | Status: DISCONTINUED | OUTPATIENT
Start: 2019-02-18 | End: 2019-02-19

## 2019-02-18 RX ORDER — SODIUM CHLORIDE 9 MG/ML
1000 INJECTION INTRAMUSCULAR; INTRAVENOUS; SUBCUTANEOUS
Qty: 0 | Refills: 0 | Status: DISCONTINUED | OUTPATIENT
Start: 2019-02-18 | End: 2019-02-24

## 2019-02-18 RX ORDER — MAGNESIUM SULFATE 500 MG/ML
2 VIAL (ML) INJECTION ONCE
Qty: 0 | Refills: 0 | Status: COMPLETED | OUTPATIENT
Start: 2019-02-18 | End: 2019-02-18

## 2019-02-18 RX ORDER — FENTANYL CITRATE 50 UG/ML
25 INJECTION INTRAVENOUS ONCE
Qty: 0 | Refills: 0 | Status: DISCONTINUED | OUTPATIENT
Start: 2019-02-18 | End: 2019-02-18

## 2019-02-18 RX ADMIN — PROPOFOL 11.71 MICROGRAM(S)/KG/MIN: 10 INJECTION, EMULSION INTRAVENOUS at 21:46

## 2019-02-18 RX ADMIN — Medication 20 MILLIGRAM(S): at 14:55

## 2019-02-18 RX ADMIN — BUDESONIDE AND FORMOTEROL FUMARATE DIHYDRATE 2 PUFF(S): 160; 4.5 AEROSOL RESPIRATORY (INHALATION) at 05:33

## 2019-02-18 RX ADMIN — Medication 2000 MILLIGRAM(S): at 21:58

## 2019-02-18 RX ADMIN — MILRINONE LACTATE 5.86 MICROGRAM(S)/KG/MIN: 1 INJECTION, SOLUTION INTRAVENOUS at 22:34

## 2019-02-18 RX ADMIN — MILRINONE LACTATE 5.86 MICROGRAM(S)/KG/MIN: 1 INJECTION, SOLUTION INTRAVENOUS at 15:07

## 2019-02-18 RX ADMIN — Medication 50 GRAM(S): at 16:34

## 2019-02-18 RX ADMIN — INSULIN HUMAN 2 UNIT(S)/HR: 100 INJECTION, SOLUTION SUBCUTANEOUS at 15:56

## 2019-02-18 RX ADMIN — Medication 25 MILLIGRAM(S): at 05:33

## 2019-02-18 RX ADMIN — Medication 2000 MILLIGRAM(S): at 15:56

## 2019-02-18 RX ADMIN — NICARDIPINE HYDROCHLORIDE 25 MG/HR: 30 CAPSULE, EXTENDED RELEASE ORAL at 15:06

## 2019-02-18 RX ADMIN — SODIUM CHLORIDE 3 MILLILITER(S): 9 INJECTION INTRAMUSCULAR; INTRAVENOUS; SUBCUTANEOUS at 05:43

## 2019-02-18 RX ADMIN — CHLORHEXIDINE GLUCONATE 1 APPLICATION(S): 213 SOLUTION TOPICAL at 05:34

## 2019-02-18 RX ADMIN — Medication 125 MILLILITER(S): at 22:10

## 2019-02-18 RX ADMIN — Medication 100 MILLIEQUIVALENT(S): at 16:33

## 2019-02-18 RX ADMIN — PROPOFOL 11.71 MICROGRAM(S)/KG/MIN: 10 INJECTION, EMULSION INTRAVENOUS at 15:07

## 2019-02-18 RX ADMIN — Medication 100 MILLIEQUIVALENT(S): at 23:00

## 2019-02-18 RX ADMIN — FENTANYL CITRATE 25 MICROGRAM(S): 50 INJECTION INTRAVENOUS at 15:06

## 2019-02-18 RX ADMIN — FENTANYL CITRATE 25 MICROGRAM(S): 50 INJECTION INTRAVENOUS at 15:15

## 2019-02-18 RX ADMIN — Medication 125 MILLILITER(S): at 22:00

## 2019-02-18 RX ADMIN — PANTOPRAZOLE SODIUM 40 MILLIGRAM(S): 20 TABLET, DELAYED RELEASE ORAL at 15:06

## 2019-02-18 RX ADMIN — DOPAMINE HYDROCHLORIDE 7.32 MICROGRAM(S)/KG/MIN: 40 INJECTION, SOLUTION, CONCENTRATE INTRAVENOUS at 22:09

## 2019-02-18 NOTE — BRIEF OPERATIVE NOTE - PROCEDURE
<<-----Click on this checkbox to enter Procedure MVR (mitral valve replacement)  02/18/2019  sternotomy,extensive cryo MAZE, left atrial appendage excision, removal of left atrial thrombus  Active  PMAINGON

## 2019-02-18 NOTE — BRIEF OPERATIVE NOTE - COMMENTS
Arrived to CTICU intubated, HD stable on Levo 0.02. Soon after arrival , Levo was pause and Milrinone started on 0.25

## 2019-02-18 NOTE — PROGRESS NOTE ADULT - SUBJECTIVE AND OBJECTIVE BOX
CTICU  CRITICAL  CARE  attending     Hand off received 					   Pertinent clinical, laboratory, radiographic, hemodynamic, echocardiographic, respiratory data, microbiologic data and chart were reviewed and analyzed frequently throughout the course of the day and night  Patient seen and examined with CTS/ SH attending at bedside  Pt is a 78y , Female, HEALTH ISSUES - PROBLEM Dx:      , FAMILY HISTORY:  No pertinent family history in first degree relatives  PAST MEDICAL & SURGICAL HISTORY:  CAD (coronary artery disease)  Asthma  Atrial fibrillation  HLD (hyperlipidemia)  DM (diabetes mellitus)  HTN (hypertension)  DVT (deep venous thrombosis)  Asthma  COPD (chronic obstructive pulmonary disease)  DM (diabetes mellitus)  S/P arterial stent: left arterial thrombectomy  History of embolectomy: LLE    Patient is a 78y old  Female who presents with a chief complaint of MR (17 Feb 2019 09:57)      14 system review was unremarkable  acute changes include acute respiratory failure  Vital signs, hemodynamic and respiratory parameters were reviewed from the bedside nursing flowsheet.  ICU Vital Signs Last 24 Hrs  T(C): 37.2 (18 Feb 2019 17:00), Max: 37.4 (17 Feb 2019 23:51)  T(F): 98.9 (18 Feb 2019 17:00), Max: 99.4 (17 Feb 2019 23:51)  HR: 58 (18 Feb 2019 18:30) (58 - 124)  BP: 130/108 (18 Feb 2019 04:11) (130/78 - 140/79)  BP(mean): 116 (18 Feb 2019 04:11) (97 - 116)  ABP: 110/54 (18 Feb 2019 18:30) (100/50 - 156/70)  ABP(mean): 72 (18 Feb 2019 18:30) (66 - 104)  RR: 12 (18 Feb 2019 18:00) (4 - 21)  SpO2: 100% (18 Feb 2019 18:30) (96% - 100%)    Adult Advanced Hemodynamics Last 24 Hrs  CVP(mm Hg): 11 (18 Feb 2019 18:30) (8 - 17)  CVP(cm H2O): --  CO: --  CI: --  PA: --  PA(mean): --  PCWP: --  SVR: --  SVRI: --  PVR: --  PVRI: --, ABG - ( 18 Feb 2019 14:31 )  pH, Arterial: 7.40  pH, Blood: x     /  pCO2: 42    /  pO2: 291   / HCO3: 26    / Base Excess: 0.5   /  SaO2: 99                Mode: AC/ CMV (Assist Control/ Continuous Mandatory Ventilation)  RR (machine): 10  TV (machine): 500  FiO2: 60  PEEP: 8  ITime: 1  MAP: 12  PIP: 25    Intake and output was reviewed and the fluid balance was calculated  Daily Height in cm: 162.56 (17 Feb 2019 23:51)    Daily   I&O's Summary    17 Feb 2019 07:01  -  18 Feb 2019 07:00  --------------------------------------------------------  IN: 511.5 mL / OUT: 150 mL / NET: 361.5 mL    18 Feb 2019 07:01  -  18 Feb 2019 18:42  --------------------------------------------------------  IN: 308.4 mL / OUT: 2040 mL / NET: -1731.6 mL        All lines and drain sites were assessed  [facility]      POCT Blood Glucose.: 165 mg/dL (18 Feb 2019 18:20)    No acute change in mental status  Auscultation of the chest reveals equal bs  Abdomen is soft  Extremities are warm and well perfused  Wounds appear clean and unremarkable  Antibiotics are periop    labs  CBC Full  -  ( 18 Feb 2019 17:28 )  WBC Count : 8.59 K/uL  Hemoglobin : 11.3 g/dL  Hematocrit : 32.1 %  Platelet Count - Automated : 97 K/uL  Mean Cell Volume : 84.7 fl  Mean Cell Hemoglobin : 29.8 pg  Mean Cell Hemoglobin Concentration : 35.2 gm/dL  Auto Neutrophil # : 7.40 K/uL  Auto Lymphocyte # : 0.62 K/uL  Auto Monocyte # : 0.46 K/uL  Auto Eosinophil # : 0.01 K/uL  Auto Basophil # : 0.01 K/uL  Auto Neutrophil % : 86.2 %  Auto Lymphocyte % : 7.2 %  Auto Monocyte % : 5.4 %  Auto Eosinophil % : 0.1 %  Auto Basophil % : 0.1 %    02-18    147<H>  |  111<H>  |  11  ----------------------------<  168<H>  4.3   |  23  |  0.54    Ca    12.6<H>      18 Feb 2019 14:30  Phos  5.5     02-18  Mg     3.0     02-18    TPro  4.7<L>  /  Alb  2.7<L>  /  TBili  1.8<H>  /  DBili  x   /  AST  68<H>  /  ALT  17  /  AlkPhos  63  02-18    PT/INR - ( 18 Feb 2019 17:28 )   PT: 13.8 sec;   INR: 1.21          PTT - ( 18 Feb 2019 17:28 )  PTT:33.3 sec  The current medications were reviewed   MEDICATIONS  (STANDING):  buDESOnide 160 MICROgram(s)/formoterol 4.5 MICROgram(s) Inhaler 2 Puff(s) Inhalation two times a day  ceFAZolin  Injectable. 2000 milliGRAM(s) IV Push every 8 hours  dextrose 50% Injectable 50 milliLiter(s) IV Push every 15 minutes  furosemide Infusion 10 mG/Hr (5 mL/Hr) IV Continuous <Continuous>  heparin  Injectable 5000 Unit(s) SubCutaneous every 8 hours  insulin Infusion 2 Unit(s)/Hr (2 mL/Hr) IV Continuous <Continuous>  milrinone Infusion 0.25 MICROgram(s)/kG/Min (5.857 mL/Hr) IV Continuous <Continuous>  niCARdipine Infusion 5 mG/Hr (25 mL/Hr) IV Continuous <Continuous>  pantoprazole  Injectable 40 milliGRAM(s) IV Push daily  propofol Infusion 25 MICROgram(s)/kG/Min (11.715 mL/Hr) IV Continuous <Continuous>  sodium chloride 0.9%. 1000 milliLiter(s) (10 mL/Hr) IV Continuous <Continuous>    MEDICATIONS  (PRN):       PROBLEM LIST/ ASSESSMENT:  HEALTH ISSUES - PROBLEM Dx:      ,   Patient is a 78y old  Female who presents with a chief complaint of MR (17 Feb 2019 09:57)     s/p   acute changes include acute respiratory failure    My plan includes :  close hemodynamic, ventilatory and drain monitoring and management per post op routine    Monitor for arrhythmias and monitor parameters for organ perfusion  monitor neurologic status  Head of the bed should remain elevated to 45 deg .   chest PT and IS will be encouraged  monitor adequacy of oxygenation and ventilation and attempt to wean oxygen  Nutritional goals will be met using po eventually , ensure adequate caloric intake and montior the same  Stress ulcer and VTE prophylaxis will be achieved    Glycemic control is satisfactory  Electrolytes have been repleted as necessary and wound care has been carried out. Pain control has been achieved.   agressive physical therapy and early mobility and ambulation goals will be met   The family was updated about the course and plan  CRITICAL CARE TIME SPENT in evaluation and management, reassessments, review and interpretation of labs and x-rays, ventilator and hemodynamic management, formulating a plan and coordinating care: __55___ MIN.  Time does not include procedural time.  CTICU ATTENDING     					    Jaylon Branch MD CTICU  CRITICAL  CARE  attending     Hand off received 					   Pertinent clinical, laboratory, radiographic, hemodynamic, echocardiographic, respiratory data, microbiologic data and chart were reviewed and analyzed frequently throughout the course of the day and night  Patient seen and examined with CTS/ SH attending at bedside    Pt is a 78y , Female, post op day today; s/p MVR; cryomaze; ANDREW occlusion; removal of LA thrombus    intraop:    received 9 units PRBC; 4 units FFP; 1 unit of platelets; Cryo 5 units  intubated/sedated  pressors/inotropes            , FAMILY HISTORY:  No pertinent family history in first degree relatives  PAST MEDICAL & SURGICAL HISTORY:  CAD (coronary artery disease)  Asthma  Atrial fibrillation  HLD (hyperlipidemia)  DM (diabetes mellitus)  HTN (hypertension)  DVT (deep venous thrombosis)  Asthma  COPD (chronic obstructive pulmonary disease)  DM (diabetes mellitus)  S/P arterial stent: left arterial thrombectomy  History of embolectomy: LLE    Patient is a 78y old  Female who presents with a chief complaint of MR (17 Feb 2019 09:57)      14 system review was unremarkable  acute changes include acute respiratory failure  Vital signs, hemodynamic and respiratory parameters were reviewed from the bedside nursing flowsheet.  ICU Vital Signs Last 24 Hrs  T(C): 37.2 (18 Feb 2019 17:00), Max: 37.4 (17 Feb 2019 23:51)  T(F): 98.9 (18 Feb 2019 17:00), Max: 99.4 (17 Feb 2019 23:51)  HR: 58 (18 Feb 2019 18:30) (58 - 124)  BP: 130/108 (18 Feb 2019 04:11) (130/78 - 140/79)  BP(mean): 116 (18 Feb 2019 04:11) (97 - 116)  ABP: 110/54 (18 Feb 2019 18:30) (100/50 - 156/70)  ABP(mean): 72 (18 Feb 2019 18:30) (66 - 104)  RR: 12 (18 Feb 2019 18:00) (4 - 21)  SpO2: 100% (18 Feb 2019 18:30) (96% - 100%)    Adult Advanced Hemodynamics Last 24 Hrs  CVP(mm Hg): 11 (18 Feb 2019 18:30) (8 - 17)  CVP(cm H2O): --  CO: --  CI: --  PA: --  PA(mean): --  PCWP: --  SVR: --  SVRI: --  PVR: --  PVRI: --, ABG - ( 18 Feb 2019 14:31 )  pH, Arterial: 7.40  pH, Blood: x     /  pCO2: 42    /  pO2: 291   / HCO3: 26    / Base Excess: 0.5   /  SaO2: 99                Mode: AC/ CMV (Assist Control/ Continuous Mandatory Ventilation)  RR (machine): 10  TV (machine): 500  FiO2: 60  PEEP: 8  ITime: 1  MAP: 12  PIP: 25    Intake and output was reviewed and the fluid balance was calculated  Daily Height in cm: 162.56 (17 Feb 2019 23:51)    Daily   I&O's Summary    17 Feb 2019 07:01  -  18 Feb 2019 07:00  --------------------------------------------------------  IN: 511.5 mL / OUT: 150 mL / NET: 361.5 mL    18 Feb 2019 07:01  -  18 Feb 2019 18:42  --------------------------------------------------------  IN: 308.4 mL / OUT: 2040 mL / NET: -1731.6 mL        All lines and drain sites were assessed  [facility]      POCT Blood Glucose.: 165 mg/dL (18 Feb 2019 18:20)    No acute change in mental status  Auscultation of the chest reveals equal bs  Abdomen is soft  Extremities are warm and well perfused  Wounds appear clean and unremarkable  Antibiotics are periop    labs  CBC Full  -  ( 18 Feb 2019 17:28 )  WBC Count : 8.59 K/uL  Hemoglobin : 11.3 g/dL  Hematocrit : 32.1 %  Platelet Count - Automated : 97 K/uL  Mean Cell Volume : 84.7 fl  Mean Cell Hemoglobin : 29.8 pg  Mean Cell Hemoglobin Concentration : 35.2 gm/dL  Auto Neutrophil # : 7.40 K/uL  Auto Lymphocyte # : 0.62 K/uL  Auto Monocyte # : 0.46 K/uL  Auto Eosinophil # : 0.01 K/uL  Auto Basophil # : 0.01 K/uL  Auto Neutrophil % : 86.2 %  Auto Lymphocyte % : 7.2 %  Auto Monocyte % : 5.4 %  Auto Eosinophil % : 0.1 %  Auto Basophil % : 0.1 %    02-18    147<H>  |  111<H>  |  11  ----------------------------<  168<H>  4.3   |  23  |  0.54    Ca    12.6<H>      18 Feb 2019 14:30  Phos  5.5     02-18  Mg     3.0     02-18    TPro  4.7<L>  /  Alb  2.7<L>  /  TBili  1.8<H>  /  DBili  x   /  AST  68<H>  /  ALT  17  /  AlkPhos  63  02-18    PT/INR - ( 18 Feb 2019 17:28 )   PT: 13.8 sec;   INR: 1.21          PTT - ( 18 Feb 2019 17:28 )  PTT:33.3 sec  The current medications were reviewed   MEDICATIONS  (STANDING):  buDESOnide 160 MICROgram(s)/formoterol 4.5 MICROgram(s) Inhaler 2 Puff(s) Inhalation two times a day  ceFAZolin  Injectable. 2000 milliGRAM(s) IV Push every 8 hours  dextrose 50% Injectable 50 milliLiter(s) IV Push every 15 minutes  furosemide Infusion 10 mG/Hr (5 mL/Hr) IV Continuous <Continuous>  heparin  Injectable 5000 Unit(s) SubCutaneous every 8 hours  insulin Infusion 2 Unit(s)/Hr (2 mL/Hr) IV Continuous <Continuous>  milrinone Infusion 0.25 MICROgram(s)/kG/Min (5.857 mL/Hr) IV Continuous <Continuous>  niCARdipine Infusion 5 mG/Hr (25 mL/Hr) IV Continuous <Continuous>  pantoprazole  Injectable 40 milliGRAM(s) IV Push daily  propofol Infusion 25 MICROgram(s)/kG/Min (11.715 mL/Hr) IV Continuous <Continuous>  sodium chloride 0.9%. 1000 milliLiter(s) (10 mL/Hr) IV Continuous <Continuous>    MEDICATIONS  (PRN):       PROBLEM LIST/ ASSESSMENT:  HEALTH ISSUES - PROBLEM Dx:    hemodynamic instability  s/p MVR        ,   Patient is a 78y old  Female who presents with a chief complaint of MR (17 Feb 2019 09:57)     s/p MVR; cryomaze; ANDREW occlusion; removal of LA thrombus      My plan includes :  close hemodynamic, ventilatory and drain monitoring and management per post op routine    Monitor for arrhythmias and monitor parameters for organ perfusion  monitor neurologic status  Head of the bed should remain elevated to 45 deg .   chest PT and IS will be encouraged  monitor adequacy of oxygenation and ventilation and attempt to wean oxygen  Nutritional goals will be met using po eventually , ensure adequate caloric intake and montior the same  Stress ulcer and VTE prophylaxis will be achieved    Glycemic control is satisfactory  Electrolytes have been repleted as necessary and wound care has been carried out. Pain control has been achieved.   agressive physical therapy and early mobility and ambulation goals will be met   The family was updated about the course and plan  CRITICAL CARE TIME SPENT in evaluation and management, reassessments, review and interpretation of labs and x-rays, ventilator and hemodynamic management, formulating a plan and coordinating care: __55___ MIN.  Time does not include procedural time.  CTICU ATTENDING     					    Jaylon Branch MD

## 2019-02-18 NOTE — BRIEF OPERATIVE NOTE - POST-OP DX
Atrial fibrillation, unspecified type  02/18/2019    Active  Laura Coates  Mitral valve stenosis, unspecified etiology  02/18/2019    Active  Laura Coates

## 2019-02-18 NOTE — PROGRESS NOTE ADULT - SUBJECTIVE AND OBJECTIVE BOX
CTICU  CRITICAL  CARE  attending     Hand off received 					   Pertinent clinical, laboratory, radiographic, hemodynamic, echocardiographic, respiratory data, microbiologic data and chart were reviewed and analyzed frequently throughout the course of the day and night  Patient seen and examined with CTS/ SH attending at bedside    77 y/o female with hx of HTN, HLD, DM, asthma, HLD, atrial fibrillation (on coumadin), glaucoma, CAD s/p PCI 3 weeks ago, CHF, MR, who presented to [facility] with progressive SOB 3 weeks ago and found to have CAD  PCI performed.   The patient states that she has continued to have SOB and now has minimal exercise tolerance. She walks with a walker and wheelchair at home and is unable to do her daily activities due to her symptoms of SOB. She has also had multiple episodes of dizziness/syncope with multiple falls. Patient states last syncope 3 weeks ago and she has had 3 falls the last few months. She denies ever hitting her head during these falls. She also endorses frequent nausea, chest tightness and intermittent palpitations but denies any other abdominal symptoms.  She presented to [place] with worsening SOB and was found to be in Afib with RVR and with pulmonary congestion. She was admitted to the ICU and was treated with  IV lasix, esmolol drip for her BP and IV  amiodarone for  atrial fibrillation.   She is now on 4L NC sating well, HR in the 80s with BP in the 100s systolic and is transferred to [facility] for further evaluation and medical management for CHF. Further plan is mitral valve replacement.      FAMILY HISTORY:  No pertinent family history in first degree relatives  PAST MEDICAL & SURGICAL HISTORY:  CAD (coronary artery disease)  Asthma  Atrial fibrillation  HLD (hyperlipidemia)  DM (diabetes mellitus)  HTN (hypertension)  DVT (deep venous thrombosis)  Asthma  COPD (chronic obstructive pulmonary disease)  DM (diabetes mellitus)  S/P arterial stent: left arterial thrombectomy  History of embolectomy: [facility]    Patient is a 78y old  Female who presents with a chief complaint of MR (18 Feb 2019 18:42)      14 system review was unremarkable  acute changes include acute respiratory failure  Vital signs, hemodynamic and respiratory parameters were reviewed from the bedside nursing flow sheet.  ICU Vital Signs Last 24 Hrs  T(C): 36.4 (18 Feb 2019 21:00), Max: 37.4 (17 Feb 2019 23:51)  T(F): 97.5 (18 Feb 2019 21:00), Max: 99.4 (17 Feb 2019 23:51)  HR: 82 (18 Feb 2019 22:00) (56 - 124)  BP: 130/108 (18 Feb 2019 04:11) (130/78 - 130/108)  BP(mean): 116 (18 Feb 2019 04:11) (97 - 116)  ABP: 112/52 (18 Feb 2019 22:00) (100/50 - 156/70)  ABP(mean): 70 (18 Feb 2019 22:00) (66 - 104)  RR: 14 (18 Feb 2019 21:00) (4 - 21)  SpO2: 100% (18 Feb 2019 22:00) (96% - 100%)    Adult Advanced Hemodynamics Last 24 Hrs  CVP(mm Hg): 10 (18 Feb 2019 22:00) (8 - 17)  CVP(cm H2O): --  CO: --  CI: --  PA: --  PA(mean): --  PCWP: --  SVR: --  SVRI: --  PVR: --  PVRI: --, ABG - ( 18 Feb 2019 21:24 )  pH, Arterial: 7.50  pH, Blood: x     /  pCO2: 35    /  pO2: 135   / HCO3: 27    / Base Excess: 3.6   /  SaO2: 99                Mode: AC/ CMV (Assist Control/ Continuous Mandatory Ventilation)  RR (machine): 10  TV (machine): 500  FiO2: 50  PEEP: 5  ITime: 1.4  MAP: 11  PIP: 23    Intake and output was reviewed and the fluid balance was calculated  Daily Height in cm: 162.56 (17 Feb 2019 23:51)    Daily   I&O's Summary    17 Feb 2019 07:01  -  18 Feb 2019 07:00  --------------------------------------------------------  IN: 511.5 mL / OUT: 150 mL / NET: 361.5 mL    18 Feb 2019 07:01  -  18 Feb 2019 22:20  --------------------------------------------------------  IN: 461.6 mL / OUT: 2790 mL / NET: -2328.4 mL        All lines and drain sites were assessed  Glycemic trend was reviewed CAPILL[facility] BLOOD GLUCOSE      POCT Blood Glucose.: 106 mg/dL (18 Feb 2019 22:18)      NEURO: PUPILS 2 mm (Reactive). Sedated with propofol.  CVS: S1, S2, No S3.  RESPIRATORY: Auscultation of the chest reveals bibasilar rale.  GI: Abdomen is soft. Distended. No tenderness. Hypoactive bowel sounds.  Extremities are warm and well perfused.  VASCULAR: + DP/PT by Doppler.  Wounds appear clean and unremarkable  Antibiotics are perioperative cefazolin.        labs  CBC Full  -  ( 18 Feb 2019 21:26 )  WBC Count : 6.52 K/uL  Hemoglobin : 10.1 g/dL  Hematocrit : 29.4 %  Platelet Count - Automated : 90 K/uL  Mean Cell Volume : 85.2 fl  Mean Cell Hemoglobin : 29.3 pg  Mean Cell Hemoglobin Concentration : 34.4 gm/dL  Auto Neutrophil # : x  Auto Lymphocyte # : x  Auto Monocyte # : x  Auto Eosinophil # : x  Auto Basophil # : x  Auto Neutrophil % : x  Auto Lymphocyte % : x  Auto Monocyte % : x  Auto Eosinophil % : x  Auto Basophil % : x    02-18    145  |  109<H>  |  11  ----------------------------<  136<H>  4.0   |  23  |  0.72    Ca    11.2<H>      18 Feb 2019 21:26  Phos  4.2     02-18  Mg     2.5     02-18    TPro  5.3<L>  /  Alb  3.0<L>  /  TBili  2.4<H>  /  DBili  x   /  AST  102<H>  /  ALT  20  /  AlkPhos  69  02-18    PT/INR - ( 18 Feb 2019 21:26 )   PT: 14.4 sec;   INR: 1.27          PTT - ( 18 Feb 2019 21:26 )  PTT:51.2 sec  The current medications were reviewed   MEDICATIONS  (STANDING):  acetaminophen  IVPB .. 1000 milliGRAM(s) IV Intermittent once  buDESOnide 160 MICROgram(s)/formoterol 4.5 MICROgram(s) Inhaler 2 Puff(s) Inhalation two times a day  ceFAZolin  Injectable. 2000 milliGRAM(s) IV Push every 8 hours  dextrose 50% Injectable 50 milliLiter(s) IV Push every 15 minutes  DOPamine Infusion 2.5 MICROgram(s)/kG/Min (7.322 mL/Hr) IV Continuous <Continuous>  furosemide Infusion 10 mG/Hr (5 mL/Hr) IV Continuous <Continuous>  heparin  Injectable 5000 Unit(s) SubCutaneous every 8 hours  insulin Infusion 2 Unit(s)/Hr (2 mL/Hr) IV Continuous <Continuous>  milrinone Infusion 0.25 MICROgram(s)/kG/Min (5.857 mL/Hr) IV Continuous <Continuous>  niCARdipine Infusion 5 mG/Hr (25 mL/Hr) IV Continuous <Continuous>  pantoprazole  Injectable 40 milliGRAM(s) IV Push daily  phenylephrine    Infusion 0.1 MICROgram(s)/kG/Min (2.929 mL/Hr) IV Continuous <Continuous>  propofol Infusion 25 MICROgram(s)/kG/Min (11.715 mL/Hr) IV Continuous <Continuous>  sodium chloride 0.9%. 1000 milliLiter(s) (10 mL/Hr) IV Continuous <Continuous>    MEDICATIONS  (PRN):  fentaNYL    Injectable 25 MICROGram(s) IV Push every 4 hours PRN Severe Pain (7 - 10)       PROBLEM LIST/ ASSESSMENT:  HEALTH ISSUES - PROBLEM Dx:        Patient is a 78y old  Female who presents with severe MR   S/P MVR  S/P Removal of LA thrombus.  S/P occlusion of LA appendage  S/P cryomaze.  Received 10 PRBC, 4 FFP, 5 cryoprecipitate.  Metabolic acidosis  Sinus bradycardia with stable BP (Initially she was paced).  Good oxygenation.  Diuresing well.  Overall doing well.           My plan includes :  Wean vent as tolerated.  Close hemodynamic, ventilatory and drain monitoring and management.  Monitor for arrhythmias and monitor parameters for organ perfusion  Monitor neurologic status  Head of the bed should remain elevated to 45 deg .   Chest PT and IS will be encouraged  Monitor adequacy of oxygenation and ventilation and attempt to wean oxygen  Nutritional goals will be met using po eventually , ensure adequate caloric intake and monitor  the same  Stress ulcer and VTE prophylaxis will be achieved    Glycemic control is satisfactory  Electrolytes have been repleted as necessary and wound care has been carried out. Pain control has been achieved.   Aggressive  physical therapy and early mobility and ambulation goals will be met   The family was updated about the course and plan  CRITICAL CARE TIME SPENT in evaluation and management, reassessments, review and interpretation of labs and x-rays, ventilator and hemodynamic management, formulating a plan and coordinating care: ___60____ MIN.  Time does not include procedural time.  CTICU ATTENDING     					    Chandra Alex MD

## 2019-02-18 NOTE — BRIEF OPERATIVE NOTE - OPERATION/FINDINGS
EF 35-40%  Pump time: 109 minutes and 23 minutes for total 132 minutes. Back on pump to fix bleeding from superior pulmonary vein.

## 2019-02-19 LAB
ALBUMIN SERPL ELPH-MCNC: 3.1 G/DL — LOW (ref 3.3–5)
ALBUMIN SERPL ELPH-MCNC: 3.4 G/DL — SIGNIFICANT CHANGE UP (ref 3.3–5)
ALBUMIN SERPL ELPH-MCNC: 3.4 G/DL — SIGNIFICANT CHANGE UP (ref 3.3–5)
ALP SERPL-CCNC: 62 U/L — SIGNIFICANT CHANGE UP (ref 40–120)
ALP SERPL-CCNC: 67 U/L — SIGNIFICANT CHANGE UP (ref 40–120)
ALP SERPL-CCNC: 67 U/L — SIGNIFICANT CHANGE UP (ref 40–120)
ALT FLD-CCNC: 14 U/L — SIGNIFICANT CHANGE UP (ref 10–45)
ALT FLD-CCNC: 16 U/L — SIGNIFICANT CHANGE UP (ref 10–45)
ALT FLD-CCNC: 17 U/L — SIGNIFICANT CHANGE UP (ref 10–45)
ANION GAP SERPL CALC-SCNC: 10 MMOL/L — SIGNIFICANT CHANGE UP (ref 5–17)
ANION GAP SERPL CALC-SCNC: 11 MMOL/L — SIGNIFICANT CHANGE UP (ref 5–17)
ANION GAP SERPL CALC-SCNC: 11 MMOL/L — SIGNIFICANT CHANGE UP (ref 5–17)
ANION GAP SERPL CALC-SCNC: 9 MMOL/L — SIGNIFICANT CHANGE UP (ref 5–17)
APTT BLD: 31.1 SEC — SIGNIFICANT CHANGE UP (ref 27.5–36.3)
APTT BLD: 32.4 SEC — SIGNIFICANT CHANGE UP (ref 27.5–36.3)
APTT BLD: 32.6 SEC — SIGNIFICANT CHANGE UP (ref 27.5–36.3)
APTT BLD: 44.1 SEC — HIGH (ref 27.5–36.3)
AST SERPL-CCNC: 76 U/L — HIGH (ref 10–40)
AST SERPL-CCNC: 90 U/L — HIGH (ref 10–40)
AST SERPL-CCNC: 90 U/L — HIGH (ref 10–40)
BILIRUB SERPL-MCNC: 1 MG/DL — SIGNIFICANT CHANGE UP (ref 0.2–1.2)
BILIRUB SERPL-MCNC: 1.7 MG/DL — HIGH (ref 0.2–1.2)
BILIRUB SERPL-MCNC: 2.1 MG/DL — HIGH (ref 0.2–1.2)
BLD GP AB SCN SERPL QL: NEGATIVE — SIGNIFICANT CHANGE UP
BUN SERPL-MCNC: 12 MG/DL — SIGNIFICANT CHANGE UP (ref 7–23)
BUN SERPL-MCNC: 13 MG/DL — SIGNIFICANT CHANGE UP (ref 7–23)
BUN SERPL-MCNC: 14 MG/DL — SIGNIFICANT CHANGE UP (ref 7–23)
BUN SERPL-MCNC: 15 MG/DL — SIGNIFICANT CHANGE UP (ref 7–23)
CALCIUM SERPL-MCNC: 10.4 MG/DL — SIGNIFICANT CHANGE UP (ref 8.4–10.5)
CALCIUM SERPL-MCNC: 9 MG/DL — SIGNIFICANT CHANGE UP (ref 8.4–10.5)
CALCIUM SERPL-MCNC: 9.4 MG/DL — SIGNIFICANT CHANGE UP (ref 8.4–10.5)
CALCIUM SERPL-MCNC: 9.8 MG/DL — SIGNIFICANT CHANGE UP (ref 8.4–10.5)
CHLORIDE SERPL-SCNC: 101 MMOL/L — SIGNIFICANT CHANGE UP (ref 96–108)
CHLORIDE SERPL-SCNC: 104 MMOL/L — SIGNIFICANT CHANGE UP (ref 96–108)
CHLORIDE SERPL-SCNC: 104 MMOL/L — SIGNIFICANT CHANGE UP (ref 96–108)
CHLORIDE SERPL-SCNC: 106 MMOL/L — SIGNIFICANT CHANGE UP (ref 96–108)
CO2 SERPL-SCNC: 25 MMOL/L — SIGNIFICANT CHANGE UP (ref 22–31)
CO2 SERPL-SCNC: 26 MMOL/L — SIGNIFICANT CHANGE UP (ref 22–31)
CO2 SERPL-SCNC: 27 MMOL/L — SIGNIFICANT CHANGE UP (ref 22–31)
CO2 SERPL-SCNC: 27 MMOL/L — SIGNIFICANT CHANGE UP (ref 22–31)
CREAT SERPL-MCNC: 0.63 MG/DL — SIGNIFICANT CHANGE UP (ref 0.5–1.3)
CREAT SERPL-MCNC: 0.67 MG/DL — SIGNIFICANT CHANGE UP (ref 0.5–1.3)
CREAT SERPL-MCNC: 0.83 MG/DL — SIGNIFICANT CHANGE UP (ref 0.5–1.3)
CREAT SERPL-MCNC: 0.86 MG/DL — SIGNIFICANT CHANGE UP (ref 0.5–1.3)
GAS PNL BLDA: SIGNIFICANT CHANGE UP
GLUCOSE BLDC GLUCOMTR-MCNC: 104 MG/DL — HIGH (ref 70–99)
GLUCOSE BLDC GLUCOMTR-MCNC: 105 MG/DL — HIGH (ref 70–99)
GLUCOSE BLDC GLUCOMTR-MCNC: 106 MG/DL — HIGH (ref 70–99)
GLUCOSE BLDC GLUCOMTR-MCNC: 109 MG/DL — HIGH (ref 70–99)
GLUCOSE BLDC GLUCOMTR-MCNC: 112 MG/DL — HIGH (ref 70–99)
GLUCOSE BLDC GLUCOMTR-MCNC: 116 MG/DL — HIGH (ref 70–99)
GLUCOSE BLDC GLUCOMTR-MCNC: 146 MG/DL — HIGH (ref 70–99)
GLUCOSE BLDC GLUCOMTR-MCNC: 178 MG/DL — HIGH (ref 70–99)
GLUCOSE BLDC GLUCOMTR-MCNC: 195 MG/DL — HIGH (ref 70–99)
GLUCOSE BLDC GLUCOMTR-MCNC: 81 MG/DL — SIGNIFICANT CHANGE UP (ref 70–99)
GLUCOSE BLDC GLUCOMTR-MCNC: 91 MG/DL — SIGNIFICANT CHANGE UP (ref 70–99)
GLUCOSE BLDC GLUCOMTR-MCNC: 91 MG/DL — SIGNIFICANT CHANGE UP (ref 70–99)
GLUCOSE BLDC GLUCOMTR-MCNC: 94 MG/DL — SIGNIFICANT CHANGE UP (ref 70–99)
GLUCOSE BLDC GLUCOMTR-MCNC: 94 MG/DL — SIGNIFICANT CHANGE UP (ref 70–99)
GLUCOSE BLDC GLUCOMTR-MCNC: 95 MG/DL — SIGNIFICANT CHANGE UP (ref 70–99)
GLUCOSE BLDC GLUCOMTR-MCNC: 99 MG/DL — SIGNIFICANT CHANGE UP (ref 70–99)
GLUCOSE SERPL-MCNC: 100 MG/DL — HIGH (ref 70–99)
GLUCOSE SERPL-MCNC: 107 MG/DL — HIGH (ref 70–99)
GLUCOSE SERPL-MCNC: 119 MG/DL — HIGH (ref 70–99)
GLUCOSE SERPL-MCNC: 162 MG/DL — HIGH (ref 70–99)
HCT VFR BLD CALC: 23.3 % — LOW (ref 34.5–45)
HCT VFR BLD CALC: 24.1 % — LOW (ref 34.5–45)
HCT VFR BLD CALC: 25 % — LOW (ref 34.5–45)
HCT VFR BLD CALC: 25.9 % — LOW (ref 34.5–45)
HGB BLD-MCNC: 7.8 G/DL — LOW (ref 11.5–15.5)
HGB BLD-MCNC: 8.3 G/DL — LOW (ref 11.5–15.5)
HGB BLD-MCNC: 8.5 G/DL — LOW (ref 11.5–15.5)
HGB BLD-MCNC: 9 G/DL — LOW (ref 11.5–15.5)
INR BLD: 1.15 — SIGNIFICANT CHANGE UP (ref 0.88–1.16)
INR BLD: 1.2 — HIGH (ref 0.88–1.16)
INR BLD: 1.22 — HIGH (ref 0.88–1.16)
INR BLD: 1.23 — HIGH (ref 0.88–1.16)
LACTATE SERPL-SCNC: 1.1 MMOL/L — SIGNIFICANT CHANGE UP (ref 0.5–2)
LACTATE SERPL-SCNC: 1.4 MMOL/L — SIGNIFICANT CHANGE UP (ref 0.5–2)
LACTATE SERPL-SCNC: 1.5 MMOL/L — SIGNIFICANT CHANGE UP (ref 0.5–2)
LACTATE SERPL-SCNC: 1.7 MMOL/L — SIGNIFICANT CHANGE UP (ref 0.5–2)
MAGNESIUM SERPL-MCNC: 2 MG/DL — SIGNIFICANT CHANGE UP (ref 1.6–2.6)
MAGNESIUM SERPL-MCNC: 2.2 MG/DL — SIGNIFICANT CHANGE UP (ref 1.6–2.6)
MAGNESIUM SERPL-MCNC: 2.5 MG/DL — SIGNIFICANT CHANGE UP (ref 1.6–2.6)
MAGNESIUM SERPL-MCNC: 3.9 MG/DL — HIGH (ref 1.6–2.6)
MCHC RBC-ENTMCNC: 28.9 PG — SIGNIFICANT CHANGE UP (ref 27–34)
MCHC RBC-ENTMCNC: 29 PG — SIGNIFICANT CHANGE UP (ref 27–34)
MCHC RBC-ENTMCNC: 29.4 PG — SIGNIFICANT CHANGE UP (ref 27–34)
MCHC RBC-ENTMCNC: 29.6 PG — SIGNIFICANT CHANGE UP (ref 27–34)
MCHC RBC-ENTMCNC: 33.5 GM/DL — SIGNIFICANT CHANGE UP (ref 32–36)
MCHC RBC-ENTMCNC: 34 GM/DL — SIGNIFICANT CHANGE UP (ref 32–36)
MCHC RBC-ENTMCNC: 34.4 GM/DL — SIGNIFICANT CHANGE UP (ref 32–36)
MCHC RBC-ENTMCNC: 34.7 GM/DL — SIGNIFICANT CHANGE UP (ref 32–36)
MCV RBC AUTO: 84.6 FL — SIGNIFICANT CHANGE UP (ref 80–100)
MCV RBC AUTO: 85 FL — SIGNIFICANT CHANGE UP (ref 80–100)
MCV RBC AUTO: 86.1 FL — SIGNIFICANT CHANGE UP (ref 80–100)
MCV RBC AUTO: 86.6 FL — SIGNIFICANT CHANGE UP (ref 80–100)
NRBC # BLD: 0 /100 WBCS — SIGNIFICANT CHANGE UP (ref 0–0)
PHOSPHATE SERPL-MCNC: 4.2 MG/DL — SIGNIFICANT CHANGE UP (ref 2.5–4.5)
PHOSPHATE SERPL-MCNC: 4.7 MG/DL — HIGH (ref 2.5–4.5)
PHOSPHATE SERPL-MCNC: 5.8 MG/DL — HIGH (ref 2.5–4.5)
PHOSPHATE SERPL-MCNC: 5.9 MG/DL — HIGH (ref 2.5–4.5)
PLATELET # BLD AUTO: 62 K/UL — LOW (ref 150–400)
PLATELET # BLD AUTO: 66 K/UL — LOW (ref 150–400)
PLATELET # BLD AUTO: 69 K/UL — LOW (ref 150–400)
PLATELET # BLD AUTO: 69 K/UL — LOW (ref 150–400)
POTASSIUM SERPL-MCNC: 3.5 MMOL/L — SIGNIFICANT CHANGE UP (ref 3.5–5.3)
POTASSIUM SERPL-MCNC: 3.8 MMOL/L — SIGNIFICANT CHANGE UP (ref 3.5–5.3)
POTASSIUM SERPL-MCNC: 3.9 MMOL/L — SIGNIFICANT CHANGE UP (ref 3.5–5.3)
POTASSIUM SERPL-MCNC: 4.5 MMOL/L — SIGNIFICANT CHANGE UP (ref 3.5–5.3)
POTASSIUM SERPL-SCNC: 3.5 MMOL/L — SIGNIFICANT CHANGE UP (ref 3.5–5.3)
POTASSIUM SERPL-SCNC: 3.8 MMOL/L — SIGNIFICANT CHANGE UP (ref 3.5–5.3)
POTASSIUM SERPL-SCNC: 3.9 MMOL/L — SIGNIFICANT CHANGE UP (ref 3.5–5.3)
POTASSIUM SERPL-SCNC: 4.5 MMOL/L — SIGNIFICANT CHANGE UP (ref 3.5–5.3)
PROT SERPL-MCNC: 5.1 G/DL — LOW (ref 6–8.3)
PROT SERPL-MCNC: 5.2 G/DL — LOW (ref 6–8.3)
PROT SERPL-MCNC: 5.4 G/DL — LOW (ref 6–8.3)
PROTHROM AB SERPL-ACNC: 13 SEC — HIGH (ref 10–12.9)
PROTHROM AB SERPL-ACNC: 13.6 SEC — HIGH (ref 10–12.9)
PROTHROM AB SERPL-ACNC: 13.9 SEC — HIGH (ref 10–12.9)
PROTHROM AB SERPL-ACNC: 14 SEC — HIGH (ref 10–12.9)
RBC # BLD: 2.69 M/UL — LOW (ref 3.8–5.2)
RBC # BLD: 2.8 M/UL — LOW (ref 3.8–5.2)
RBC # BLD: 2.94 M/UL — LOW (ref 3.8–5.2)
RBC # BLD: 3.06 M/UL — LOW (ref 3.8–5.2)
RBC # FLD: 15.2 % — HIGH (ref 10.3–14.5)
RBC # FLD: 15.4 % — HIGH (ref 10.3–14.5)
RBC # FLD: 15.5 % — HIGH (ref 10.3–14.5)
RBC # FLD: 15.6 % — HIGH (ref 10.3–14.5)
RH IG SCN BLD-IMP: POSITIVE — SIGNIFICANT CHANGE UP
SODIUM SERPL-SCNC: 137 MMOL/L — SIGNIFICANT CHANGE UP (ref 135–145)
SODIUM SERPL-SCNC: 140 MMOL/L — SIGNIFICANT CHANGE UP (ref 135–145)
SODIUM SERPL-SCNC: 142 MMOL/L — SIGNIFICANT CHANGE UP (ref 135–145)
SODIUM SERPL-SCNC: 142 MMOL/L — SIGNIFICANT CHANGE UP (ref 135–145)
WBC # BLD: 10 K/UL — SIGNIFICANT CHANGE UP (ref 3.8–10.5)
WBC # BLD: 6.72 K/UL — SIGNIFICANT CHANGE UP (ref 3.8–10.5)
WBC # BLD: 8.24 K/UL — SIGNIFICANT CHANGE UP (ref 3.8–10.5)
WBC # BLD: 9.92 K/UL — SIGNIFICANT CHANGE UP (ref 3.8–10.5)
WBC # FLD AUTO: 10 K/UL — SIGNIFICANT CHANGE UP (ref 3.8–10.5)
WBC # FLD AUTO: 6.72 K/UL — SIGNIFICANT CHANGE UP (ref 3.8–10.5)
WBC # FLD AUTO: 8.24 K/UL — SIGNIFICANT CHANGE UP (ref 3.8–10.5)
WBC # FLD AUTO: 9.92 K/UL — SIGNIFICANT CHANGE UP (ref 3.8–10.5)

## 2019-02-19 PROCEDURE — 99291 CRITICAL CARE FIRST HOUR: CPT

## 2019-02-19 PROCEDURE — 99292 CRITICAL CARE ADDL 30 MIN: CPT

## 2019-02-19 PROCEDURE — 71045 X-RAY EXAM CHEST 1 VIEW: CPT | Mod: 26

## 2019-02-19 PROCEDURE — 93010 ELECTROCARDIOGRAM REPORT: CPT

## 2019-02-19 RX ORDER — ACETAMINOPHEN 500 MG
1000 TABLET ORAL ONCE
Qty: 0 | Refills: 0 | Status: COMPLETED | OUTPATIENT
Start: 2019-02-19 | End: 2019-02-19

## 2019-02-19 RX ORDER — MAGNESIUM SULFATE 500 MG/ML
2 VIAL (ML) INJECTION ONCE
Qty: 0 | Refills: 0 | Status: COMPLETED | OUTPATIENT
Start: 2019-02-19 | End: 2019-02-19

## 2019-02-19 RX ORDER — OXYCODONE AND ACETAMINOPHEN 5; 325 MG/1; MG/1
1 TABLET ORAL ONCE
Qty: 0 | Refills: 0 | Status: DISCONTINUED | OUTPATIENT
Start: 2019-02-19 | End: 2019-02-19

## 2019-02-19 RX ORDER — POTASSIUM CHLORIDE 20 MEQ
20 PACKET (EA) ORAL ONCE
Qty: 0 | Refills: 0 | Status: COMPLETED | OUTPATIENT
Start: 2019-02-19 | End: 2019-02-19

## 2019-02-19 RX ORDER — INSULIN LISPRO 100/ML
VIAL (ML) SUBCUTANEOUS
Qty: 0 | Refills: 0 | Status: DISCONTINUED | OUTPATIENT
Start: 2019-02-19 | End: 2019-02-24

## 2019-02-19 RX ORDER — DEXMEDETOMIDINE HYDROCHLORIDE IN 0.9% SODIUM CHLORIDE 4 UG/ML
0.1 INJECTION INTRAVENOUS
Qty: 200 | Refills: 0 | Status: DISCONTINUED | OUTPATIENT
Start: 2019-02-19 | End: 2019-02-19

## 2019-02-19 RX ORDER — INSULIN GLARGINE 100 [IU]/ML
10 INJECTION, SOLUTION SUBCUTANEOUS AT BEDTIME
Qty: 0 | Refills: 0 | Status: DISCONTINUED | OUTPATIENT
Start: 2019-02-19 | End: 2019-02-22

## 2019-02-19 RX ORDER — DEXTROSE 50 % IN WATER 50 %
15 SYRINGE (ML) INTRAVENOUS ONCE
Qty: 0 | Refills: 0 | Status: DISCONTINUED | OUTPATIENT
Start: 2019-02-19 | End: 2019-02-24

## 2019-02-19 RX ORDER — POTASSIUM CHLORIDE 20 MEQ
20 PACKET (EA) ORAL
Qty: 0 | Refills: 0 | Status: COMPLETED | OUTPATIENT
Start: 2019-02-19 | End: 2019-02-19

## 2019-02-19 RX ORDER — OXYCODONE AND ACETAMINOPHEN 5; 325 MG/1; MG/1
1 TABLET ORAL EVERY 6 HOURS
Qty: 0 | Refills: 0 | Status: DISCONTINUED | OUTPATIENT
Start: 2019-02-19 | End: 2019-02-24

## 2019-02-19 RX ORDER — GLUCAGON INJECTION, SOLUTION 0.5 MG/.1ML
1 INJECTION, SOLUTION SUBCUTANEOUS ONCE
Qty: 0 | Refills: 0 | Status: DISCONTINUED | OUTPATIENT
Start: 2019-02-19 | End: 2019-02-24

## 2019-02-19 RX ORDER — FUROSEMIDE 40 MG
20 TABLET ORAL ONCE
Qty: 0 | Refills: 0 | Status: COMPLETED | OUTPATIENT
Start: 2019-02-19 | End: 2019-02-19

## 2019-02-19 RX ORDER — PANTOPRAZOLE SODIUM 20 MG/1
40 TABLET, DELAYED RELEASE ORAL
Qty: 0 | Refills: 0 | Status: DISCONTINUED | OUTPATIENT
Start: 2019-02-19 | End: 2019-02-24

## 2019-02-19 RX ORDER — FENTANYL CITRATE 50 UG/ML
12.5 INJECTION INTRAVENOUS ONCE
Qty: 0 | Refills: 0 | Status: DISCONTINUED | OUTPATIENT
Start: 2019-02-19 | End: 2019-02-19

## 2019-02-19 RX ORDER — CLOPIDOGREL BISULFATE 75 MG/1
75 TABLET, FILM COATED ORAL DAILY
Qty: 0 | Refills: 0 | Status: DISCONTINUED | OUTPATIENT
Start: 2019-02-20 | End: 2019-02-24

## 2019-02-19 RX ORDER — CLOPIDOGREL BISULFATE 75 MG/1
300 TABLET, FILM COATED ORAL ONCE
Qty: 0 | Refills: 0 | Status: COMPLETED | OUTPATIENT
Start: 2019-02-19 | End: 2019-02-19

## 2019-02-19 RX ORDER — ACETAMINOPHEN 500 MG
650 TABLET ORAL EVERY 6 HOURS
Qty: 0 | Refills: 0 | Status: DISCONTINUED | OUTPATIENT
Start: 2019-02-19 | End: 2019-02-24

## 2019-02-19 RX ORDER — ACETAZOLAMIDE 250 MG/1
500 TABLET ORAL ONCE
Qty: 0 | Refills: 0 | Status: COMPLETED | OUTPATIENT
Start: 2019-02-19 | End: 2019-02-19

## 2019-02-19 RX ORDER — CHLORHEXIDINE GLUCONATE 213 G/1000ML
1 SOLUTION TOPICAL DAILY
Qty: 0 | Refills: 0 | Status: DISCONTINUED | OUTPATIENT
Start: 2019-02-19 | End: 2019-02-24

## 2019-02-19 RX ORDER — FUROSEMIDE 40 MG
20 TABLET ORAL ONCE
Qty: 0 | Refills: 0 | Status: COMPLETED | OUTPATIENT
Start: 2019-02-19 | End: 2019-02-20

## 2019-02-19 RX ORDER — INSULIN LISPRO 100/ML
3 VIAL (ML) SUBCUTANEOUS
Qty: 0 | Refills: 0 | Status: DISCONTINUED | OUTPATIENT
Start: 2019-02-19 | End: 2019-02-22

## 2019-02-19 RX ORDER — SODIUM CHLORIDE 9 MG/ML
1000 INJECTION, SOLUTION INTRAVENOUS
Qty: 0 | Refills: 0 | Status: DISCONTINUED | OUTPATIENT
Start: 2019-02-19 | End: 2019-02-24

## 2019-02-19 RX ADMIN — Medication 400 MILLIGRAM(S): at 11:12

## 2019-02-19 RX ADMIN — FENTANYL CITRATE 12.5 MICROGRAM(S): 50 INJECTION INTRAVENOUS at 11:45

## 2019-02-19 RX ADMIN — HEPARIN SODIUM 5000 UNIT(S): 5000 INJECTION INTRAVENOUS; SUBCUTANEOUS at 06:21

## 2019-02-19 RX ADMIN — PANTOPRAZOLE SODIUM 40 MILLIGRAM(S): 20 TABLET, DELAYED RELEASE ORAL at 11:43

## 2019-02-19 RX ADMIN — INSULIN GLARGINE 10 UNIT(S): 100 INJECTION, SOLUTION SUBCUTANEOUS at 21:14

## 2019-02-19 RX ADMIN — OXYCODONE AND ACETAMINOPHEN 1 TABLET(S): 5; 325 TABLET ORAL at 17:00

## 2019-02-19 RX ADMIN — Medication 20 MILLIGRAM(S): at 17:35

## 2019-02-19 RX ADMIN — HEPARIN SODIUM 5000 UNIT(S): 5000 INJECTION INTRAVENOUS; SUBCUTANEOUS at 13:24

## 2019-02-19 RX ADMIN — OXYCODONE AND ACETAMINOPHEN 1 TABLET(S): 5; 325 TABLET ORAL at 17:33

## 2019-02-19 RX ADMIN — FENTANYL CITRATE 12.5 MICROGRAM(S): 50 INJECTION INTRAVENOUS at 11:30

## 2019-02-19 RX ADMIN — Medication 2000 MILLIGRAM(S): at 05:38

## 2019-02-19 RX ADMIN — Medication 100 MILLIEQUIVALENT(S): at 05:04

## 2019-02-19 RX ADMIN — Medication 2000 MILLIGRAM(S): at 21:14

## 2019-02-19 RX ADMIN — ACETAZOLAMIDE 110 MILLIGRAM(S): 250 TABLET ORAL at 09:19

## 2019-02-19 RX ADMIN — CLOPIDOGREL BISULFATE 300 MILLIGRAM(S): 75 TABLET, FILM COATED ORAL at 07:44

## 2019-02-19 RX ADMIN — Medication 100 MILLIEQUIVALENT(S): at 06:30

## 2019-02-19 RX ADMIN — OXYCODONE AND ACETAMINOPHEN 1 TABLET(S): 5; 325 TABLET ORAL at 23:15

## 2019-02-19 RX ADMIN — Medication 100 MILLIEQUIVALENT(S): at 21:54

## 2019-02-19 RX ADMIN — HEPARIN SODIUM 5000 UNIT(S): 5000 INJECTION INTRAVENOUS; SUBCUTANEOUS at 21:13

## 2019-02-19 RX ADMIN — Medication 2000 MILLIGRAM(S): at 13:14

## 2019-02-19 RX ADMIN — Medication 100 MILLIEQUIVALENT(S): at 05:27

## 2019-02-19 RX ADMIN — Medication 20 MILLIGRAM(S): at 20:01

## 2019-02-19 RX ADMIN — Medication 20 MILLIEQUIVALENT(S): at 19:09

## 2019-02-19 RX ADMIN — Medication 50 GRAM(S): at 07:23

## 2019-02-19 RX ADMIN — Medication 1000 MILLIGRAM(S): at 11:45

## 2019-02-19 NOTE — PROGRESS NOTE ADULT - SUBJECTIVE AND OBJECTIVE BOX
CTICU  CRITICAL  CARE  attending     Hand off received 					   Pertinent clinical, laboratory, radiographic, hemodynamic, echocardiographic, respiratory data, microbiologic data and chart were reviewed and analyzed frequently throughout the course of the day and night  Patient seen and examined with CTS/ SH attending at bedside  Pt is a 78y , Female, HEALTH ISSUES - PROBLEM Dx:    79 y/o female with hx of HTN, HLD, DM, asthma, HLD, atrial fibrillation (on coumadin), glaucoma, CAD s/p PCI 3 weeks ago, CHF, MR, who presented to [facility] with progressive SOB 3 weeks ago and found to have CAD  PCI performed.   The patient states that she has continued to have SOB and now has minimal exercise tolerance. She walks with a walker and wheelchair at home and is unable to do her daily activities due to her symptoms of SOB. She has also had multiple episodes of dizziness/syncope with multiple falls. Patient states last syncope 3 weeks ago and she has had 3 falls the last few months. She denies ever hitting her head during these falls. She also endorses frequent nausea, chest tightness and intermittent palpitations but denies any other abdominal symptoms.  She presented to [facility] with worsening SOB and was found to be in Afib with RVR and with pulmonary congestion. She was admitted to the ICU and was treated with  IV lasix, esmolol drip for her BP and IV  amiodarone for  atrial fibrillation.   She is now on 4L NC sating well, HR in the 80s with BP in the 100s systolic and is transferred to [facility] for further evaluation and medical management for CHF. Further plan -> mitral valve replacement.  S/P MVR.      FAMILY HISTORY:  No pertinent family history in first degree relatives  PAST MEDICAL & SURGICAL HISTORY:  CAD (coronary artery disease)  Asthma  Atrial fibrillation  HLD (hyperlipidemia)  DM (diabetes mellitus)  HTN (hypertension)  DVT (deep venous thrombosis)  Asthma  COPD (chronic obstructive pulmonary disease)  DM (diabetes mellitus)  S/P arterial stent: left arterial thrombectomy  History of embolectomy: E    Patient is a 78y old  Female who presents with a chief complaint of MR (2019 17:55)    14 system review was unremarkable  acute changes include acute respiratory failure  Vital signs, hemodynamic and respiratory parameters were reviewed from the bedside nursing flow sheet.  ICU Vital Signs Last 24 Hrs  T(C): 36.3 (2019 21:27), Max: 37.6 (2019 11:00)  T(F): 97.3 (2019 21:27), Max: 99.7 (2019 11:00)  HR: 64 (2019 21:00) (51 - 72)  BP: --  BP(mean): --  ABP: 126/52 (2019 21:00) (102/44 - 162/68)  ABP(mean): 76 (2019 21:00) (60 - 102)  RR: 16 (2019 21:00) (8 - 22)  SpO2: 100% (:00) (99% - 100%)    Adult Advanced Hemodynamics Last 24 Hrs  CVP(mm Hg): 12 (2019 21:00) (-1 - 21)  CVP(cm H2O): --  CO: --  CI: --  PA: --  PA(mean): --  PCWP: --  SVR: --  SVRI: --  PVR: --  PVRI: --, ABG - ( 2019 21:04 )  pH, Arterial: 7.50  pH, Blood: x     /  pCO2: 37    /  pO2: 100   / HCO3: 28    / Base Excess: 4.7   /  SaO2: 98                Mode: AC/ CMV (Assist Control/ Continuous Mandatory Ventilation)  RR (machine): 10  TV (machine): 500  FiO2: 40  PEEP: 8  ITime: 1.4  MAP: 12  PIP: 26    Intake and output was reviewed and the fluid balance was calculated  Daily     Daily Weight in k.5 (2019 09:58)  I&O's Summary    2019 07:  -  2019 07:00  --------------------------------------------------------  IN: 1690.8 mL / OUT: 4620 mL / NET: -2929.2 mL    2019 07:01  -  2019 22:03  --------------------------------------------------------  IN: 396.4 mL / OUT: 1375 mL / NET: -978.6 mL        All lines and drain sites were assessed  Glycemic trend was reviewed CAPILLARY BLOOD GLUCOSE      POCT Blood Glucose.: 146 mg/dL (2019 20:45)    NEURO: PUPILS 2 mm (Reactive). Moves all 4 extremities well. No changes in mental status from the baseline.  CVS: S1, S2, No S3.  RESPIRATORY: Auscultation of the chest reveals bibasilar rale.  GI: Abdomen is soft. Distended. No tenderness. + bowel sounds.  Extremities are warm and well perfused.  VASCULAR: + DP/PT by Doppler.  Wounds appear clean and unremarkable  Antibiotics are perioperative cefazolin.      labs  CBC Full  -  ( 2019 21:07 )  WBC Count : 10.00 K/uL  Hemoglobin : 7.8 g/dL  Hematocrit : 23.3 %  Platelet Count - Automated : 66 K/uL  Mean Cell Volume : 86.6 fl  Mean Cell Hemoglobin : 29.0 pg  Mean Cell Hemoglobin Concentration : 33.5 gm/dL  Auto Neutrophil # : x  Auto Lymphocyte # : x  Auto Monocyte # : x  Auto Eosinophil # : x  Auto Basophil # : x  Auto Neutrophil % : x  Auto Lymphocyte % : x  Auto Monocyte % : x  Auto Eosinophil % : x  Auto Basophil % : x        137  |  101  |  15  ----------------------------<  162<H>  3.8   |  25  |  0.86    Ca    9.0      2019 21:07  Phos  5.8       Mg     2.2         TPro  5.1<L>  /  Alb  3.1<L>  /  TBili  1.0  /  DBili  x   /  AST  76<H>  /  ALT  14  /  AlkPhos  67      PT/INR - ( 2019 21:07 )   PT: 13.9 sec;   INR: 1.22          PTT - ( 2019 21:07 )  PTT:31.1 sec  The current medications were reviewed   MEDICATIONS  (STANDING):  buDESOnide 160 MICROgram(s)/formoterol 4.5 MICROgram(s) Inhaler 2 Puff(s) Inhalation two times a day  ceFAZolin  Injectable. 2000 milliGRAM(s) IV Push every 8 hours  chlorhexidine 2% Cloths 1 Application(s) Topical daily  dextrose 5%. 1000 milliLiter(s) (50 mL/Hr) IV Continuous <Continuous>  dextrose 50% Injectable 50 milliLiter(s) IV Push every 15 minutes  furosemide   Injectable 20 milliGRAM(s) IV Push once  heparin  Injectable 5000 Unit(s) SubCutaneous every 8 hours  insulin glargine Injectable (LANTUS) 10 Unit(s) SubCutaneous at bedtime  insulin Infusion 2 Unit(s)/Hr (2 mL/Hr) IV Continuous <Continuous>  insulin lispro (HumaLOG) corrective regimen sliding scale   SubCutaneous Before meals and at bedtime  insulin lispro Injectable (HumaLOG) 3 Unit(s) SubCutaneous three times a day before meals  pantoprazole    Tablet 40 milliGRAM(s) Oral before breakfast  sodium chloride 0.9%. 1000 milliLiter(s) (10 mL/Hr) IV Continuous <Continuous>    MEDICATIONS  (PRN):  acetaminophen   Tablet .. 650 milliGRAM(s) Oral every 6 hours PRN Mild Pain (1 - 3)  dextrose 40% Gel 15 Gram(s) Oral once PRN Blood Glucose LESS THAN 70 milliGRAM(s)/deciliter  glucagon  Injectable 1 milliGRAM(s) IntraMuscular once PRN Glucose LESS THAN 70 milligrams/deciliter  oxyCODONE    5 mG/acetaminophen 325 mG 1 Tablet(s) Oral every 6 hours PRN Severe Pain (7 - 10)        Patient is a 78y old  Female who presents with severe MR   S/P MVR  S/P Removal of LA thrombus.  S/P occlusion of LA appendage  S/P cryomaze.  Received 9 PRBC, 4 FFP, 1 platelet, 5 cryoprecipitate in the OR.  Low H/H noted.  Hemodynamically stable of all drips.  Good oxygenation.  Fair urine output.  Overall doing well.             My plan includes :  Transfuse PRBC  Gentle diuresis.   D/C mediastinal chest tube.  PO afterload reduction.  Close hemodynamic, ventilatory and drain monitoring and management.  Monitor for arrhythmias and monitor parameters for organ perfusion  Monitor neurologic status  Head of the bed should remain elevated to 45 deg .   Chest PT and IS will be encouraged  Monitor adequacy of oxygenation and ventilation and attempt to wean oxygen  Nutritional goals will be met using po eventually , ensure adequate caloric intake and monitor the same  Stress ulcer and VTE prophylaxis will be achieved    Glycemic control is satisfactory  Electrolytes have been repleted as necessary and wound care has been carried out. Pain control has been achieved.   Aggressive physical therapy and early mobility and ambulation goals will be met   The family was updated about the course and plan  CRITICAL CARE TIME SPENT in evaluation and management, reassessments, review and interpretation of labs and x-rays, ventilator and hemodynamic management, formulating a plan and coordinating care: ___30____ MIN.  Time does not include procedural time.  CTICU ATTENDING     					    Chandra Alex MD

## 2019-02-19 NOTE — DIETITIAN INITIAL EVALUATION ADULT. - OTHER INFO
Patient is a 78y old  Female who presents with a chief complaint of MR. S/p MVR, cryomaze. Pt remains intubated on CPAP trials. Propofol off. Per MD, pt planned for extubation later today. Recommend dysphagia screen vs formal S&S post extubation as medically feasible. Start Dash/TLC, CST CHO diet pending appropriate consistency. If pt unable to be safely extubated today, please consult RD for EN recommendations. Unsure of diet/wt history PTA as no family present. RD to follow per policy.

## 2019-02-19 NOTE — PROGRESS NOTE ADULT - SUBJECTIVE AND OBJECTIVE BOX
CTICU  CRITICAL  CARE  attending     Hand off received 					   Pertinent clinical, laboratory, radiographic, hemodynamic, echocardiographic, respiratory data, microbiologic data and chart were reviewed and analyzed frequently throughout the course of the day and night  Patient seen and examined with CTS/ SH attending at bedside    Pt is a 78y , Female, post op day # 1; s/p MVR; cryomaze; ANDREW occlusion; removal of LA thrombus    intraop:    received 9 units PRBC; 4 units FFP; 1 unit of platelets; Cryo 5 units  intubated/sedated  pressors/inotropes    today:    weaned and extubated  acute post H'gic anemia          , FAMILY HISTORY:  No pertinent family history in first degree relatives  PAST MEDICAL & SURGICAL HISTORY:  CAD (coronary artery disease)  Asthma  Atrial fibrillation  HLD (hyperlipidemia)  DM (diabetes mellitus)  HTN (hypertension)  DVT (deep venous thrombosis)  Asthma  COPD (chronic obstructive pulmonary disease)  DM (diabetes mellitus)  S/P arterial stent: left arterial thrombectomy  History of embolectomy: LLE    Patient is a 78y old  Female who presents with a chief complaint of MR (2019 22:20)      14 system review was unremarkable  acute changes include acute respiratory failure  Vital signs, hemodynamic and respiratory parameters were reviewed from the bedside nursing flowsheet.  ICU Vital Signs Last 24 Hrs  T(C): 36.3 (2019 17:28), Max: 37.6 (2019 11:00)  T(F): 97.3 (2019 17:28), Max: 99.7 (2019 11:00)  HR: 62 (2019 17:00) (51 - 82)  BP: --  BP(mean): --  ABP: 146/56 (2019 17:00) (102/44 - 162/68)  ABP(mean): 86 (2019 17:00) (60 - 102)  RR: 22 (2019 17:00) (8 - 22)  SpO2: 100% (2019 17:00) (99% - 100%)    Adult Advanced Hemodynamics Last 24 Hrs  CVP(mm Hg): 0 (2019 17:00) (0 - 21)  CVP(cm H2O): --  CO: --  CI: --  PA: --  PA(mean): --  PCWP: --  SVR: --  SVRI: --  PVR: --  PVRI: --, ABG - ( 2019 17:13 )  pH, Arterial: 7.46  pH, Blood: x     /  pCO2: 41    /  pO2: 149   / HCO3: 29    / Base Excess: 4.5   /  SaO2: 99                Mode: AC/ CMV (Assist Control/ Continuous Mandatory Ventilation)  RR (machine): 10  TV (machine): 500  FiO2: 40  PEEP: 8  ITime: 1.4  MAP: 12  PIP: 26    Intake and output was reviewed and the fluid balance was calculated  Daily     Daily Weight in k.5 (2019 09:58)  I&O's Summary    2019 07:01  -  2019 07:00  --------------------------------------------------------  IN: 1690.8 mL / OUT: 4620 mL / NET: -2929.2 mL    2019 07:01  -  2019 17:55  --------------------------------------------------------  IN: 289.4 mL / OUT: 895 mL / NET: -605.6 mL        All lines and drain sites were assessed  Glycemic trend was reviewedCAPILLARY BLOOD GLUCOSE      POCT Blood Glucose.: 99 mg/dL (2019 17:08)    No acute change in mental status  Auscultation of the chest reveals equal bs  Abdomen is soft  Extremities are warm and well perfused  Wounds appear clean and unremarkable  Antibiotics are periop    labs  CBC Full  -  ( 2019 17:17 )  WBC Count : 9.92 K/uL  Hemoglobin : 8.3 g/dL  Hematocrit : 24.1 %  Platelet Count - Automated : 62 K/uL  Mean Cell Volume : 86.1 fl  Mean Cell Hemoglobin : 29.6 pg  Mean Cell Hemoglobin Concentration : 34.4 gm/dL  Auto Neutrophil # : x  Auto Lymphocyte # : x  Auto Monocyte # : x  Auto Eosinophil # : x  Auto Basophil # : x  Auto Neutrophil % : x  Auto Lymphocyte % : x  Auto Monocyte % : x  Auto Eosinophil % : x  Auto Basophil % : x        140  |  104  |  14  ----------------------------<  100<H>  3.9   |  27  |  0.83    Ca    9.4      2019 17:17  Phos  5.9       Mg     2.5         TPro  5.2<L>  /  Alb  3.4  /  TBili  1.7<H>  /  DBili  x   /  AST  90<H>  /  ALT  16  /  AlkPhos  67      PT/INR - ( 2019 17:17 )   PT: 13.6 sec;   INR: 1.20          PTT - ( 2019 17:17 )  PTT:32.6 sec  The current medications were reviewed   MEDICATIONS  (STANDING):  buDESOnide 160 MICROgram(s)/formoterol 4.5 MICROgram(s) Inhaler 2 Puff(s) Inhalation two times a day  ceFAZolin  Injectable. 2000 milliGRAM(s) IV Push every 8 hours  chlorhexidine 2% Cloths 1 Application(s) Topical daily  dextrose 5%. 1000 milliLiter(s) (50 mL/Hr) IV Continuous <Continuous>  dextrose 50% Injectable 50 milliLiter(s) IV Push every 15 minutes  heparin  Injectable 5000 Unit(s) SubCutaneous every 8 hours  insulin glargine Injectable (LANTUS) 10 Unit(s) SubCutaneous at bedtime  insulin Infusion 2 Unit(s)/Hr (2 mL/Hr) IV Continuous <Continuous>  insulin lispro (HumaLOG) corrective regimen sliding scale   SubCutaneous Before meals and at bedtime  insulin lispro Injectable (HumaLOG) 3 Unit(s) SubCutaneous three times a day before meals  pantoprazole    Tablet 40 milliGRAM(s) Oral before breakfast  potassium chloride    Tablet ER 20 milliEquivalent(s) Oral once  sodium chloride 0.9%. 1000 milliLiter(s) (10 mL/Hr) IV Continuous <Continuous>    MEDICATIONS  (PRN):  dextrose 40% Gel 15 Gram(s) Oral once PRN Blood Glucose LESS THAN 70 milliGRAM(s)/deciliter  glucagon  Injectable 1 milliGRAM(s) IntraMuscular once PRN Glucose LESS THAN 70 milligrams/deciliter  oxyCODONE    5 mG/acetaminophen 325 mG 1 Tablet(s) Oral every 6 hours PRN Severe Pain (7 - 10)       PROBLEM LIST/ ASSESSMENT:  HEALTH ISSUES - PROBLEM Dx:      hemodynamic instability  s/p MVR  acute H'gic anemia          ,   Patient is a 78y old  Female who presents with a chief complaint of MR (2019 22:20)     s/p MVR; cryomaze; ANDREW occlusion; removal of LA thrombus      My plan includes :  close hemodynamic, ventilatory and drain monitoring and management per post op routine    Monitor for arrhythmias and monitor parameters for organ perfusion  monitor neurologic status  Head of the bed should remain elevated to 45 deg .   chest PT and IS will be encouraged  monitor adequacy of oxygenation and ventilation and attempt to wean oxygen  Nutritional goals will be met using po eventually , ensure adequate caloric intake and montior the same  Stress ulcer and VTE prophylaxis will be achieved    Glycemic control is satisfactory  Electrolytes have been repleted as necessary and wound care has been carried out. Pain control has been achieved.   agressive physical therapy and early mobility and ambulation goals will be met   The family was updated about the course and plan  CRITICAL CARE TIME SPENT in evaluation and management, reassessments, review and interpretation of labs and x-rays, ventilator and hemodynamic management, formulating a plan and coordinating care: ___45____ MIN.  Time does not include procedural time.  CTICU ATTENDING     					    Jaylon Branch MD

## 2019-02-19 NOTE — DIETITIAN INITIAL EVALUATION ADULT. - NS AS NUTRI INTERV MEALS SNACK
[place] - modified diet/Dash/TLC, CST CHO diet per appropriate diet consistency/Carbohydrate - modified diet/Fat - modified diet

## 2019-02-19 NOTE — DIETITIAN INITIAL EVALUATION ADULT. - ENERGY NEEDS
Ht 162.56cm; Wt 78.1Kg  IBW 54.5Kg; %%  BMI 29.6    Utilized IBW to calculate needs, 2/2 vent. Adjusted for age/post-op. Fluids per team 2/2 h/o CHF.

## 2019-02-20 LAB
ALBUMIN SERPL ELPH-MCNC: 3 G/DL — LOW (ref 3.3–5)
ALP SERPL-CCNC: 66 U/L — SIGNIFICANT CHANGE UP (ref 40–120)
ALT FLD-CCNC: 12 U/L — SIGNIFICANT CHANGE UP (ref 10–45)
ANION GAP SERPL CALC-SCNC: 11 MMOL/L — SIGNIFICANT CHANGE UP (ref 5–17)
ANION GAP SERPL CALC-SCNC: 11 MMOL/L — SIGNIFICANT CHANGE UP (ref 5–17)
APTT BLD: 32.6 SEC — SIGNIFICANT CHANGE UP (ref 27.5–36.3)
APTT BLD: 38.6 SEC — HIGH (ref 27.5–36.3)
AST SERPL-CCNC: 71 U/L — HIGH (ref 10–40)
BILIRUB SERPL-MCNC: 1.9 MG/DL — HIGH (ref 0.2–1.2)
BUN SERPL-MCNC: 15 MG/DL — SIGNIFICANT CHANGE UP (ref 7–23)
BUN SERPL-MCNC: 19 MG/DL — SIGNIFICANT CHANGE UP (ref 7–23)
CALCIUM SERPL-MCNC: 8.8 MG/DL — SIGNIFICANT CHANGE UP (ref 8.4–10.5)
CALCIUM SERPL-MCNC: 8.9 MG/DL — SIGNIFICANT CHANGE UP (ref 8.4–10.5)
CHLORIDE SERPL-SCNC: 101 MMOL/L — SIGNIFICANT CHANGE UP (ref 96–108)
CHLORIDE SERPL-SCNC: 104 MMOL/L — SIGNIFICANT CHANGE UP (ref 96–108)
CO2 SERPL-SCNC: 24 MMOL/L — SIGNIFICANT CHANGE UP (ref 22–31)
CO2 SERPL-SCNC: 25 MMOL/L — SIGNIFICANT CHANGE UP (ref 22–31)
CREAT SERPL-MCNC: 0.88 MG/DL — SIGNIFICANT CHANGE UP (ref 0.5–1.3)
CREAT SERPL-MCNC: 0.89 MG/DL — SIGNIFICANT CHANGE UP (ref 0.5–1.3)
GAS PNL BLDA: SIGNIFICANT CHANGE UP
GAS PNL BLDA: SIGNIFICANT CHANGE UP
GLUCOSE BLDC GLUCOMTR-MCNC: 112 MG/DL — HIGH (ref 70–99)
GLUCOSE BLDC GLUCOMTR-MCNC: 82 MG/DL — SIGNIFICANT CHANGE UP (ref 70–99)
GLUCOSE BLDC GLUCOMTR-MCNC: 83 MG/DL — SIGNIFICANT CHANGE UP (ref 70–99)
GLUCOSE BLDC GLUCOMTR-MCNC: 86 MG/DL — SIGNIFICANT CHANGE UP (ref 70–99)
GLUCOSE SERPL-MCNC: 110 MG/DL — HIGH (ref 70–99)
GLUCOSE SERPL-MCNC: 99 MG/DL — SIGNIFICANT CHANGE UP (ref 70–99)
HCT VFR BLD CALC: 26.7 % — LOW (ref 34.5–45)
HCT VFR BLD CALC: 27.6 % — LOW (ref 34.5–45)
HGB BLD-MCNC: 9.1 G/DL — LOW (ref 11.5–15.5)
HGB BLD-MCNC: 9.3 G/DL — LOW (ref 11.5–15.5)
INR BLD: 1.12 — SIGNIFICANT CHANGE UP (ref 0.88–1.16)
INR BLD: 1.14 — SIGNIFICANT CHANGE UP (ref 0.88–1.16)
LACTATE SERPL-SCNC: 1 MMOL/L — SIGNIFICANT CHANGE UP (ref 0.5–2)
LACTATE SERPL-SCNC: 1.2 MMOL/L — SIGNIFICANT CHANGE UP (ref 0.5–2)
MAGNESIUM SERPL-MCNC: 2.1 MG/DL — SIGNIFICANT CHANGE UP (ref 1.6–2.6)
MAGNESIUM SERPL-MCNC: 2.2 MG/DL — SIGNIFICANT CHANGE UP (ref 1.6–2.6)
MCHC RBC-ENTMCNC: 29.2 PG — SIGNIFICANT CHANGE UP (ref 27–34)
MCHC RBC-ENTMCNC: 29.5 PG — SIGNIFICANT CHANGE UP (ref 27–34)
MCHC RBC-ENTMCNC: 33.7 GM/DL — SIGNIFICANT CHANGE UP (ref 32–36)
MCHC RBC-ENTMCNC: 34.1 GM/DL — SIGNIFICANT CHANGE UP (ref 32–36)
MCV RBC AUTO: 86.7 FL — SIGNIFICANT CHANGE UP (ref 80–100)
MCV RBC AUTO: 86.8 FL — SIGNIFICANT CHANGE UP (ref 80–100)
NRBC # BLD: 0 /100 WBCS — SIGNIFICANT CHANGE UP (ref 0–0)
NRBC # BLD: 0 /100 WBCS — SIGNIFICANT CHANGE UP (ref 0–0)
PHOSPHATE SERPL-MCNC: 5 MG/DL — HIGH (ref 2.5–4.5)
PHOSPHATE SERPL-MCNC: 6 MG/DL — HIGH (ref 2.5–4.5)
PLATELET # BLD AUTO: 64 K/UL — LOW (ref 150–400)
PLATELET # BLD AUTO: 69 K/UL — LOW (ref 150–400)
POTASSIUM SERPL-MCNC: 3.6 MMOL/L — SIGNIFICANT CHANGE UP (ref 3.5–5.3)
POTASSIUM SERPL-MCNC: 3.9 MMOL/L — SIGNIFICANT CHANGE UP (ref 3.5–5.3)
POTASSIUM SERPL-SCNC: 3.6 MMOL/L — SIGNIFICANT CHANGE UP (ref 3.5–5.3)
POTASSIUM SERPL-SCNC: 3.9 MMOL/L — SIGNIFICANT CHANGE UP (ref 3.5–5.3)
PROT SERPL-MCNC: 5.5 G/DL — LOW (ref 6–8.3)
PROTHROM AB SERPL-ACNC: 12.7 SEC — SIGNIFICANT CHANGE UP (ref 10–12.9)
PROTHROM AB SERPL-ACNC: 12.9 SEC — SIGNIFICANT CHANGE UP (ref 10–12.9)
RBC # BLD: 3.08 M/UL — LOW (ref 3.8–5.2)
RBC # BLD: 3.18 M/UL — LOW (ref 3.8–5.2)
RBC # FLD: 15.2 % — HIGH (ref 10.3–14.5)
RBC # FLD: 15.5 % — HIGH (ref 10.3–14.5)
SODIUM SERPL-SCNC: 137 MMOL/L — SIGNIFICANT CHANGE UP (ref 135–145)
SODIUM SERPL-SCNC: 139 MMOL/L — SIGNIFICANT CHANGE UP (ref 135–145)
WBC # BLD: 10.81 K/UL — HIGH (ref 3.8–10.5)
WBC # BLD: 12.31 K/UL — HIGH (ref 3.8–10.5)
WBC # FLD AUTO: 10.81 K/UL — HIGH (ref 3.8–10.5)
WBC # FLD AUTO: 12.31 K/UL — HIGH (ref 3.8–10.5)

## 2019-02-20 PROCEDURE — 99291 CRITICAL CARE FIRST HOUR: CPT

## 2019-02-20 PROCEDURE — 71045 X-RAY EXAM CHEST 1 VIEW: CPT | Mod: 26,76

## 2019-02-20 RX ORDER — ATORVASTATIN CALCIUM 80 MG/1
40 TABLET, FILM COATED ORAL AT BEDTIME
Qty: 0 | Refills: 0 | Status: DISCONTINUED | OUTPATIENT
Start: 2019-02-20 | End: 2019-02-24

## 2019-02-20 RX ORDER — ONDANSETRON 8 MG/1
4 TABLET, FILM COATED ORAL ONCE
Qty: 0 | Refills: 0 | Status: COMPLETED | OUTPATIENT
Start: 2019-02-20 | End: 2019-02-20

## 2019-02-20 RX ORDER — ASPIRIN/CALCIUM CARB/MAGNESIUM 324 MG
81 TABLET ORAL DAILY
Qty: 0 | Refills: 0 | Status: DISCONTINUED | OUTPATIENT
Start: 2019-02-20 | End: 2019-02-24

## 2019-02-20 RX ORDER — POLYETHYLENE GLYCOL 3350 17 G/17G
17 POWDER, FOR SOLUTION ORAL DAILY
Qty: 0 | Refills: 0 | Status: DISCONTINUED | OUTPATIENT
Start: 2019-02-20 | End: 2019-02-24

## 2019-02-20 RX ORDER — FUROSEMIDE 40 MG
20 TABLET ORAL ONCE
Qty: 0 | Refills: 0 | Status: COMPLETED | OUTPATIENT
Start: 2019-02-20 | End: 2019-02-20

## 2019-02-20 RX ORDER — POTASSIUM CHLORIDE 20 MEQ
20 PACKET (EA) ORAL
Qty: 0 | Refills: 0 | Status: COMPLETED | OUTPATIENT
Start: 2019-02-20 | End: 2019-02-20

## 2019-02-20 RX ORDER — FUROSEMIDE 40 MG
40 TABLET ORAL ONCE
Qty: 0 | Refills: 0 | Status: COMPLETED | OUTPATIENT
Start: 2019-02-20 | End: 2019-02-20

## 2019-02-20 RX ORDER — POTASSIUM CHLORIDE 20 MEQ
20 PACKET (EA) ORAL ONCE
Qty: 0 | Refills: 0 | Status: COMPLETED | OUTPATIENT
Start: 2019-02-20 | End: 2019-02-20

## 2019-02-20 RX ORDER — FUROSEMIDE 40 MG
40 TABLET ORAL ONCE
Qty: 0 | Refills: 0 | Status: DISCONTINUED | OUTPATIENT
Start: 2019-02-20 | End: 2019-02-20

## 2019-02-20 RX ORDER — WARFARIN SODIUM 2.5 MG/1
3 TABLET ORAL ONCE
Qty: 0 | Refills: 0 | Status: COMPLETED | OUTPATIENT
Start: 2019-02-20 | End: 2019-02-20

## 2019-02-20 RX ADMIN — OXYCODONE AND ACETAMINOPHEN 1 TABLET(S): 5; 325 TABLET ORAL at 17:50

## 2019-02-20 RX ADMIN — Medication 40 MILLIGRAM(S): at 12:26

## 2019-02-20 RX ADMIN — INSULIN GLARGINE 10 UNIT(S): 100 INJECTION, SOLUTION SUBCUTANEOUS at 22:00

## 2019-02-20 RX ADMIN — Medication 81 MILLIGRAM(S): at 12:26

## 2019-02-20 RX ADMIN — POLYETHYLENE GLYCOL 3350 17 GRAM(S): 17 POWDER, FOR SOLUTION ORAL at 12:26

## 2019-02-20 RX ADMIN — Medication 100 MILLIEQUIVALENT(S): at 15:05

## 2019-02-20 RX ADMIN — Medication 20 MILLIGRAM(S): at 10:47

## 2019-02-20 RX ADMIN — Medication 20 MILLIGRAM(S): at 00:08

## 2019-02-20 RX ADMIN — OXYCODONE AND ACETAMINOPHEN 1 TABLET(S): 5; 325 TABLET ORAL at 05:23

## 2019-02-20 RX ADMIN — Medication 100 MILLIEQUIVALENT(S): at 14:00

## 2019-02-20 RX ADMIN — HEPARIN SODIUM 5000 UNIT(S): 5000 INJECTION INTRAVENOUS; SUBCUTANEOUS at 05:17

## 2019-02-20 RX ADMIN — OXYCODONE AND ACETAMINOPHEN 1 TABLET(S): 5; 325 TABLET ORAL at 18:50

## 2019-02-20 RX ADMIN — Medication 100 MILLIEQUIVALENT(S): at 04:47

## 2019-02-20 RX ADMIN — PANTOPRAZOLE SODIUM 40 MILLIGRAM(S): 20 TABLET, DELAYED RELEASE ORAL at 07:03

## 2019-02-20 RX ADMIN — CLOPIDOGREL BISULFATE 75 MILLIGRAM(S): 75 TABLET, FILM COATED ORAL at 12:26

## 2019-02-20 RX ADMIN — Medication 3 UNIT(S): at 07:03

## 2019-02-20 RX ADMIN — OXYCODONE AND ACETAMINOPHEN 1 TABLET(S): 5; 325 TABLET ORAL at 00:15

## 2019-02-20 RX ADMIN — Medication 100 MILLIEQUIVALENT(S): at 04:00

## 2019-02-20 RX ADMIN — Medication 3 UNIT(S): at 16:13

## 2019-02-20 RX ADMIN — HEPARIN SODIUM 5000 UNIT(S): 5000 INJECTION INTRAVENOUS; SUBCUTANEOUS at 14:10

## 2019-02-20 RX ADMIN — ATORVASTATIN CALCIUM 40 MILLIGRAM(S): 80 TABLET, FILM COATED ORAL at 21:00

## 2019-02-20 RX ADMIN — Medication 2000 MILLIGRAM(S): at 05:17

## 2019-02-20 RX ADMIN — HEPARIN SODIUM 5000 UNIT(S): 5000 INJECTION INTRAVENOUS; SUBCUTANEOUS at 21:00

## 2019-02-20 RX ADMIN — WARFARIN SODIUM 3 MILLIGRAM(S): 2.5 TABLET ORAL at 21:01

## 2019-02-20 RX ADMIN — OXYCODONE AND ACETAMINOPHEN 1 TABLET(S): 5; 325 TABLET ORAL at 06:23

## 2019-02-20 RX ADMIN — ONDANSETRON 4 MILLIGRAM(S): 8 TABLET, FILM COATED ORAL at 13:10

## 2019-02-20 RX ADMIN — CHLORHEXIDINE GLUCONATE 1 APPLICATION(S): 213 SOLUTION TOPICAL at 05:18

## 2019-02-20 NOTE — PHYSICAL THERAPY INITIAL EVALUATION ADULT - CRITERIA FOR SKILLED THERAPEUTIC INTERVENTIONS
functional limitations in following categories/rehab potential/anticipated discharge recommendation/impairments found/anticipated equipment needs at discharge

## 2019-02-20 NOTE — PHYSICAL THERAPY INITIAL EVALUATION ADULT - IMPAIRMENTS FOUND, PT EVAL
muscle strength/poor safety awareness/ventilation and respiration/gas exchange/aerobic capacity/endurance/gait, locomotion, and balance

## 2019-02-20 NOTE — PHYSICAL THERAPY INITIAL EVALUATION ADULT - ADDITIONAL COMMENTS
Pt lives in a house with 4 steps to enter with her . Prior to hospitalization, pt was ambulating with a RW and having difficulty with mobility and ADLs. Pt's  has history of CVA and is unable to help care for her. No HHA. Pt's daughter states that she will be coming back from [place] this upcoming weekend to stay and assist as needed.

## 2019-02-20 NOTE — PHYSICAL THERAPY INITIAL EVALUATION ADULT - DID THE PATIENT HAVE SURGERY?
yes/MVR, sternotomy,extensive cryo MAZE, left atrial appendage excision, removal of left atrial thrombus

## 2019-02-20 NOTE — PHYSICAL THERAPY INITIAL EVALUATION ADULT - GENERAL OBSERVATIONS, REHAB EVAL
Pt received seated, +TPM, +CT x3, +sternal incision bandage C/D/I, +L radial a-line, +2L NC O2, +RIJ TLC heplock, +ortega, +telemetry, +pulse ox, +b/l SCDs, NAD, agreeable to PT.

## 2019-02-21 LAB
ALBUMIN SERPL ELPH-MCNC: 2.8 G/DL — LOW (ref 3.3–5)
ALBUMIN SERPL ELPH-MCNC: 3 G/DL — LOW (ref 3.3–5)
ALP SERPL-CCNC: 102 U/L — SIGNIFICANT CHANGE UP (ref 40–120)
ALP SERPL-CCNC: 92 U/L — SIGNIFICANT CHANGE UP (ref 40–120)
ALT FLD-CCNC: 9 U/L — LOW (ref 10–45)
ALT FLD-CCNC: 9 U/L — LOW (ref 10–45)
ANION GAP SERPL CALC-SCNC: 10 MMOL/L — SIGNIFICANT CHANGE UP (ref 5–17)
ANION GAP SERPL CALC-SCNC: 10 MMOL/L — SIGNIFICANT CHANGE UP (ref 5–17)
APTT BLD: 35.4 SEC — SIGNIFICANT CHANGE UP (ref 27.5–36.3)
APTT BLD: 36.3 SEC — SIGNIFICANT CHANGE UP (ref 27.5–36.3)
AST SERPL-CCNC: 43 U/L — HIGH (ref 10–40)
AST SERPL-CCNC: 45 U/L — HIGH (ref 10–40)
BILIRUB SERPL-MCNC: 1.4 MG/DL — HIGH (ref 0.2–1.2)
BILIRUB SERPL-MCNC: 1.6 MG/DL — HIGH (ref 0.2–1.2)
BUN SERPL-MCNC: 21 MG/DL — SIGNIFICANT CHANGE UP (ref 7–23)
BUN SERPL-MCNC: 22 MG/DL — SIGNIFICANT CHANGE UP (ref 7–23)
CALCIUM SERPL-MCNC: 8.3 MG/DL — LOW (ref 8.4–10.5)
CALCIUM SERPL-MCNC: 8.4 MG/DL — SIGNIFICANT CHANGE UP (ref 8.4–10.5)
CHLORIDE SERPL-SCNC: 100 MMOL/L — SIGNIFICANT CHANGE UP (ref 96–108)
CHLORIDE SERPL-SCNC: 101 MMOL/L — SIGNIFICANT CHANGE UP (ref 96–108)
CO2 SERPL-SCNC: 25 MMOL/L — SIGNIFICANT CHANGE UP (ref 22–31)
CO2 SERPL-SCNC: 26 MMOL/L — SIGNIFICANT CHANGE UP (ref 22–31)
CREAT SERPL-MCNC: 0.76 MG/DL — SIGNIFICANT CHANGE UP (ref 0.5–1.3)
CREAT SERPL-MCNC: 0.84 MG/DL — SIGNIFICANT CHANGE UP (ref 0.5–1.3)
GLUCOSE BLDC GLUCOMTR-MCNC: 115 MG/DL — HIGH (ref 70–99)
GLUCOSE BLDC GLUCOMTR-MCNC: 128 MG/DL — HIGH (ref 70–99)
GLUCOSE BLDC GLUCOMTR-MCNC: 88 MG/DL — SIGNIFICANT CHANGE UP (ref 70–99)
GLUCOSE BLDC GLUCOMTR-MCNC: 89 MG/DL — SIGNIFICANT CHANGE UP (ref 70–99)
GLUCOSE SERPL-MCNC: 116 MG/DL — HIGH (ref 70–99)
GLUCOSE SERPL-MCNC: 93 MG/DL — SIGNIFICANT CHANGE UP (ref 70–99)
HCT VFR BLD CALC: 25.1 % — LOW (ref 34.5–45)
HCT VFR BLD CALC: 29.9 % — LOW (ref 34.5–45)
HGB BLD-MCNC: 8.2 G/DL — LOW (ref 11.5–15.5)
HGB BLD-MCNC: 9.7 G/DL — LOW (ref 11.5–15.5)
INR BLD: 1.16 — SIGNIFICANT CHANGE UP (ref 0.88–1.16)
INR BLD: 1.2 — HIGH (ref 0.88–1.16)
LACTATE SERPL-SCNC: 1.3 MMOL/L — SIGNIFICANT CHANGE UP (ref 0.5–2)
MAGNESIUM SERPL-MCNC: 2.1 MG/DL — SIGNIFICANT CHANGE UP (ref 1.6–2.6)
MAGNESIUM SERPL-MCNC: 2.2 MG/DL — SIGNIFICANT CHANGE UP (ref 1.6–2.6)
MCHC RBC-ENTMCNC: 29 PG — SIGNIFICANT CHANGE UP (ref 27–34)
MCHC RBC-ENTMCNC: 29.2 PG — SIGNIFICANT CHANGE UP (ref 27–34)
MCHC RBC-ENTMCNC: 32.4 GM/DL — SIGNIFICANT CHANGE UP (ref 32–36)
MCHC RBC-ENTMCNC: 32.7 GM/DL — SIGNIFICANT CHANGE UP (ref 32–36)
MCV RBC AUTO: 89.3 FL — SIGNIFICANT CHANGE UP (ref 80–100)
MCV RBC AUTO: 89.5 FL — SIGNIFICANT CHANGE UP (ref 80–100)
NRBC # BLD: 0 /100 WBCS — SIGNIFICANT CHANGE UP (ref 0–0)
NRBC # BLD: 0 /100 WBCS — SIGNIFICANT CHANGE UP (ref 0–0)
PHOSPHATE SERPL-MCNC: 3.2 MG/DL — SIGNIFICANT CHANGE UP (ref 2.5–4.5)
PHOSPHATE SERPL-MCNC: 4 MG/DL — SIGNIFICANT CHANGE UP (ref 2.5–4.5)
PLATELET # BLD AUTO: 74 K/UL — LOW (ref 150–400)
PLATELET # BLD AUTO: 79 K/UL — LOW (ref 150–400)
POTASSIUM SERPL-MCNC: 3.9 MMOL/L — SIGNIFICANT CHANGE UP (ref 3.5–5.3)
POTASSIUM SERPL-MCNC: 4 MMOL/L — SIGNIFICANT CHANGE UP (ref 3.5–5.3)
POTASSIUM SERPL-SCNC: 3.9 MMOL/L — SIGNIFICANT CHANGE UP (ref 3.5–5.3)
POTASSIUM SERPL-SCNC: 4 MMOL/L — SIGNIFICANT CHANGE UP (ref 3.5–5.3)
PROT SERPL-MCNC: 5.2 G/DL — LOW (ref 6–8.3)
PROT SERPL-MCNC: 5.6 G/DL — LOW (ref 6–8.3)
PROTHROM AB SERPL-ACNC: 13.2 SEC — HIGH (ref 10–12.9)
PROTHROM AB SERPL-ACNC: 13.6 SEC — HIGH (ref 10–12.9)
RBC # BLD: 2.81 M/UL — LOW (ref 3.8–5.2)
RBC # BLD: 3.34 M/UL — LOW (ref 3.8–5.2)
RBC # FLD: 15.2 % — HIGH (ref 10.3–14.5)
RBC # FLD: 15.4 % — HIGH (ref 10.3–14.5)
SODIUM SERPL-SCNC: 135 MMOL/L — SIGNIFICANT CHANGE UP (ref 135–145)
SODIUM SERPL-SCNC: 137 MMOL/L — SIGNIFICANT CHANGE UP (ref 135–145)
SURGICAL PATHOLOGY STUDY: SIGNIFICANT CHANGE UP
WBC # BLD: 11.31 K/UL — HIGH (ref 3.8–10.5)
WBC # BLD: 11.47 K/UL — HIGH (ref 3.8–10.5)
WBC # FLD AUTO: 11.31 K/UL — HIGH (ref 3.8–10.5)
WBC # FLD AUTO: 11.47 K/UL — HIGH (ref 3.8–10.5)

## 2019-02-21 PROCEDURE — 99292 CRITICAL CARE ADDL 30 MIN: CPT | Mod: 25

## 2019-02-21 PROCEDURE — 71045 X-RAY EXAM CHEST 1 VIEW: CPT | Mod: 26,77

## 2019-02-21 PROCEDURE — 32557 INSERT CATH PLEURA W/ IMAGE: CPT

## 2019-02-21 PROCEDURE — 71045 X-RAY EXAM CHEST 1 VIEW: CPT | Mod: 26

## 2019-02-21 PROCEDURE — 99291 CRITICAL CARE FIRST HOUR: CPT | Mod: 25

## 2019-02-21 RX ORDER — OXYCODONE AND ACETAMINOPHEN 5; 325 MG/1; MG/1
1 TABLET ORAL ONCE
Qty: 0 | Refills: 0 | Status: DISCONTINUED | OUTPATIENT
Start: 2019-02-21 | End: 2019-02-21

## 2019-02-21 RX ORDER — SODIUM CHLORIDE 9 MG/ML
3 INJECTION INTRAMUSCULAR; INTRAVENOUS; SUBCUTANEOUS EVERY 8 HOURS
Qty: 0 | Refills: 0 | Status: DISCONTINUED | OUTPATIENT
Start: 2019-02-21 | End: 2019-02-24

## 2019-02-21 RX ORDER — POTASSIUM CHLORIDE 20 MEQ
20 PACKET (EA) ORAL ONCE
Qty: 0 | Refills: 0 | Status: COMPLETED | OUTPATIENT
Start: 2019-02-21 | End: 2019-02-21

## 2019-02-21 RX ORDER — SENNA PLUS 8.6 MG/1
1 TABLET ORAL AT BEDTIME
Qty: 0 | Refills: 0 | Status: DISCONTINUED | OUTPATIENT
Start: 2019-02-21 | End: 2019-02-24

## 2019-02-21 RX ORDER — WARFARIN SODIUM 2.5 MG/1
5 TABLET ORAL ONCE
Qty: 0 | Refills: 0 | Status: COMPLETED | OUTPATIENT
Start: 2019-02-21 | End: 2019-02-21

## 2019-02-21 RX ORDER — FUROSEMIDE 40 MG
40 TABLET ORAL ONCE
Qty: 0 | Refills: 0 | Status: COMPLETED | OUTPATIENT
Start: 2019-02-21 | End: 2019-02-22

## 2019-02-21 RX ORDER — DOCUSATE SODIUM 100 MG
100 CAPSULE ORAL THREE TIMES A DAY
Qty: 0 | Refills: 0 | Status: DISCONTINUED | OUTPATIENT
Start: 2019-02-21 | End: 2019-02-24

## 2019-02-21 RX ADMIN — HEPARIN SODIUM 5000 UNIT(S): 5000 INJECTION INTRAVENOUS; SUBCUTANEOUS at 06:54

## 2019-02-21 RX ADMIN — OXYCODONE AND ACETAMINOPHEN 1 TABLET(S): 5; 325 TABLET ORAL at 18:56

## 2019-02-21 RX ADMIN — CHLORHEXIDINE GLUCONATE 1 APPLICATION(S): 213 SOLUTION TOPICAL at 07:04

## 2019-02-21 RX ADMIN — WARFARIN SODIUM 5 MILLIGRAM(S): 2.5 TABLET ORAL at 21:20

## 2019-02-21 RX ADMIN — SODIUM CHLORIDE 3 MILLILITER(S): 9 INJECTION INTRAMUSCULAR; INTRAVENOUS; SUBCUTANEOUS at 21:07

## 2019-02-21 RX ADMIN — CHLORHEXIDINE GLUCONATE 1 APPLICATION(S): 213 SOLUTION TOPICAL at 23:39

## 2019-02-21 RX ADMIN — Medication 650 MILLIGRAM(S): at 07:20

## 2019-02-21 RX ADMIN — PANTOPRAZOLE SODIUM 40 MILLIGRAM(S): 20 TABLET, DELAYED RELEASE ORAL at 06:54

## 2019-02-21 RX ADMIN — Medication 20 MILLIEQUIVALENT(S): at 06:54

## 2019-02-21 RX ADMIN — OXYCODONE AND ACETAMINOPHEN 1 TABLET(S): 5; 325 TABLET ORAL at 17:56

## 2019-02-21 RX ADMIN — Medication 3 UNIT(S): at 17:04

## 2019-02-21 RX ADMIN — ATORVASTATIN CALCIUM 40 MILLIGRAM(S): 80 TABLET, FILM COATED ORAL at 21:20

## 2019-02-21 RX ADMIN — SENNA PLUS 1 TABLET(S): 8.6 TABLET ORAL at 21:20

## 2019-02-21 RX ADMIN — Medication 81 MILLIGRAM(S): at 15:25

## 2019-02-21 RX ADMIN — OXYCODONE AND ACETAMINOPHEN 1 TABLET(S): 5; 325 TABLET ORAL at 16:25

## 2019-02-21 RX ADMIN — OXYCODONE AND ACETAMINOPHEN 1 TABLET(S): 5; 325 TABLET ORAL at 15:25

## 2019-02-21 RX ADMIN — Medication 100 MILLIGRAM(S): at 15:26

## 2019-02-21 RX ADMIN — HEPARIN SODIUM 5000 UNIT(S): 5000 INJECTION INTRAVENOUS; SUBCUTANEOUS at 21:19

## 2019-02-21 RX ADMIN — POLYETHYLENE GLYCOL 3350 17 GRAM(S): 17 POWDER, FOR SOLUTION ORAL at 12:17

## 2019-02-21 RX ADMIN — Medication 100 MILLIGRAM(S): at 21:19

## 2019-02-21 RX ADMIN — CLOPIDOGREL BISULFATE 75 MILLIGRAM(S): 75 TABLET, FILM COATED ORAL at 15:25

## 2019-02-21 RX ADMIN — Medication 100 MILLIEQUIVALENT(S): at 12:26

## 2019-02-21 RX ADMIN — INSULIN GLARGINE 10 UNIT(S): 100 INJECTION, SOLUTION SUBCUTANEOUS at 21:19

## 2019-02-21 RX ADMIN — Medication 650 MILLIGRAM(S): at 06:53

## 2019-02-21 NOTE — PROGRESS NOTE ADULT - SUBJECTIVE AND OBJECTIVE BOX
CTICU  CRITICAL  CARE  attending     Hand off received 					   Pertinent clinical, laboratory, radiographic, hemodynamic, echocardiographic, respiratory data, microbiologic data and chart were reviewed and analyzed frequently throughout the course of the day and night  Patient seen and examined with CTS/ SH attending at bedside  Pt is a 78y , Female, HEALTH ISSUES - PROBLEM Dx:      , FAMILY HISTORY:  No pertinent family history in first degree relatives  PAST MEDICAL & SURGICAL HISTORY:  CAD (coronary artery disease)  Asthma  Atrial fibrillation  HLD (hyperlipidemia)  DM (diabetes mellitus)  HTN (hypertension)  DVT (deep venous thrombosis)  Asthma  COPD (chronic obstructive pulmonary disease)  DM (diabetes mellitus)  S/P arterial stent: left arterial thrombectomy  History of embolectomy: LLE    Patient is a 78y old  Female who presents with a chief complaint of MR (20 Feb 2019 11:19)      14 system review was unremarkable  acute changes include acute respiratory failure  Vital signs, hemodynamic and respiratory parameters were reviewed from the bedside nursing flowsheet.  ICU Vital Signs Last 24 Hrs  T(C): 36.3 (21 Feb 2019 14:00), Max: 36.7 (21 Feb 2019 09:00)  T(F): 97.3 (21 Feb 2019 14:00), Max: 98 (21 Feb 2019 09:00)  HR: 74 (21 Feb 2019 17:05) (68 - 82)  BP: 121/68 (21 Feb 2019 17:05) (91/51 - 140/85)  BP(mean): 86 (21 Feb 2019 17:05) (61 - 114)  ABP: --  ABP(mean): --  RR: 16 (21 Feb 2019 17:05) (16 - 20)  SpO2: 99% (21 Feb 2019 17:05) (93% - 100%)    Adult Advanced Hemodynamics Last 24 Hrs  CVP(mm Hg): --  CVP(cm H2O): --  CO: --  CI: --  PA: --  PA(mean): --  PCWP: --  SVR: --  SVRI: --  PVR: --  PVRI: --, ABG - ( 20 Feb 2019 13:06 )  pH, Arterial: 7.51  pH, Blood: x     /  pCO2: 33    /  pO2: 70    / HCO3: 26    / Base Excess: 3.0   /  SaO2: 94                  Intake and output was reviewed and the fluid balance was calculated  Daily     Daily   I&O's Summary    20 Feb 2019 07:01  -  21 Feb 2019 07:00  --------------------------------------------------------  IN: 710 mL / OUT: 1095 mL / NET: -385 mL    21 Feb 2019 07:01  -  21 Feb 2019 18:23  --------------------------------------------------------  IN: 260 mL / OUT: 650 mL / NET: -390 mL        All lines and drain sites were assessed  [facility]      POCT Blood Glucose.: 115 mg/dL (21 Feb 2019 16:28)    No acute change in mental status  Auscultation of the chest reveals equal bs  Abdomen is soft  Extremities are warm and well perfused  Wounds appear clean and unremarkable  Antibiotics are periop    labs  CBC Full  -  ( 21 Feb 2019 11:16 )  WBC Count : 11.31 K/uL  Hemoglobin : 9.7 g/dL  Hematocrit : 29.9 %  Platelet Count - Automated : 79 K/uL  Mean Cell Volume : 89.5 fl  Mean Cell Hemoglobin : 29.0 pg  Mean Cell Hemoglobin Concentration : 32.4 gm/dL  Auto Neutrophil # : x  Auto Lymphocyte # : x  Auto Monocyte # : x  Auto Eosinophil # : x  Auto Basophil # : x  Auto Neutrophil % : x  Auto Lymphocyte % : x  Auto Monocyte % : x  Auto Eosinophil % : x  Auto Basophil % : x    02-21    135  |  100  |  22  ----------------------------<  116<H>  4.0   |  25  |  0.76    Ca    8.3<L>      21 Feb 2019 11:16  Phos  3.2     02-21  Mg     2.1     02-21    TPro  5.6<L>  /  Alb  3.0<L>  /  TBili  1.6<H>  /  DBili  x   /  AST  43<H>  /  ALT  9<L>  /  AlkPhos  102  02-21    PT/INR - ( 21 Feb 2019 11:16 )   PT: 13.6 sec;   INR: 1.20          PTT - ( 21 Feb 2019 11:16 )  PTT:36.3 sec  The current medications were reviewed   MEDICATIONS  (STANDING):  aspirin enteric coated 81 milliGRAM(s) Oral daily  atorvastatin 40 milliGRAM(s) Oral at bedtime  chlorhexidine 2% Cloths 1 Application(s) Topical daily  clopidogrel Tablet 75 milliGRAM(s) Oral daily  dextrose 5%. 1000 milliLiter(s) (50 mL/Hr) IV Continuous <Continuous>  dextrose 50% Injectable 50 milliLiter(s) IV Push every 15 minutes  docusate sodium 100 milliGRAM(s) Oral three times a day  heparin  Injectable 5000 Unit(s) SubCutaneous every 8 hours  insulin glargine Injectable (LANTUS) 10 Unit(s) SubCutaneous at bedtime  insulin lispro (HumaLOG) corrective regimen sliding scale   SubCutaneous Before meals and at bedtime  insulin lispro Injectable (HumaLOG) 3 Unit(s) SubCutaneous three times a day before meals  pantoprazole    Tablet 40 milliGRAM(s) Oral before breakfast  polyethylene glycol 3350 17 Gram(s) Oral daily  senna 1 Tablet(s) Oral at bedtime  sodium chloride 0.9% lock flush 3 milliLiter(s) IV Push every 8 hours  sodium chloride 0.9%. 1000 milliLiter(s) (10 mL/Hr) IV Continuous <Continuous>  warfarin 5 milliGRAM(s) Oral once    MEDICATIONS  (PRN):  acetaminophen   Tablet .. 650 milliGRAM(s) Oral every 6 hours PRN Mild Pain (1 - 3)  dextrose 40% Gel 15 Gram(s) Oral once PRN Blood Glucose LESS THAN 70 milliGRAM(s)/deciliter  glucagon  Injectable 1 milliGRAM(s) IntraMuscular once PRN Glucose LESS THAN 70 milligrams/deciliter  oxyCODONE    5 mG/acetaminophen 325 mG 1 Tablet(s) Oral every 6 hours PRN Severe Pain (7 - 10)       PROBLEM LIST/ ASSESSMENT:  HEALTH ISSUES - PROBLEM Dx:      ,   Patient is a 78y old  Female who presents with a chief complaint of MR (20 Feb 2019 11:19)     s/p cardiac surgery      My plan includes :  close hemodynamic, ventilatory and drain monitoring and management per post op routine    Monitor for arrhythmias and monitor parameters for organ perfusion  beta blockade not administered due to hemodynamic instability and bradycardia  monitor neurologic status  Head of the bed should remain elevated to 45 deg .   chest PT and IS will be encouraged  monitor adequacy of oxygenation and ventilation and attempt to wean oxygen  antibiotic regimen will be tailored to the clinical, laboratory and microbiologic data  Nutritional goals will be met using po eventually , ensure adequate caloric intake and montior the same  Stress ulcer and VTE prophylaxis will be achieved    Glycemic control is satisfactory  Electrolytes have been repleted as necessary and wound care has been carried out. Pain control has been achieved.   agressive physical therapy and early mobility and ambulation goals will be met   The family was updated about the course and plan  CRITICAL CARE TIME SPENT in evaluation and management, reassessments, review and interpretation of labs and x-rays, ventilator and hemodynamic management, formulating a plan and coordinating care: ___90____ MIN.  Time does not include procedural time.  CTICU ATTENDING     					    Akshat Mata MD

## 2019-02-21 NOTE — CHART NOTE - NSCHARTNOTEFT_GEN_A_CORE
Admitting Diagnosis:   Patient is a 78y old  Female who presents with a chief complaint of MR (20 Feb 2019 11:19)      PAST MEDICAL & SURGICAL HISTORY:  CAD (coronary artery disease)  Asthma  Atrial fibrillation  HLD (hyperlipidemia)  DM (diabetes mellitus)  HTN (hypertension)  DVT (deep venous thrombosis)  Asthma  COPD (chronic obstructive pulmonary disease)  DM (diabetes mellitus)  S/P arterial stent: left arterial thrombectomy  History of embolectomy: LLE      Current Nutrition Order:  Dash/TLC, CST CHO + glucerna TID     PO Intake: Good (%) [   ]  Fair (50-75%) [X] Poor (<25%) [   ]    GI Issues:   Denies N/V/D  +BM day of sx, none since; + flatus    Pain:  Controlled at present     Skin Integrity:  Midline chest ASW    Labs:   02-21    135  |  100  |  22  ----------------------------<  116<H>  4.0   |  25  |  0.76    Ca    8.3<L>      21 Feb 2019 11:16  Phos  3.2     02-21  Mg     2.1     02-21    TPro  5.6<L>  /  Alb  3.0<L>  /  TBili  1.6<H>  /  DBili  x   /  AST  43<H>  /  ALT  9<L>  /  AlkPhos  102  02-21    CAPILLARY BLOOD GLUCOSE      POCT Blood Glucose.: 128 mg/dL (21 Feb 2019 10:43)  POCT Blood Glucose.: 89 mg/dL (21 Feb 2019 07:20)  POCT Blood Glucose.: 83 mg/dL (20 Feb 2019 21:17)  POCT Blood Glucose.: 82 mg/dL (20 Feb 2019 16:07)      Medications:  MEDICATIONS  (STANDING):  aspirin enteric coated 81 milliGRAM(s) Oral daily  atorvastatin 40 milliGRAM(s) Oral at bedtime  chlorhexidine 2% Cloths 1 Application(s) Topical daily  clopidogrel Tablet 75 milliGRAM(s) Oral daily  dextrose 5%. 1000 milliLiter(s) (50 mL/Hr) IV Continuous <Continuous>  dextrose 50% Injectable 50 milliLiter(s) IV Push every 15 minutes  docusate sodium 100 milliGRAM(s) Oral three times a day  heparin  Injectable 5000 Unit(s) SubCutaneous every 8 hours  insulin glargine Injectable (LANTUS) 10 Unit(s) SubCutaneous at bedtime  insulin lispro (HumaLOG) corrective regimen sliding scale   SubCutaneous Before meals and at bedtime  insulin lispro Injectable (HumaLOG) 3 Unit(s) SubCutaneous three times a day before meals  pantoprazole    Tablet 40 milliGRAM(s) Oral before breakfast  polyethylene glycol 3350 17 Gram(s) Oral daily  senna 1 Tablet(s) Oral at bedtime  sodium chloride 0.9% lock flush 3 milliLiter(s) IV Push every 8 hours  sodium chloride 0.9%. 1000 milliLiter(s) (10 mL/Hr) IV Continuous <Continuous>  warfarin 5 milliGRAM(s) Oral once    MEDICATIONS  (PRN):  acetaminophen   Tablet .. 650 milliGRAM(s) Oral every 6 hours PRN Mild Pain (1 - 3)  dextrose 40% Gel 15 Gram(s) Oral once PRN Blood Glucose LESS THAN 70 milliGRAM(s)/deciliter  glucagon  Injectable 1 milliGRAM(s) IntraMuscular once PRN Glucose LESS THAN 70 milligrams/deciliter  oxyCODONE    5 mG/acetaminophen 325 mG 1 Tablet(s) Oral every 6 hours PRN Severe Pain (7 - 10)      Weight:  74.5kg (2/15)  72.4kg (2/17)    Weight Change:   Continue to trend     Estimated energy needs:   IBW 54.5kg used for EER and adjusted for age/post-op  25-30kcal/kg (1362-1635kcal), 1.2-1.4g/kg (65-76g), fluids per team 2/2 CHF    Subjective:   Patient is a 78y old  Female who presents with a chief complaint of MR. S/p MVR, cryomaze. Pt now extubated. She is seen resting OOB in chair. Pt reports a decreased appetite post-op, but is aware of need for adequate nutrition. She reports not having breakfast yet, but is planning on eating a yogurt and glucerna shake. Denies N/V/D, pain, and mechanical issues. +BM day of surgery, but not since. Discussed bowel regimen; please continue per team. PTA pt reports eating well and denies wt changes. She endorses receiving prior diet education and eats a heart healthy diet and limits her sugar intake. Pt recalls eating a lot of chicken/fish with veggies and grains. She is careful of her fruit/veggie intake as well 2/2 coumadin use. She also endorses checking her BG daily in the morning; BG typically <120. Reinforced Dash/TLC, CST CHO diet. Continue to encourage intake and ONS. RD to follow per policy.    Previous Nutrition Diagnosis:  Inadequate energy intake RT no nutrition order placed 2/2 plan for extubation AEB pt meeting 0% of EER    Active [   ]  Resolved [X]    If resolved, new PES:   Increased nutrient needs RT increased protein demand AEB post-op/age     Goal:  Pt to meet >/=75% of EER     Recommendations:  1. Continue Dash/TLC, CST CHO diet + glucerna TID.   2. Monitor lytes and replete prn.   3. Trend daily wts and fluids per team.   4. Add MVI.     Education:   Encouraged intake and ONS; reinforced CST CHO, Dash/TLC diet     Risk Level: High [   ] Moderate [X] Low [   ]

## 2019-02-22 PROBLEM — J45.909 UNSPECIFIED ASTHMA, UNCOMPLICATED: Chronic | Status: ACTIVE | Noted: 2019-02-14

## 2019-02-22 PROBLEM — I48.91 UNSPECIFIED ATRIAL FIBRILLATION: Chronic | Status: ACTIVE | Noted: 2019-02-14

## 2019-02-22 PROBLEM — I25.10 ATHEROSCLEROTIC HEART DISEASE OF NATIVE CORONARY ARTERY WITHOUT ANGINA PECTORIS: Chronic | Status: ACTIVE | Noted: 2019-02-14

## 2019-02-22 PROBLEM — E11.9 TYPE 2 DIABETES MELLITUS WITHOUT COMPLICATIONS: Chronic | Status: ACTIVE | Noted: 2019-02-14

## 2019-02-22 PROBLEM — E78.5 HYPERLIPIDEMIA, UNSPECIFIED: Chronic | Status: ACTIVE | Noted: 2019-02-14

## 2019-02-22 PROBLEM — I10 ESSENTIAL (PRIMARY) HYPERTENSION: Chronic | Status: ACTIVE | Noted: 2019-02-14

## 2019-02-22 LAB
ALBUMIN SERPL ELPH-MCNC: 2.9 G/DL — LOW (ref 3.3–5)
ALP SERPL-CCNC: 130 U/L — HIGH (ref 40–120)
ALT FLD-CCNC: 10 U/L — SIGNIFICANT CHANGE UP (ref 10–45)
ANION GAP SERPL CALC-SCNC: 8 MMOL/L — SIGNIFICANT CHANGE UP (ref 5–17)
APTT BLD: 52.3 SEC — HIGH (ref 27.5–36.3)
AST SERPL-CCNC: 37 U/L — SIGNIFICANT CHANGE UP (ref 10–40)
BILIRUB SERPL-MCNC: 1.8 MG/DL — HIGH (ref 0.2–1.2)
BUN SERPL-MCNC: 21 MG/DL — SIGNIFICANT CHANGE UP (ref 7–23)
CALCIUM SERPL-MCNC: 8.4 MG/DL — SIGNIFICANT CHANGE UP (ref 8.4–10.5)
CHLORIDE SERPL-SCNC: 102 MMOL/L — SIGNIFICANT CHANGE UP (ref 96–108)
CO2 SERPL-SCNC: 25 MMOL/L — SIGNIFICANT CHANGE UP (ref 22–31)
CREAT SERPL-MCNC: 0.64 MG/DL — SIGNIFICANT CHANGE UP (ref 0.5–1.3)
GLUCOSE BLDC GLUCOMTR-MCNC: 107 MG/DL — HIGH (ref 70–99)
GLUCOSE BLDC GLUCOMTR-MCNC: 134 MG/DL — HIGH (ref 70–99)
GLUCOSE BLDC GLUCOMTR-MCNC: 166 MG/DL — HIGH (ref 70–99)
GLUCOSE BLDC GLUCOMTR-MCNC: 91 MG/DL — SIGNIFICANT CHANGE UP (ref 70–99)
GLUCOSE SERPL-MCNC: 93 MG/DL — SIGNIFICANT CHANGE UP (ref 70–99)
HCT VFR BLD CALC: 27.4 % — LOW (ref 34.5–45)
HGB BLD-MCNC: 9.2 G/DL — LOW (ref 11.5–15.5)
INR BLD: 1.46 — HIGH (ref 0.88–1.16)
MAGNESIUM SERPL-MCNC: 2 MG/DL — SIGNIFICANT CHANGE UP (ref 1.6–2.6)
MCHC RBC-ENTMCNC: 30.2 PG — SIGNIFICANT CHANGE UP (ref 27–34)
MCHC RBC-ENTMCNC: 33.6 GM/DL — SIGNIFICANT CHANGE UP (ref 32–36)
MCV RBC AUTO: 89.8 FL — SIGNIFICANT CHANGE UP (ref 80–100)
NRBC # BLD: 0 /100 WBCS — SIGNIFICANT CHANGE UP (ref 0–0)
PHOSPHATE SERPL-MCNC: 2.5 MG/DL — SIGNIFICANT CHANGE UP (ref 2.5–4.5)
PLATELET # BLD AUTO: 96 K/UL — LOW (ref 150–400)
POTASSIUM SERPL-MCNC: 4.2 MMOL/L — SIGNIFICANT CHANGE UP (ref 3.5–5.3)
POTASSIUM SERPL-SCNC: 4.2 MMOL/L — SIGNIFICANT CHANGE UP (ref 3.5–5.3)
PROT SERPL-MCNC: 5.9 G/DL — LOW (ref 6–8.3)
PROTHROM AB SERPL-ACNC: 16.7 SEC — HIGH (ref 10–12.9)
RBC # BLD: 3.05 M/UL — LOW (ref 3.8–5.2)
RBC # FLD: 15.2 % — HIGH (ref 10.3–14.5)
SODIUM SERPL-SCNC: 135 MMOL/L — SIGNIFICANT CHANGE UP (ref 135–145)
WBC # BLD: 11.96 K/UL — HIGH (ref 3.8–10.5)
WBC # FLD AUTO: 11.96 K/UL — HIGH (ref 3.8–10.5)

## 2019-02-22 PROCEDURE — 71046 X-RAY EXAM CHEST 2 VIEWS: CPT | Mod: 26

## 2019-02-22 RX ORDER — WARFARIN SODIUM 2.5 MG/1
5 TABLET ORAL ONCE
Qty: 0 | Refills: 0 | Status: COMPLETED | OUTPATIENT
Start: 2019-02-22 | End: 2019-02-22

## 2019-02-22 RX ORDER — POTASSIUM CHLORIDE 20 MEQ
20 PACKET (EA) ORAL DAILY
Qty: 0 | Refills: 0 | Status: DISCONTINUED | OUTPATIENT
Start: 2019-02-22 | End: 2019-02-24

## 2019-02-22 RX ORDER — FUROSEMIDE 40 MG
40 TABLET ORAL DAILY
Qty: 0 | Refills: 0 | Status: DISCONTINUED | OUTPATIENT
Start: 2019-02-22 | End: 2019-02-24

## 2019-02-22 RX ORDER — METOPROLOL TARTRATE 50 MG
12.5 TABLET ORAL EVERY 12 HOURS
Qty: 0 | Refills: 0 | Status: DISCONTINUED | OUTPATIENT
Start: 2019-02-22 | End: 2019-02-24

## 2019-02-22 RX ADMIN — Medication 81 MILLIGRAM(S): at 12:56

## 2019-02-22 RX ADMIN — CLOPIDOGREL BISULFATE 75 MILLIGRAM(S): 75 TABLET, FILM COATED ORAL at 12:56

## 2019-02-22 RX ADMIN — SODIUM CHLORIDE 3 MILLILITER(S): 9 INJECTION INTRAMUSCULAR; INTRAVENOUS; SUBCUTANEOUS at 05:18

## 2019-02-22 RX ADMIN — OXYCODONE AND ACETAMINOPHEN 1 TABLET(S): 5; 325 TABLET ORAL at 06:30

## 2019-02-22 RX ADMIN — Medication 40 MILLIGRAM(S): at 04:00

## 2019-02-22 RX ADMIN — POLYETHYLENE GLYCOL 3350 17 GRAM(S): 17 POWDER, FOR SOLUTION ORAL at 12:56

## 2019-02-22 RX ADMIN — WARFARIN SODIUM 5 MILLIGRAM(S): 2.5 TABLET ORAL at 23:10

## 2019-02-22 RX ADMIN — SODIUM CHLORIDE 3 MILLILITER(S): 9 INJECTION INTRAMUSCULAR; INTRAVENOUS; SUBCUTANEOUS at 23:04

## 2019-02-22 RX ADMIN — SODIUM CHLORIDE 3 MILLILITER(S): 9 INJECTION INTRAMUSCULAR; INTRAVENOUS; SUBCUTANEOUS at 13:46

## 2019-02-22 RX ADMIN — Medication 20 MILLIEQUIVALENT(S): at 12:55

## 2019-02-22 RX ADMIN — HEPARIN SODIUM 5000 UNIT(S): 5000 INJECTION INTRAVENOUS; SUBCUTANEOUS at 23:08

## 2019-02-22 RX ADMIN — OXYCODONE AND ACETAMINOPHEN 1 TABLET(S): 5; 325 TABLET ORAL at 05:26

## 2019-02-22 RX ADMIN — PANTOPRAZOLE SODIUM 40 MILLIGRAM(S): 20 TABLET, DELAYED RELEASE ORAL at 06:50

## 2019-02-22 RX ADMIN — HEPARIN SODIUM 5000 UNIT(S): 5000 INJECTION INTRAVENOUS; SUBCUTANEOUS at 06:50

## 2019-02-22 RX ADMIN — HEPARIN SODIUM 5000 UNIT(S): 5000 INJECTION INTRAVENOUS; SUBCUTANEOUS at 14:09

## 2019-02-22 RX ADMIN — OXYCODONE AND ACETAMINOPHEN 1 TABLET(S): 5; 325 TABLET ORAL at 18:20

## 2019-02-22 RX ADMIN — Medication 2: at 12:54

## 2019-02-22 RX ADMIN — Medication 40 MILLIGRAM(S): at 10:29

## 2019-02-22 RX ADMIN — Medication 12.5 MILLIGRAM(S): at 12:55

## 2019-02-22 RX ADMIN — ATORVASTATIN CALCIUM 40 MILLIGRAM(S): 80 TABLET, FILM COATED ORAL at 23:10

## 2019-02-22 RX ADMIN — Medication 100 MILLIGRAM(S): at 14:09

## 2019-02-22 NOTE — PROGRESS NOTE ADULT - ASSESSMENT
A/P: 78 year old female with PMHx HTN, HLD, DM, Asthma, Atrial fibrillation on coumadin, CAD s/p PCI 3 weeks ago, CHF, and known MR that presented to [facility] complaining of progressive SOB and recent syncopal episode 3 weeks prior. At that time she was found to have in Afib with RVR and to have pulmonary congestion on CXR. She was admitted to ICU and was given IV lasix and started on esmolol drip and amiodarone. She was transferred to [facility] under the care of Dr. Salas for further evaluation. Preoperative workup was completed and patient underwent MVR with cryomaze and removal of LA thrombus on 2/18/19. Intraop patient received 9u prbc, 4 FFP and 1 plt and was transferred to ICU post op stable. She was extubated POD#1. She had a left sided pigtail placed on POD#3 draining 500cc which was subsequently removed the same day and she was transferred to  that evening. No acute events overnight.      Neurovascular: Stable. Pain well controlled with current regimen.  -continue tylenol and percocet PRN     Cardiovascular: Hemodynamically stable. HR controlled.  - severe MR s/p MVR, continue asa 81mg, plavix 75mg, atorvastatin 40mg qhs,     Respiratory: 02 Sat = 98% on RA.  -If on oxygen wean to RA from for O2 Sat > 93%.  -Encourage C+DB and Use of IS 10x / hr while awake.  -CXR.    GI: Stable.  -NPO after MN.  -PPX.  -PO Diet.    Renal / :  -Monitor renal function.  -Monitor I/O's.    Endocrine:    -A1c.  -TSH.    Hematologic:  -CBC.  -Coagulation Panel.    ID:  -Tempature.  -CBC.  -Observe for SIRS/Sepsis Syndrome.    Prophylaxis:  -DVT prophylaxis with 5000 SubQ Heparin q8h.  -SCD's    Disposition:  -ICU for frequent monitoring.: A/P: 78 year old female with PMHx HTN, HLD, DM, Asthma, Atrial fibrillation on coumadin, CAD s/p PCI 3 weeks ago, CHF, and known MR that presented to [facility] complaining of progressive SOB and recent syncopal episode 3 weeks prior. At that time she was found to have in Afib with RVR and to have pulmonary congestion on CXR. She was admitted to ICU and was given IV lasix and started on esmolol drip and amiodarone. She was transferred to [facility] under the care of Dr. Salas for further evaluation. Preoperative workup was completed and patient underwent MVR with cryomaze and removal of LA thrombus on 2/18/19. Intraop patient received 9u prbc, 4 FFP and 1 plt and was transferred to ICU post op stable. She was extubated POD#1. She had a left sided pigtail placed on POD#3 draining 500cc which was subsequently removed the same day and she was transferred to  that evening. No acute events overnight.      Neurovascular: Stable. Pain well controlled with current regimen.  -continue tylenol and percocet PRN     Cardiovascular: Hemodynamically stable. HR controlled.  - severe MR s/p MVR, continue asa 81mg, plavix 75mg, atorvastatin 40mg qhs. Metoprolol started this afternoon 12.5mg BID, titrate as tolerated.   - Atrial fibrillation, now NSR continue coumadin 5mg tonight follow up INR in AM     Respiratory: 02 Sat = 98% on RA.  - CXR today with stable left sided congestion / atelectasis. Diuresis with lasix 40mg daily and follow up CXR in AM   - Encourage C+DB and Use of IS 10x / hr while awake.    GI: Stable.  - Continue PO diet   - Continue protonix 40mg for GI ppx   - Continue bowel regimen     Renal / : Cr 0.64, no active issues   - Continue diuresis with lasix 40mg daily and K supplementation   - Monitor renal function.  - Monitor I/O's.    Endocrine: hgba1c 5.6, TSH WNL    - Insulin sliding scale   - monitor fingersticks     Hematologic: Atrial fibrillation on coumadin   - Coumadin 5mg tonight, INR 1.46 this AM follow up INR in AM. D/C SQH once INR > 1.8   - Patient scheduled to follow up with Dr. Michael Fitch on 2/26/19 for coumadin/INR follow up at 3:00 PM.     Prophylaxis:  - DVT prophylaxis with 5000 SubQ Heparin q8h and Coumadin   - SCD's    Disposition: Home when medically ready, likely sunday

## 2019-02-22 NOTE — PROGRESS NOTE ADULT - SUBJECTIVE AND OBJECTIVE BOX
Patient discussed on morning rounds with Dr. Salas      Operation / Date: 2/18/19 MVR, Cryomaze and removal of LA thrombus     SUBJECTIVE ASSESSMENT:  patient seen this morning at bedside, doing well and not offering any complaints at this time. States her breathing has improved significantly since yesterday. She denies any chest pain or shortness of breath.     Vital Signs Last 24 Hrs  T(C): 36 (22 Feb 2019 10:02), Max: 36.7 (21 Feb 2019 21:00)  T(F): 96.8 (22 Feb 2019 10:02), Max: 98.1 (21 Feb 2019 21:00)  HR: 88 (22 Feb 2019 09:50) (74 - 88)  BP: 139/65 (22 Feb 2019 09:00) (109/62 - 141/73)  BP(mean): 93 (22 Feb 2019 09:00) (83 - 97)  RR: 18 (22 Feb 2019 09:50) (16 - 20)  SpO2: 99% (22 Feb 2019 09:50) (96% - 100%)  I&O's Detail    21 Feb 2019 07:01  -  22 Feb 2019 07:00  --------------------------------------------------------  IN:    IV PiggyBack: 100 mL    Oral Fluid: 710 mL    sodium chloride 0.9%.: 40 mL  Total IN: 850 mL    OUT:    Chest Tube: 500 mL    Voided: 1050 mL  Total OUT: 1550 mL    Total NET: -700 mL      22 Feb 2019 07:01  -  22 Feb 2019 11:02  --------------------------------------------------------  IN:    Oral Fluid: 300 mL  Total IN: 300 mL    OUT:    Voided: 500 mL  Total OUT: 500 mL    Total NET: -200 mL    CHEST TUBE:  No  SMITHA DRAIN: No   EPICARDIAL WIRES: Yes   TIE DOWNS: Yes   SEXTON: No    PHYSICAL EXAM:    General: Patient sitting comfortably in a chair, no acute distress     Neurological: Alert and oriented. No focal neurological deficits     Cardiovascular: S1S2, RRR, no murmurs appreciated on exam     Respiratory: Clear to ausculation bilaterally     Gastrointestinal: Abdomen soft, non tender, non distended     Extremities: Warm and well perfused. trace bilateral non pitting edema, no tenderness     Vascular: Peripheral pulses 2+ bilaterally     Incision Sites: Sternotomy incision C/D/I with mild ecchymosis surrounding incision. No sternal click or drainage   Chest tube sites covered with occlusive dressing     LABS:                        9.2    11.96 )-----------( 96       ( 22 Feb 2019 05:14 )             27.4       COUMADIN: No    PT/INR - ( 22 Feb 2019 05:14 )   PT: 16.7 sec;   INR: 1.46          PTT - ( 22 Feb 2019 05:14 )  PTT:52.3 sec    02-22    135  |  102  |  21  ----------------------------<  93  4.2   |  25  |  0.64    Ca    8.4      22 Feb 2019 05:14  Phos  2.5     02-22  Mg     2.0     02-22    TPro  5.9<L>  /  Alb  2.9<L>  /  TBili  1.8<H>  /  DBili  x   /  AST  37  /  ALT  10  /  AlkPhos  130<H>  02-22    MEDICATIONS  (STANDING):  aspirin enteric coated 81 milliGRAM(s) Oral daily  atorvastatin 40 milliGRAM(s) Oral at bedtime  clopidogrel Tablet 75 milliGRAM(s) Oral daily  docusate sodium 100 milliGRAM(s) Oral three times a day  furosemide    Tablet 40 milliGRAM(s) Oral daily  heparin  Injectable 5000 Unit(s) SubCutaneous every 8 hours  insulin glargine Injectable (LANTUS) 10 Unit(s) SubCutaneous at bedtime  insulin lispro (HumaLOG) corrective regimen sliding scale   SubCutaneous Before meals and at bedtime  insulin lispro Injectable (HumaLOG) 3 Unit(s) SubCutaneous three times a day before meals  pantoprazole    Tablet 40 milliGRAM(s) Oral before breakfast  polyethylene glycol 3350 17 Gram(s) Oral daily  potassium chloride    Tablet ER 20 milliEquivalent(s) Oral daily  senna 1 Tablet(s) Oral at bedtime  warfarin 5 milliGRAM(s) Oral once    MEDICATIONS  (PRN):  acetaminophen   Tablet .. 650 milliGRAM(s) Oral every 6 hours PRN Mild Pain (1 - 3)  oxyCODONE    5 mG/acetaminophen 325 mG 1 Tablet(s) Oral every 6 hours PRN Severe Pain (7 - 10)    RADIOLOGY & ADDITIONAL TESTS:

## 2019-02-23 LAB
ANION GAP SERPL CALC-SCNC: 8 MMOL/L — SIGNIFICANT CHANGE UP (ref 5–17)
APTT BLD: 55.3 SEC — HIGH (ref 27.5–36.3)
BUN SERPL-MCNC: 19 MG/DL — SIGNIFICANT CHANGE UP (ref 7–23)
CALCIUM SERPL-MCNC: 8.2 MG/DL — LOW (ref 8.4–10.5)
CHLORIDE SERPL-SCNC: 100 MMOL/L — SIGNIFICANT CHANGE UP (ref 96–108)
CO2 SERPL-SCNC: 26 MMOL/L — SIGNIFICANT CHANGE UP (ref 22–31)
CREAT SERPL-MCNC: 0.63 MG/DL — SIGNIFICANT CHANGE UP (ref 0.5–1.3)
GLUCOSE BLDC GLUCOMTR-MCNC: 111 MG/DL — HIGH (ref 70–99)
GLUCOSE BLDC GLUCOMTR-MCNC: 125 MG/DL — HIGH (ref 70–99)
GLUCOSE BLDC GLUCOMTR-MCNC: 126 MG/DL — HIGH (ref 70–99)
GLUCOSE BLDC GLUCOMTR-MCNC: 98 MG/DL — SIGNIFICANT CHANGE UP (ref 70–99)
GLUCOSE SERPL-MCNC: 98 MG/DL — SIGNIFICANT CHANGE UP (ref 70–99)
HCT VFR BLD CALC: 26.1 % — LOW (ref 34.5–45)
HGB BLD-MCNC: 8.7 G/DL — LOW (ref 11.5–15.5)
INR BLD: 2.47 — HIGH (ref 0.88–1.16)
MAGNESIUM SERPL-MCNC: 1.8 MG/DL — SIGNIFICANT CHANGE UP (ref 1.6–2.6)
MCHC RBC-ENTMCNC: 30.2 PG — SIGNIFICANT CHANGE UP (ref 27–34)
MCHC RBC-ENTMCNC: 33.3 GM/DL — SIGNIFICANT CHANGE UP (ref 32–36)
MCV RBC AUTO: 90.6 FL — SIGNIFICANT CHANGE UP (ref 80–100)
NRBC # BLD: 0 /100 WBCS — SIGNIFICANT CHANGE UP (ref 0–0)
PLATELET # BLD AUTO: 113 K/UL — LOW (ref 150–400)
POTASSIUM SERPL-MCNC: 3.8 MMOL/L — SIGNIFICANT CHANGE UP (ref 3.5–5.3)
POTASSIUM SERPL-SCNC: 3.8 MMOL/L — SIGNIFICANT CHANGE UP (ref 3.5–5.3)
PROTHROM AB SERPL-ACNC: 28.7 SEC — HIGH (ref 10–12.9)
RBC # BLD: 2.88 M/UL — LOW (ref 3.8–5.2)
RBC # FLD: 14.9 % — HIGH (ref 10.3–14.5)
SODIUM SERPL-SCNC: 134 MMOL/L — LOW (ref 135–145)
WBC # BLD: 10.65 K/UL — HIGH (ref 3.8–10.5)
WBC # FLD AUTO: 10.65 K/UL — HIGH (ref 3.8–10.5)

## 2019-02-23 PROCEDURE — 71046 X-RAY EXAM CHEST 2 VIEWS: CPT | Mod: 26

## 2019-02-23 RX ORDER — MAGNESIUM OXIDE 400 MG ORAL TABLET 241.3 MG
400 TABLET ORAL ONCE
Qty: 0 | Refills: 0 | Status: COMPLETED | OUTPATIENT
Start: 2019-02-23 | End: 2019-02-23

## 2019-02-23 RX ORDER — AMIODARONE HYDROCHLORIDE 400 MG/1
400 TABLET ORAL EVERY 8 HOURS
Qty: 0 | Refills: 0 | Status: DISCONTINUED | OUTPATIENT
Start: 2019-02-23 | End: 2019-02-23

## 2019-02-23 RX ORDER — AMIODARONE HYDROCHLORIDE 400 MG/1
TABLET ORAL
Qty: 0 | Refills: 0 | Status: DISCONTINUED | OUTPATIENT
Start: 2019-02-23 | End: 2019-02-23

## 2019-02-23 RX ORDER — WARFARIN SODIUM 2.5 MG/1
5 TABLET ORAL ONCE
Qty: 0 | Refills: 0 | Status: COMPLETED | OUTPATIENT
Start: 2019-02-23 | End: 2019-02-23

## 2019-02-23 RX ORDER — POTASSIUM CHLORIDE 20 MEQ
40 PACKET (EA) ORAL ONCE
Qty: 0 | Refills: 0 | Status: COMPLETED | OUTPATIENT
Start: 2019-02-23 | End: 2019-02-23

## 2019-02-23 RX ADMIN — HEPARIN SODIUM 5000 UNIT(S): 5000 INJECTION INTRAVENOUS; SUBCUTANEOUS at 06:58

## 2019-02-23 RX ADMIN — CHLORHEXIDINE GLUCONATE 1 APPLICATION(S): 213 SOLUTION TOPICAL at 05:37

## 2019-02-23 RX ADMIN — OXYCODONE AND ACETAMINOPHEN 1 TABLET(S): 5; 325 TABLET ORAL at 05:45

## 2019-02-23 RX ADMIN — Medication 20 MILLIEQUIVALENT(S): at 12:45

## 2019-02-23 RX ADMIN — CLOPIDOGREL BISULFATE 75 MILLIGRAM(S): 75 TABLET, FILM COATED ORAL at 12:45

## 2019-02-23 RX ADMIN — HEPARIN SODIUM 5000 UNIT(S): 5000 INJECTION INTRAVENOUS; SUBCUTANEOUS at 22:03

## 2019-02-23 RX ADMIN — Medication 81 MILLIGRAM(S): at 12:45

## 2019-02-23 RX ADMIN — OXYCODONE AND ACETAMINOPHEN 1 TABLET(S): 5; 325 TABLET ORAL at 22:04

## 2019-02-23 RX ADMIN — Medication 100 MILLIGRAM(S): at 15:02

## 2019-02-23 RX ADMIN — Medication 12.5 MILLIGRAM(S): at 18:31

## 2019-02-23 RX ADMIN — AMIODARONE HYDROCHLORIDE 400 MILLIGRAM(S): 400 TABLET ORAL at 15:02

## 2019-02-23 RX ADMIN — ATORVASTATIN CALCIUM 40 MILLIGRAM(S): 80 TABLET, FILM COATED ORAL at 22:05

## 2019-02-23 RX ADMIN — MAGNESIUM OXIDE 400 MG ORAL TABLET 400 MILLIGRAM(S): 241.3 TABLET ORAL at 09:03

## 2019-02-23 RX ADMIN — HEPARIN SODIUM 5000 UNIT(S): 5000 INJECTION INTRAVENOUS; SUBCUTANEOUS at 14:50

## 2019-02-23 RX ADMIN — Medication 12.5 MILLIGRAM(S): at 05:37

## 2019-02-23 RX ADMIN — SODIUM CHLORIDE 3 MILLILITER(S): 9 INJECTION INTRAMUSCULAR; INTRAVENOUS; SUBCUTANEOUS at 05:31

## 2019-02-23 RX ADMIN — WARFARIN SODIUM 5 MILLIGRAM(S): 2.5 TABLET ORAL at 22:04

## 2019-02-23 RX ADMIN — OXYCODONE AND ACETAMINOPHEN 1 TABLET(S): 5; 325 TABLET ORAL at 23:00

## 2019-02-23 RX ADMIN — OXYCODONE AND ACETAMINOPHEN 1 TABLET(S): 5; 325 TABLET ORAL at 06:49

## 2019-02-23 RX ADMIN — SODIUM CHLORIDE 3 MILLILITER(S): 9 INJECTION INTRAMUSCULAR; INTRAVENOUS; SUBCUTANEOUS at 14:45

## 2019-02-23 RX ADMIN — SODIUM CHLORIDE 3 MILLILITER(S): 9 INJECTION INTRAMUSCULAR; INTRAVENOUS; SUBCUTANEOUS at 21:58

## 2019-02-23 RX ADMIN — POLYETHYLENE GLYCOL 3350 17 GRAM(S): 17 POWDER, FOR SOLUTION ORAL at 12:45

## 2019-02-23 RX ADMIN — PANTOPRAZOLE SODIUM 40 MILLIGRAM(S): 20 TABLET, DELAYED RELEASE ORAL at 06:58

## 2019-02-23 RX ADMIN — Medication 40 MILLIEQUIVALENT(S): at 09:03

## 2019-02-23 RX ADMIN — Medication 40 MILLIGRAM(S): at 05:37

## 2019-02-23 NOTE — PROGRESS NOTE ADULT - ASSESSMENT
A/P: 78 year old female with PMHx HTN, HLD, DM, Asthma, Atrial fibrillation on coumadin, CAD s/p PCI 3 weeks ago, CHF, and known MR that presented to [facility] complaining of progressive SOB and recent syncopal episode 3 weeks prior. At that time she was found to have in Afib with RVR and to have pulmonary congestion on CXR. She was admitted to ICU and was given IV lasix and started on esmolol drip and amiodarone. She was transferred to [facility] under the care of Dr. Salas for further evaluation. Preoperative workup was completed and patient underwent MVR with cryomaze and removal of LA thrombus on 2/18/19. Intraop patient received 9u prbc, 4 FFP and 1 plt and was transferred to ICU post op stable. She was extubated POD#1. She had a left sided pigtail placed on POD#3 draining 500cc which was subsequently removed the same day and she was transferred to  that evening. No acute events overnight.      Neurovascular: Stable. Pain well controlled with current regimen.  -continue tylenol and percocet PRN     Cardiovascular: Hemodynamically stable. HR controlled.  - severe MR s/p MVR, continue asa 81mg, plavix 75mg, atorvastatin 40mg qhs. Metoprolol titrated up to 12.5 q6   - hx of Afib Atrial fibrillation, now NSR continue coumadin 5mg tonight follow up INR in AM     Respiratory: 02 Sat = 98% on RA.  - CXR today with stable left sided congestion / atelectasis. Diuresis with lasix 40mg daily and follow up CXR in AM   - Encourage C+DB and Use of IS 10x / hr while awake.    GI: Stable.  - Continue PO diet   - Continue protonix 40mg for GI ppx   - Continue bowel regimen     Renal / : Cr 0.64, no active issues   - Continue diuresis with lasix 40mg daily and K supplementation   - Monitor renal function.  - Monitor I/O's.    Endocrine: hgba1c 5.6, TSH WNL    - Insulin sliding scale   - monitor fingersticks     Hematologic: Atrial fibrillation on coumadin   - Coumadin 5mg tonight, INR 1.46 this AM follow up INR in AM. D/C SQH once INR > 1.8   - Patient scheduled to follow up with Dr. Michael Fitch on 2/26/19 for coumadin/INR follow up at 3:00 PM.     Prophylaxis:  - DVT prophylaxis with 5000 SubQ Heparin q8h and Coumadin   - SCD's    Disposition: Home when medically ready, likely sunday

## 2019-02-23 NOTE — PROGRESS NOTE ADULT - SUBJECTIVE AND OBJECTIVE BOX
Patient discussed on morning rounds with Dr. Alvarenga     Operation / Date: 2/18/19 MVR/Cryomaze, removal of LA thrombus    SUBJECTIVE ASSESSMENT:    Pt reports pain well controlled, denies dyspnea/SOB, fevers/chills.    Vital Signs Last 24 Hrs  T(C): 36.4 (23 Feb 2019 14:00), Max: 36.8 (22 Feb 2019 22:18)  T(F): 97.6 (23 Feb 2019 14:00), Max: 98.2 (22 Feb 2019 22:18)  HR: 90 (23 Feb 2019 15:10) (77 - 90)  BP: 122/59 (23 Feb 2019 14:57) (104/58 - 129/68)  BP(mean): 83 (23 Feb 2019 14:57) (72 - 89)  RR: 18 (23 Feb 2019 15:10) (17 - 24)  SpO2: 100% (23 Feb 2019 15:10) (95% - 100%)  I&O's Detail    22 Feb 2019 07:01  -  23 Feb 2019 07:00  --------------------------------------------------------  IN:    Oral Fluid: 725 mL  Total IN: 725 mL    OUT:    Voided: 500 mL  Total OUT: 500 mL    Total NET: 225 mL    23 Feb 2019 07:01  -  23 Feb 2019 17:24  --------------------------------------------------------  IN:    Oral Fluid: 770 mL  Total IN: 770 mL    OUT:    Voided: 750 mL  Total OUT: 750 mL    Total NET: 20 mL    CHEST TUBE:  No.    SMITHA DRAIN:  No.  EPICARDIAL WIRES: No.  TIE DOWNS: No.  SEXTON: No.    PHYSICAL EXAM:    General: NAD    Neurological: A&Ox3    Cardiovascular: s1S2 RRR    Respiratory: CTA b/l no W/R/R    Gastrointestinal: soft NT/ND +BS    Extremities: no edema    Vascular: warm and well perfused bilaterally    Incision Sites: MSI C/D/I    LABS:                        8.7    10.65 )-----------( 113      ( 23 Feb 2019 06:10 )             26.1       COUMADIN:  No.     PT/INR - ( 22 Feb 2019 05:14 )   PT: 16.7 sec;   INR: 1.46          PTT - ( 22 Feb 2019 05:14 )  PTT:52.3 sec    02-23    134<L>  |  100  |  19  ----------------------------<  98  3.8   |  26  |  0.63    Ca    8.2<L>      23 Feb 2019 06:10  Phos  2.5     02-22  Mg     1.8     02-23    TPro  5.9<L>  /  Alb  2.9<L>  /  TBili  1.8<H>  /  DBili  x   /  AST  37  /  ALT  10  /  AlkPhos  130<H>  02-22          MEDICATIONS  (STANDING):  amiodarone    Tablet 400 milliGRAM(s) Oral every 8 hours  amiodarone    Tablet   Oral   aspirin enteric coated 81 milliGRAM(s) Oral daily  atorvastatin 40 milliGRAM(s) Oral at bedtime  chlorhexidine 2% Cloths 1 Application(s) Topical daily  clopidogrel Tablet 75 milliGRAM(s) Oral daily  dextrose 5%. 1000 milliLiter(s) (50 mL/Hr) IV Continuous <Continuous>  dextrose 50% Injectable 50 milliLiter(s) IV Push every 15 minutes  docusate sodium 100 milliGRAM(s) Oral three times a day  furosemide    Tablet 40 milliGRAM(s) Oral daily  heparin  Injectable 5000 Unit(s) SubCutaneous every 8 hours  insulin lispro (HumaLOG) corrective regimen sliding scale   SubCutaneous Before meals and at bedtime  metoprolol tartrate 12.5 milliGRAM(s) Oral every 12 hours  pantoprazole    Tablet 40 milliGRAM(s) Oral before breakfast  polyethylene glycol 3350 17 Gram(s) Oral daily  potassium chloride    Tablet ER 20 milliEquivalent(s) Oral daily  senna 1 Tablet(s) Oral at bedtime  sodium chloride 0.9% lock flush 3 milliLiter(s) IV Push every 8 hours  sodium chloride 0.9%. 1000 milliLiter(s) (10 mL/Hr) IV Continuous <Continuous>    MEDICATIONS  (PRN):  acetaminophen   Tablet .. 650 milliGRAM(s) Oral every 6 hours PRN Mild Pain (1 - 3)  dextrose 40% Gel 15 Gram(s) Oral once PRN Blood Glucose LESS THAN 70 milliGRAM(s)/deciliter  glucagon  Injectable 1 milliGRAM(s) IntraMuscular once PRN Glucose LESS THAN 70 milligrams/deciliter  oxyCODONE    5 mG/acetaminophen 325 mG 1 Tablet(s) Oral every 6 hours PRN Severe Pain (7 - 10)        RADIOLOGY & ADDITIONAL TESTS:    CXR: no effusions/infiltrates/PTX

## 2019-02-24 ENCOUNTER — TRANSCRIPTION ENCOUNTER (OUTPATIENT)
Age: 79
End: 2019-02-24

## 2019-02-24 VITALS
DIASTOLIC BLOOD PRESSURE: 65 MMHG | OXYGEN SATURATION: 98 % | SYSTOLIC BLOOD PRESSURE: 144 MMHG | HEART RATE: 88 BPM | RESPIRATION RATE: 17 BRPM

## 2019-02-24 LAB
ANION GAP SERPL CALC-SCNC: 9 MMOL/L — SIGNIFICANT CHANGE UP (ref 5–17)
APTT BLD: 61.2 SEC — HIGH (ref 27.5–36.3)
BUN SERPL-MCNC: 18 MG/DL — SIGNIFICANT CHANGE UP (ref 7–23)
CALCIUM SERPL-MCNC: 8.2 MG/DL — LOW (ref 8.4–10.5)
CHLORIDE SERPL-SCNC: 100 MMOL/L — SIGNIFICANT CHANGE UP (ref 96–108)
CO2 SERPL-SCNC: 25 MMOL/L — SIGNIFICANT CHANGE UP (ref 22–31)
CREAT SERPL-MCNC: 0.62 MG/DL — SIGNIFICANT CHANGE UP (ref 0.5–1.3)
GLUCOSE BLDC GLUCOMTR-MCNC: 110 MG/DL — HIGH (ref 70–99)
GLUCOSE BLDC GLUCOMTR-MCNC: 99 MG/DL — SIGNIFICANT CHANGE UP (ref 70–99)
GLUCOSE SERPL-MCNC: 105 MG/DL — HIGH (ref 70–99)
HCT VFR BLD CALC: 26.7 % — LOW (ref 34.5–45)
HGB BLD-MCNC: 8.6 G/DL — LOW (ref 11.5–15.5)
INR BLD: 2.76 — HIGH (ref 0.88–1.16)
MAGNESIUM SERPL-MCNC: 1.9 MG/DL — SIGNIFICANT CHANGE UP (ref 1.6–2.6)
MCHC RBC-ENTMCNC: 29.4 PG — SIGNIFICANT CHANGE UP (ref 27–34)
MCHC RBC-ENTMCNC: 32.2 GM/DL — SIGNIFICANT CHANGE UP (ref 32–36)
MCV RBC AUTO: 91.1 FL — SIGNIFICANT CHANGE UP (ref 80–100)
NRBC # BLD: 0 /100 WBCS — SIGNIFICANT CHANGE UP (ref 0–0)
PLATELET # BLD AUTO: 158 K/UL — SIGNIFICANT CHANGE UP (ref 150–400)
POTASSIUM SERPL-MCNC: 4.3 MMOL/L — SIGNIFICANT CHANGE UP (ref 3.5–5.3)
POTASSIUM SERPL-SCNC: 4.3 MMOL/L — SIGNIFICANT CHANGE UP (ref 3.5–5.3)
PROTHROM AB SERPL-ACNC: 32.2 SEC — HIGH (ref 10–12.9)
RBC # BLD: 2.93 M/UL — LOW (ref 3.8–5.2)
RBC # FLD: 15 % — HIGH (ref 10.3–14.5)
SODIUM SERPL-SCNC: 134 MMOL/L — LOW (ref 135–145)
WBC # BLD: 11.84 K/UL — HIGH (ref 3.8–10.5)
WBC # FLD AUTO: 11.84 K/UL — HIGH (ref 3.8–10.5)

## 2019-02-24 PROCEDURE — 84443 ASSAY THYROID STIM HORMONE: CPT

## 2019-02-24 PROCEDURE — 86901 BLOOD TYPING SEROLOGIC RH(D): CPT

## 2019-02-24 PROCEDURE — 82803 BLOOD GASES ANY COMBINATION: CPT

## 2019-02-24 PROCEDURE — 84100 ASSAY OF PHOSPHORUS: CPT

## 2019-02-24 PROCEDURE — 94150 VITAL CAPACITY TEST: CPT

## 2019-02-24 PROCEDURE — 80053 COMPREHEN METABOLIC PANEL: CPT

## 2019-02-24 PROCEDURE — 85025 COMPLETE CBC W/AUTO DIFF WBC: CPT

## 2019-02-24 PROCEDURE — 82962 GLUCOSE BLOOD TEST: CPT

## 2019-02-24 PROCEDURE — 84295 ASSAY OF SERUM SODIUM: CPT

## 2019-02-24 PROCEDURE — 71046 X-RAY EXAM CHEST 2 VIEWS: CPT

## 2019-02-24 PROCEDURE — 85610 PROTHROMBIN TIME: CPT

## 2019-02-24 PROCEDURE — 97116 GAIT TRAINING THERAPY: CPT

## 2019-02-24 PROCEDURE — 99239 HOSP IP/OBS DSCHRG MGMT >30: CPT

## 2019-02-24 PROCEDURE — P9017: CPT

## 2019-02-24 PROCEDURE — 83605 ASSAY OF LACTIC ACID: CPT

## 2019-02-24 PROCEDURE — 83880 ASSAY OF NATRIURETIC PEPTIDE: CPT

## 2019-02-24 PROCEDURE — P9045: CPT

## 2019-02-24 PROCEDURE — 83036 HEMOGLOBIN GLYCOSYLATED A1C: CPT

## 2019-02-24 PROCEDURE — 84436 ASSAY OF TOTAL THYROXINE: CPT

## 2019-02-24 PROCEDURE — 36430 TRANSFUSION BLD/BLD COMPNT: CPT

## 2019-02-24 PROCEDURE — P9016: CPT

## 2019-02-24 PROCEDURE — C1889: CPT

## 2019-02-24 PROCEDURE — P9035: CPT

## 2019-02-24 PROCEDURE — 85730 THROMBOPLASTIN TIME PARTIAL: CPT

## 2019-02-24 PROCEDURE — 84132 ASSAY OF SERUM POTASSIUM: CPT

## 2019-02-24 PROCEDURE — 84480 ASSAY TRIIODOTHYRONINE (T3): CPT

## 2019-02-24 PROCEDURE — 86900 BLOOD TYPING SEROLOGIC ABO: CPT

## 2019-02-24 PROCEDURE — 85027 COMPLETE CBC AUTOMATED: CPT

## 2019-02-24 PROCEDURE — 94002 VENT MGMT INPAT INIT DAY: CPT

## 2019-02-24 PROCEDURE — 94660 CPAP INITIATION&MGMT: CPT

## 2019-02-24 PROCEDURE — 88305 TISSUE EXAM BY PATHOLOGIST: CPT

## 2019-02-24 PROCEDURE — 93005 ELECTROCARDIOGRAM TRACING: CPT

## 2019-02-24 PROCEDURE — 86850 RBC ANTIBODY SCREEN: CPT

## 2019-02-24 PROCEDURE — 80048 BASIC METABOLIC PNL TOTAL CA: CPT

## 2019-02-24 PROCEDURE — 83735 ASSAY OF MAGNESIUM: CPT

## 2019-02-24 PROCEDURE — 93306 TTE W/DOPPLER COMPLETE: CPT

## 2019-02-24 PROCEDURE — 82330 ASSAY OF CALCIUM: CPT

## 2019-02-24 PROCEDURE — 71045 X-RAY EXAM CHEST 1 VIEW: CPT

## 2019-02-24 PROCEDURE — 94640 AIRWAY INHALATION TREATMENT: CPT

## 2019-02-24 PROCEDURE — 81001 URINALYSIS AUTO W/SCOPE: CPT

## 2019-02-24 PROCEDURE — 85384 FIBRINOGEN ACTIVITY: CPT

## 2019-02-24 PROCEDURE — 88304 TISSUE EXAM BY PATHOLOGIST: CPT

## 2019-02-24 PROCEDURE — 86923 COMPATIBILITY TEST ELECTRIC: CPT

## 2019-02-24 PROCEDURE — 36415 COLL VENOUS BLD VENIPUNCTURE: CPT

## 2019-02-24 PROCEDURE — P9012: CPT

## 2019-02-24 PROCEDURE — 97162 PT EVAL MOD COMPLEX 30 MIN: CPT

## 2019-02-24 PROCEDURE — 71250 CT THORAX DX C-: CPT

## 2019-02-24 RX ORDER — SITAGLIPTIN AND METFORMIN HYDROCHLORIDE 500; 50 MG/1; MG/1
1 TABLET, FILM COATED ORAL
Qty: 60 | Refills: 0
Start: 2019-02-24 | End: 2019-03-25

## 2019-02-24 RX ORDER — CLOPIDOGREL BISULFATE 75 MG/1
1 TABLET, FILM COATED ORAL
Qty: 0 | Refills: 0 | COMMUNITY

## 2019-02-24 RX ORDER — PANTOPRAZOLE SODIUM 20 MG/1
1 TABLET, DELAYED RELEASE ORAL
Qty: 0 | Refills: 0 | COMMUNITY

## 2019-02-24 RX ORDER — MONTELUKAST 4 MG/1
1 TABLET, CHEWABLE ORAL
Qty: 30 | Refills: 0
Start: 2019-02-24 | End: 2019-03-25

## 2019-02-24 RX ORDER — MAGNESIUM OXIDE 400 MG ORAL TABLET 241.3 MG
400 TABLET ORAL ONCE
Qty: 0 | Refills: 0 | Status: COMPLETED | OUTPATIENT
Start: 2019-02-24 | End: 2019-02-24

## 2019-02-24 RX ORDER — POTASSIUM CHLORIDE 20 MEQ
1 PACKET (EA) ORAL
Qty: 30 | Refills: 0
Start: 2019-02-24 | End: 2019-03-25

## 2019-02-24 RX ORDER — SITAGLIPTIN AND METFORMIN HYDROCHLORIDE 500; 50 MG/1; MG/1
1 TABLET, FILM COATED ORAL
Qty: 0 | Refills: 0 | COMMUNITY

## 2019-02-24 RX ORDER — ATORVASTATIN CALCIUM 80 MG/1
1 TABLET, FILM COATED ORAL
Qty: 0 | Refills: 0 | COMMUNITY

## 2019-02-24 RX ORDER — AZELASTINE HCL 0.05 %
1 DROPS OPHTHALMIC (EYE)
Qty: 1 | Refills: 0
Start: 2019-02-24

## 2019-02-24 RX ORDER — MONTELUKAST 4 MG/1
10 TABLET, CHEWABLE ORAL ONCE
Qty: 0 | Refills: 0 | Status: DISCONTINUED | OUTPATIENT
Start: 2019-02-24 | End: 2019-02-24

## 2019-02-24 RX ORDER — METOPROLOL TARTRATE 50 MG
12.5 TABLET ORAL EVERY 12 HOURS
Qty: 0 | Refills: 0 | Status: DISCONTINUED | OUTPATIENT
Start: 2019-02-24 | End: 2019-02-24

## 2019-02-24 RX ORDER — MONTELUKAST 4 MG/1
1 TABLET, CHEWABLE ORAL
Qty: 0 | Refills: 0 | COMMUNITY

## 2019-02-24 RX ORDER — AZELASTINE HCL 0.05 %
1 DROPS OPHTHALMIC (EYE)
Qty: 0 | Refills: 0 | COMMUNITY

## 2019-02-24 RX ORDER — BUDESONIDE AND FORMOTEROL FUMARATE DIHYDRATE 160; 4.5 UG/1; UG/1
2 AEROSOL RESPIRATORY (INHALATION)
Qty: 1 | Refills: 0
Start: 2019-02-24

## 2019-02-24 RX ORDER — ATORVASTATIN CALCIUM 80 MG/1
1 TABLET, FILM COATED ORAL
Qty: 30 | Refills: 0
Start: 2019-02-24 | End: 2019-03-25

## 2019-02-24 RX ORDER — ASPIRIN/CALCIUM CARB/MAGNESIUM 324 MG
1 TABLET ORAL
Qty: 30 | Refills: 0
Start: 2019-02-24 | End: 2019-03-25

## 2019-02-24 RX ORDER — WARFARIN SODIUM 2.5 MG/1
1 TABLET ORAL
Qty: 30 | Refills: 0
Start: 2019-02-24 | End: 2019-03-25

## 2019-02-24 RX ORDER — ASPIRIN/CALCIUM CARB/MAGNESIUM 324 MG
1 TABLET ORAL
Qty: 0 | Refills: 0 | COMMUNITY

## 2019-02-24 RX ORDER — FUROSEMIDE 40 MG
1 TABLET ORAL
Qty: 30 | Refills: 0
Start: 2019-02-24 | End: 2019-03-25

## 2019-02-24 RX ORDER — WARFARIN SODIUM 2.5 MG/1
1 TABLET ORAL
Qty: 0 | Refills: 0 | COMMUNITY

## 2019-02-24 RX ORDER — ACETAMINOPHEN 500 MG
2 TABLET ORAL
Qty: 60 | Refills: 0
Start: 2019-02-24

## 2019-02-24 RX ORDER — SENNA PLUS 8.6 MG/1
1 TABLET ORAL
Qty: 10 | Refills: 0
Start: 2019-02-24 | End: 2019-03-05

## 2019-02-24 RX ORDER — DOCUSATE SODIUM 100 MG
1 CAPSULE ORAL
Qty: 30 | Refills: 0
Start: 2019-02-24 | End: 2019-03-05

## 2019-02-24 RX ORDER — METOPROLOL TARTRATE 50 MG
0.5 TABLET ORAL
Qty: 30 | Refills: 0
Start: 2019-02-24 | End: 2019-03-25

## 2019-02-24 RX ORDER — BUDESONIDE AND FORMOTEROL FUMARATE DIHYDRATE 160; 4.5 UG/1; UG/1
2 AEROSOL RESPIRATORY (INHALATION)
Qty: 0 | Refills: 0 | COMMUNITY

## 2019-02-24 RX ORDER — PANTOPRAZOLE SODIUM 20 MG/1
1 TABLET, DELAYED RELEASE ORAL
Qty: 30 | Refills: 0
Start: 2019-02-24 | End: 2019-03-25

## 2019-02-24 RX ORDER — BRINZOLAMIDE 10 MG/ML
1 SUSPENSION/ DROPS OPHTHALMIC
Qty: 0 | Refills: 0 | COMMUNITY

## 2019-02-24 RX ORDER — METOPROLOL TARTRATE 50 MG
1 TABLET ORAL
Qty: 0 | Refills: 0 | COMMUNITY

## 2019-02-24 RX ORDER — CLOPIDOGREL BISULFATE 75 MG/1
1 TABLET, FILM COATED ORAL
Qty: 30 | Refills: 0
Start: 2019-02-24 | End: 2019-03-25

## 2019-02-24 RX ORDER — BRINZOLAMIDE 10 MG/ML
1 SUSPENSION/ DROPS OPHTHALMIC
Qty: 1 | Refills: 0
Start: 2019-02-24

## 2019-02-24 RX ADMIN — MAGNESIUM OXIDE 400 MG ORAL TABLET 400 MILLIGRAM(S): 241.3 TABLET ORAL at 10:02

## 2019-02-24 RX ADMIN — CHLORHEXIDINE GLUCONATE 1 APPLICATION(S): 213 SOLUTION TOPICAL at 06:32

## 2019-02-24 RX ADMIN — POLYETHYLENE GLYCOL 3350 17 GRAM(S): 17 POWDER, FOR SOLUTION ORAL at 12:10

## 2019-02-24 RX ADMIN — Medication 81 MILLIGRAM(S): at 12:10

## 2019-02-24 RX ADMIN — Medication 20 MILLIEQUIVALENT(S): at 12:10

## 2019-02-24 RX ADMIN — Medication 12.5 MILLIGRAM(S): at 06:31

## 2019-02-24 RX ADMIN — Medication 40 MILLIGRAM(S): at 06:31

## 2019-02-24 RX ADMIN — OXYCODONE AND ACETAMINOPHEN 1 TABLET(S): 5; 325 TABLET ORAL at 13:39

## 2019-02-24 RX ADMIN — PANTOPRAZOLE SODIUM 40 MILLIGRAM(S): 20 TABLET, DELAYED RELEASE ORAL at 06:31

## 2019-02-24 RX ADMIN — SODIUM CHLORIDE 3 MILLILITER(S): 9 INJECTION INTRAMUSCULAR; INTRAVENOUS; SUBCUTANEOUS at 06:17

## 2019-02-24 RX ADMIN — CLOPIDOGREL BISULFATE 75 MILLIGRAM(S): 75 TABLET, FILM COATED ORAL at 12:10

## 2019-02-24 NOTE — DISCHARGE NOTE ADULT - CARE PROVIDERS DIRECT ADDRESSES
[email or web address]

## 2019-02-24 NOTE — DISCHARGE NOTE ADULT - MEDICATION SUMMARY - MEDICATIONS TO TAKE
I will START or STAY ON the medications listed below when I get home from the hospital:    semi-electric hospital bed  -- Indication: For post-op    glucerna  -- 1 unit(s) by mouth 3 times a day   -- Indication: For DM (diabetes mellitus)    postpartum pads  -- 1 pad(s) vaginally 3 times a day, As Needed for spotting   -- Indication: For vaginal spotting    acetaminophen 325 mg oral tablet  -- 2 tab(s) by mouth every 6 hours, As needed, Mild Pain (1 - 3)  -- Indication: For pain    oxycodone-acetaminophen 5 mg-325 mg oral tablet  -- 1 tab(s) by mouth every 6 hours, As needed, Severe Pain (7 - 10) MDD:4  -- Indication: For pain    Aspirin Enteric Coated 81 mg oral delayed release tablet  -- 1 tab(s) by mouth once a day  -- Indication: For CAD (coronary artery disease)    Coumadin 5 mg oral tablet  -- 1 tab(s) by mouth once a day   -- Do not take this drug if you are pregnant.  It is very important that you take or use this exactly as directed.  Do not skip doses or discontinue unless directed by your doctor.  Obtain medical advice before taking any non-prescription drugs as some may affect the action of this medication.    -- Indication: For Afib    Janumet 50 mg-1000 mg oral tablet  -- 1 tab(s) by mouth 2 times a day   -- Do not drink alcoholic beverages when taking this medication.  Take with food or milk.    -- Indication: For DM (diabetes mellitus)    atorvastatin 40 mg oral tablet  -- 1 tab(s) by mouth once a day (at bedtime)  -- Indication: For CAD (coronary artery disease)    clopidogrel 75 mg oral tablet  -- 1 tab(s) by mouth once a day  -- Indication: For CAD (coronary artery disease)    metoprolol succinate 25 mg oral capsule, extended release  -- 0.5 cap(s) by mouth 2 times a day   -- Do not drink alcoholic beverages when taking this medication.  It is very important that you take or use this exactly as directed.  Do not skip doses or discontinue unless directed by your doctor.  May cause drowsiness or dizziness.  Obtain medical advice before taking any non-prescription drugs as some may affect the action of this medication.  Swallow whole.  Do not crush.  This drug may impair the ability to drive or operate machinery.  Use care until you become familiar with its effects.    -- Indication: For CAD (coronary artery disease)    Symbicort 80 mcg-4.5 mcg/inh inhalation aerosol  -- 2 puff(s) inhaled 2 times a day  -- Indication: For Asthma    furosemide 40 mg oral tablet  -- 1 tab(s) by mouth once a day  -- Indication: For Diuretic    senna oral tablet  -- 1 tab(s) by mouth once a day (at bedtime), As Needed -for constipation   -- Indication: For Constipation    docusate sodium 100 mg oral capsule  -- 1 cap(s) by mouth 3 times a day  -- Indication: For Constipation    montelukast 10 mg oral tablet  -- 1 tab(s) by mouth once a day   -- Indication: For Asthma    potassium chloride 20 mEq oral tablet, extended release  -- 1 tab(s) by mouth once a day  -- Indication: For potassium supplement    Azopt 1% ophthalmic suspension  -- 1 drop(s) to each affected eye once a day (at bedtime)  -- Indication: For eye drops    azelastine 0.05% ophthalmic solution  -- 1 drop(s) to each affected eye 2 times a day, As Needed for tears  -- Indication: For eye drops    pantoprazole 40 mg oral delayed release tablet  -- 1 tab(s) by mouth once a day  -- Indication: For GERD

## 2019-02-24 NOTE — DISCHARGE NOTE ADULT - MEDICATION SUMMARY - MEDICATIONS TO CHANGE
I will SWITCH the dose or number of times a day I take the medications listed below when I get home from the hospital:    Lipitor 20 mg oral tablet  -- 1 tab(s) by mouth once a day    metoprolol succinate 25 mg oral capsule, extended release  -- 1 cap(s) by mouth once a day

## 2019-02-24 NOTE — DISCHARGE NOTE ADULT - COMMUNITY RESOURCES
[facility], [facility] Surgical T: [phone] - hospital bed planned for delivery 2/25/2019

## 2019-02-24 NOTE — PROGRESS NOTE ADULT - SUBJECTIVE AND OBJECTIVE BOX
Patient discussed on morning rounds with Dr. Alvarenga     Operation / Date: 2/18/19      Surgeon: Dr. Salas    Referring Physician: Dr. Reed    SUBJECTIVE ASSESSMENT:    Pt reports pain well controlled on current regimen, denies dyspnea/SOB, fevers/chills.  She is breating comfortably on room air and ambulating in hallway.    Hospital Course:    77 y/o female with hx of HTN, HLD, DM, asthma, HLD, afib on coumadin, glaucoma, CAD s/p PCI, CHF, MR, who presented to [facility] recently with progressive SOB and found to have CAD s/p PCI. She continued to have SOB and minimal exercise tolerance as well as multiple episodes of dizziness/syncope with multiple falls. She presented to [place] with worsening SOB and was found to be in Afib with RVR and with pulmonary congestion. She was admitted to the ICU for diuresis and rate control and was found to have severe MR.  She was transferred to [facility] for further evaluation by Dr. Salas.  After completing pre-op workup she was brought to the OR on 2/18 for MVR/Cryomaze, and removal of LA thrombus.  She recieved 9u PRBC, 4FFP, and 5 Cryo intraop and was brought to CTICU post-op intubated and HD stable.  She was extubated on pod#1.  She had a left pigtail catheter placed for pleural effusion on pod#3 and was transferred to the step down unit and resumed her home dose of coumadin.  The tube was removed overnight into pod#4.  She has been breathing comfortably on room air and has been ambulating with rolling walker without fatigue or SOB.  She is presently stable and ready to return home on pod#6    Vital Signs Last 24 Hrs  T(C): 36.3 (24 Feb 2019 12:53), Max: 36.4 (23 Feb 2019 14:00)  T(F): 97.3 (24 Feb 2019 12:53), Max: 97.6 (23 Feb 2019 14:00)  HR: 92 (24 Feb 2019 10:00) (74 - 92)  BP: 156/67 (24 Feb 2019 10:00) (111/54 - 156/67)  BP(mean): 96 (24 Feb 2019 10:00) (77 - 96)  RR: 16 (24 Feb 2019 10:00) (16 - 22)  SpO2: 99% (24 Feb 2019 10:00) (97% - 100%)  I&O's Detail    23 Feb 2019 07:01  -  24 Feb 2019 07:00  --------------------------------------------------------  IN:    Oral Fluid: 1465 mL  Total IN: 1465 mL    OUT:    Voided: 950 mL  Total OUT: 950 mL    Total NET: 515 mL      24 Feb 2019 07:01  -  24 Feb 2019 13:28  --------------------------------------------------------  IN:    Oral Fluid: 420 mL  Total IN: 420 mL    OUT:    Voided: 900 mL  Total OUT: 900 mL    Total NET: -480 mL    EPICARDIAL WIRES REMOVED: Yes  TIE DOWNS REMOVED: Yes    PHYSICAL EXAM:    General: NAD    Neurological: A&Ox3    Cardiovascular: s1S2 RRR    Respiratory: CTA b/l no W/R/R    Gastrointestinal: soft NT/ND +BS    Extremities: no edema    Vascular: warm and well perfused bilaterally    Incision Sites: MSI C/D/I    LABS:                        8.6    11.84 )-----------( 158      ( 24 Feb 2019 05:55 )             26.7       COUMADIN:  Yes.        DOSE: 5mg                 INDICATION: DVT/Afib               GOAL INR: 2-3    PT/INR - ( 24 Feb 2019 05:55 )   PT: 32.2 sec;   INR: 2.76          PTT - ( 24 Feb 2019 05:55 )  PTT:61.2 sec    02-24    134<L>  |  100  |  18  ----------------------------<  105<H>  4.3   |  25  |  0.62    Ca    8.2<L>      24 Feb 2019 05:55  Mg     1.9     02-24            MEDICATIONS  (STANDING):  aspirin enteric coated 81 milliGRAM(s) Oral daily  atorvastatin 40 milliGRAM(s) Oral at bedtime  chlorhexidine 2% Cloths 1 Application(s) Topical daily  clopidogrel Tablet 75 milliGRAM(s) Oral daily  dextrose 5%. 1000 milliLiter(s) (50 mL/Hr) IV Continuous <Continuous>  dextrose 50% Injectable 50 milliLiter(s) IV Push every 15 minutes  docusate sodium 100 milliGRAM(s) Oral three times a day  furosemide    Tablet 40 milliGRAM(s) Oral daily  insulin lispro (HumaLOG) corrective regimen sliding scale   SubCutaneous Before meals and at bedtime  metoprolol tartrate 12.5 milliGRAM(s) Oral every 12 hours  montelukast 10 milliGRAM(s) Oral once  pantoprazole    Tablet 40 milliGRAM(s) Oral before breakfast  polyethylene glycol 3350 17 Gram(s) Oral daily  potassium chloride    Tablet ER 20 milliEquivalent(s) Oral daily  senna 1 Tablet(s) Oral at bedtime  sodium chloride 0.9% lock flush 3 milliLiter(s) IV Push every 8 hours  sodium chloride 0.9%. 1000 milliLiter(s) (10 mL/Hr) IV Continuous <Continuous>      Discharge CXR: no effusions/infiltrates

## 2019-02-24 NOTE — DISCHARGE NOTE ADULT - HOSPITAL COURSE
79 y/o female with hx of HTN, HLD, DM, asthma, HLD, afib on coumadin, glaucoma, CAD s/p PCI, CHF, MR, who presented to [facility] recently with progressive SOB and found to have CAD s/p PCI. She continued to have SOB and minimal exercise tolerance as well as multiple episodes of dizziness/syncope with multiple falls. She presented to [place] with worsening SOB and was found to be in Afib with RVR and with pulmonary congestion. She was admitted to the ICU for diuresis and rate control and was found to have severe MR.  She was transferred to [facility] for further evaluation by Dr. Salas.  After completing pre-op workup she was brought to the OR on 79 y/o female with hx of HTN, HLD, DM, asthma, HLD, afib on coumadin, glaucoma, CAD s/p PCI, CHF, MR, who presented to [facility] recently with progressive SOB and found to have CAD s/p PCI. She continued to have SOB and minimal exercise tolerance as well as multiple episodes of dizziness/syncope with multiple falls. She presented to [place] with worsening SOB and was found to be in Afib with RVR and with pulmonary congestion. She was admitted to the ICU for diuresis and rate control and was found to have severe MR.  She was transferred to [facility] for further evaluation by Dr. Salas.  After completing pre-op workup she was brought to the OR on 2/18 for MVR/Cryomaze, and removal of LA thrombus.  She recieved 9u PRBC, 4FFP, and 5 Cryo intraop and was brought to CTICU post-op intubated and HD stable.  She was extubated on pod#1.  She had a left pigtail catheter placed for pleural effusion on pod#3 and was transferred to the step down unit and resumed her home dose of coumadin.  The tube was removed overnight into pod#4.  She has been breathing comfortably on room air and has been ambulating with rolling walker without fatigue or SOB.  She is presently stable and ready to return home on pod#6    02/18/2019 Intraop received 9u pc/4u FFP, 1u plts, 5u Cryo. POD 1 Extubated,POD 2 ambulated, L. base ATX on Bipap x 3hours, coumadin 3mg x1POD 3 left pigtail placed for effusion. Out now. 79 y/o female with hx of HTN, HLD, DM, asthma, HLD, afib on coumadin, glaucoma, CAD s/p PCI, CHF, MR, who presented to [facility] recently with progressive SOB and found to have CAD s/p PCI. She continued to have SOB and minimal exercise tolerance as well as multiple episodes of dizziness/syncope with multiple falls. She presented to [place] with worsening SOB and was found to be in Afib with RVR and with pulmonary congestion. She was admitted to the ICU for diuresis and rate control and was found to have severe MR.  She was transferred to [facility] for further evaluation by Dr. Salas.  After completing pre-op workup she was brought to the OR on 2/18 for MVR/Cryomaze, and removal of LA thrombus.  She recieved 9u PRBC, 4FFP, and 5 Cryo intraop and was brought to CTICU post-op intubated and HD stable.  She was extubated on pod#1.  She had a left pigtail catheter placed for pleural effusion on pod#3 and was transferred to the step down unit and resumed her home dose of coumadin.  The tube was removed overnight into pod#4.  She has been breathing comfortably on room air and has been ambulating with rolling walker without fatigue or SOB.  She is presently stable and ready to return home on pod#6    35 minutes was spent with the patient reviewing the discharge material including medications, follow up appointments, recovery, concerning symptoms, and how to contact their health care providers if they have questions

## 2019-02-24 NOTE — DISCHARGE NOTE ADULT - PATIENT PORTAL LINK FT
You can access the [facility] Patient Portal, offered by [facility], by registering with the following website: [email or web address]

## 2019-02-24 NOTE — DISCHARGE NOTE ADULT - PLAN OF CARE
see below -Please follow up with Dr. Salas on 3/5/19 at 10:30 AM.  The office is located at [facility], [facility] 4th Floor.  Call us with any questions #[phone].  Follow up with Dr. Brien Reed on 2/28 at 1:45PM.  -No driving or strenuous activity for 6 weeks, or until cleared by your surgeon.  Avoid lifting >10lbs.   -Continue to walk daily as tolerated and use your incentive spirometer every hour.  -Gently clean your incision(s) with anti-bacterial soap and water, pat dry and leave open to air.  We recommend liquid Dial.    -Call your doctor if you have shortness of breath, chest pain not relieved by pain medication, dizziness, fever >101.5, or increased redness/drainage from your incision. -Please follow up with Dr. Salas on 3/5/19 at 10:30 AM.  The office is located at [facility], [facility] 4th Floor.  Call us with any questions #[phone].  Follow up with Dr. Brien Reed on 2/28 at 1:45PM.  Follow up with Dr. Michael Fitch on Tuesday 2/26/19 at 3:00 PM for an INR check. Office is lcoated at [address] [address], [facility] [address]  -No driving or strenuous activity for 6 weeks, or until cleared by your surgeon.  Avoid lifting >10lbs.   -Continue to walk daily as tolerated and use your incentive spirometer every hour.  -Gently clean your incision(s) with anti-bacterial soap and water, pat dry and leave open to air.  We recommend liquid Dial.    -Call your doctor if you have shortness of breath, chest pain not relieved by pain medication, dizziness, fever >101.5, or increased redness/drainage from your incision.

## 2019-02-24 NOTE — DISCHARGE NOTE ADULT - CARE PLAN
Goal:	see below  Assessment and plan of treatment:	-Please follow up with Dr. Salas on 3/5/19 at 10:30 AM.  The office is located at [facility], [facility] 4th Floor.  Call us with any questions #[phone].  Follow up with Dr. Brien Reed on 2/28 at 1:45PM.  -No driving or strenuous activity for 6 weeks, or until cleared by your surgeon.  Avoid lifting >10lbs.   -Continue to walk daily as tolerated and use your incentive spirometer every hour.  -Gently clean your incision(s) with anti-bacterial soap and water, pat dry and leave open to air.  We recommend liquid Dial.    -Call your doctor if you have shortness of breath, chest pain not relieved by pain medication, dizziness, fever >101.5, or increased redness/drainage from your incision. Principal Discharge DX:	Essential hypertension  Goal:	see below  Assessment and plan of treatment:	-Please follow up with Dr. Salas on 3/5/19 at 10:30 AM.  The office is located at [facility], [facility] 4th Floor.  Call us with any questions #[phone].  Follow up with Dr. Brien Reed on 2/28 at 1:45PM.  -No driving or strenuous activity for 6 weeks, or until cleared by your surgeon.  Avoid lifting >10lbs.   -Continue to walk daily as tolerated and use your incentive spirometer every hour.  -Gently clean your incision(s) with anti-bacterial soap and water, pat dry and leave open to air.  We recommend liquid Dial.    -Call your doctor if you have shortness of breath, chest pain not relieved by pain medication, dizziness, fever >101.5, or increased redness/drainage from your incision. Principal Discharge DX:	Essential hypertension  Goal:	see below  Assessment and plan of treatment:	-Please follow up with Dr. Salas on 3/5/19 at 10:30 AM.  The office is located at [facility], [facility] 4th Floor.  Call us with any questions #[phone].  Follow up with Dr. Brien Reed on 2/28 at 1:45PM.  Follow up with Dr. Michael Fitch on Tuesday 2/26/19 at 3:00 PM for an INR check. Office is lcoated at [address] [address]  -No driving or strenuous activity for 6 weeks, or until cleared by your surgeon.  Avoid lifting >10lbs.   -Continue to walk daily as tolerated and use your incentive spirometer every hour.  -Gently clean your incision(s) with anti-bacterial soap and water, pat dry and leave open to air.  We recommend liquid Dial.    -Call your doctor if you have shortness of breath, chest pain not relieved by pain medication, dizziness, fever >101.5, or increased redness/drainage from your incision.

## 2019-02-28 VITALS — BODY MASS INDEX: 29.36 KG/M2 | HEIGHT: 64.02 IN | WEIGHT: 171.96 LBS

## 2019-02-28 PROBLEM — Z98.890 STATUS POST CRYOABLATION OF ARRHYTHMIA: Status: ACTIVE | Noted: 2019-02-28

## 2019-02-28 PROBLEM — I51.3 LEFT ATRIAL THROMBUS: Status: ACTIVE | Noted: 2019-02-28

## 2019-02-28 PROBLEM — Z95.5 PRESENCE OF STENT IN LAD CORONARY ARTERY: Status: ACTIVE | Noted: 2019-02-28

## 2019-02-28 PROBLEM — Z86.39 HISTORY OF DIABETES MELLITUS: Status: RESOLVED | Noted: 2019-02-28 | Resolved: 2019-02-28

## 2019-02-28 PROBLEM — I50.33 ACUTE ON CHRONIC DIASTOLIC CHF (CONGESTIVE HEART FAILURE): Status: RESOLVED | Noted: 2019-02-28 | Resolved: 2019-02-28

## 2019-02-28 PROBLEM — Z95.3 S/P MITRAL VALVE REPLACEMENT WITH TISSUE VALVE: Status: ACTIVE | Noted: 2019-02-28

## 2019-02-28 PROBLEM — Z09 POSTOP CHECK: Status: ACTIVE | Noted: 2019-02-28

## 2019-02-28 PROBLEM — Z86.79 HISTORY OF HYPERTENSION: Status: RESOLVED | Noted: 2019-02-28 | Resolved: 2019-02-28

## 2019-02-28 PROBLEM — Z86.69 HISTORY OF GLAUCOMA: Status: RESOLVED | Noted: 2019-02-28 | Resolved: 2019-02-28

## 2019-02-28 PROBLEM — I05.0 MITRAL STENOSIS: Status: ACTIVE | Noted: 2019-02-28

## 2019-02-28 PROBLEM — Z87.09 HISTORY OF ASTHMA: Status: RESOLVED | Noted: 2019-02-28 | Resolved: 2019-02-28

## 2019-02-28 PROBLEM — Z86.39 HISTORY OF HYPERLIPIDEMIA: Status: RESOLVED | Noted: 2019-02-28 | Resolved: 2019-02-28

## 2019-02-28 PROBLEM — Z86.79 HISTORY OF CORONARY ARTERY DISEASE: Status: RESOLVED | Noted: 2019-02-28 | Resolved: 2019-02-28

## 2019-02-28 PROBLEM — I34.0 MITRAL REGURGITATION: Status: ACTIVE | Noted: 2019-02-28

## 2019-02-28 PROBLEM — Z86.79 HISTORY OF CHRONIC ATRIAL FIBRILLATION: Status: RESOLVED | Noted: 2019-02-28 | Resolved: 2019-02-28

## 2019-02-28 RX ORDER — CLOPIDOGREL BISULFATE 75 MG/1
75 TABLET, FILM COATED ORAL DAILY
Qty: 1 | Refills: 3 | Status: ACTIVE | COMMUNITY

## 2019-02-28 RX ORDER — WARFARIN SODIUM 5 MG/1
5 TABLET ORAL DAILY
Qty: 30 | Refills: 0 | Status: ACTIVE | COMMUNITY

## 2019-02-28 RX ORDER — PANTOPRAZOLE 40 MG/1
40 TABLET, DELAYED RELEASE ORAL DAILY
Refills: 5 | Status: ACTIVE | COMMUNITY

## 2019-02-28 RX ORDER — SITAGLIPTIN AND METFORMIN HYDROCHLORIDE 50; 1000 MG/1; MG/1
50-1000 TABLET, FILM COATED ORAL TWICE DAILY
Qty: 60 | Refills: 3 | Status: ACTIVE | COMMUNITY

## 2019-02-28 RX ORDER — BUDESONIDE AND FORMOTEROL FUMARATE DIHYDRATE 80; 4.5 UG/1; UG/1
80-4.5 AEROSOL RESPIRATORY (INHALATION) TWICE DAILY
Qty: 1 | Refills: 2 | Status: ACTIVE | COMMUNITY

## 2019-02-28 RX ORDER — MONTELUKAST 10 MG/1
10 TABLET, FILM COATED ORAL DAILY
Refills: 0 | Status: ACTIVE | COMMUNITY

## 2019-02-28 RX ORDER — BRINZOLAMIDE 10 MG/ML
1 SUSPENSION/ DROPS OPHTHALMIC
Refills: 0 | Status: ACTIVE | COMMUNITY

## 2019-02-28 RX ORDER — ATORVASTATIN CALCIUM 40 MG/1
40 TABLET, FILM COATED ORAL
Qty: 30 | Refills: 3 | Status: ACTIVE | COMMUNITY

## 2019-02-28 RX ORDER — ASPIRIN 81 MG
81 TABLET, DELAYED RELEASE (ENTERIC COATED) ORAL DAILY
Qty: 30 | Refills: 6 | Status: ACTIVE | COMMUNITY

## 2019-02-28 RX ORDER — AZELASTINE HYDROCHLORIDE 0.5 MG/ML
0.05 SOLUTION/ DROPS OPHTHALMIC
Refills: 0 | Status: ACTIVE | COMMUNITY

## 2019-02-28 RX ORDER — FUROSEMIDE 40 MG/1
40 TABLET ORAL DAILY
Qty: 30 | Refills: 0 | Status: ACTIVE | COMMUNITY

## 2019-02-28 RX ORDER — OXYCODONE HYDROCHLORIDE AND ACETAMINOPHEN 5; 325 MG/1; MG/1
5-325 TABLET ORAL
Refills: 0 | Status: ACTIVE | COMMUNITY

## 2019-02-28 RX ORDER — METOPROLOL SUCCINATE 25 MG/1
25 TABLET, EXTENDED RELEASE ORAL
Qty: 1 | Refills: 3 | Status: ACTIVE | COMMUNITY

## 2019-02-28 RX ORDER — POTASSIUM CHLORIDE 1500 MG/1
20 TABLET, FILM COATED, EXTENDED RELEASE ORAL
Refills: 0 | Status: ACTIVE | COMMUNITY

## 2019-03-01 ENCOUNTER — APPOINTMENT (OUTPATIENT)
Dept: CARDIOTHORACIC SURGERY | Facility: CLINIC | Age: 79
End: 2019-03-01

## 2019-03-04 ENCOUNTER — FORM ENCOUNTER (OUTPATIENT)
Age: 79
End: 2019-03-04

## 2019-03-05 ENCOUNTER — OUTPATIENT (OUTPATIENT)
Dept: OUTPATIENT SERVICES | Facility: HOSPITAL | Age: 79
LOS: 1 days | End: 2019-03-05
Payer: MEDICARE

## 2019-03-05 ENCOUNTER — APPOINTMENT (OUTPATIENT)
Dept: CARDIOTHORACIC SURGERY | Facility: CLINIC | Age: 79
End: 2019-03-05
Payer: MEDICARE

## 2019-03-05 VITALS
WEIGHT: 156 LBS | HEART RATE: 100 BPM | DIASTOLIC BLOOD PRESSURE: 51 MMHG | SYSTOLIC BLOOD PRESSURE: 82 MMHG | BODY MASS INDEX: 26.76 KG/M2 | TEMPERATURE: 97.8 F | OXYGEN SATURATION: 99 % | RESPIRATION RATE: 20 BRPM

## 2019-03-05 DIAGNOSIS — Z86.718 PERSONAL HISTORY OF OTHER VENOUS THROMBOSIS AND EMBOLISM: ICD-10-CM

## 2019-03-05 DIAGNOSIS — R55 SYNCOPE AND COLLAPSE: ICD-10-CM

## 2019-03-05 DIAGNOSIS — I34.0 NONRHEUMATIC MITRAL (VALVE) INSUFFICIENCY: ICD-10-CM

## 2019-03-05 DIAGNOSIS — Z98.890 OTHER SPECIFIED POSTPROCEDURAL STATES: ICD-10-CM

## 2019-03-05 DIAGNOSIS — Z86.79 PERSONAL HISTORY OF OTHER DISEASES OF THE CIRCULATORY SYSTEM: ICD-10-CM

## 2019-03-05 DIAGNOSIS — D62 ACUTE POSTHEMORRHAGIC ANEMIA: ICD-10-CM

## 2019-03-05 DIAGNOSIS — Z95.5 PRESENCE OF CORONARY ANGIOPLASTY IMPLANT AND GRAFT: ICD-10-CM

## 2019-03-05 DIAGNOSIS — Z95.9 PRESENCE OF CARDIAC AND VASCULAR IMPLANT AND GRAFT, UNSPECIFIED: Chronic | ICD-10-CM

## 2019-03-05 DIAGNOSIS — I25.10 ATHEROSCLEROTIC HEART DISEASE OF NATIVE CORONARY ARTERY WITHOUT ANGINA PECTORIS: ICD-10-CM

## 2019-03-05 DIAGNOSIS — Z95.3 PRESENCE OF XENOGENIC HEART VALVE: ICD-10-CM

## 2019-03-05 DIAGNOSIS — Z09 ENCOUNTER FOR FOLLOW-UP EXAMINATION AFTER COMPLETED TREATMENT FOR CONDITIONS OTHER THAN MALIGNANT NEOPLASM: ICD-10-CM

## 2019-03-05 DIAGNOSIS — Z79.02 LONG TERM (CURRENT) USE OF ANTITHROMBOTICS/ANTIPLATELETS: ICD-10-CM

## 2019-03-05 DIAGNOSIS — J44.9 CHRONIC OBSTRUCTIVE PULMONARY DISEASE, UNSPECIFIED: ICD-10-CM

## 2019-03-05 DIAGNOSIS — I05.0 RHEUMATIC MITRAL STENOSIS: ICD-10-CM

## 2019-03-05 DIAGNOSIS — E78.5 HYPERLIPIDEMIA, UNSPECIFIED: ICD-10-CM

## 2019-03-05 DIAGNOSIS — Z86.69 PERSONAL HISTORY OF OTHER DISEASES OF THE NERVOUS SYSTEM AND SENSE ORGANS: ICD-10-CM

## 2019-03-05 DIAGNOSIS — R00.1 BRADYCARDIA, UNSPECIFIED: ICD-10-CM

## 2019-03-05 DIAGNOSIS — Z79.84 LONG TERM (CURRENT) USE OF ORAL HYPOGLYCEMIC DRUGS: ICD-10-CM

## 2019-03-05 DIAGNOSIS — Z86.39 PERSONAL HISTORY OF OTHER ENDOCRINE, NUTRITIONAL AND METABOLIC DISEASE: ICD-10-CM

## 2019-03-05 DIAGNOSIS — I05.2 RHEUMATIC MITRAL STENOSIS WITH INSUFFICIENCY: ICD-10-CM

## 2019-03-05 DIAGNOSIS — I50.33 ACUTE ON CHRONIC DIASTOLIC (CONGESTIVE) HEART FAILURE: ICD-10-CM

## 2019-03-05 DIAGNOSIS — J90 PLEURAL EFFUSION, NOT ELSEWHERE CLASSIFIED: ICD-10-CM

## 2019-03-05 DIAGNOSIS — I11.0 HYPERTENSIVE HEART DISEASE WITH HEART FAILURE: ICD-10-CM

## 2019-03-05 DIAGNOSIS — Z79.01 LONG TERM (CURRENT) USE OF ANTICOAGULANTS: ICD-10-CM

## 2019-03-05 DIAGNOSIS — Z87.09 PERSONAL HISTORY OF OTHER DISEASES OF THE RESPIRATORY SYSTEM: ICD-10-CM

## 2019-03-05 DIAGNOSIS — Z91.81 HISTORY OF FALLING: ICD-10-CM

## 2019-03-05 DIAGNOSIS — I48.2 CHRONIC ATRIAL FIBRILLATION: ICD-10-CM

## 2019-03-05 DIAGNOSIS — Z86.79 OTHER SPECIFIED POSTPROCEDURAL STATES: ICD-10-CM

## 2019-03-05 DIAGNOSIS — I51.3 INTRACARDIAC THROMBOSIS, NOT ELSEWHERE CLASSIFIED: ICD-10-CM

## 2019-03-05 DIAGNOSIS — Z98.890 OTHER SPECIFIED POSTPROCEDURAL STATES: Chronic | ICD-10-CM

## 2019-03-05 DIAGNOSIS — E11.9 TYPE 2 DIABETES MELLITUS WITHOUT COMPLICATIONS: ICD-10-CM

## 2019-03-05 DIAGNOSIS — E87.2 ACIDOSIS: ICD-10-CM

## 2019-03-05 DIAGNOSIS — H40.9 UNSPECIFIED GLAUCOMA: ICD-10-CM

## 2019-03-05 PROCEDURE — 99024 POSTOP FOLLOW-UP VISIT: CPT

## 2019-03-05 PROCEDURE — 71046 X-RAY EXAM CHEST 2 VIEWS: CPT

## 2019-03-05 PROCEDURE — 71046 X-RAY EXAM CHEST 2 VIEWS: CPT | Mod: 26

## 2019-04-10 ENCOUNTER — MESSAGE (OUTPATIENT)
Age: 79
End: 2019-04-10

## 2019-04-18 NOTE — PROGRESS NOTE ADULT - ASSESSMENT
77 yo female h/o dvt on coumadin, copd, dm, asthma, a/w c.o sob, curtis.  found to be in afib with rvr and acute heart failure  PE ruled out  ACS ruled out    afib  cards following  converted to NSR  cont with cardizem for now  tele monitor  found to have atrial thrombus on echo  dccv cancelled  s/p cath. results noted with LAD dz, valvular dz, and fistula  [facility]  pending OR monday   coumadin on hold  on hep gtt       acute heart failure  diuresis with lasix   strict i/o    DVT   on AC with hep gtt    asthma/copd  stable  nebs prn  pulm f/u    PT 1

## 2019-08-22 NOTE — DIETITIAN INITIAL EVALUATION ADULT. - PATIENT PROFILE REVIEWED
Chief Complaint   Patient presents with     Confirmation Of Pregnancy       Initial /64 (BP Location: Left arm, Patient Position: Sitting, Cuff Size: Adult Regular)   Pulse 68   Wt 60.6 kg (133 lb 8 oz)   LMP 07/18/2019 (Exact Date)  There is no height or weight on file to calculate BMI.  Medication Reconciliation: complete    Jami Thomas CMA     yes

## 2020-01-09 NOTE — PHYSICAL THERAPY INITIAL EVALUATION ADULT - AMBULATION SKILLS, REHAB EVAL
Patient seen by contract labor provider, Yissel Hirsch. See scanned progress note under Media-Provider Medical Statement-Written Office Notes.    Charges for this Encounter:      [address]99 Strep Neg Lot [id number] Exp 2/28/2021   independent

## 2020-01-27 NOTE — PHYSICAL THERAPY INITIAL EVALUATION ADULT - MANUAL MUSCLE TESTING RESULTS, REHAB EVAL
Bilateral UE muscle strength grossly 3/5 Throughout; Bilateral LE muscle strength grossly 3/5 Throughout. [place] Counseling: I discussed with the patient the risks of ixekizumab including but not limited to immunosuppression, serious infections, worsening of inflammatory bowel disease and drug reactions.  The patient understands that monitoring is required including a PPD at baseline and must alert us or the primary physician if symptoms of infection or other concerning signs are noted.

## 2020-09-22 NOTE — PRE-OP CHECKLIST - WAS PATIENT ON BETA BLOCKER?
Problem: Falls - Risk of  Goal: *Absence of Falls  Description: Document Denia Reagan Fall Risk and appropriate interventions in the flowsheet. Outcome: Progressing Towards Goal  Note: Fall Risk Interventions:  Mobility Interventions: Assess mobility with egress test         Medication Interventions: Patient to call before getting OOB    Elimination Interventions: Call light in reach              Problem: Pain  Goal: *Control of Pain  Outcome: Progressing Towards Goal     Problem: Fluid Volume - Risk of, Imbalanced  Goal: *Balanced intake and output  Outcome: Progressing Towards Goal     Problem: Pressure Injury - Risk of  Goal: *Prevention of pressure injury  Description: Document Frederic Scale and appropriate interventions in the flowsheet.   Outcome: Progressing Towards Goal  Note: Pressure Injury Interventions:  Sensory Interventions: Assess need for specialty bed, Assess changes in LOC    Moisture Interventions: Absorbent underpads    Activity Interventions: Increase time out of bed, PT/OT evaluation    Mobility Interventions: HOB 30 degrees or less    Nutrition Interventions: Document food/fluid/supplement intake, Discuss nutritional consult with provider                     Problem: Heart Failure: Day 4  Goal: Off Pathway (Use only if patient is Off Pathway)  Outcome: Progressing Towards Goal     Problem: Heart Failure: Day 4  Goal: Activity/Safety  Outcome: Progressing Towards Goal     Problem: Heart Failure: Day 4  Goal: Diagnostic Test/Procedures  Outcome: Progressing Towards Goal     Problem: Heart Failure: Day 4  Goal: Nutrition/Diet  Outcome: Progressing Towards Goal     Problem: Heart Failure: Day 4  Goal: Discharge Planning  Outcome: Progressing Towards Goal     Problem: Heart Failure: Day 4  Goal: Medications  Outcome: Progressing Towards Goal     Problem: Heart Failure: Day 4  Goal: Respiratory  Outcome: Progressing Towards Goal     Problem: Heart Failure: Day 4  Goal: Treatments/Interventions/Procedures  Outcome: Progressing Towards Goal Yes

## 2021-10-06 ENCOUNTER — INPATIENT (INPATIENT)
Facility: HOSPITAL | Age: 81
LOS: 5 days | Discharge: HOME | DRG: 378 | End: 2021-10-12
Attending: INTERNAL MEDICINE | Admitting: INTERNAL MEDICINE
Payer: COMMERCIAL

## 2021-10-06 VITALS
RESPIRATION RATE: 18 BRPM | SYSTOLIC BLOOD PRESSURE: 132 MMHG | TEMPERATURE: 98 F | DIASTOLIC BLOOD PRESSURE: 68 MMHG | OXYGEN SATURATION: 99 % | HEIGHT: 64 IN | HEART RATE: 78 BPM | WEIGHT: 151.9 LBS

## 2021-10-06 DIAGNOSIS — Z95.9 PRESENCE OF CARDIAC AND VASCULAR IMPLANT AND GRAFT, UNSPECIFIED: Chronic | ICD-10-CM

## 2021-10-06 DIAGNOSIS — Z98.890 OTHER SPECIFIED POSTPROCEDURAL STATES: Chronic | ICD-10-CM

## 2021-10-06 DIAGNOSIS — K92.2 GASTROINTESTINAL HEMORRHAGE, UNSPECIFIED: ICD-10-CM

## 2021-10-06 LAB
ALBUMIN SERPL ELPH-MCNC: 3.7 G/DL — SIGNIFICANT CHANGE UP (ref 3.3–5)
ALP SERPL-CCNC: 261 U/L — HIGH (ref 40–120)
ALT FLD-CCNC: 66 U/L — HIGH (ref 10–45)
ANION GAP SERPL CALC-SCNC: 10 MMOL/L — SIGNIFICANT CHANGE UP (ref 5–17)
ANION GAP SERPL CALC-SCNC: 10 MMOL/L — SIGNIFICANT CHANGE UP (ref 5–17)
ANION GAP SERPL CALC-SCNC: 8 MMOL/L — SIGNIFICANT CHANGE UP (ref 5–17)
APPEARANCE UR: CLEAR — SIGNIFICANT CHANGE UP
APTT BLD: 47.3 SEC — HIGH (ref 27.5–35.5)
AST SERPL-CCNC: 152 U/L — HIGH (ref 10–40)
BACTERIA # UR AUTO: NEGATIVE — SIGNIFICANT CHANGE UP
BASE EXCESS BLDV CALC-SCNC: -2 MMOL/L — SIGNIFICANT CHANGE UP (ref -2–2)
BASOPHILS # BLD AUTO: 0.02 K/UL — SIGNIFICANT CHANGE UP (ref 0–0.2)
BASOPHILS NFR BLD AUTO: 0.3 % — SIGNIFICANT CHANGE UP (ref 0–2)
BILIRUB SERPL-MCNC: 1.2 MG/DL — SIGNIFICANT CHANGE UP (ref 0.2–1.2)
BILIRUB UR-MCNC: NEGATIVE — SIGNIFICANT CHANGE UP
BLD GP AB SCN SERPL QL: NEGATIVE — SIGNIFICANT CHANGE UP
BUN SERPL-MCNC: 10 MG/DL — SIGNIFICANT CHANGE UP (ref 7–23)
BUN SERPL-MCNC: 10 MG/DL — SIGNIFICANT CHANGE UP (ref 7–23)
BUN SERPL-MCNC: 11 MG/DL — SIGNIFICANT CHANGE UP (ref 7–23)
CA-I SERPL-SCNC: 1.12 MMOL/L — LOW (ref 1.15–1.33)
CALCIUM SERPL-MCNC: 7.7 MG/DL — LOW (ref 8.4–10.5)
CALCIUM SERPL-MCNC: 8.2 MG/DL — LOW (ref 8.4–10.5)
CALCIUM SERPL-MCNC: 8.2 MG/DL — LOW (ref 8.4–10.5)
CHLORIDE BLDV-SCNC: 104 MMOL/L — SIGNIFICANT CHANGE UP (ref 96–108)
CHLORIDE SERPL-SCNC: 104 MMOL/L — SIGNIFICANT CHANGE UP (ref 96–108)
CHLORIDE SERPL-SCNC: 105 MMOL/L — SIGNIFICANT CHANGE UP (ref 96–108)
CHLORIDE SERPL-SCNC: 105 MMOL/L — SIGNIFICANT CHANGE UP (ref 96–108)
CO2 BLDV-SCNC: 26 MMOL/L — SIGNIFICANT CHANGE UP (ref 22–26)
CO2 SERPL-SCNC: 19 MMOL/L — LOW (ref 22–31)
CO2 SERPL-SCNC: 20 MMOL/L — LOW (ref 22–31)
CO2 SERPL-SCNC: 21 MMOL/L — LOW (ref 22–31)
COLOR SPEC: YELLOW — SIGNIFICANT CHANGE UP
CREAT SERPL-MCNC: 0.6 MG/DL — SIGNIFICANT CHANGE UP (ref 0.5–1.3)
CREAT SERPL-MCNC: 0.64 MG/DL — SIGNIFICANT CHANGE UP (ref 0.5–1.3)
CREAT SERPL-MCNC: 0.68 MG/DL — SIGNIFICANT CHANGE UP (ref 0.5–1.3)
DIFF PNL FLD: ABNORMAL
EOSINOPHIL # BLD AUTO: 0.18 K/UL — SIGNIFICANT CHANGE UP (ref 0–0.5)
EOSINOPHIL NFR BLD AUTO: 3 % — SIGNIFICANT CHANGE UP (ref 0–6)
EPI CELLS # UR: 2 /HPF — SIGNIFICANT CHANGE UP
GAS PNL BLDV: 133 MMOL/L — LOW (ref 136–145)
GAS PNL BLDV: SIGNIFICANT CHANGE UP
GAS PNL BLDV: SIGNIFICANT CHANGE UP
GLUCOSE BLDC GLUCOMTR-MCNC: 148 MG/DL — HIGH (ref 70–99)
GLUCOSE BLDV-MCNC: 94 MG/DL — SIGNIFICANT CHANGE UP (ref 70–99)
GLUCOSE SERPL-MCNC: 86 MG/DL — SIGNIFICANT CHANGE UP (ref 70–99)
GLUCOSE SERPL-MCNC: 87 MG/DL — SIGNIFICANT CHANGE UP (ref 70–99)
GLUCOSE SERPL-MCNC: 91 MG/DL — SIGNIFICANT CHANGE UP (ref 70–99)
GLUCOSE UR QL: NEGATIVE — SIGNIFICANT CHANGE UP
HCO3 BLDV-SCNC: 25 MMOL/L — SIGNIFICANT CHANGE UP (ref 22–29)
HCT VFR BLD CALC: 24.1 % — LOW (ref 34.5–45)
HCT VFR BLD CALC: 25.8 % — LOW (ref 34.5–45)
HCT VFR BLDA CALC: 38 % — SIGNIFICANT CHANGE UP (ref 34.5–46.5)
HGB BLD CALC-MCNC: 12.7 G/DL — SIGNIFICANT CHANGE UP (ref 11.7–16.1)
HGB BLD-MCNC: 7.5 G/DL — LOW (ref 11.5–15.5)
HGB BLD-MCNC: 8 G/DL — LOW (ref 11.5–15.5)
IMM GRANULOCYTES NFR BLD AUTO: 0.7 % — SIGNIFICANT CHANGE UP (ref 0–1.5)
INR BLD: 3.91 RATIO — HIGH (ref 0.88–1.16)
KETONES UR-MCNC: NEGATIVE — SIGNIFICANT CHANGE UP
LACTATE BLDV-MCNC: 1.6 MMOL/L — SIGNIFICANT CHANGE UP (ref 0.7–2)
LEUKOCYTE ESTERASE UR-ACNC: NEGATIVE — SIGNIFICANT CHANGE UP
LYMPHOCYTES # BLD AUTO: 0.94 K/UL — LOW (ref 1–3.3)
LYMPHOCYTES # BLD AUTO: 15.9 % — SIGNIFICANT CHANGE UP (ref 13–44)
MCHC RBC-ENTMCNC: 27.9 PG — SIGNIFICANT CHANGE UP (ref 27–34)
MCHC RBC-ENTMCNC: 27.9 PG — SIGNIFICANT CHANGE UP (ref 27–34)
MCHC RBC-ENTMCNC: 31 GM/DL — LOW (ref 32–36)
MCHC RBC-ENTMCNC: 31.1 GM/DL — LOW (ref 32–36)
MCV RBC AUTO: 89.6 FL — SIGNIFICANT CHANGE UP (ref 80–100)
MCV RBC AUTO: 89.9 FL — SIGNIFICANT CHANGE UP (ref 80–100)
MONOCYTES # BLD AUTO: 0.52 K/UL — SIGNIFICANT CHANGE UP (ref 0–0.9)
MONOCYTES NFR BLD AUTO: 8.8 % — SIGNIFICANT CHANGE UP (ref 2–14)
NEUTROPHILS # BLD AUTO: 4.22 K/UL — SIGNIFICANT CHANGE UP (ref 1.8–7.4)
NEUTROPHILS NFR BLD AUTO: 71.3 % — SIGNIFICANT CHANGE UP (ref 43–77)
NITRITE UR-MCNC: NEGATIVE — SIGNIFICANT CHANGE UP
NRBC # BLD: 0 /100 WBCS — SIGNIFICANT CHANGE UP (ref 0–0)
NRBC # BLD: 0 /100 WBCS — SIGNIFICANT CHANGE UP (ref 0–0)
PCO2 BLDV: 49 MMHG — HIGH (ref 39–42)
PH BLDV: 7.31 — LOW (ref 7.32–7.43)
PH UR: 6.5 — SIGNIFICANT CHANGE UP (ref 5–8)
PLATELET # BLD AUTO: 175 K/UL — SIGNIFICANT CHANGE UP (ref 150–400)
PLATELET # BLD AUTO: 197 K/UL — SIGNIFICANT CHANGE UP (ref 150–400)
PO2 BLDV: 18 MMHG — LOW (ref 25–45)
POTASSIUM BLDV-SCNC: 5.3 MMOL/L — HIGH (ref 3.5–5.1)
POTASSIUM SERPL-MCNC: 4.9 MMOL/L — SIGNIFICANT CHANGE UP (ref 3.5–5.3)
POTASSIUM SERPL-MCNC: 5.7 MMOL/L — HIGH (ref 3.5–5.3)
POTASSIUM SERPL-MCNC: 6.4 MMOL/L — CRITICAL HIGH (ref 3.5–5.3)
POTASSIUM SERPL-SCNC: 4.9 MMOL/L — SIGNIFICANT CHANGE UP (ref 3.5–5.3)
POTASSIUM SERPL-SCNC: 5.7 MMOL/L — HIGH (ref 3.5–5.3)
POTASSIUM SERPL-SCNC: 6.4 MMOL/L — CRITICAL HIGH (ref 3.5–5.3)
PROT SERPL-MCNC: 9.3 G/DL — HIGH (ref 6–8.3)
PROT UR-MCNC: NEGATIVE — SIGNIFICANT CHANGE UP
PROTHROM AB SERPL-ACNC: 44 SEC — HIGH (ref 10.6–13.6)
RBC # BLD: 2.69 M/UL — LOW (ref 3.8–5.2)
RBC # BLD: 2.87 M/UL — LOW (ref 3.8–5.2)
RBC # FLD: 16.5 % — HIGH (ref 10.3–14.5)
RBC # FLD: 16.6 % — HIGH (ref 10.3–14.5)
RBC CASTS # UR COMP ASSIST: 1 /HPF — SIGNIFICANT CHANGE UP (ref 0–4)
RH IG SCN BLD-IMP: POSITIVE — SIGNIFICANT CHANGE UP
SAO2 % BLDV: 22.8 % — LOW (ref 67–88)
SARS-COV-2 RNA SPEC QL NAA+PROBE: SIGNIFICANT CHANGE UP
SODIUM SERPL-SCNC: 132 MMOL/L — LOW (ref 135–145)
SODIUM SERPL-SCNC: 135 MMOL/L — SIGNIFICANT CHANGE UP (ref 135–145)
SODIUM SERPL-SCNC: 135 MMOL/L — SIGNIFICANT CHANGE UP (ref 135–145)
SP GR SPEC: 1.02 — SIGNIFICANT CHANGE UP (ref 1.01–1.02)
UROBILINOGEN FLD QL: NEGATIVE — SIGNIFICANT CHANGE UP
WBC # BLD: 5.49 K/UL — SIGNIFICANT CHANGE UP (ref 3.8–10.5)
WBC # BLD: 5.92 K/UL — SIGNIFICANT CHANGE UP (ref 3.8–10.5)
WBC # FLD AUTO: 5.49 K/UL — SIGNIFICANT CHANGE UP (ref 3.8–10.5)
WBC # FLD AUTO: 5.92 K/UL — SIGNIFICANT CHANGE UP (ref 3.8–10.5)
WBC UR QL: 2 /HPF — SIGNIFICANT CHANGE UP (ref 0–5)

## 2021-10-06 PROCEDURE — 71045 X-RAY EXAM CHEST 1 VIEW: CPT | Mod: 26

## 2021-10-06 PROCEDURE — 74177 CT ABD & PELVIS W/CONTRAST: CPT | Mod: 26

## 2021-10-06 PROCEDURE — 99285 EMERGENCY DEPT VISIT HI MDM: CPT | Mod: CS

## 2021-10-06 PROCEDURE — 76937 US GUIDE VASCULAR ACCESS: CPT | Mod: 26

## 2021-10-06 PROCEDURE — 99223 1ST HOSP IP/OBS HIGH 75: CPT | Mod: GC

## 2021-10-06 PROCEDURE — 93010 ELECTROCARDIOGRAM REPORT: CPT

## 2021-10-06 RX ORDER — DEXTROSE 50 % IN WATER 50 %
12.5 SYRINGE (ML) INTRAVENOUS ONCE
Refills: 0 | Status: DISCONTINUED | OUTPATIENT
Start: 2021-10-06 | End: 2021-10-12

## 2021-10-06 RX ORDER — ATORVASTATIN CALCIUM 80 MG/1
1 TABLET, FILM COATED ORAL
Qty: 0 | Refills: 0 | DISCHARGE

## 2021-10-06 RX ORDER — SODIUM ZIRCONIUM CYCLOSILICATE 10 G/10G
10 POWDER, FOR SUSPENSION ORAL
Refills: 0 | Status: DISCONTINUED | OUTPATIENT
Start: 2021-10-06 | End: 2021-10-07

## 2021-10-06 RX ORDER — ATORVASTATIN CALCIUM 80 MG/1
20 TABLET, FILM COATED ORAL AT BEDTIME
Refills: 0 | Status: DISCONTINUED | OUTPATIENT
Start: 2021-10-06 | End: 2021-10-07

## 2021-10-06 RX ORDER — METOPROLOL TARTRATE 50 MG
25 TABLET ORAL DAILY
Refills: 0 | Status: DISCONTINUED | OUTPATIENT
Start: 2021-10-06 | End: 2021-10-12

## 2021-10-06 RX ORDER — DEXTROSE 50 % IN WATER 50 %
15 SYRINGE (ML) INTRAVENOUS ONCE
Refills: 0 | Status: DISCONTINUED | OUTPATIENT
Start: 2021-10-06 | End: 2021-10-12

## 2021-10-06 RX ORDER — DEXTROSE 50 % IN WATER 50 %
25 SYRINGE (ML) INTRAVENOUS ONCE
Refills: 0 | Status: DISCONTINUED | OUTPATIENT
Start: 2021-10-06 | End: 2021-10-12

## 2021-10-06 RX ORDER — PHYTONADIONE (VIT K1) 5 MG
5 TABLET ORAL ONCE
Refills: 0 | Status: COMPLETED | OUTPATIENT
Start: 2021-10-06 | End: 2021-10-06

## 2021-10-06 RX ORDER — SODIUM CHLORIDE 9 MG/ML
1000 INJECTION, SOLUTION INTRAVENOUS
Refills: 0 | Status: DISCONTINUED | OUTPATIENT
Start: 2021-10-06 | End: 2021-10-12

## 2021-10-06 RX ORDER — PHYTONADIONE (VIT K1) 5 MG
2.5 TABLET ORAL ONCE
Refills: 0 | Status: COMPLETED | OUTPATIENT
Start: 2021-10-06 | End: 2021-10-06

## 2021-10-06 RX ORDER — INSULIN LISPRO 100/ML
VIAL (ML) SUBCUTANEOUS
Refills: 0 | Status: DISCONTINUED | OUTPATIENT
Start: 2021-10-06 | End: 2021-10-12

## 2021-10-06 RX ORDER — GLUCAGON INJECTION, SOLUTION 0.5 MG/.1ML
1 INJECTION, SOLUTION SUBCUTANEOUS ONCE
Refills: 0 | Status: DISCONTINUED | OUTPATIENT
Start: 2021-10-06 | End: 2021-10-12

## 2021-10-06 RX ORDER — METFORMIN HYDROCHLORIDE 850 MG/1
1 TABLET ORAL
Qty: 0 | Refills: 0 | DISCHARGE

## 2021-10-06 RX ORDER — PANTOPRAZOLE SODIUM 20 MG/1
40 TABLET, DELAYED RELEASE ORAL DAILY
Refills: 0 | Status: DISCONTINUED | OUTPATIENT
Start: 2021-10-06 | End: 2021-10-12

## 2021-10-06 RX ORDER — ALBUTEROL 90 UG/1
1 AEROSOL, METERED ORAL
Qty: 0 | Refills: 0 | DISCHARGE

## 2021-10-06 RX ADMIN — Medication 101 MILLIGRAM(S): at 21:15

## 2021-10-06 RX ADMIN — ATORVASTATIN CALCIUM 20 MILLIGRAM(S): 80 TABLET, FILM COATED ORAL at 21:15

## 2021-10-06 RX ADMIN — Medication 2.5 MILLIGRAM(S): at 14:28

## 2021-10-06 NOTE — H&P ADULT - NSHPLABSRESULTS_GEN_ALL_CORE
LABS:                        8.0    5.92  )-----------( 197      ( 06 Oct 2021 12:44 )             25.8     10-06    135  |  105  |  10  ----------------------------<  91  5.7<H>   |  20<L>  |  0.60    Ca    8.2<L>      06 Oct 2021 16:07    TPro  9.3<H>  /  Alb  3.7  /  TBili  1.2  /  DBili  x   /  AST  152<H>  /  ALT  66<H>  /  AlkPhos  261<H>  10-06    PT/INR - ( 06 Oct 2021 12:44 )   PT: 44.0 sec;   INR: 3.91 ratio         PTT - ( 06 Oct 2021 12:44 )  PTT:47.3 sec      Urinalysis Basic - ( 06 Oct 2021 16:19 )    Color: Yellow / Appearance: Clear / S.018 / pH: x  Gluc: x / Ketone: Negative  / Bili: Negative / Urobili: Negative   Blood: x / Protein: Negative / Nitrite: Negative   Leuk Esterase: Negative / RBC: 1 /hpf / WBC 2 /HPF   Sq Epi: x / Non Sq Epi: 2 /hpf / Bacteria: Negative          10-06 @ 12:44  5.3  18

## 2021-10-06 NOTE — CONSULT NOTE ADULT - SUBJECTIVE AND OBJECTIVE BOX
CARDIOLOGY CONSULT - Dr. Fitch         HPI:  80 y/o female with hx  as mentioned below presenting with h BRBPR x 3 days associated with some lightheadedness and fatigue.  Pt reporting "a lot of blood" in the bathroom when she uses the toilet as well as leaking on a pad when she is out and about during the day.  Fatigue and lightheadedness are worse with exertion better with rest. paitnet is known to the practice. cardiac hx includes CAD s/p PCI (to LAD on 1/2019),  Afib (on couamdin), severe MS S/P (2/18/2019) MVR/Cryomaze, and removal of LA thrombus.        PAST MEDICAL & SURGICAL HISTORY:  DM (diabetes mellitus)    COPD (chronic obstructive pulmonary disease)    Asthma    DVT (deep venous thrombosis)    HTN (hypertension)    DM (diabetes mellitus)    HLD (hyperlipidemia)    Atrial fibrillation    Asthma    CAD (coronary artery disease)    History of embolectomy  LLE    S/P arterial stent  left arterial thrombectomy          PREVIOUS DIAGNOSTIC TESTING:    Echo 2/15/19: EF 50%, mildly reduced LV fx, mild pulm htn, mod TR, severe mitral annular calcification.  moderate mitral regurgitation.  EMILI 1/7/19 : EF 66%, mod to sev MS ; large left atrial appendage thrombus   Cardiac Cath Lab - Adult (01.18.19 @ 11:34) >  PCI to severe proximal LAD lesion with BRENDA  in setting of new onset angina, acute diastolic HF          MEDICATIONS:  Home Medications:      MEDICATIONS  (STANDING):      FAMILY HISTORY:  No pertinent family history in first degree relatives        SOCIAL HISTORY:    [ ] Non-smoker  [ ] Smoker  [ ] Alcohol    Allergies    No Known Allergies    Intolerances    	    REVIEW OF SYSTEMS:  CONSTITUTIONAL: No fever, weight loss, or fatigue  EYES: No eye pain, visual disturbances, or discharge  ENMT:  No difficulty hearing, tinnitus, vertigo; No sinus or throat pain  NECK: No pain or stiffness  RESPIRATORY: No cough, wheezing, chills or hemoptysis; No Shortness of Breath  CARDIOVASCULAR: No chest pain, palpitations, passing out, dizziness, or leg swelling  GASTROINTESTINAL: No abdominal or epigastric pain. No nausea, vomiting, or hematemesis; No diarrhea or constipation. No melena or hematochezia.  GENITOURINARY: No dysuria, frequency, hematuria, or incontinence  NEUROLOGICAL: No headaches, memory loss, loss of strength, numbness, or tremors  SKIN: No itching, burning, rashes, or lesions   	    [ ] All others negative	  [ ] Unable to obtain    PHYSICAL EXAM:  T(C): 36.8 (10-06-21 @ 11:39), Max: 36.8 (10-06-21 @ 11:39)  HR: 78 (10-06-21 @ 11:39) (78 - 78)  BP: 132/68 (10-06-21 @ 11:39) (132/68 - 132/68)  RR: 18 (10-06-21 @ 11:39) (18 - 18)  SpO2: 99% (10-06-21 @ 11:39) (99% - 99%)  Wt(kg): --  I&O's Summary      Appearance: Normal	  Psychiatry: A & O x 3, Mood & affect appropriate  HEENT:   Normal oral mucosa, PERRL, EOMI	  Lymphatic: No lymphadenopathy  Cardiovascular: Normal S1 S2,RRR, No JVD, No murmurs  Respiratory: Lungs clear to auscultation	  Gastrointestinal:  Soft, Non-tender, + BS	  Skin: No rashes, No ecchymoses, No cyanosis	  Neurologic: Non-focal  Extremities: Normal range of motion, No clubbing, cyanosis or edema  Vascular: Peripheral pulses palpable 2+ bilaterally    TELEMETRY: 	    ECG:  	  RADIOLOGY:  OTHER: 	  	  LABS:	 	    CARDIAC MARKERS:                                  8.0    5.92  )-----------( 197      ( 06 Oct 2021 12:44 )             25.8             proBNP:   Lipid Profile:   HgA1c:   TSH:        CARDIOLOGY CONSULT - Dr. Fitch         HPI:  82 y/o female with hx  as mentioned below presenting with h BRBPR x 3 days associated with some lightheadedness and fatigue.  Pt reporting "a lot of blood" in the bathroom when she uses the toilet as well as leaking on a pad when she is out and about during the day.  Fatigue and lightheadedness are worse with exertion better with rest. paitnet is known to the practice. cardiac hx includes CAD s/p PCI (to LAD on 1/2019),  Afib (on couamdin), severe MS S/P (2/18/2019) MVR/Cryomaze, and removal of LA thrombus.She denies any recent cp sob. ROS otherwise negative           PAST MEDICAL & SURGICAL HISTORY:  DM (diabetes mellitus)    COPD (chronic obstructive pulmonary disease)    Asthma    DVT (deep venous thrombosis)    HTN (hypertension)    DM (diabetes mellitus)    HLD (hyperlipidemia)    Atrial fibrillation    Asthma    CAD (coronary artery disease)    History of embolectomy  LLE    S/P arterial stent  left arterial thrombectomy          PREVIOUS DIAGNOSTIC TESTING:    Echo 2/15/19: EF 50%, mildly reduced LV fx, mild pulm htn, mod TR, severe mitral annular calcification.  moderate mitral regurgitation.  EMILI 1/7/19 : EF 66%, mod to sev MS ; large left atrial appendage thrombus   Cardiac Cath Lab - Adult (01.18.19 @ 11:34) >  PCI to severe proximal LAD lesion with BRENDA  in setting of new onset angina, acute diastolic HF          MEDICATIONS:  Home Medications:      MEDICATIONS  (STANDING):      FAMILY HISTORY:  No pertinent family history in first degree relatives        SOCIAL HISTORY:    [x ] Non-smoker  [ ] Smoker  [ ] Alcohol    Allergies    No Known Allergies    Intolerances    	    REVIEW OF SYSTEMS:  CONSTITUTIONAL: No fever, weight loss, or fatigue  EYES: No eye pain, visual disturbances, or discharge  ENMT:  No difficulty hearing, tinnitus, vertigo; No sinus or throat pain  NECK: No pain or stiffness  RESPIRATORY: No cough, wheezing, chills or hemoptysis; No Shortness of Breath  CARDIOVASCULAR: see hpi   GASTROINTESTINAL: No abdominal or epigastric pain. No nausea, vomiting, or hematemesis; No diarrhea or constipation. No melena or hematochezia.  GENITOURINARY: No dysuria, frequency, hematuria, or incontinence  NEUROLOGICAL: No headaches, memory loss, loss of strength, numbness, or tremors  SKIN: No itching, burning, rashes, or lesions   	    [ x] All others negative	  [ ] Unable to obtain    PHYSICAL EXAM:  T(C): 36.8 (10-06-21 @ 11:39), Max: 36.8 (10-06-21 @ 11:39)  HR: 78 (10-06-21 @ 11:39) (78 - 78)  BP: 132/68 (10-06-21 @ 11:39) (132/68 - 132/68)  RR: 18 (10-06-21 @ 11:39) (18 - 18)  SpO2: 99% (10-06-21 @ 11:39) (99% - 99%)  Wt(kg): --  I&O's Summary      Appearance: Normal	  Psychiatry: A & O x 3, Mood & affect appropriate  HEENT:   Normal oral mucosa, PERRL, EOMI	  Lymphatic: No lymphadenopathy  Cardiovascular: Normal S1 S2,RRR+ murmur  Respiratory: Lungs clear to auscultation	  Gastrointestinal:  Soft, Non-tender, + BS	  Skin: No rashes, No ecchymoses, No cyanosis	  Neurologic: Non-focal  Extremities: Normal range of motion, No clubbing, cyanosis or edema  Vascular: Peripheral pulses palpable 2+ bilaterally    TELEMETRY: nsr  	    ECG:  	PENDING   RADIOLOGY:  OTHER: 	  	  LABS:	 	    CARDIAC MARKERS:                                  8.0    5.92  )-----------( 197      ( 06 Oct 2021 12:44 )             25.8             proBNP:   Lipid Profile:   HgA1c:   TSH:

## 2021-10-06 NOTE — ED ADULT NURSE NOTE - NSICDXPASTSURGICALHX_GEN_ALL_CORE_FT
PAST SURGICAL HISTORY:  History of embolectomy LLE    S/P arterial stent left arterial thrombectomy

## 2021-10-06 NOTE — ED PROVIDER NOTE - CLINICAL SUMMARY MEDICAL DECISION MAKING FREE TEXT BOX
NOHEMI Roland, Attending: seen with ACP and reviewed note.    81 yoF, PMHX of CAD s/p stents, afib, HLD, HTN, asthma, DM, on coumadin p/w BRBPR for several days. No pain. No vomiting. No black stools. Feels weak and dizzy. No CP or dyspnea. Never had colonoscopy.     Looks well. Abd soft. Vital reassuring. Blood on rectal exam by CECILIO Castillo. Not actively hemorrhaging.     Given age, cardiac hx, coumadin use, and no prior endoscopic evaluation will need admission, serial H/H and GI eval.    No signs of shock state.

## 2021-10-06 NOTE — ED ADULT NURSE NOTE - NSIMPLEMENTINTERV_GEN_ALL_ED
Implemented All Universal Safety Interventions:  [place] to call system. Call bell, personal items and telephone within reach. Instruct patient to call for assistance. Room bathroom lighting operational. Non-slip footwear when patient is off stretcher. Physically safe environment: no spills, clutter or unnecessary equipment. Stretcher in lowest position, wheels locked, appropriate side rails in place.

## 2021-10-06 NOTE — CONSULT NOTE ADULT - SUBJECTIVE AND OBJECTIVE BOX
Chief Complaint:  rectal bleeding/fatigue    HPI:JEANNE TRUJILLO is a 81y Female with COPD, asthma, CHF, CAD s/p PCI 2019, severe MS with MVR/cryomaze with removal of LV thrombus, afib on coumadin, uterine cancer s/p RT ~2019 and presents today as a consult to GI for BRBPR.    Patient reporting bloody BMs for the past 3 days. Says it only occurs with BMs and it is a lot in the toilet and with the stools. Stool she says is brown, but has been black in the past with iron, but never red. Denies n/v/d, constipation, no abdominal pain or discomfort. No f/c, reporting weight loss, but unable to quantify. Denies h/o EGD/colon. Denies heavy NSAID use but will occasionally take advil for headaches. Of note, patient reporting elevated INR a few weeks ago.    She has been HDS, with Hgb 8 (8.6 in 2/2019),  with white count 5. INR 4. Cr 0.68, BUN 11. , , ALT 66, T bili 1.2. She was started on PPI, given vitamin K and GI consulted.    She has been HDS    PMHX/PSHX:  No pertinent past medical history    DM (diabetes mellitus)    COPD (chronic obstructive pulmonary disease)    Asthma    DVT (deep venous thrombosis)    HTN (hypertension)    DM (diabetes mellitus)    HLD (hyperlipidemia)    Atrial fibrillation    Asthma    CAD (coronary artery disease)    No significant past surgical history    History of embolectomy    S/P arterial stent      Allergies:  No Known Allergies      Home Medications: reviewed  Hospital Medications:      Social History:   Tob: Denies  EtOH: Denies  Illicit Drugs: Denies    Family history:  No pertinent family history in first degree relatives      Denies family history of colon cancer/polyps, stomach cancer/polyps, pancreatic cancer/masses, liver cancer/disease, ovarian cancer and endometrial cancer.    ROS: complete and normal except as mentioned abve    PHYSICAL EXAM:   Vital Signs:  Vital Signs Last 24 Hrs  T(C): 36.8 (06 Oct 2021 11:39), Max: 36.8 (06 Oct 2021 11:39)  T(F): 98.2 (06 Oct 2021 11:39), Max: 98.2 (06 Oct 2021 11:39)  HR: 75 (06 Oct 2021 14:19) (75 - 78)  BP: 144/70 (06 Oct 2021 14:19) (132/68 - 144/70)  BP(mean): --  RR: 16 (06 Oct 2021 14:19) (16 - 18)  SpO2: 100% (06 Oct 2021 14:19) (99% - 100%)  Daily Height in cm: 162.56 (06 Oct 2021 11:39)    Daily     GENERAL: no acute distress  NEURO: alert  HEENT: anicteric sclera, no conjunctival pallor appreciated  CHEST: no respiratory distress, no accessory muscle use  CARDIAC: regular rate, rhythm  ABDOMEN: soft, non-tender, non-distended, no rebound or guarding  EXTREMITIES: warm, well perfused, no edema  SKIN: no lesions noted    LABS: reviewed                        8.0    5.92  )-----------( 197      ( 06 Oct 2021 12:44 )             25.8     10-06    135  |  105  |  10  ----------------------------<  91  5.7<H>   |  20<L>  |  0.60    Ca    8.2<L>      06 Oct 2021 16:07    TPro  9.3<H>  /  Alb  3.7  /  TBili  1.2  /  DBili  x   /  AST  152<H>  /  ALT  66<H>  /  AlkPhos  261<H>  10-06    LIVER FUNCTIONS - ( 06 Oct 2021 12:44 )  Alb: 3.7 g/dL / Pro: 9.3 g/dL / ALK PHOS: 261 U/L / ALT: 66 U/L / AST: 152 U/L / GGT: x               Diagnostic Studies: see sunrise for full report         Chief Complaint:  rectal bleeding/fatigue    HPI:JEANNE TRUJILLO is a 81y Female with COPD, asthma, CHF, CAD s/p PCI 2019, severe MS with MVR/cryomaze with removal of LV thrombus, afib on coumadin, uterine cancer s/p RT ~2019 and presents today as a consult to GI for BRBPR.    Patient reporting bloody BMs for the past 3 days. Says it only occurs with BMs and it is a lot in the toilet and with the stools. Stool she says is brown, but has been black in the past with iron, but never red. Denies n/v/d, constipation, no abdominal pain or discomfort. No f/c, reporting weight loss, but unable to quantify. Denies h/o EGD/colon. Denies heavy NSAID use but will occasionally take advil for headaches. Of note, patient reporting elevated INR a few weeks ago.    She has been HDS, with Hgb 8 (8.6 in 2/2019),  with white count 5. INR 4. Cr 0.68, BUN 11. , , ALT 66, T bili 1.2. She was started on PPI, given vitamin K and GI consulted.    She has been HDS    PMHX/PSHX:  No pertinent past medical history    DM (diabetes mellitus)    COPD (chronic obstructive pulmonary disease)    Asthma    DVT (deep venous thrombosis)    HTN (hypertension)    DM (diabetes mellitus)    HLD (hyperlipidemia)    Atrial fibrillation    Asthma    CAD (coronary artery disease)    No significant past surgical history    History of embolectomy    S/P arterial stent      Allergies:  No Known Allergies      Home Medications: reviewed  Hospital Medications:      Social History:   Tob: Denies  EtOH: Denies  Illicit Drugs: Denies    Family history:  No pertinent family history in first degree relatives      Denies family history of colon cancer/polyps, stomach cancer/polyps, pancreatic cancer/masses, liver cancer/disease, ovarian cancer and endometrial cancer.    ROS: complete and normal except as mentioned abve    PHYSICAL EXAM:   Vital Signs:  Vital Signs Last 24 Hrs  T(C): 36.8 (06 Oct 2021 11:39), Max: 36.8 (06 Oct 2021 11:39)  T(F): 98.2 (06 Oct 2021 11:39), Max: 98.2 (06 Oct 2021 11:39)  HR: 75 (06 Oct 2021 14:19) (75 - 78)  BP: 144/70 (06 Oct 2021 14:19) (132/68 - 144/70)  BP(mean): --  RR: 16 (06 Oct 2021 14:19) (16 - 18)  SpO2: 100% (06 Oct 2021 14:19) (99% - 100%)  Daily Height in cm: 162.56 (06 Oct 2021 11:39)    Daily     GENERAL: no acute distress  NEURO: alert  HEENT: anicteric sclera, no conjunctival pallor appreciated  CHEST: no respiratory distress, no accessory muscle use  CARDIAC: regular rate, rhythm  ABDOMEN: soft, non-tender, non-distended, no rebound or guarding  EXTREMITIES: warm, well perfused, no edema  SKIN: no lesions noted  PSYCH: Normal affect    LABS: reviewed                        8.0    5.92  )-----------( 197      ( 06 Oct 2021 12:44 )             25.8     10-06    135  |  105  |  10  ----------------------------<  91  5.7<H>   |  20<L>  |  0.60    Ca    8.2<L>      06 Oct 2021 16:07    TPro  9.3<H>  /  Alb  3.7  /  TBili  1.2  /  DBili  x   /  AST  152<H>  /  ALT  66<H>  /  AlkPhos  261<H>  10-06    LIVER FUNCTIONS - ( 06 Oct 2021 12:44 )  Alb: 3.7 g/dL / Pro: 9.3 g/dL / ALK PHOS: 261 U/L / ALT: 66 U/L / AST: 152 U/L / GGT: x               Diagnostic Studies: see sunrise for full report

## 2021-10-06 NOTE — H&P ADULT - ASSESSMENT
81y Female        with COPD, asthma, CHF, CAD s/p PCI 2019, severe MS with MVR/cryomaze with removal of LV thrombus,        Afib on coumadin, uterine cancer s/p RT ~2019        admitted with   BRBPR.  Patient reporting bloody BMs for the past 3 days.  .Denies h/o EGD/colon. Denies heavy NSAID use but will occasionally take advil for headaches   Of note, patient reporting elevated INR a few weeks ago.     Hgb 8 (8.6 in 2/2019),  with white count 5.     INR 4. Cr 0.68, BUN 11. , , ALT 66,      given vitamin K   by  er, and GI consulted.    *   Hematochezia possibly 2/2 radiation proctitis vs diverticular bleed   baseline  hb  is  8   never had an EGD/colon  [facility]  w/p  coumadin on hold  serial hb/ prbc  a s needed  * elevated lft\s   check ct  a/p    *  h/o  Afib, and  a/c on hold  *  h/o CAD/  s/p  MVR IN 2019  (  HTN/  DM   on toprol, norvasc.  lipitor    dvt ppx/ pas  *  hyperkalemia, on lokelma                  81y Female        with COPD, asthma, CHF, CAD s/p PCI 2019, severe MS with MVR/cryomaze with removal of LV thrombus,        Afib on coumadin, uterine cancer s/p RT ~2019    pt's card is dr izaguirre/ [facility]        admitted with   BRBPR.  Patient reporting bloody BMs for the past 3 days.  .Denies h/o EGD/colon. Denies heavy NSAID use but will occasionally take advil for headaches   Of note, patient reporting elevated INR a few weeks ago.     Hgb 8 (8.6 in 2/2019),  with white count 5.     INR 4. Cr 0.68, BUN 11. , , ALT 66,      given vitamin K   by  er, and GI consulted.    *   Hematochezia possibly 2/2 radiation proctitis vs diverticular bleed   baseline  hb  is  8   never had an EGD/colon,   [facility]  w/p, seen by house  gi  coumadin on hold/  coagulopathy on arrival, vit  K given  serial hb/ prbc  as needed  * elevated lft\s   check ct  a/p    *  h/o  Afib, and  a/c on hold  *  h/o CAD/  s/p  MVR IN 2019  (  HTN/  DM   on toprol, norvasc.  lipitor    dvt ppx/ pas  *  hyperkalemia, on lokelma

## 2021-10-06 NOTE — H&P ADULT - HISTORY OF PRESENT ILLNESS
81y Female        with COPD, asthma, CHF, CAD s/p PCI 2019, severe MS with MVR/cryomaze with removal of LV thrombus,        Afib on coumadin, uterine cancer s/p RT ~2019        admitted with   BRBPR.  Patient reporting bloody BMs for the past 3 days.     No f/c  . Denies h/o EGD/colon. Denies heavy NSAID use but will occasionally take advil for headaches   . Of note, patient reporting elevated INR a few weeks ago.     Hgb 8 (8.6 in 2/2019),  with white count 5.     INR 4. Cr 0.68, BUN 11. , , ALT 66,      given vitamin K   by  er, and GI consulted.

## 2021-10-06 NOTE — ED ADULT NURSE NOTE - OBJECTIVE STATEMENT
80 yo F w/ PMHx of CAD, asthma, afib (on coumadin), HLD, DM, HTN, DVT, COPD presents to ED via waiting room c/o rectal bleeding. Pt reports noting bright red blood in toilet over past 3 days, worse when having bowel movement. Pt endorses associated lightheadedness and fatigue over same time period, worse w/ exertion. Pt denies any CP, SOB, cough, N/V, fever, chills, urinary complaints, constipation, diarrhea, HA, dizziness, weakness. Pt A&Ox4, lungs CTA, +central pulses. Abdomen soft, not tender, not distended. Ambulating w/ steady gait, safety and comfort maintained, no acute distress noted at this time. Pt denies any recent travel or known sick contacts.

## 2021-10-06 NOTE — H&P ADULT - NSHPPHYSICALEXAM_GEN_ALL_CORE
PHYSICAL EXAMINATION:  Vital Signs Last 24 Hrs  T(C): 36.9 (06 Oct 2021 18:14), Max: 36.9 (06 Oct 2021 18:14)  T(F): 98.5 (06 Oct 2021 18:14), Max: 98.5 (06 Oct 2021 18:14)  HR: 72 (06 Oct 2021 18:14) (72 - 78)  BP: 148/72 (06 Oct 2021 18:14) (132/68 - 148/72)  BP(mean): --  RR: 18 (06 Oct 2021 18:14) (16 - 18)  SpO2: 100% (06 Oct 2021 18:14) (99% - 100%)  CAPILLARY BLOOD GLUCOSE            GENERAL: NAD, well-groomed,  HEAD:  atraumatic, normocephalic  EYES: sclera anicteric  ENMT: mucous membranes moist  NECK: supple, No JVD  CHEST/LUNG: clear to auscultation bilaterally;    no      rales   ,   no rhonchi,   HEART: normal S1, S2  ABDOMEN: BS+, soft, ND, NT   EXTREMITIES:    no    edema    b/l LEs  NEURO: awake, ,     moves all extremities  SKIN: no     rash

## 2021-10-06 NOTE — ED PROCEDURE NOTE - PROCEDURE ADDITIONAL DETAILS
Emergency Department Focused Ultrasound performed at patient's bedside.  The complete report can be found in PACS.   20G left AC vein Emergency Department Focused Ultrasound performed at patient's bedside.  The complete report can be found in PACS.   18G left AC vein

## 2021-10-06 NOTE — CONSULT NOTE ADULT - ASSESSMENT
Impression:  1. Hematochezia possibly 2/2 diverticular bleed vs hemorrhoidal vs angioectasia vs malignancy - she has never had an EGD/colon and warrants further evaluation with colonoscopy. However, she will need to be optimized prior to procedure and cleared by cardiology/pulm.  2. Elevated liver chemistries    Recommendations:  - Keep on CLD  - Anticipate colonoscopy on Friday  - Obtain cards and pulm clearance   - Consider reversing INR  - May benefit from holding AC in setting of concerns for bleeding; hold if okay with Cards  - Trend Hgb, Transfuse if Hgb < 8 due to cardiac history  - Trend coags  - Trend liver chemistries  - Check ferritin and iron panel  - Check hepatitis panel  - Check peth  - Check abdominal US    Thank you for involving us in this patient's care.    Patient seen and discussed with Attending, Dr. Coates.    Marie Howard MD  Gastroenterology/Hepatology Fellow, PGY-V    NON-URGENT CONSULTS:  Please email [email or web address]  AT NIGHT AND ON WEEKENDS:  Contact on-call GI fellow via answering service ([phone]) from 5pm-8am and on weekends/holidays  MONDAY-FRIDAY 8AM-5PM:  Pager# [phone] ([facility])  GI Phone# [phone] ([facility])   Impression:  1. Hematochezia possibly 2/2 radiation proctitis vs diverticular bleed vs hemorrhoidal vs angioectasia vs malignancy - she has never had an EGD/colon and warrants further evaluation with colonoscopy. However, she will need to be optimized prior to procedure and cleared by cardiology/pulm.  2. Elevated liver chemistries    Recommendations:  - Keep on CLD  - Anticipate colonoscopy on Friday  - Obtain cards and pulm clearance   - Consider reversing INR  - May benefit from holding AC in setting of concerns for bleeding; hold if okay with Cards  - Trend Hgb, Transfuse if Hgb < 8 due to cardiac history  - Trend coags  - Trend liver chemistries  - Check ferritin and iron panel  - Check hepatitis panel  - Check peth  - Check abdominal US    Thank you for involving us in this patient's care.    Patient seen and discussed with Attending, Dr. Coates.    Marie Howard MD  Gastroenterology/Hepatology Fellow, PGY-V    NON-URGENT CONSULTS:  Please email [email or web address]  AT NIGHT AND ON WEEKENDS:  Contact on-call GI fellow via answering service ([phone]) from 5pm-8am and on weekends/holidays  MONDAY-FRIDAY 8AM-5PM:  Pager# [phone] ([facility])  GI Phone# [phone] ([facility])   Impression:  1. Hematochezia possibly 2/2 radiation proctitis vs diverticular bleed vs hemorrhoidal vs angioectasia vs malignancy - she has never had an EGD/colon and warrants further evaluation with colonoscopy. However, she will need to be optimized prior to procedure and cleared by cardiology/pulm.  2. Elevated liver chemistries    Recommendations:  - Keep on CLD  - Anticipate colonoscopy on Friday  - Obtain cards and pulm clearance   - Hold coumadin; would need INR to be not supertherapeutic for procedure. if it continues to risk, consider reversing INR  - Trend Hgb, Transfuse if Hgb < 8 due to cardiac history  - Trend coags  - Trend liver chemistries  - Check ferritin and iron panel  - Check hepatitis panel  - Check peth  - Check abdominal US given mildly elevated liver enzymes    Thank you for involving us in this patient's care.    Patient seen and discussed with Attending, Dr. Coates.    Marie Howard MD  Gastroenterology/Hepatology Fellow, PGY-V    NON-URGENT CONSULTS:  Please email [email or web address]  AT NIGHT AND ON WEEKENDS:  Contact on-call GI fellow via answering service ([phone]) from 5pm-8am and on weekends/holidays  MONDAY-FRIDAY 8AM-5PM:  Pager# [phone] ([facility])  GI Phone# [phone] ([facility])

## 2021-10-06 NOTE — ED ADULT NURSE NOTE - NSICDXPASTMEDICALHX_GEN_ALL_CORE_FT
PAST MEDICAL HISTORY:  Asthma     Asthma     Atrial fibrillation     CAD (coronary artery disease)     COPD (chronic obstructive pulmonary disease)     DM (diabetes mellitus)     DM (diabetes mellitus)     DVT (deep venous thrombosis)     HLD (hyperlipidemia)     HTN (hypertension)

## 2021-10-06 NOTE — ED PROVIDER NOTE - OBJECTIVE STATEMENT
80 y/o female with hx of HTN, HLD, DM, asthma, HLD, afib on coumadin, glaucoma, CAD s/p PCI, CHF, MR, who coming into the ER today with BRBPR x 3 days associated with some lightheadedness and fatigue.  Pt reporting "a lot of blood" in the bathroom when she uses the toilet as well as leaking on a pad when she is out and about during the day.  Fatigue and lightheadedness are worse with exertion better with rest.  Nothing makes bleeding better or worse.  Last dose of coumadin was last night, help this morning.  Last INR was weeks ago and supertheraputic.  No other complaints at this time, no chest pain or SOB, no fevers, no chills.  Denies melena 82 y/o female with hx of HTN, HLD, DM, asthma, HLD, afib on coumadin, glaucoma, CAD s/p PCI, CHF, MR, who coming into the ER today with BRBPR x 3 days associated with some lightheadedness and fatigue.  Pt reporting "a lot of blood" in the bathroom when she uses the toilet as well as leaking on a pad when she is out and about during the day.  Fatigue and lightheadedness are worse with exertion better with rest.  Nothing makes bleeding better or worse.  Last dose of coumadin was last night, help this morning.  Last INR was weeks ago and supratherapeutic.  No other complaints at this time, no chest pain or SOB, no fevers, no chills.  Denies melena

## 2021-10-06 NOTE — CONSULT NOTE ADULT - ASSESSMENT
Echo 2/15/19: EF 50%, mildly reduced LV fx, mild pulm htn, mod TR, severe mitral annular calcification.  moderate mitral regurgitation.  EMILI 1/7/19 : EF 66%, mod to sev MS ; large left atrial appendage thrombus   Cardiac Cath Lab - Adult (01.18.19 @ 11:34) >  PCI to severe proximal LAD lesion with BRENDA  in setting of new onset angina, acute diastolic HF    a/p     82 y/o female with hx of HTN, HLD, DM, asthma, Urince CA s/p Radiation (on remission per pt) CAD s/p PCI (to LAD on 1/2019),  Afib (on coumadin severe MS S/P (2/18/2019) MVR/Cryomaze, and removal of LA thrombus presenting with GIB    #GIB  -a/c on hold for now   -supratheraputic INR noted- awaiting vitamin K  -GI eval     # CAD s/p PCI (to LAD on 1/2019)  -resume asa 81 mg daily if cleared by GI   -check ecg     # Chronic AFib   -a/c on hold given GIB  -resume outpt cv meds   -in NSR on tele   -check ecg    #  Severe MS S/P (2/18/2019) MVR/Cryomaze, and removal of LA thrombus  -check echo  -resume outpt cv meds     dvt ppx

## 2021-10-07 LAB
A1C WITH ESTIMATED AVERAGE GLUCOSE RESULT: 5.5 % — SIGNIFICANT CHANGE UP (ref 4–5.6)
ALBUMIN SERPL ELPH-MCNC: 3 G/DL — LOW (ref 3.3–5)
ALP SERPL-CCNC: 235 U/L — HIGH (ref 40–120)
ALT FLD-CCNC: 49 U/L — HIGH (ref 10–45)
ANION GAP SERPL CALC-SCNC: 9 MMOL/L — SIGNIFICANT CHANGE UP (ref 5–17)
ANION GAP SERPL CALC-SCNC: 9 MMOL/L — SIGNIFICANT CHANGE UP (ref 5–17)
APTT BLD: 36.4 SEC — HIGH (ref 27.5–35.5)
AST SERPL-CCNC: 115 U/L — HIGH (ref 10–40)
BILIRUB DIRECT SERPL-MCNC: 0.5 MG/DL — HIGH (ref 0–0.2)
BILIRUB INDIRECT FLD-MCNC: 1 MG/DL — SIGNIFICANT CHANGE UP (ref 0.2–1)
BILIRUB SERPL-MCNC: 1.5 MG/DL — HIGH (ref 0.2–1.2)
BUN SERPL-MCNC: 10 MG/DL — SIGNIFICANT CHANGE UP (ref 7–23)
BUN SERPL-MCNC: 12 MG/DL — SIGNIFICANT CHANGE UP (ref 7–23)
CALCIUM SERPL-MCNC: 8.2 MG/DL — LOW (ref 8.4–10.5)
CALCIUM SERPL-MCNC: 8.2 MG/DL — LOW (ref 8.4–10.5)
CHLORIDE SERPL-SCNC: 105 MMOL/L — SIGNIFICANT CHANGE UP (ref 96–108)
CHLORIDE SERPL-SCNC: 105 MMOL/L — SIGNIFICANT CHANGE UP (ref 96–108)
CO2 SERPL-SCNC: 21 MMOL/L — LOW (ref 22–31)
CO2 SERPL-SCNC: 22 MMOL/L — SIGNIFICANT CHANGE UP (ref 22–31)
COVID-19 SPIKE DOMAIN AB INTERP: POSITIVE
COVID-19 SPIKE DOMAIN ANTIBODY RESULT: 18 U/ML — HIGH
CREAT SERPL-MCNC: 0.65 MG/DL — SIGNIFICANT CHANGE UP (ref 0.5–1.3)
CREAT SERPL-MCNC: 0.66 MG/DL — SIGNIFICANT CHANGE UP (ref 0.5–1.3)
ESTIMATED AVERAGE GLUCOSE: 111 MG/DL — SIGNIFICANT CHANGE UP (ref 68–114)
FERRITIN SERPL-MCNC: 47 NG/ML — SIGNIFICANT CHANGE UP (ref 15–150)
GLUCOSE BLDC GLUCOMTR-MCNC: 116 MG/DL — HIGH (ref 70–99)
GLUCOSE BLDC GLUCOMTR-MCNC: 138 MG/DL — HIGH (ref 70–99)
GLUCOSE BLDC GLUCOMTR-MCNC: 81 MG/DL — SIGNIFICANT CHANGE UP (ref 70–99)
GLUCOSE BLDC GLUCOMTR-MCNC: 90 MG/DL — SIGNIFICANT CHANGE UP (ref 70–99)
GLUCOSE SERPL-MCNC: 101 MG/DL — HIGH (ref 70–99)
GLUCOSE SERPL-MCNC: 78 MG/DL — SIGNIFICANT CHANGE UP (ref 70–99)
HCT VFR BLD CALC: 25.6 % — LOW (ref 34.5–45)
HCT VFR BLD CALC: 26.5 % — LOW (ref 34.5–45)
HGB BLD-MCNC: 8.5 G/DL — LOW (ref 11.5–15.5)
HGB BLD-MCNC: 8.8 G/DL — LOW (ref 11.5–15.5)
INR BLD: 1.84 RATIO — HIGH (ref 0.88–1.16)
IRON SATN MFR SERPL: 116 UG/DL — SIGNIFICANT CHANGE UP (ref 30–160)
MCHC RBC-ENTMCNC: 28.5 PG — SIGNIFICANT CHANGE UP (ref 27–34)
MCHC RBC-ENTMCNC: 29.9 PG — SIGNIFICANT CHANGE UP (ref 27–34)
MCHC RBC-ENTMCNC: 32.1 GM/DL — SIGNIFICANT CHANGE UP (ref 32–36)
MCHC RBC-ENTMCNC: 34.4 GM/DL — SIGNIFICANT CHANGE UP (ref 32–36)
MCV RBC AUTO: 87.1 FL — SIGNIFICANT CHANGE UP (ref 80–100)
MCV RBC AUTO: 88.9 FL — SIGNIFICANT CHANGE UP (ref 80–100)
NRBC # BLD: 0 /100 WBCS — SIGNIFICANT CHANGE UP (ref 0–0)
NRBC # BLD: 0 /100 WBCS — SIGNIFICANT CHANGE UP (ref 0–0)
PLATELET # BLD AUTO: 168 K/UL — SIGNIFICANT CHANGE UP (ref 150–400)
PLATELET # BLD AUTO: 172 K/UL — SIGNIFICANT CHANGE UP (ref 150–400)
POTASSIUM SERPL-MCNC: 3.8 MMOL/L — SIGNIFICANT CHANGE UP (ref 3.5–5.3)
POTASSIUM SERPL-MCNC: 3.8 MMOL/L — SIGNIFICANT CHANGE UP (ref 3.5–5.3)
POTASSIUM SERPL-SCNC: 3.8 MMOL/L — SIGNIFICANT CHANGE UP (ref 3.5–5.3)
POTASSIUM SERPL-SCNC: 3.8 MMOL/L — SIGNIFICANT CHANGE UP (ref 3.5–5.3)
PROT SERPL-MCNC: 7.8 G/DL — SIGNIFICANT CHANGE UP (ref 6–8.3)
PROTHROM AB SERPL-ACNC: 21.4 SEC — HIGH (ref 10.6–13.6)
RBC # BLD: 2.94 M/UL — LOW (ref 3.8–5.2)
RBC # BLD: 2.98 M/UL — LOW (ref 3.8–5.2)
RBC # FLD: 15.2 % — HIGH (ref 10.3–14.5)
RBC # FLD: 15.9 % — HIGH (ref 10.3–14.5)
SARS-COV-2 IGG+IGM SERPL QL IA: 18 U/ML — HIGH
SARS-COV-2 IGG+IGM SERPL QL IA: POSITIVE
SODIUM SERPL-SCNC: 135 MMOL/L — SIGNIFICANT CHANGE UP (ref 135–145)
SODIUM SERPL-SCNC: 136 MMOL/L — SIGNIFICANT CHANGE UP (ref 135–145)
WBC # BLD: 5.17 K/UL — SIGNIFICANT CHANGE UP (ref 3.8–10.5)
WBC # BLD: 5.65 K/UL — SIGNIFICANT CHANGE UP (ref 3.8–10.5)
WBC # FLD AUTO: 5.17 K/UL — SIGNIFICANT CHANGE UP (ref 3.8–10.5)
WBC # FLD AUTO: 5.65 K/UL — SIGNIFICANT CHANGE UP (ref 3.8–10.5)

## 2021-10-07 PROCEDURE — 99232 SBSQ HOSP IP/OBS MODERATE 35: CPT | Mod: GC

## 2021-10-07 PROCEDURE — 76700 US EXAM ABDOM COMPLETE: CPT | Mod: 26

## 2021-10-07 PROCEDURE — 93306 TTE W/DOPPLER COMPLETE: CPT | Mod: 26

## 2021-10-07 PROCEDURE — 93010 ELECTROCARDIOGRAM REPORT: CPT

## 2021-10-07 RX ORDER — SOD SULF/SODIUM/NAHCO3/KCL/PEG
4000 SOLUTION, RECONSTITUTED, ORAL ORAL ONCE
Refills: 0 | Status: COMPLETED | OUTPATIENT
Start: 2021-10-07 | End: 2021-10-07

## 2021-10-07 RX ORDER — ACETAMINOPHEN 500 MG
650 TABLET ORAL EVERY 6 HOURS
Refills: 0 | Status: DISCONTINUED | OUTPATIENT
Start: 2021-10-07 | End: 2021-10-07

## 2021-10-07 RX ORDER — LANOLIN ALCOHOL/MO/W.PET/CERES
3 CREAM (GRAM) TOPICAL AT BEDTIME
Refills: 0 | Status: DISCONTINUED | OUTPATIENT
Start: 2021-10-07 | End: 2021-10-12

## 2021-10-07 RX ADMIN — PANTOPRAZOLE SODIUM 40 MILLIGRAM(S): 20 TABLET, DELAYED RELEASE ORAL at 06:09

## 2021-10-07 RX ADMIN — Medication 10 MILLIGRAM(S): at 21:51

## 2021-10-07 RX ADMIN — Medication 4000 MILLILITER(S): at 17:36

## 2021-10-07 RX ADMIN — Medication 25 MILLIGRAM(S): at 06:09

## 2021-10-07 NOTE — PROGRESS NOTE ADULT - ATTENDING COMMENTS
Patient seen and examined, agree with above. Plan for colonoscopy +/- EGD tomorrow - keep on clears, NPO after midnight. GI team to order bowel prep. INR now in acceptable range <2.

## 2021-10-07 NOTE — PROGRESS NOTE ADULT - ASSESSMENT
Impression:  1. Hematochezia possibly 2/2 radiation proctitis vs diverticular bleed vs hemorrhoidal vs angioectasia vs malignancy - she has never had an EGD/colon and warrants further evaluation with colonoscopy. However, she will need to be optimized prior to procedure and cleared by cardiology/pulm.  2. Elevated liver chemistries c/b CBD 8 mm and cholelithiasis, no abdominal pain and normal white count, afebrile    Recommendations:  - Keep on CLD today  - NPO after midnight for colonoscopy tomorrow w/wo EGD  - I will order prep  - Cleared by cardiology  - Confirm pulm clearance   - Hold coumadin; would need INR to be not supertherapeutic for procedure. if it continues to increase, consider reversing INR  - Trend Hgb, Transfuse if Hgb < 8 due to cardiac history  - Trend coags  - Trend liver chemistries  - Check MRCP  - Check hepatitis panel  - Check peth    Thank you for involving us in this patient's care.     Patient seen and discussed with Attending, Dr. Coates.    Marie Howard MD  Gastroenterology/Hepatology Fellow, PGY-V    NON-URGENT CONSULTS:  Please email [email or web address]  AT NIGHT AND ON WEEKENDS:  Contact on-call GI fellow via answering service ([phone]) from 5pm-8am and on weekends/holidays  MONDAY-FRIDAY 8AM-5PM:  Pager# [phone] ([facility])  GI Phone# [phone] ([facility])   Impression:  1. Hematochezia possibly 2/2 radiation proctitis vs diverticular bleed vs hemorrhoidal vs angioectasia vs malignancy - she has never had an EGD/colon and warrants further evaluation with colonoscopy. However, she will need to be optimized prior to procedure and cleared by cardiology/pulm.  2. Elevated liver chemistries c/b CBD 8 mm and cholelithiasis, no abdominal pain and normal white count, afebrile    Recommendations:  - Keep on CLD today  - NPO after midnight for colonoscopy tomorrow w/wo EGD  - I will order prep  - Cleared by cardiology  - Hold coumadin; would need INR to be not supertherapeutic for procedure. if it continues to increase, consider reversing INR  - Trend Hgb, Transfuse if Hgb < 8 due to cardiac history  - Trend coags  - Trend liver chemistries  - Check MRCP  - Check hepatitis panel, CINDY, Anti-smooth muscle Ab, anti-mitochondrial Ab  - Check peth    Thank you for involving us in this patient's care.     Patient seen and discussed with Attending, Dr. Coates.    Marie Howard MD  Gastroenterology/Hepatology Fellow, PGY-V    NON-URGENT CONSULTS:  Please email [email or web address]  AT NIGHT AND ON WEEKENDS:  Contact on-call GI fellow via answering service ([phone]) from 5pm-8am and on weekends/holidays  MONDAY-FRIDAY 8AM-5PM:  Pager# [phone] ([facility])  GI Phone# [phone] ([facility])

## 2021-10-07 NOTE — PROGRESS NOTE ADULT - ASSESSMENT
81y Female        with COPD, asthma, CHF, CAD s/p PCI 2019, severe MS with MVR/cryomaze with removal of LV thrombus,        Afib on coumadin, uterine cancer s/p RT ~2019    pt's card is dr izaguirre/ [facility]        admitted with   BRBPR.  Patient reporting bloody BMs for the past 3 days.  .Denies h/o EGD/colon. Denies heavy NSAID use but will occasionally take advil for headaches   Of note, patient reporting elevated INR a few weeks ago.     Hgb 8 (8.6 in 2/2019),  with white count 5.     INR 4. Cr 0.68, BUN 11. , , ALT 66,      given vitamin K   by  er, and GI consulted.    *   Hematochezia possibly 2/2 radiation proctitis vs diverticular bleed   baseline  hb  is  8   never had an EGD/colon,   [facility] gi  w/p, seen by house  gi  coumadin on hold/  coagulopathy on arrival, vit  K given  serial hb/ prbc  as needed  * elevated lft\   *  h/o  Afib, and  a/c on hold  *  h/o CAD/  s/p  MVR IN 2019  (  HTN/  DM   on toprol, norvasc.    hold  lipitor, gioven high lft's    dvt ppx/ pas  *   s/p  hyperkalemia, on [facility]  Cta /p,  report pending  colonoscopy on friday/  coumadin on hold

## 2021-10-07 NOTE — PROGRESS NOTE ADULT - ASSESSMENT
Echo 2/15/19: EF 50%, mildly reduced LV fx, mild pulm htn, mod TR, severe mitral annular calcification.  moderate mitral regurgitation.  EMILI 1/7/19 : EF 66%, mod to sev MS ; large left atrial appendage thrombus   Cardiac Cath Lab - Adult (01.18.19 @ 11:34) >  PCI to severe proximal LAD lesion with BRENDA  in setting of new onset angina, acute diastolic HF    a/p     80 y/o female with hx of HTN, HLD, DM, asthma, Urince CA s/p Radiation (on remission per pt) CAD s/p PCI (to LAD on 1/2019),  Afib (on coumadin severe MS S/P (2/18/2019) MVR/Cryomaze, and removal of LA thrombus presenting with GIB    #GIB  -s/p PRBC-- monitor h/h   -a/c on hold for now   -supratheraputic INR on admission s/p vitamin K-- INR today 1.84  -GI eval noted-- likely planning  colonoscopy +/- EGD Friday- pt can proceed from a cv standpoint      # CAD s/p PCI (to LAD on 1/2019)  -resume asa 81 mg daily Once cleared by GI   -ecg with no acs - cv stable     # Chronic AFib   -a/c on hold given GIB-- resume pending GI recommendations   -c/w BB   -in NSR     #  Severe MS S/P (2/18/2019) MVR/Cryomaze, and removal of LA thrombus  -pending echo  -c/w BB     dvt ppx

## 2021-10-07 NOTE — PATIENT PROFILE ADULT - NSTRANSFERBELONGINGSRESP_GEN_A_NUR
Infusion Nursing Note:  Josefina Aldrich presents today for Tysabri.    Patient seen by provider today: No   present during visit today: Not Applicable.    Note: Tysabri online checklist complete.    Intravenous Access:  Peripheral IV placed.    Treatment Conditions:  Not Applicable.  Labs due with next infusion.      Post Infusion Assessment:  Patient tolerated infusion without incident.  Patient observed for 60 minutes post Tysabri per protocol.  Site patent and intact, free from redness, edema or discomfort.  No evidence of extravasations.  Access discontinued per protocol.       Discharge Plan:   Patient declined prescription refills.  Copy of AVS reviewed with patient and/or family.  Patient will return 1/17 for next appointment.  Patient discharged in stable condition accompanied by: self.  Departure Mode: Ambulatory.    Megan Langford RN                         yes

## 2021-10-08 ENCOUNTER — RESULT REVIEW (OUTPATIENT)
Age: 81
End: 2021-10-08

## 2021-10-08 LAB
ALBUMIN SERPL ELPH-MCNC: 3.2 G/DL — LOW (ref 3.3–5)
ALP SERPL-CCNC: 284 U/L — HIGH (ref 40–120)
ALT FLD-CCNC: 51 U/L — HIGH (ref 10–45)
ANION GAP SERPL CALC-SCNC: 8 MMOL/L — SIGNIFICANT CHANGE UP (ref 5–17)
APTT BLD: 32.7 SEC — SIGNIFICANT CHANGE UP (ref 27.5–35.5)
AST SERPL-CCNC: 128 U/L — HIGH (ref 10–40)
BILIRUB SERPL-MCNC: 1.6 MG/DL — HIGH (ref 0.2–1.2)
BUN SERPL-MCNC: 7 MG/DL — SIGNIFICANT CHANGE UP (ref 7–23)
CALCIUM SERPL-MCNC: 8.5 MG/DL — SIGNIFICANT CHANGE UP (ref 8.4–10.5)
CHLORIDE SERPL-SCNC: 104 MMOL/L — SIGNIFICANT CHANGE UP (ref 96–108)
CO2 SERPL-SCNC: 23 MMOL/L — SIGNIFICANT CHANGE UP (ref 22–31)
CREAT SERPL-MCNC: 0.71 MG/DL — SIGNIFICANT CHANGE UP (ref 0.5–1.3)
GLUCOSE BLDC GLUCOMTR-MCNC: 102 MG/DL — HIGH (ref 70–99)
GLUCOSE BLDC GLUCOMTR-MCNC: 82 MG/DL — SIGNIFICANT CHANGE UP (ref 70–99)
GLUCOSE BLDC GLUCOMTR-MCNC: 84 MG/DL — SIGNIFICANT CHANGE UP (ref 70–99)
GLUCOSE BLDC GLUCOMTR-MCNC: 99 MG/DL — SIGNIFICANT CHANGE UP (ref 70–99)
GLUCOSE SERPL-MCNC: 83 MG/DL — SIGNIFICANT CHANGE UP (ref 70–99)
HCT VFR BLD CALC: 28.6 % — LOW (ref 34.5–45)
HGB BLD-MCNC: 9.2 G/DL — LOW (ref 11.5–15.5)
INR BLD: 1.26 RATIO — HIGH (ref 0.88–1.16)
MAGNESIUM SERPL-MCNC: 1.7 MG/DL — SIGNIFICANT CHANGE UP (ref 1.6–2.6)
MCHC RBC-ENTMCNC: 28.9 PG — SIGNIFICANT CHANGE UP (ref 27–34)
MCHC RBC-ENTMCNC: 32.2 GM/DL — SIGNIFICANT CHANGE UP (ref 32–36)
MCV RBC AUTO: 89.9 FL — SIGNIFICANT CHANGE UP (ref 80–100)
NRBC # BLD: 0 /100 WBCS — SIGNIFICANT CHANGE UP (ref 0–0)
PHOSPHATE SERPL-MCNC: 3.1 MG/DL — SIGNIFICANT CHANGE UP (ref 2.5–4.5)
PLATELET # BLD AUTO: 177 K/UL — SIGNIFICANT CHANGE UP (ref 150–400)
POTASSIUM SERPL-MCNC: 3.9 MMOL/L — SIGNIFICANT CHANGE UP (ref 3.5–5.3)
POTASSIUM SERPL-SCNC: 3.9 MMOL/L — SIGNIFICANT CHANGE UP (ref 3.5–5.3)
PROT SERPL-MCNC: 8.5 G/DL — HIGH (ref 6–8.3)
PROTHROM AB SERPL-ACNC: 15 SEC — HIGH (ref 10.6–13.6)
RBC # BLD: 3.18 M/UL — LOW (ref 3.8–5.2)
RBC # FLD: 15.9 % — HIGH (ref 10.3–14.5)
SODIUM SERPL-SCNC: 135 MMOL/L — SIGNIFICANT CHANGE UP (ref 135–145)
WBC # BLD: 4.99 K/UL — SIGNIFICANT CHANGE UP (ref 3.8–10.5)
WBC # FLD AUTO: 4.99 K/UL — SIGNIFICANT CHANGE UP (ref 3.8–10.5)

## 2021-10-08 PROCEDURE — 45380 COLONOSCOPY AND BIOPSY: CPT | Mod: GC

## 2021-10-08 PROCEDURE — 44360 SMALL BOWEL ENDOSCOPY: CPT | Mod: GC

## 2021-10-08 PROCEDURE — 88305 TISSUE EXAM BY PATHOLOGIST: CPT | Mod: 26

## 2021-10-08 RX ORDER — WARFARIN SODIUM 2.5 MG/1
5 TABLET ORAL ONCE
Refills: 0 | Status: COMPLETED | OUTPATIENT
Start: 2021-10-08 | End: 2021-10-08

## 2021-10-08 RX ORDER — IBUPROFEN 200 MG
400 TABLET ORAL EVERY 6 HOURS
Refills: 0 | Status: COMPLETED | OUTPATIENT
Start: 2021-10-08 | End: 2021-10-11

## 2021-10-08 RX ORDER — SODIUM CHLORIDE 9 MG/ML
500 INJECTION INTRAMUSCULAR; INTRAVENOUS; SUBCUTANEOUS
Refills: 0 | Status: COMPLETED | OUTPATIENT
Start: 2021-10-08 | End: 2021-10-08

## 2021-10-08 RX ORDER — MONTELUKAST 4 MG/1
10 TABLET, CHEWABLE ORAL AT BEDTIME
Refills: 0 | Status: DISCONTINUED | OUTPATIENT
Start: 2021-10-08 | End: 2021-10-12

## 2021-10-08 RX ADMIN — PANTOPRAZOLE SODIUM 40 MILLIGRAM(S): 20 TABLET, DELAYED RELEASE ORAL at 15:34

## 2021-10-08 RX ADMIN — SODIUM CHLORIDE 30 MILLILITER(S): 9 INJECTION INTRAMUSCULAR; INTRAVENOUS; SUBCUTANEOUS at 10:12

## 2021-10-08 RX ADMIN — WARFARIN SODIUM 5 MILLIGRAM(S): 2.5 TABLET ORAL at 21:17

## 2021-10-08 RX ADMIN — MONTELUKAST 10 MILLIGRAM(S): 4 TABLET, CHEWABLE ORAL at 22:05

## 2021-10-08 RX ADMIN — Medication 10 MILLIGRAM(S): at 21:17

## 2021-10-08 RX ADMIN — Medication 25 MILLIGRAM(S): at 05:58

## 2021-10-08 NOTE — PROGRESS NOTE ADULT - ASSESSMENT
81y Female        with COPD, asthma, CHF, CAD s/p PCI 2019, severe MS with MVR/cryomaze with removal of LV thrombus,        Afib on coumadin, uterine cancer s/p RT ~2019    pt's card is dr izaguirre/ [facility]        admitted with   BRBPR.  Patient reporting bloody BMs for the past 3 days.  .Denies h/o EGD/colon. Denies heavy NSAID use but will occasionally take advil for headaches   Of note, patient reporting elevated INR a few weeks ago.     Hgb 8 (8.6 in 2/2019),  with white count 5.     INR 4. Cr 0.68, BUN 11. , , ALT 66,      given vitamin K   by  er, and GI consulted.    *   Hematochezia possibly 2/2 radiation proctitis vs diverticular bleed   baseline  hb  is  8   never had an EGD/colon,   [facility] gi  w/p, seen by house  gi  coumadin on hold/  coagulopathy on arrival, vit  K given  serial hb/ prbc  as needed  * elevated lft\   *  h/o  Afib, and  a/c on hold  *  h/o CAD/  s/p  MVR IN 2019  (  HTN/  DM   on toprol, norvasc.    hold  lipitor, gioven high lft's/  w/p per gi    dvt ppx/ pas  *   s/p  hyperkalemia, on lokelma  Cta /p,   h'rroids, left iliac A  occlusion   vascular  eval  colonoscopy on friday/  coumadin on hold     rad< from: CT Abdomen and Pelvis w/ IV Cont (10.06.21 @ 20:30) >  MPRESSION:  No CT evidence of active GI bleed.  Colonic diverticulosis without acute diverticulitis.  Question internal hemorrhoids at the rectum.  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  --- End of Report ---  < end of copied text >

## 2021-10-08 NOTE — CONSULT NOTE ADULT - SUBJECTIVE AND OBJECTIVE BOX
HPI:    81y Female with COPD, asthma, CHF, CAD s/p PCI 2019, severe MS with MVR/cryomaze with removal of LV thrombus, afib on coumadin, uterine cancer s/p RT ~2019 and presents today with BRBPR, Patient reporting bloody BMs for the past 3 days. Says it only occurs with BMs and it is a lot in the toilet and with the stools. Stool she says is brown, but has been black in the past with iron, but never red. Denies n/v/d, constipation, no abdominal pain or discomfort. No f/c, reporting weight loss, but unable to quantify. Denies h/o EGD/colon. Denies heavy NSAID use but will occasionally take Advil for headaches. Of note, patient reporting elevated INR a few weeks ago. Vascular surgery consulted for incidentally found left external iliac occlusion with distal reconstitution Of note, patient states follows at [facility] with Vascular surgeon. Recently had REROL's done for which she claims results were normal. Patient claims to have had femoral endarterectomy in .      PAST MEDICAL & SURGICAL HISTORY:  DM (diabetes mellitus)    COPD (chronic obstructive pulmonary disease)    Asthma    DVT (deep venous thrombosis)    HTN (hypertension)    DM (diabetes mellitus)    HLD (hyperlipidemia)    Atrial fibrillation    Asthma    CAD (coronary artery disease)    History of embolectomy  LLE    S/P arterial stent  left arterial thrombectomy        MEDICATIONS  (STANDING):  bisacodyl 10 milliGRAM(s) Oral at bedtime  dextrose 40% Gel 15 Gram(s) Oral once  dextrose 5%. 1000 milliLiter(s) (50 mL/Hr) IV Continuous <Continuous>  dextrose 5%. 1000 milliLiter(s) (100 mL/Hr) IV Continuous <Continuous>  dextrose 50% Injectable 25 Gram(s) IV Push once  dextrose 50% Injectable 12.5 Gram(s) IV Push once  dextrose 50% Injectable 25 Gram(s) IV Push once  glucagon  Injectable 1 milliGRAM(s) IntraMuscular once  insulin lispro (ADMELOG) corrective regimen sliding scale   SubCutaneous three times a day before meals  metoprolol succinate ER 25 milliGRAM(s) Oral daily  pantoprazole  Injectable 40 milliGRAM(s) IV Push daily    MEDICATIONS  (PRN):  melatonin 3 milliGRAM(s) Oral at bedtime PRN Insomnia      Allergies    No Known Allergies    Intolerances        SOCIAL HISTORY:    FAMILY HISTORY:  No pertinent family history in first degree relatives          PHYSICAL EXAM:  Constitutional: well developed, well nourished, NAD  Eyes: anicteric  ENMT: normal facies, symmetric  Respiratory: Breathing comfortably    Gastrointestinal: abdomen soft, nontender, nondistended.   Extremities: FROM, healed left iliac scar, doppler DP b/l, no wounds, strength and sensation equal b/l  Neurological: intact, non-focal  Psychiatric: oriented x 3; appropriate      Vital Signs Last 24 Hrs  T(C): 36.9 (08 Oct 2021 14:53), Max: 36.9 (08 Oct 2021 14:53)  T(F): 98.5 (08 Oct 2021 14:53), Max: 98.5 (08 Oct 2021 14:53)  HR: 74 (08 Oct 2021 14:53) (69 - 78)  BP: 125/74 (08 Oct 2021 14:53) (103/84 - 165/76)  BP(mean): --  RR: 18 (08 Oct 2021 14:53) (18 - 24)  SpO2: 100% (08 Oct 2021 14:53) (98% - 100%)    I&O's Summary          LABS:                        9.2    4.99  )-----------( 177      ( 08 Oct 2021 06:49 )             28.6     10-08    135  |  104  |  7   ----------------------------<  83  3.9   |  23  |  0.71    Ca    8.5      08 Oct 2021 06:46  Phos  3.1     10-08  Mg     1.7     10-08    TPro  8.5<H>  /  Alb  3.2<L>  /  TBili  1.6<H>  /  DBili  x   /  AST  128<H>  /  ALT  51<H>  /  AlkPhos  284<H>  10-08    PT/INR - ( 08 Oct 2021 06:52 )   PT: 15.0 sec;   INR: 1.26 ratio         PTT - ( 08 Oct 2021 06:52 )  PTT:32.7 sec  Urinalysis Basic - ( 06 Oct 2021 16:19 )    Color: Yellow / Appearance: Clear / S.018 / pH: x  Gluc: x / Ketone: Negative  / Bili: Negative / Urobili: Negative   Blood: x / Protein: Negative / Nitrite: Negative   Leuk Esterase: Negative / RBC: 1 /hpf / WBC 2 /HPF   Sq Epi: x / Non Sq Epi: 2 /hpf / Bacteria: Negative      CAPILLARY BLOOD GLUCOSE      POCT Blood Glucose.: 82 mg/dL (08 Oct 2021 05:58)  POCT Blood Glucose.: 99 mg/dL (08 Oct 2021 00:03)  POCT Blood Glucose.: 116 mg/dL (07 Oct 2021 21:41)  POCT Blood Glucose.: 90 mg/dL (07 Oct 2021 17:56)    LIVER FUNCTIONS - ( 08 Oct 2021 06:46 )  Alb: 3.2 g/dL / Pro: 8.5 g/dL / ALK PHOS: 284 U/L / ALT: 51 U/L / AST: 128 U/L / GGT: x             Cultures:      RADIOLOGY & ADDITIONAL STUDIES:      Plan:

## 2021-10-08 NOTE — PRE PROCEDURE NOTE - PRE PROCEDURE EVALUATION
Attending Physician:   Dr. Coates                         Procedure:   EGD/Colonoscopy    Indication for Procedure:   GIH  ________________________________________________________  PAST MEDICAL & SURGICAL HISTORY:    DM (diabetes mellitus)  COPD (chronic obstructive pulmonary disease)  Asthma  DVT (deep venous thrombosis)  HTN (hypertension)  DM (diabetes mellitus)  HLD (hyperlipidemia)  Atrial fibrillation  Asthma  CAD (coronary artery disease)    History of embolectomy-LLE  S/P arterial stent-left arterial thrombectomy      ALLERGIES:  No Known Allergies    HOME MEDICATIONS:  albuterol 90 mcg/inh inhalation aerosol: 1 puff(s) inhaled , As Needed  amlodipine-valsartan 10 mg-160 mg oral tablet: pt states 0.5 tab 2 to 3 times a week    note:Pharmacy has 1 tab once a day  atorvastatin 20 mg oral tablet: 1 tab(s) orally once a day    Note:Pt unsure of dosage  metFORMIN 850 mg oral tablet: 1 tab(s) orally once a day (in the morning)    Note:Pharmacy has directions as 1 tab twice a day    AICD/PPM: [ ] yes   [X ] no    PERTINENT LAB DATA:                        9.2    4.99  )-----------( 177      ( 08 Oct 2021 06:49 )             28.6     10-08    135  |  104  |  7   ----------------------------<  83  3.9   |  23  |  0.71    Ca    8.5      08 Oct 2021 06:46  Phos  3.1     10-08  Mg     1.7     10-08  TPro  8.5<H>  /  Alb  3.2<L>  /  TBili  1.6<H>  /  DBili  x   /  AST  128<H>  /  ALT  51<H>  /  AlkPhos  284<H>  10-08  PT/INR - ( 08 Oct 2021 06:52 )   PT: 15.0 sec;   INR: 1.26 ratio    PTT - ( 08 Oct 2021 06:52 )  PTT:32.7 sec    PHYSICAL EXAMINATION:    T(C): 35.9  HR: 73  BP: 133/79  RR: 24  SpO2: 100%    Constitutional: NAD    Neck:  No JVD  Respiratory: CTAB/L  Cardiovascular: S1 and S2  Gastrointestinal: BS+, soft, NT/ND  Extremities: No peripheral edema  Neurological: A/O x 4    COMMENTS:    The patient is a suitable candidate for the planned procedure unless box checked [ ]  No, explain:

## 2021-10-08 NOTE — PROGRESS NOTE ADULT - ASSESSMENT
Echo 10/7/21: EF 75%, Bio MVR fx, mod As, hyperdynamic lv sys fx, mild-mod TR, mild AL, mild pHTN  Echo 2/15/19: EF 50%, mildly reduced LV fx, mild pulm htn, mod TR, severe mitral annular calcification.  moderate mitral regurgitation.  EMILI 1/7/19 : EF 66%, mod to sev MS ; large left atrial appendage thrombus   Cardiac Cath Lab - Adult (01.18.19 @ 11:34) >  PCI to severe proximal LAD lesion with BRENDA  in setting of new onset angina, acute diastolic HF    a/p     82 y/o female with hx of HTN, HLD, DM, asthma, Urince CA s/p Radiation (on remission per pt) CAD s/p PCI (to LAD on 1/2019),  Afib (on coumadin severe MS S/P (2/18/2019) MVR/Cryomaze, and removal of LA thrombus presenting with GIB    #GIB  -s/p PRBC-- monitor h/h   -supratheraputic INR on admission s/p vitamin K-- INR today 1.26  -s/p EGD/colonscopy - results noted   -ok to resume AC per GI     # CAD s/p PCI (to LAD on 1/2019)  -resume asa 81 mg daily -cleared by GI   -ecg with no acs - cv stable     # Chronic AFib   -Coumadin per INR   -c/w BB   -in NSR     #  Severe MS S/P (2/18/2019) MVR/Cryomaze, and removal of LA thrombus  -Echo noted with   EF 75%, fx Bio MVR , mod As, hyperdynamic lv sys fx, mild-mod TR, mild AL, mild pHTN  -c/w BB     dvt ppx

## 2021-10-08 NOTE — CONSULT NOTE ADULT - ASSESSMENT
81y Female with COPD, asthma, CHF, CAD s/p PCI 2019, severe MS with MVR/cryomaze with removal of LV thrombus, afib on coumadin, uterine cancer s/p RT ~2019 and presents today with BRBPR. Vascular surgery consulted for external iliac artery occlusion with distal reconsitiution.    - Patient with no symptoms, doppler signals present, patient with no LE pain, no wounds present  - Please get ERROL/PVR to better assess vasculature  - Will follow  - Discussed with attending     [id number] 81y Female with COPD, asthma, CHF, CAD s/p PCI 2019, severe MS with MVR/cryomaze with removal of LV thrombus, afib on coumadin, uterine cancer s/p RT ~2019 and presents today with BRBPR. Vascular surgery consulted for left external iliac artery occlusion with distal reconsitiution.    - Patient with no symptoms, doppler signals present, patient with no LE pain, no wounds present  - Please get ERROL/PVR to better assess vasculature  - Will follow  - Discussed with attending     [id number] 81y Female with COPD, asthma, CHF, CAD s/p PCI 2019, severe MS with MVR/cryomaze with removal of LV thrombus, afib on coumadin, uterine cancer s/p RT ~2019 and presents today with BRBPR. Vascular surgery consulted for left external iliac artery occlusion with distal reconsitiution.    - Patient with no symptoms, doppler signals present, patient with no LE pain, no wounds present  - Please get ERROL/PVR to better assess vasculature  - Will follow  - Discussed with attending     [id number]    ATTENDING STATEMENT :  Full consult note as above; discussed with surgery resident and vascular fellow.    The pt was admitted with a GI bleed and was incidentally found to have a LT iliac artery occlusion. She had femoral endarterectomy many years ago.  The occlusion is well known to the patient. She does not have any rest pain. She walks short distances only and does not have disabling claudication. She is followed by a vascular surgeon at [facility]. She had a palpable RT femoral pulse but no LT femoral, popliteal or pedal pulses. On the RT side the femoral pulse was the only palpable pulse.  She had normal capillary refill. She had good Doppler signals in her feet. The PVR study could be repeated but there is no  likelihood  of her needing vascular surgery intervention. After this she can follow up with her outside vascular surgeon.

## 2021-10-09 LAB
ALBUMIN SERPL ELPH-MCNC: 2.9 G/DL — LOW (ref 3.3–5)
ALP SERPL-CCNC: 242 U/L — HIGH (ref 40–120)
ALT FLD-CCNC: 42 U/L — SIGNIFICANT CHANGE UP (ref 10–45)
ANION GAP SERPL CALC-SCNC: 12 MMOL/L — SIGNIFICANT CHANGE UP (ref 5–17)
AST SERPL-CCNC: 102 U/L — HIGH (ref 10–40)
BILIRUB DIRECT SERPL-MCNC: 0.7 MG/DL — HIGH (ref 0–0.2)
BILIRUB INDIRECT FLD-MCNC: 1 MG/DL — SIGNIFICANT CHANGE UP (ref 0.2–1)
BILIRUB SERPL-MCNC: 1.7 MG/DL — HIGH (ref 0.2–1.2)
BUN SERPL-MCNC: 9 MG/DL — SIGNIFICANT CHANGE UP (ref 7–23)
CALCIUM SERPL-MCNC: 8.2 MG/DL — LOW (ref 8.4–10.5)
CHLORIDE SERPL-SCNC: 103 MMOL/L — SIGNIFICANT CHANGE UP (ref 96–108)
CO2 SERPL-SCNC: 20 MMOL/L — LOW (ref 22–31)
CREAT SERPL-MCNC: 0.77 MG/DL — SIGNIFICANT CHANGE UP (ref 0.5–1.3)
GLUCOSE BLDC GLUCOMTR-MCNC: 123 MG/DL — HIGH (ref 70–99)
GLUCOSE BLDC GLUCOMTR-MCNC: 162 MG/DL — HIGH (ref 70–99)
GLUCOSE BLDC GLUCOMTR-MCNC: 86 MG/DL — SIGNIFICANT CHANGE UP (ref 70–99)
GLUCOSE BLDC GLUCOMTR-MCNC: 99 MG/DL — SIGNIFICANT CHANGE UP (ref 70–99)
GLUCOSE SERPL-MCNC: 103 MG/DL — HIGH (ref 70–99)
HAV IGM SER-ACNC: SIGNIFICANT CHANGE UP
HBV CORE IGM SER-ACNC: SIGNIFICANT CHANGE UP
HBV SURFACE AG SER-ACNC: SIGNIFICANT CHANGE UP
HCT VFR BLD CALC: 26.7 % — LOW (ref 34.5–45)
HCV AB S/CO SERPL IA: 0.96 S/CO — SIGNIFICANT CHANGE UP (ref 0–0.99)
HCV AB SERPL-IMP: SIGNIFICANT CHANGE UP
HGB BLD-MCNC: 8.7 G/DL — LOW (ref 11.5–15.5)
INR BLD: 1.34 RATIO — HIGH (ref 0.88–1.16)
MCHC RBC-ENTMCNC: 29.2 PG — SIGNIFICANT CHANGE UP (ref 27–34)
MCHC RBC-ENTMCNC: 32.6 GM/DL — SIGNIFICANT CHANGE UP (ref 32–36)
MCV RBC AUTO: 89.6 FL — SIGNIFICANT CHANGE UP (ref 80–100)
NRBC # BLD: 0 /100 WBCS — SIGNIFICANT CHANGE UP (ref 0–0)
PLATELET # BLD AUTO: 180 K/UL — SIGNIFICANT CHANGE UP (ref 150–400)
POTASSIUM SERPL-MCNC: 3.8 MMOL/L — SIGNIFICANT CHANGE UP (ref 3.5–5.3)
POTASSIUM SERPL-SCNC: 3.8 MMOL/L — SIGNIFICANT CHANGE UP (ref 3.5–5.3)
PROT SERPL-MCNC: 8 G/DL — SIGNIFICANT CHANGE UP (ref 6–8.3)
PROTHROM AB SERPL-ACNC: 15.9 SEC — HIGH (ref 10.6–13.6)
RBC # BLD: 2.98 M/UL — LOW (ref 3.8–5.2)
RBC # FLD: 15.9 % — HIGH (ref 10.3–14.5)
SODIUM SERPL-SCNC: 135 MMOL/L — SIGNIFICANT CHANGE UP (ref 135–145)
WBC # BLD: 9.23 K/UL — SIGNIFICANT CHANGE UP (ref 3.8–10.5)
WBC # FLD AUTO: 9.23 K/UL — SIGNIFICANT CHANGE UP (ref 3.8–10.5)

## 2021-10-09 RX ORDER — WARFARIN SODIUM 2.5 MG/1
5 TABLET ORAL ONCE
Refills: 0 | Status: DISCONTINUED | OUTPATIENT
Start: 2021-10-09 | End: 2021-10-09

## 2021-10-09 RX ORDER — WARFARIN SODIUM 2.5 MG/1
7.5 TABLET ORAL ONCE
Refills: 0 | Status: COMPLETED | OUTPATIENT
Start: 2021-10-09 | End: 2021-10-09

## 2021-10-09 RX ORDER — ASPIRIN/CALCIUM CARB/MAGNESIUM 324 MG
81 TABLET ORAL DAILY
Refills: 0 | Status: DISCONTINUED | OUTPATIENT
Start: 2021-10-09 | End: 2021-10-12

## 2021-10-09 RX ADMIN — Medication 1 DROP(S): at 23:11

## 2021-10-09 RX ADMIN — PANTOPRAZOLE SODIUM 40 MILLIGRAM(S): 20 TABLET, DELAYED RELEASE ORAL at 11:34

## 2021-10-09 RX ADMIN — Medication 3 MILLIGRAM(S): at 21:04

## 2021-10-09 RX ADMIN — Medication 1: at 17:31

## 2021-10-09 RX ADMIN — WARFARIN SODIUM 7.5 MILLIGRAM(S): 2.5 TABLET ORAL at 21:05

## 2021-10-09 RX ADMIN — MONTELUKAST 10 MILLIGRAM(S): 4 TABLET, CHEWABLE ORAL at 21:04

## 2021-10-09 RX ADMIN — Medication 25 MILLIGRAM(S): at 05:47

## 2021-10-09 NOTE — PROGRESS NOTE ADULT - ASSESSMENT
Echo 10/7/21: EF 75%, Bio MVR fx, mod As, hyperdynamic lv sys fx, mild-mod TR, mild DE, mild pHTN  Echo 2/15/19: EF 50%, mildly reduced LV fx, mild pulm htn, mod TR, severe mitral annular calcification.  moderate mitral regurgitation.  EMILI 1/7/19 : EF 66%, mod to sev MS ; large left atrial appendage thrombus   Cardiac Cath Lab - Adult (01.18.19 @ 11:34) >  PCI to severe proximal LAD lesion with BRENDA  in setting of new onset angina, acute diastolic HF    a/p     80 y/o female with hx of HTN, HLD, DM, asthma, Urince CA s/p Radiation (on remission per pt) CAD s/p PCI (to LAD on 1/2019),  Afib (on coumadin severe MS S/P (2/18/2019) MVR/Cryomaze, and removal of LA thrombus presenting with GIB    #GIB  -s/p PRBC-- monitor h/h   -supratheraputic INR on admission s/p vitamin K-- INR today 1.26  -s/p EGD/colonscopy - results noted   -ok to resume AC per GI     # CAD s/p PCI (to LAD on 1/2019)  -resume asa 81 mg daily -cleared by GI   -ecg with no acs - cv stable     # Chronic AFib   -Coumadin per INR   -c/w BB   -in NSR     #  Severe MS S/P (2/18/2019) MVR/Cryomaze, and removal of LA thrombus  -Echo noted with   EF 75%, fx Bio MVR , mod As, hyperdynamic lv sys fx, mild-mod TR, mild DE, mild pHTN  -c/w BB     # left external iliac artery occlusion with distal reconsitiution.  -CT abd noted- work up vascular     dvt ppx

## 2021-10-09 NOTE — PROGRESS NOTE ADULT - ASSESSMENT
81y Female        with COPD, asthma, CHF, CAD s/p PCI 2019, severe MS with MVR/cryomaze with removal of LV thrombus,        Afib on coumadin, uterine cancer s/p RT ~2019    pt's card is dr izaguirre/ [facility]        admitted with   BRBPR.  Patient reporting bloody BMs for the past 3 days.  .Denies h/o EGD/colon. Denies heavy NSAID use but will occasionally take advil for headaches   Of note, patient reporting elevated INR a few weeks ago.     Hgb 8 (8.6 in 2/2019),  with white count 5.     INR 4. Cr 0.68, BUN 11. , , ALT 66,      given vitamin K   by  er, and GI consulted.    *   Hematochezia possibly 2/2 radiation proctitis vs diverticular bleed   baseline  hb  is  8   never had an EGD/colon,   [facility] gi  w/p, seen by house  gi  coumadin on hold/  coagulopathy on arrival, vit  K given  serial hb/ prbc  as needed  * elevated lft\   *  h/o  Afib, and  a/c on hold  *  h/o CAD/  s/p  MVR IN 2019    HTN/  DM,  on toprol, norvasc.    hold  lipitor, gioven high lft's/  w/p per gi    dvt ppx/ pas  *   s/p  hyperkalemia, on lokelma   * Cta /p,   h'rroids, left iliac A  occlusion   vascular  eval  noted,  awaiting  becky   *  s/p colonoscopy on friday/.  likely  diverticular  bleed  per  gi  follwo hb     coumadin restarted for  afib  mri as  per  gi,  still pending      rad< from: CT Abdomen and Pelvis w/ IV Cont (10.06.21 @ 20:30) >  MPRESSION:  No CT evidence of active GI bleed.  Colonic diverticulosis without acute diverticulitis.  Question internal hemorrhoids at the rectum.  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  --- End of Report ---  < end of copied text >

## 2021-10-10 LAB
ALBUMIN SERPL ELPH-MCNC: 2.8 G/DL — LOW (ref 3.3–5)
ALP SERPL-CCNC: 220 U/L — HIGH (ref 40–120)
ALT FLD-CCNC: 36 U/L — SIGNIFICANT CHANGE UP (ref 10–45)
ANA PAT FLD IF-IMP: ABNORMAL
ANA TITR SER: ABNORMAL
ANION GAP SERPL CALC-SCNC: 13 MMOL/L — SIGNIFICANT CHANGE UP (ref 5–17)
AST SERPL-CCNC: 84 U/L — HIGH (ref 10–40)
BILIRUB SERPL-MCNC: 1.2 MG/DL — SIGNIFICANT CHANGE UP (ref 0.2–1.2)
BUN SERPL-MCNC: 8 MG/DL — SIGNIFICANT CHANGE UP (ref 7–23)
CALCIUM SERPL-MCNC: 7.9 MG/DL — LOW (ref 8.4–10.5)
CHLORIDE SERPL-SCNC: 104 MMOL/L — SIGNIFICANT CHANGE UP (ref 96–108)
CO2 SERPL-SCNC: 19 MMOL/L — LOW (ref 22–31)
CREAT SERPL-MCNC: 0.71 MG/DL — SIGNIFICANT CHANGE UP (ref 0.5–1.3)
GLUCOSE BLDC GLUCOMTR-MCNC: 124 MG/DL — HIGH (ref 70–99)
GLUCOSE BLDC GLUCOMTR-MCNC: 134 MG/DL — HIGH (ref 70–99)
GLUCOSE BLDC GLUCOMTR-MCNC: 178 MG/DL — HIGH (ref 70–99)
GLUCOSE BLDC GLUCOMTR-MCNC: 96 MG/DL — SIGNIFICANT CHANGE UP (ref 70–99)
GLUCOSE SERPL-MCNC: 83 MG/DL — SIGNIFICANT CHANGE UP (ref 70–99)
HCT VFR BLD CALC: 28.5 % — LOW (ref 34.5–45)
HGB BLD-MCNC: 8.9 G/DL — LOW (ref 11.5–15.5)
INR BLD: 1.44 RATIO — HIGH (ref 0.88–1.16)
MCHC RBC-ENTMCNC: 28.7 PG — SIGNIFICANT CHANGE UP (ref 27–34)
MCHC RBC-ENTMCNC: 31.2 GM/DL — LOW (ref 32–36)
MCV RBC AUTO: 91.9 FL — SIGNIFICANT CHANGE UP (ref 80–100)
NRBC # BLD: 0 /100 WBCS — SIGNIFICANT CHANGE UP (ref 0–0)
PLATELET # BLD AUTO: 184 K/UL — SIGNIFICANT CHANGE UP (ref 150–400)
POTASSIUM SERPL-MCNC: 3.4 MMOL/L — LOW (ref 3.5–5.3)
POTASSIUM SERPL-SCNC: 3.4 MMOL/L — LOW (ref 3.5–5.3)
PROT SERPL-MCNC: 7.6 G/DL — SIGNIFICANT CHANGE UP (ref 6–8.3)
PROTHROM AB SERPL-ACNC: 17 SEC — HIGH (ref 10.6–13.6)
RBC # BLD: 3.1 M/UL — LOW (ref 3.8–5.2)
RBC # FLD: 16.1 % — HIGH (ref 10.3–14.5)
SODIUM SERPL-SCNC: 136 MMOL/L — SIGNIFICANT CHANGE UP (ref 135–145)
WBC # BLD: 4.99 K/UL — SIGNIFICANT CHANGE UP (ref 3.8–10.5)
WBC # FLD AUTO: 4.99 K/UL — SIGNIFICANT CHANGE UP (ref 3.8–10.5)

## 2021-10-10 PROCEDURE — 74183 MRI ABD W/O CNTR FLWD CNTR: CPT | Mod: 26

## 2021-10-10 RX ORDER — POTASSIUM CHLORIDE 20 MEQ
40 PACKET (EA) ORAL ONCE
Refills: 0 | Status: COMPLETED | OUTPATIENT
Start: 2021-10-10 | End: 2021-10-10

## 2021-10-10 RX ORDER — WARFARIN SODIUM 2.5 MG/1
7 TABLET ORAL ONCE
Refills: 0 | Status: COMPLETED | OUTPATIENT
Start: 2021-10-10 | End: 2021-10-10

## 2021-10-10 RX ADMIN — Medication 3 MILLIGRAM(S): at 21:22

## 2021-10-10 RX ADMIN — MONTELUKAST 10 MILLIGRAM(S): 4 TABLET, CHEWABLE ORAL at 21:22

## 2021-10-10 RX ADMIN — Medication 25 MILLIGRAM(S): at 06:26

## 2021-10-10 RX ADMIN — WARFARIN SODIUM 7 MILLIGRAM(S): 2.5 TABLET ORAL at 21:22

## 2021-10-10 RX ADMIN — PANTOPRAZOLE SODIUM 40 MILLIGRAM(S): 20 TABLET, DELAYED RELEASE ORAL at 15:36

## 2021-10-10 RX ADMIN — Medication 1 DROP(S): at 06:08

## 2021-10-10 RX ADMIN — Medication 40 MILLIEQUIVALENT(S): at 19:09

## 2021-10-10 NOTE — PROVIDER CONTACT NOTE (MEDICATION) - SITUATION
Pt is refusing to take the Aspirin 81 mg PO. Pt stated she takes warfarin. She doesn't want to take another blood thinner.

## 2021-10-10 NOTE — PROGRESS NOTE ADULT - ASSESSMENT
81y Female        with COPD, asthma, CHF, CAD s/p PCI 2019, severe MS with MVR/cryomaze with removal of LV thrombus,        Afib on coumadin, uterine cancer s/p RT ~2019    pt's card is dr izaguirre/ [facility]        admitted with   BRBPR.  Patient reporting bloody BMs for the past 3 days.  .Denies h/o EGD/colon. Denies heavy NSAID use but will occasionally take advil for headaches   Of note, patient reporting elevated INR a few weeks ago.     Hgb 8 (8.6 in 2/2019),  with white count 5.     INR 4. Cr 0.68, BUN 11. , , ALT 66,      given vitamin K   by  er, and GI consulted.    *   Hematochezia possibly 2/2 radiation proctitis vs diverticular bleed   baseline  hb  is  8   never had an EGD/colon,   [facility]  w/p, seen by house  gi  coumadin on hold/  coagulopathy on arrival, vit  K given  serial hb/ prbc  as needed  * elevated lft\   *  h/o  Afib, and  a/c on hold  *  h/o CAD/  s/p  MVR IN 2019    HTN/  DM,  on toprol, norvasc.    hold  lipitor, gioven high lft's/  w/p per gi    dvt ppx/ pas  *   s/p  hyperkalemia, on lokelma   * Cta /p,   h'rroids, left iliac A  occlusion   vascular  eval  noted,  awaiting  becky   *  s/p colonoscopy on friday/.  likely  diverticular  bleed  per  gi     coumadin restarted for  afib.  daily inr  mri as  per  gi,  still pending   paln. if  mri  is  nromal, tehn will   d/c start5    d/c planninga nd, pt may f/p with d  rpurtil as  an out t       rad< from: CT Abdomen and Pelvis w/ IV Cont (10.06.21 @ 20:30) >  MPRESSION:  No CT evidence of active GI bleed.  Colonic diverticulosis without acute diverticulitis.  Question internal hemorrhoids at the rectum.  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  --- End of Report ---  < end of copied text >

## 2021-10-10 NOTE — PROVIDER CONTACT NOTE (MEDICATION) - RECOMMENDATIONS
None. RN explained the importance of her taking Aspirin. Pt was also told it was recommended from her Cardiologist Dr. Fitch.

## 2021-10-10 NOTE — PROGRESS NOTE ADULT - ASSESSMENT
Echo 10/7/21: EF 75%, Bio MVR fx, mod As, hyperdynamic lv sys fx, mild-mod TR, mild OH, mild pHTN  Echo 2/15/19: EF 50%, mildly reduced LV fx, mild pulm htn, mod TR, severe mitral annular calcification.  moderate mitral regurgitation.  EMILI 1/7/19 : EF 66%, mod to sev MS ; large left atrial appendage thrombus   Cardiac Cath Lab - Adult (01.18.19 @ 11:34) >  PCI to severe proximal LAD lesion with BRENDA  in setting of new onset angina, acute diastolic HF    a/p     80 y/o female with hx of HTN, HLD, DM, asthma, Urince CA s/p Radiation (on remission per pt) CAD s/p PCI (to LAD on 1/2019),  Afib (on coumadin severe MS S/P (2/18/2019) MVR/Cryomaze, and removal of LA thrombus presenting with GIB    #GIB  -s/p PRBC-- monitor h/h   -supratheraputic INR on admission s/p vitamin K  -s/p EGD/colonscopy - results noted   -back on a/c    # CAD s/p PCI (to LAD on 1/2019)  -asa 81 mg daily -cleared by GI   -ecg with no acs - cv stable     # Chronic AFib   -Coumadin per INR   -c/w BB   -in NSR     #  Severe MS S/P (2/18/2019) MVR/Cryomaze, and removal of LA thrombus  -Echo noted with   EF 75%, fx Bio MVR , mod As, hyperdynamic lv sys fx, mild-mod TR, mild OH, mild pHTN  -c/w BB     dvt ppx     d/c planning

## 2021-10-11 LAB
ALBUMIN SERPL ELPH-MCNC: 3 G/DL — LOW (ref 3.3–5)
ALP SERPL-CCNC: 228 U/L — HIGH (ref 40–120)
ALT FLD-CCNC: 30 U/L — SIGNIFICANT CHANGE UP (ref 10–45)
ANION GAP SERPL CALC-SCNC: 11 MMOL/L — SIGNIFICANT CHANGE UP (ref 5–17)
AST SERPL-CCNC: 74 U/L — HIGH (ref 10–40)
BILIRUB SERPL-MCNC: 0.8 MG/DL — SIGNIFICANT CHANGE UP (ref 0.2–1.2)
BUN SERPL-MCNC: 6 MG/DL — LOW (ref 7–23)
CALCIUM SERPL-MCNC: 8 MG/DL — LOW (ref 8.4–10.5)
CHLORIDE SERPL-SCNC: 106 MMOL/L — SIGNIFICANT CHANGE UP (ref 96–108)
CO2 SERPL-SCNC: 19 MMOL/L — LOW (ref 22–31)
CREAT SERPL-MCNC: 0.68 MG/DL — SIGNIFICANT CHANGE UP (ref 0.5–1.3)
GLUCOSE BLDC GLUCOMTR-MCNC: 103 MG/DL — HIGH (ref 70–99)
GLUCOSE BLDC GLUCOMTR-MCNC: 114 MG/DL — HIGH (ref 70–99)
GLUCOSE BLDC GLUCOMTR-MCNC: 96 MG/DL — SIGNIFICANT CHANGE UP (ref 70–99)
GLUCOSE BLDC GLUCOMTR-MCNC: 97 MG/DL — SIGNIFICANT CHANGE UP (ref 70–99)
GLUCOSE SERPL-MCNC: 71 MG/DL — SIGNIFICANT CHANGE UP (ref 70–99)
HCT VFR BLD CALC: 28.5 % — LOW (ref 34.5–45)
HGB BLD-MCNC: 9.1 G/DL — LOW (ref 11.5–15.5)
INR BLD: 2.08 RATIO — HIGH (ref 0.88–1.16)
MCHC RBC-ENTMCNC: 29.6 PG — SIGNIFICANT CHANGE UP (ref 27–34)
MCHC RBC-ENTMCNC: 31.9 GM/DL — LOW (ref 32–36)
MCV RBC AUTO: 92.8 FL — SIGNIFICANT CHANGE UP (ref 80–100)
NRBC # BLD: 0 /100 WBCS — SIGNIFICANT CHANGE UP (ref 0–0)
PLATELET # BLD AUTO: 194 K/UL — SIGNIFICANT CHANGE UP (ref 150–400)
POTASSIUM SERPL-MCNC: 3.6 MMOL/L — SIGNIFICANT CHANGE UP (ref 3.5–5.3)
POTASSIUM SERPL-SCNC: 3.6 MMOL/L — SIGNIFICANT CHANGE UP (ref 3.5–5.3)
PROT SERPL-MCNC: 7.5 G/DL — SIGNIFICANT CHANGE UP (ref 6–8.3)
PROTHROM AB SERPL-ACNC: 24.1 SEC — HIGH (ref 10.6–13.6)
RBC # BLD: 3.07 M/UL — LOW (ref 3.8–5.2)
RBC # FLD: 16.2 % — HIGH (ref 10.3–14.5)
SODIUM SERPL-SCNC: 136 MMOL/L — SIGNIFICANT CHANGE UP (ref 135–145)
WBC # BLD: 5 K/UL — SIGNIFICANT CHANGE UP (ref 3.8–10.5)
WBC # FLD AUTO: 5 K/UL — SIGNIFICANT CHANGE UP (ref 3.8–10.5)

## 2021-10-11 PROCEDURE — 93923 UPR/LXTR ART STDY 3+ LVLS: CPT | Mod: 26

## 2021-10-11 PROCEDURE — 99223 1ST HOSP IP/OBS HIGH 75: CPT

## 2021-10-11 PROCEDURE — 99233 SBSQ HOSP IP/OBS HIGH 50: CPT

## 2021-10-11 RX ORDER — WARFARIN SODIUM 2.5 MG/1
4 TABLET ORAL ONCE
Refills: 0 | Status: COMPLETED | OUTPATIENT
Start: 2021-10-11 | End: 2021-10-11

## 2021-10-11 RX ADMIN — Medication 3 MILLIGRAM(S): at 21:24

## 2021-10-11 RX ADMIN — Medication 1 DROP(S): at 11:17

## 2021-10-11 RX ADMIN — Medication 1 DROP(S): at 21:22

## 2021-10-11 RX ADMIN — Medication 1 DROP(S): at 06:02

## 2021-10-11 RX ADMIN — PANTOPRAZOLE SODIUM 40 MILLIGRAM(S): 20 TABLET, DELAYED RELEASE ORAL at 11:17

## 2021-10-11 RX ADMIN — Medication 400 MILLIGRAM(S): at 22:31

## 2021-10-11 RX ADMIN — Medication 10 MILLIGRAM(S): at 21:23

## 2021-10-11 RX ADMIN — Medication 400 MILLIGRAM(S): at 23:00

## 2021-10-11 RX ADMIN — MONTELUKAST 10 MILLIGRAM(S): 4 TABLET, CHEWABLE ORAL at 21:23

## 2021-10-11 RX ADMIN — Medication 25 MILLIGRAM(S): at 06:02

## 2021-10-11 RX ADMIN — WARFARIN SODIUM 4 MILLIGRAM(S): 2.5 TABLET ORAL at 21:23

## 2021-10-11 RX ADMIN — Medication 1 DROP(S): at 17:33

## 2021-10-11 NOTE — CONSULT NOTE ADULT - ATTENDING COMMENTS
Hepatology Staff: Sue Beltran MD  I saw and examined the patient along with  Dr. Yadav on 10-11-21.    Patient Medical Record, hosptial course was reviewed and summarized as below:    Vitals: Vital Signs Last 24 Hrs  T(C): 36.6 (11 Oct 2021 11:27), Max: 36.6 (10 Oct 2021 20:48)  T(F): 97.8 (11 Oct 2021 11:27), Max: 97.9 (10 Oct 2021 20:48)  HR: 71 (11 Oct 2021 11:27) (68 - 71)  BP: 120/76 (11 Oct 2021 11:27) (114/73 - 120/76)    RR: 18 (11 Oct 2021 11:27) (18 - 18)  SpO2: 98% (11 Oct 2021 11:27) (98% - 99%)    Labs:Creatinine, Serum: 0.68 mg/dL (10-11-21 @ 07:09)  Bilirubin Total, Serum: 0.8 mg/dL (10-11-21 @ 07:09)  INR: 2.08 ratio (10-11-21 @ 07:09)      I/O: I&O's Summary    11 Oct 2021 07:01  -  11 Oct 2021 17:49  --------------------------------------------------------  IN: 660 mL / OUT: 0 mL / NET: 660 mL      Nutritional Status:   Albumin, Serum: 3.0 g/dL (10-11-21 @ 07:09)      Recommendations:This is a 81-year-old female with multiple cardiac comorbidities as outlined in fellow's note.  Incidental finding of early cirrhosis on MRCP.  No evidence of portal hypertension, no varices on recent upper endoscopy and platelet counts are within normal range.  Spleen is also not enlarged.  I agree with above outlined history, physical examination, assessment and plan.  Recommend outpatient follow-up in hepatology clinic for further evaluation and care.      Plan discussed with Primary team.
Patient seen and examined, agree with above. Patient presents with hematochezia, and has never had colonoscopy before. She also has supertherapeutic INR and complex cardiac history. Would obtain cardiac clearance prior to colonoscopy. Await INR to drift back to below 3. Plan for colonoscopy +/- EGD likely Friday, pending cardiac evaluation and optimization of INR.

## 2021-10-11 NOTE — CONSULT NOTE ADULT - SUBJECTIVE AND OBJECTIVE BOX
Chief Complaint:  Patient is a 81y old  Female who presents with a chief complaint of rectal bleed (11 Oct 2021 11:35)      HPI:  Ms. Winchester is an 80yo F with PMH of COPD, asthma, CHF, CAD s/p PCI 2019, severe MS with MVR with removal of LV thrombus, afib on coumadin, uterine cancer s/p RT ~2019. Hepatology consulted for abnormal MRI results.    Patient originally presented   Patient reporting bloody BMs for the past 3 days. Says it only occurs with BMs and it is a lot in the toilet and with the stools. Stool she says is brown, but has been black in the past with iron, but never red. Denies n/v/d, constipation, no abdominal pain or discomfort. No f/c, reporting weight loss, but unable to quantify. Denies h/o EGD/colon. Denies heavy NSAID use but will occasionally take advil for headaches. Of note, patient reporting elevated INR a few weeks ago.    She has been HDS, with Hgb 8 (8.6 in 2/2019),  with white count 5. INR 4. Cr 0.68, BUN 11. , , ALT 66, T bili 1.2. She was started on PPI, given vitamin K and GI consulted.    Allergies:  No Known Allergies      Home Medications:    Hospital Medications:  artificial tears (preservative free) Ophthalmic Solution 1 Drop(s) Both EYES four times a day  aspirin enteric coated 81 milliGRAM(s) Oral daily  bisacodyl 10 milliGRAM(s) Oral at bedtime  dextrose 40% Gel 15 Gram(s) Oral once  dextrose 5%. 1000 milliLiter(s) IV Continuous <Continuous>  dextrose 5%. 1000 milliLiter(s) IV Continuous <Continuous>  dextrose 50% Injectable 25 Gram(s) IV Push once  dextrose 50% Injectable 12.5 Gram(s) IV Push once  dextrose 50% Injectable 25 Gram(s) IV Push once  glucagon  Injectable 1 milliGRAM(s) IntraMuscular once  ibuprofen  Tablet. 400 milliGRAM(s) Oral every 6 hours PRN  insulin lispro (ADMELOG) corrective regimen sliding scale   SubCutaneous three times a day before meals  melatonin 3 milliGRAM(s) Oral at bedtime PRN  metoprolol succinate ER 25 milliGRAM(s) Oral daily  montelukast 10 milliGRAM(s) Oral at bedtime  pantoprazole  Injectable 40 milliGRAM(s) IV Push daily  warfarin 4 milliGRAM(s) Oral once      PMHX/PSHX:  No pertinent past medical history    DM (diabetes mellitus)    COPD (chronic obstructive pulmonary disease)    Asthma    DVT (deep venous thrombosis)    HTN (hypertension)    DM (diabetes mellitus)    HLD (hyperlipidemia)    Atrial fibrillation    Asthma    CAD (coronary artery disease)    No significant past surgical history    History of embolectomy    S/P arterial stent        Family history:  No pertinent family history in first degree relatives        Denies family history of colon cancer/polyps, stomach cancer/polyps, pancreatic cancer/masses, liver cancer/disease, ovarian cancer and endometrial cancer.    Social History:     Tob: Denies  EtOH: As above  Illicit Drugs: Denies    ROS:   General:  No wt loss, fevers, chills, night sweats, fatigue  Eyes:  Good vision, no reported pain  ENT:  No sore throat, pain, runny nose, dysphagia  CV:  No pain, palpitations, hypo/hypertension  Pulm:  No dyspnea, cough, tachypnea, wheezing  GI:  As per HPI  :  No pain, bleeding, incontinence, nocturia  Muscle:  No pain, weakness  Neuro:  No weakness, tingling, memory problems  Psych:  No fatigue, insomnia, mood problems, depression  Endocrine:  No polyuria, polydipsia, cold/heat intolerance  Heme:  No petechiae, ecchymosis, easy bruisability  Skin:  No rash, tattoos, scars, edema    PHYSICAL EXAM:   GENERAL:  No acute distress  HEENT:  Normocephalic/atraumatic, no scleral icterus  CHEST:  No accessory muscle use  HEART:  Regular rate and rhythm  ABDOMEN:  Soft, non-tender, non-distended, normoactive bowel sounds,  no masses, no hepato-splenomegaly, no signs of chronic liver disease  EXTREMITIES: No cyanosis, clubbing, or edema  SKIN:  No rash  NEURO:  Alert and oriented x 3, no asterixis    Vital Signs:  Vital Signs Last 24 Hrs  T(C): 36.6 (11 Oct 2021 11:27), Max: 36.6 (10 Oct 2021 20:48)  T(F): 97.8 (11 Oct 2021 11:27), Max: 97.9 (10 Oct 2021 20:48)  HR: 71 (11 Oct 2021 11:27) (68 - 71)  BP: 120/76 (11 Oct 2021 11:27) (114/73 - 120/76)  BP(mean): --  RR: 18 (11 Oct 2021 11:27) (18 - 18)  SpO2: 98% (11 Oct 2021 11:27) (98% - 99%)  Daily     Daily     LABS:                        9.1    5.00  )-----------( 194      ( 11 Oct 2021 07:09 )             28.5     Mean Cell Volume: 92.8 fl (10-11-21 @ 07:09)    10-11    136  |  106  |  6<L>  ----------------------------<  71  3.6   |  19<L>  |  0.68    Ca    8.0<L>      11 Oct 2021 07:09    TPro  7.5  /  Alb  3.0<L>  /  TBili  0.8  /  DBili  x   /  AST  74<H>  /  ALT  30  /  AlkPhos  228<H>  10-11    LIVER FUNCTIONS - ( 11 Oct 2021 07:09 )  Alb: 3.0 g/dL / Pro: 7.5 g/dL / ALK PHOS: 228 U/L / ALT: 30 U/L / AST: 74 U/L / GGT: x           PT/INR - ( 11 Oct 2021 07:09 )   PT: 24.1 sec;   INR: 2.08 ratio                                     9.1    5.00  )-----------( 194      ( 11 Oct 2021 07:09 )             28.5                         8.9    4.99  )-----------( 184      ( 10 Oct 2021 07:11 )             28.5                         8.7    9.23  )-----------( 180      ( 09 Oct 2021 07:08 )             26.7       Imaging:           Chief Complaint:  Patient is a 81y old  Female who presents with a chief complaint of rectal bleed (11 Oct 2021 11:35)      HPI:  Ms. Winchester is an 82yo F with PMH of COPD, asthma, CHF, CAD s/p PCI 2019, severe MS with MVR with removal of LV thrombus, afib on coumadin, uterine cancer s/p RT ~2019. Hepatology consulted for abnormal MRI results.    Patient originally presented with BRBPR in the settings of supra-theraputic INR, EGD/colon with diverticulosis. Patient was noted to have mildly elevated liver test (ALKP 260, normal bili,  ALT 66). INR downtrended after holding coumadin and with vitamin K. RUQ US done wnl. MRCP showed medial segment atrophy with a subtle nodular/lobular surface contour of the liver.     No history of EtOH abuse. No history of viral hepatitis. Patient with mildly elevated BMI. No history of known liver disease.    Allergies:  No Known Allergies      Home Medications:  albuterol 90 mcg/inh inhalation aerosol: 1 puff(s) inhaled , As Needed (06 Oct 2021 15:16)  amlodipine-valsartan 10 mg-160 mg oral tablet: pt states 0.5 tab 2 to 3 times a week    note:Pharmacy has 1 tab once a day (06 Oct 2021 15:16)  atorvastatin 20 mg oral tablet: 1 tab(s) orally once a day    Note:Pt unsure of dosage (06 Oct 2021 15:16)  metFORMIN 850 mg oral tablet: 1 tab(s) orally once a day (in the morning)    Note:Pharmacy has directions as 1 tab twice a day (06 Oct 2021 15:16)    Hospital Medications:  artificial tears (preservative free) Ophthalmic Solution 1 Drop(s) Both EYES four times a day  aspirin enteric coated 81 milliGRAM(s) Oral daily  bisacodyl 10 milliGRAM(s) Oral at bedtime  dextrose 40% Gel 15 Gram(s) Oral once  dextrose 5%. 1000 milliLiter(s) IV Continuous <Continuous>  dextrose 5%. 1000 milliLiter(s) IV Continuous <Continuous>  dextrose 50% Injectable 25 Gram(s) IV Push once  dextrose 50% Injectable 12.5 Gram(s) IV Push once  dextrose 50% Injectable 25 Gram(s) IV Push once  glucagon  Injectable 1 milliGRAM(s) IntraMuscular once  ibuprofen  Tablet. 400 milliGRAM(s) Oral every 6 hours PRN  insulin lispro (ADMELOG) corrective regimen sliding scale   SubCutaneous three times a day before meals  melatonin 3 milliGRAM(s) Oral at bedtime PRN  metoprolol succinate ER 25 milliGRAM(s) Oral daily  montelukast 10 milliGRAM(s) Oral at bedtime  pantoprazole  Injectable 40 milliGRAM(s) IV Push daily  warfarin 4 milliGRAM(s) Oral once      PMHX/PSHX:  No pertinent past medical history    DM (diabetes mellitus)    COPD (chronic obstructive pulmonary disease)    Asthma    DVT (deep venous thrombosis)    HTN (hypertension)    DM (diabetes mellitus)    HLD (hyperlipidemia)    Atrial fibrillation    Asthma    CAD (coronary artery disease)    No significant past surgical history    History of embolectomy    S/P arterial stent        Family history:  No pertinent family history in first degree relatives        Denies family history of colon cancer/polyps, stomach cancer/polyps, pancreatic cancer/masses, liver cancer/disease, ovarian cancer and endometrial cancer.    Social History:     Tob: Denies  EtOH: As above  Illicit Drugs: Denies    ROS:   General:  No wt loss, fevers, chills, night sweats, fatigue  Eyes:  Good vision, no reported pain  ENT:  No sore throat, pain, runny nose, dysphagia  CV:  No pain, palpitations, hypo/hypertension  Pulm:  No dyspnea, cough, tachypnea, wheezing  GI:  As per HPI  :  No pain, bleeding, incontinence, nocturia  Muscle:  No pain, weakness  Neuro:  No weakness, tingling, memory problems  Psych:  No fatigue, insomnia, mood problems, depression  Endocrine:  No polyuria, polydipsia, cold/heat intolerance  Heme:  No petechiae, ecchymosis, easy bruisability  Skin:  No rash, tattoos, scars, edema    PHYSICAL EXAM: #### PATIENT NOT SEEN###  GENERAL:  No acute distress  HEENT:  Normocephalic/atraumatic, no scleral icterus  CHEST:  No accessory muscle use  HEART:  Regular rate and rhythm  ABDOMEN:  Soft, non-tender, non-distended, normoactive bowel sounds,  no masses, no hepato-splenomegaly, no signs of chronic liver disease  EXTREMITIES: No cyanosis, clubbing, or edema  SKIN:  No rash  NEURO:  Alert and oriented x 3, no asterixis    Vital Signs:  Vital Signs Last 24 Hrs  T(C): 36.6 (11 Oct 2021 11:27), Max: 36.6 (10 Oct 2021 20:48)  T(F): 97.8 (11 Oct 2021 11:27), Max: 97.9 (10 Oct 2021 20:48)  HR: 71 (11 Oct 2021 11:27) (68 - 71)  BP: 120/76 (11 Oct 2021 11:27) (114/73 - 120/76)  BP(mean): --  RR: 18 (11 Oct 2021 11:27) (18 - 18)  SpO2: 98% (11 Oct 2021 11:27) (98% - 99%)  Daily     Daily     LABS:                        9.1    5.00  )-----------( 194      ( 11 Oct 2021 07:09 )             28.5     Mean Cell Volume: 92.8 fl (10-11-21 @ 07:09)    10-11    136  |  106  |  6<L>  ----------------------------<  71  3.6   |  19<L>  |  0.68    Ca    8.0<L>      11 Oct 2021 07:09    TPro  7.5  /  Alb  3.0<L>  /  TBili  0.8  /  DBili  x   /  AST  74<H>  /  ALT  30  /  AlkPhos  228<H>  10-11    LIVER FUNCTIONS - ( 11 Oct 2021 07:09 )  Alb: 3.0 g/dL / Pro: 7.5 g/dL / ALK PHOS: 228 U/L / ALT: 30 U/L / AST: 74 U/L / GGT: x           PT/INR - ( 11 Oct 2021 07:09 )   PT: 24.1 sec;   INR: 2.08 ratio                                     9.1    5.00  )-----------( 194      ( 11 Oct 2021 07:09 )             28.5                         8.9    4.99  )-----------( 184      ( 10 Oct 2021 07:11 )             28.5                         8.7    9.23  )-----------( 180      ( 09 Oct 2021 07:08 )             26.7       Imaging:           Chief Complaint:  Patient is a 81y old  Female who presents with a chief complaint of rectal bleed (11 Oct 2021 11:35)      HPI:  Ms. Winchester is an 82yo F with PMH of COPD, asthma, CHF, CAD s/p PCI 2019, severe MS with MVR with removal of LV thrombus, afib on coumadin, uterine cancer s/p RT ~2019. Hepatology consulted for abnormal MRI results.    Patient originally presented with BRBPR in the settings of supra-theraputic INR, EGD/colon with diverticulosis. Patient was noted to have mildly elevated liver test (ALKP 260, normal bili,  ALT 66). INR downtrended after holding coumadin and with vitamin K. RUQ US done wnl. MRCP showed medial segment atrophy with a subtle nodular/lobular surface contour of the liver.     No history of EtOH abuse. No history of viral hepatitis. Patient with mildly elevated BMI. No history of known liver disease.    Allergies:  No Known Allergies      Home Medications:  albuterol 90 mcg/inh inhalation aerosol: 1 puff(s) inhaled , As Needed (06 Oct 2021 15:16)  amlodipine-valsartan 10 mg-160 mg oral tablet: pt states 0.5 tab 2 to 3 times a week    note:Pharmacy has 1 tab once a day (06 Oct 2021 15:16)  atorvastatin 20 mg oral tablet: 1 tab(s) orally once a day    Note:Pt unsure of dosage (06 Oct 2021 15:16)  metFORMIN 850 mg oral tablet: 1 tab(s) orally once a day (in the morning)    Note:Pharmacy has directions as 1 tab twice a day (06 Oct 2021 15:16)    Hospital Medications:  artificial tears (preservative free) Ophthalmic Solution 1 Drop(s) Both EYES four times a day  aspirin enteric coated 81 milliGRAM(s) Oral daily  bisacodyl 10 milliGRAM(s) Oral at bedtime  dextrose 40% Gel 15 Gram(s) Oral once  dextrose 5%. 1000 milliLiter(s) IV Continuous <Continuous>  dextrose 5%. 1000 milliLiter(s) IV Continuous <Continuous>  dextrose 50% Injectable 25 Gram(s) IV Push once  dextrose 50% Injectable 12.5 Gram(s) IV Push once  dextrose 50% Injectable 25 Gram(s) IV Push once  glucagon  Injectable 1 milliGRAM(s) IntraMuscular once  ibuprofen  Tablet. 400 milliGRAM(s) Oral every 6 hours PRN  insulin lispro (ADMELOG) corrective regimen sliding scale   SubCutaneous three times a day before meals  melatonin 3 milliGRAM(s) Oral at bedtime PRN  metoprolol succinate ER 25 milliGRAM(s) Oral daily  montelukast 10 milliGRAM(s) Oral at bedtime  pantoprazole  Injectable 40 milliGRAM(s) IV Push daily  warfarin 4 milliGRAM(s) Oral once      PMHX/PSHX:  No pertinent past medical history    DM (diabetes mellitus)    COPD (chronic obstructive pulmonary disease)    Asthma    DVT (deep venous thrombosis)    HTN (hypertension)    DM (diabetes mellitus)    HLD (hyperlipidemia)    Atrial fibrillation    Asthma    CAD (coronary artery disease)    No significant past surgical history    History of embolectomy    S/P arterial stent        Family history:  No pertinent family history in first degree relatives        Denies family history of colon cancer/polyps, stomach cancer/polyps, pancreatic cancer/masses, liver cancer/disease, ovarian cancer and endometrial cancer.    Social History:     Tob: Denies  EtOH: As above  Illicit Drugs: Denies    ROS:   General:  No wt loss, fevers, chills, night sweats, fatigue  Eyes:  Good vision, no reported pain  ENT:  No sore throat, pain, runny nose, dysphagia  CV:  No pain, palpitations, hypo/hypertension  Pulm:  No dyspnea, cough, tachypnea, wheezing  GI:  As per HPI  :  No pain, bleeding, incontinence, nocturia  Muscle:  No pain, weakness  Neuro:  No weakness, tingling, memory problems  Psych:  No fatigue, insomnia, mood problems, depression  Endocrine:  No polyuria, polydipsia, cold/heat intolerance  Heme:  No petechiae, ecchymosis, easy bruisability  Skin:  No rash, tattoos, scars, edema    PHYSICAL EXAM:  GENERAL:  No acute distress  HEENT:  Normocephalic/atraumatic, no scleral icterus  CHEST:  No accessory muscle use  HEART:  Regular rate and rhythm  ABDOMEN:  Soft, non-tender, non-distended  EXTREMITIES: No cyanosis, clubbing, or edema  SKIN:  No rash  NEURO:  Alert and oriented x 3, no asterixis    Vital Signs:  Vital Signs Last 24 Hrs  T(C): 36.6 (11 Oct 2021 11:27), Max: 36.6 (10 Oct 2021 20:48)  T(F): 97.8 (11 Oct 2021 11:27), Max: 97.9 (10 Oct 2021 20:48)  HR: 71 (11 Oct 2021 11:27) (68 - 71)  BP: 120/76 (11 Oct 2021 11:27) (114/73 - 120/76)  BP(mean): --  RR: 18 (11 Oct 2021 11:27) (18 - 18)  SpO2: 98% (11 Oct 2021 11:27) (98% - 99%)  Daily     Daily     LABS:                        9.1    5.00  )-----------( 194      ( 11 Oct 2021 07:09 )             28.5     Mean Cell Volume: 92.8 fl (10-11-21 @ 07:09)    10-11    136  |  106  |  6<L>  ----------------------------<  71  3.6   |  19<L>  |  0.68    Ca    8.0<L>      11 Oct 2021 07:09    TPro  7.5  /  Alb  3.0<L>  /  TBili  0.8  /  DBili  x   /  AST  74<H>  /  ALT  30  /  AlkPhos  228<H>  10-11    LIVER FUNCTIONS - ( 11 Oct 2021 07:09 )  Alb: 3.0 g/dL / Pro: 7.5 g/dL / ALK PHOS: 228 U/L / ALT: 30 U/L / AST: 74 U/L / GGT: x           PT/INR - ( 11 Oct 2021 07:09 )   PT: 24.1 sec;   INR: 2.08 ratio                                     9.1    5.00  )-----------( 194      ( 11 Oct 2021 07:09 )             28.5                         8.9    4.99  )-----------( 184      ( 10 Oct 2021 07:11 )             28.5                         8.7    9.23  )-----------( 180      ( 09 Oct 2021 07:08 )             26.7       Imaging:

## 2021-10-11 NOTE — CONSULT NOTE ADULT - CONSULT REASON
hematochezia
Abnormal MRI
cards management
Incidentally found left external iliac artery occlusion with distal reconstitution

## 2021-10-11 NOTE — CONSULT NOTE ADULT - ASSESSMENT
80yo F with PMH of COPD, asthma, CHF, CAD s/p PCI 2019, severe MS with MVR with removal of LV thrombus, afib on coumadin, uterine cancer s/p RT ~2019. Hepatology consulted for abnormal MRI results.    # Abnormal MRCP - Suggesting of possible early cirrhosis. No evidence of portal hypertension - no varices on EGD. Platelets are normal. Spleen not enlarged. Will need outpatient hepatology followup.  # COPD  # asthma  # CAD  # Afib on coumadin    Recommendations:  - Please check viral hepatitis panel  - Check CINDY, AMA, ASMA, alpha 1 antitrypsin, ceruloplasmin, soluble liver antigen, anti LKM-1 Ab, immunoglobulin panel, iron studies, and ferritin  - Outpatient follow up in Hepatology Clinic: [phone] ([facility]) or [phone] ([facility])    Please await attending's attestation for final recs.    Thank you for involving us in the care of this patient. Please reach out if any further questions.    Mikey Yadav, PGY-5  Hepatology Fellow    Available on [employer] Teams  Pager [phone] ([facility]) or [phone] ([facility])  After 5PM/Weekends, please contact the on-call GI fellow: [phone]  Available through [employer] Teams

## 2021-10-11 NOTE — PROGRESS NOTE ADULT - ASSESSMENT
81y Female        with COPD, asthma, CHF, CAD s/p PCI 2019, severe MS with MVR/cryomaze with removal of LV thrombus,        Afib on coumadin, uterine cancer s/p RT ~2019    [place]irre[place] [facility] [place]        [place] with   BRBPR.  Patient reporting bloody BMs for the past 3 days.  .Denies h/o EGD/colon. Denies heavy NSAID use but will occasionally take advil for headaches   Of note, patient reporting elevated INR a few weeks ago.     Hgb 8 (8.6 in 2/2019),  with white count 5.     INR 4. Cr 0.68, BUN 11. , , ALT 66,      given vitamin K   by  er, and GI consulted.    *   Hematochezia possibly 2/2 radiation proctitis vs diverticular bleed   baseline  hb  is  8   never had an EGD/colon,   [facility]  w/p, seen by house  gi  coumadin on hold/  coagulopathy on arrival, vit  K given  serial hb/ prbc  as needed  * elevated lft\   *  h/o  Afib, and  a/c on hold  *  h/o CAD/  s/p  MVR IN 2019    HTN/  DM,  on toprol, norvasc.    hold  lipitor, gioven high lft's/  w/p per gi    dvt ppx/ pas  *   s/p  hyperkalemia, on lokelma   * Cta /p,   h'rroids, left iliac A  occlusion   vascular  eval  noted,  awaiting  becky   *  s/p colonoscopy on friday/.  likely  diverticular  bleed  per  gi     coumadin restarted for  afib.  daily inr    mri , cirrhosis   after cleared   by house  gi,  then may start   d/c    spoke  with floor team     rad< from: CT Abdomen and Pelvis w/ IV Cont (10.06.21 @ 20:30) >  MPRESSION:  No CT evidence of active GI bleed.  Colonic diverticulosis without acute diverticulitis.  Question internal hemorrhoids at the rectum.  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  --- End of Report ---  < end of copied text >

## 2021-10-11 NOTE — PROGRESS NOTE ADULT - ASSESSMENT
Echo 10/7/21: EF 75%, Bio MVR fx, mod As, hyperdynamic lv sys fx, mild-mod TR, mild OK, mild pHTN  Echo 2/15/19: EF 50%, mildly reduced LV fx, mild pulm htn, mod TR, severe mitral annular calcification.  moderate mitral regurgitation.  EMILI 1/7/19 : EF 66%, mod to sev MS ; large left atrial appendage thrombus   Cardiac Cath Lab - Adult (01.18.19 @ 11:34) >  PCI to severe proximal LAD lesion with BRENDA  in setting of new onset angina, acute diastolic HF    a/p     82 y/o female with hx of HTN, HLD, DM, asthma, Urince CA s/p Radiation (on remission per pt) CAD s/p PCI (to LAD on 1/2019),  Afib (on coumadin severe MS S/P (2/18/2019) MVR/Cryomaze, and removal of LA thrombus presenting with GIB    #GIB  -s/p PRBC-- monitor h/h   -supratheraputic INR on admission s/p vitamin K  -s/p EGD/colonscopy - results noted   -MRI noted   -back on a/c  -gi f/u     # CAD s/p PCI (to LAD on 1/2019)  -cont asa 81 mg daily   -ecg with no acs - cv stable     # Chronic AFib   -Coumadin per INR   -c/w BB   -in NSR     #  Severe MS S/P (2/18/2019) MVR/Cryomaze, and removal of LA thrombus  -Echo noted with EF 75%, fx Bio MVR , mod As, hyperdynamic lv sys fx, mild-mod TR, mild OK, mild pHTN  -c/w BB     dvt ppx     d/c planning  pt to f/u w outpt cardio upon DC

## 2021-10-12 ENCOUNTER — TRANSCRIPTION ENCOUNTER (OUTPATIENT)
Age: 81
End: 2021-10-12

## 2021-10-12 LAB
APTT BLD: 41.5 SEC — HIGH (ref 27.5–35.5)
CERULOPLASMIN SERPL-MCNC: 30 MG/DL — SIGNIFICANT CHANGE UP (ref 16–45)
FERRITIN SERPL-MCNC: 43 NG/ML — SIGNIFICANT CHANGE UP (ref 15–150)
FOLATE SERPL-MCNC: 12.4 NG/ML — SIGNIFICANT CHANGE UP
GLUCOSE BLDC GLUCOMTR-MCNC: 107 MG/DL — HIGH (ref 70–99)
GLUCOSE BLDC GLUCOMTR-MCNC: 130 MG/DL — HIGH (ref 70–99)
HAV IGM SER-ACNC: SIGNIFICANT CHANGE UP
HBV CORE IGM SER-ACNC: SIGNIFICANT CHANGE UP
HBV SURFACE AG SER-ACNC: SIGNIFICANT CHANGE UP
HCV AB S/CO SERPL IA: 1.16 S/CO — HIGH (ref 0–0.99)
HCV AB SERPL-IMP: ABNORMAL
INR BLD: 2.9 RATIO — HIGH (ref 0.88–1.16)
IRON SATN MFR SERPL: 16 % — SIGNIFICANT CHANGE UP (ref 14–50)
IRON SATN MFR SERPL: 43 UG/DL — SIGNIFICANT CHANGE UP (ref 30–160)
MITOCHONDRIA AB SER-ACNC: SIGNIFICANT CHANGE UP
PROTHROM AB SERPL-ACNC: 33.1 SEC — HIGH (ref 10.6–13.6)
SMOOTH MUSCLE AB SER-ACNC: SIGNIFICANT CHANGE UP
TIBC SERPL-MCNC: 276 UG/DL — SIGNIFICANT CHANGE UP (ref 220–430)
TRANSFERRIN SERPL-MCNC: 205 MG/DL — SIGNIFICANT CHANGE UP (ref 200–360)
UIBC SERPL-MCNC: 233 UG/DL — SIGNIFICANT CHANGE UP (ref 110–370)
VIT B12 SERPL-MCNC: 677 PG/ML — SIGNIFICANT CHANGE UP (ref 232–1245)

## 2021-10-12 RX ORDER — ASPIRIN/CALCIUM CARB/MAGNESIUM 324 MG
1 TABLET ORAL
Qty: 0 | Refills: 0 | DISCHARGE
Start: 2021-10-12

## 2021-10-12 RX ORDER — AMLODIPINE AND VALSARTAN 5; 320 MG/1; MG/1
1 TABLET, FILM COATED ORAL
Qty: 0 | Refills: 0 | DISCHARGE

## 2021-10-12 RX ORDER — WARFARIN SODIUM 2.5 MG/1
1 TABLET ORAL
Qty: 30 | Refills: 0
Start: 2021-10-12 | End: 2021-11-10

## 2021-10-12 RX ORDER — PANTOPRAZOLE SODIUM 20 MG/1
1 TABLET, DELAYED RELEASE ORAL
Qty: 30 | Refills: 0
Start: 2021-10-12 | End: 2021-11-10

## 2021-10-12 RX ADMIN — PANTOPRAZOLE SODIUM 40 MILLIGRAM(S): 20 TABLET, DELAYED RELEASE ORAL at 11:16

## 2021-10-12 RX ADMIN — Medication 1 DROP(S): at 11:16

## 2021-10-12 RX ADMIN — Medication 1 DROP(S): at 05:29

## 2021-10-12 RX ADMIN — Medication 25 MILLIGRAM(S): at 05:29

## 2021-10-12 NOTE — CHART NOTE - NSCHARTNOTEFT_GEN_A_CORE
Vascular Surgery will sign off at this point in time. ERROL/PVRs completed. Please have pt follow up as an outpatient with Dr. Almanzar 2 weeks from discharge.    Vascular Surgery  [phone]

## 2021-10-12 NOTE — DISCHARGE NOTE PROVIDER - CARE PROVIDER_API CALL
NATHANIEL SEVERINO  Internal Medicine  [address] [address]  Phone: [phone]  Fax: [phone]  Follow Up Time:     Hepatology,   Phone: (   )    -  Fax: (   )    -  Follow Up Time:     vascular surgery,   Phone: (   )    -  Fax: (   )    -  Follow Up Time:

## 2021-10-12 NOTE — PROGRESS NOTE ADULT - SUBJECTIVE AND OBJECTIVE BOX
CARDIOLOGY FOLLOW UP - Dr. Fitch  DATE OF SERVICE: 10/7/21    CC no cp or sob      REVIEW OF SYSTEMS:  CONSTITUTIONAL: No fever, weight loss, or fatigue  RESPIRATORY: No cough, wheezing, chills or hemoptysis; No Shortness of Breath  CARDIOVASCULAR: No chest pain, palpitations, passing out, dizziness, or leg swelling  GASTROINTESTINAL: No abdominal or epigastric pain. No nausea, vomiting, or hematemesis; No diarrhea or constipation. No melena or hematochezia.  VASCULAR: No edema     PHYSICAL EXAM:  T(C): 36.5 (10-07-21 @ 04:40), Max: 36.9 (10-06-21 @ 18:14)  HR: 69 (10-07-21 @ 04:40) (69 - 98)  BP: 141/79 (10-07-21 @ 04:40) (129/77 - 148/72)  RR: 18 (10-07-21 @ 04:40) (16 - 18)  SpO2: 99% (10-07-21 @ 04:40) (98% - 100%)  Wt(kg): --  I&O's Summary      Appearance: Normal	  Cardiovascular: Normal S1 S2,RRR, + murmur  Respiratory: Lungs clear to auscultation	  Gastrointestinal:  Soft, Non-tender, + BS	  Extremities: Normal range of motion, No clubbing, cyanosis or edema      Home Medications:  albuterol 90 mcg/inh inhalation aerosol: 1 puff(s) inhaled , As Needed (06 Oct 2021 15:16)  amlodipine-valsartan 10 mg-160 mg oral tablet: pt states 0.5 tab 2 to 3 times a week    note:Pharmacy has 1 tab once a day (06 Oct 2021 15:16)  atorvastatin 20 mg oral tablet: 1 tab(s) orally once a day    Note:Pt unsure of dosage (06 Oct 2021 15:16)  metFORMIN 850 mg oral tablet: 1 tab(s) orally once a day (in the morning)    Note:Pharmacy has directions as 1 tab twice a day (06 Oct 2021 15:16)      MEDICATIONS  (STANDING):  dextrose 40% Gel 15 Gram(s) Oral once  dextrose 5%. 1000 milliLiter(s) (50 mL/Hr) IV Continuous <Continuous>  dextrose 5%. 1000 milliLiter(s) (100 mL/Hr) IV Continuous <Continuous>  dextrose 50% Injectable 25 Gram(s) IV Push once  dextrose 50% Injectable 12.5 Gram(s) IV Push once  dextrose 50% Injectable 25 Gram(s) IV Push once  glucagon  Injectable 1 milliGRAM(s) IntraMuscular once  insulin lispro (ADMELOG) corrective regimen sliding scale   SubCutaneous three times a day before meals  metoprolol succinate ER 25 milliGRAM(s) Oral daily  pantoprazole  Injectable 40 milliGRAM(s) IV Push daily      TELEMETRY: 	    ECG:  	  RADIOLOGY:   DIAGNOSTIC TESTING:  [ ] Echocardiogram:  [ ]  Catheterization:  [ ] Stress Test:    OTHER: 	    LABS:	 	                            8.8    5.17  )-----------( 172      ( 07 Oct 2021 04:55 )             25.6     10-07    136  |  105  |  10  ----------------------------<  78  3.8   |  22  |  0.65    Ca    8.2<L>      07 Oct 2021 06:41    TPro  7.8  /  Alb  3.0<L>  /  TBili  1.5<H>  /  DBili  0.5<H>  /  AST  115<H>  /  ALT  49<H>  /  AlkPhos  235<H>  10-07    PT/INR - ( 07 Oct 2021 06:41 )   PT: 21.4 sec;   INR: 1.84 ratio         PTT - ( 07 Oct 2021 06:41 )  PTT:36.4 sec        
  afebrile    REVIEW OF SYSTEMS:  GEN: no fever,    no chills  RESP: no SOB,   no cough  CVS: no chest pain,   no palpitations  GI: no abdominal pain,   no nausea,   no vomiting,   no constipation,   no diarrhea  : no dysuria,   no frequency  NEURO: no headache,   no dizziness  PSYCH: no depression,   not anxious  Derm : no rash    MEDICATIONS  (STANDING):  bisacodyl 10 milliGRAM(s) Oral at bedtime  dextrose 40% Gel 15 Gram(s) Oral once  dextrose 5%. 1000 milliLiter(s) (50 mL/Hr) IV Continuous <Continuous>  dextrose 5%. 1000 milliLiter(s) (100 mL/Hr) IV Continuous <Continuous>  dextrose 50% Injectable 25 Gram(s) IV Push once  dextrose 50% Injectable 12.5 Gram(s) IV Push once  dextrose 50% Injectable 25 Gram(s) IV Push once  glucagon  Injectable 1 milliGRAM(s) IntraMuscular once  insulin lispro (ADMELOG) corrective regimen sliding scale   SubCutaneous three times a day before meals  metoprolol succinate ER 25 milliGRAM(s) Oral daily  pantoprazole  Injectable 40 milliGRAM(s) IV Push daily    MEDICATIONS  (PRN):  melatonin 3 milliGRAM(s) Oral at bedtime PRN Insomnia      Vital Signs Last 24 Hrs  T(C): 36.5 (08 Oct 2021 05:12), Max: 36.6 (07 Oct 2021 20:13)  T(F): 97.7 (08 Oct 2021 05:12), Max: 97.8 (07 Oct 2021 20:13)  HR: 71 (08 Oct 2021 05:12) (70 - 78)  BP: 136/73 (08 Oct 2021 05:12) (136/73 - 165/76)  BP(mean): --  RR: 18 (08 Oct 2021 05:12) (18 - 18)  SpO2: 99% (08 Oct 2021 05:12) (98% - 100%)  CAPILLARY BLOOD GLUCOSE      POCT Blood Glucose.: 82 mg/dL (08 Oct 2021 05:58)  POCT Blood Glucose.: 99 mg/dL (08 Oct 2021 00:03)  POCT Blood Glucose.: 116 mg/dL (07 Oct 2021 21:41)  POCT Blood Glucose.: 90 mg/dL (07 Oct 2021 17:56)  POCT Blood Glucose.: 138 mg/dL (07 Oct 2021 12:12)  POCT Blood Glucose.: 81 mg/dL (07 Oct 2021 10:22)    I&O's Summary      PHYSICAL EXAM:  HEAD:  Atraumatic, Normocephalic  NECK: Supple, No   JVD  CHEST/LUNG:   no     rales,     no,    rhonchi  HEART: Regular rate and rhythm;         murmur  ABDOMEN: Soft, Nontender, ;   EXTREMITIES:   no     edema  NEUROLOGY:  alert    LABS:                        9.2    4.99  )-----------( 177      ( 08 Oct 2021 06:49 )             28.6     10    135  |  104  |  7   ----------------------------<  83  3.9   |  23  |  0.71    Ca    8.5      08 Oct 2021 06:46  Phos  3.1     10-  Mg     1.7     10-    TPro  8.5<H>  /  Alb  3.2<L>  /  TBili  1.6<H>  /  DBili  x   /  AST  128<H>  /  ALT  51<H>  /  AlkPhos  284<H>  10-08    PT/INR - ( 08 Oct 2021 06:52 )   PT: 15.0 sec;   INR: 1.26 ratio         PTT - ( 08 Oct 2021 06:52 )  PTT:32.7 sec      Urinalysis Basic - ( 06 Oct 2021 16:19 )    Color: Yellow / Appearance: Clear / S.018 / pH: x  Gluc: x / Ketone: Negative  / Bili: Negative / Urobili: Negative   Blood: x / Protein: Negative / Nitrite: Negative   Leuk Esterase: Negative / RBC: 1 /hpf / WBC 2 /HPF   Sq Epi: x / Non Sq Epi: 2 /hpf / Bacteria: Negative          10-06 @ 12:44  5.3  18              Consultant(s) Notes Reviewed:      Care Discussed with Consultants/Other Providers:    
  no cp/abd  pain    REVIEW OF SYSTEMS:  GEN: no fever,    no chills  RESP: no SOB,   no cough  CVS: no chest pain,   no palpitations  GI: no abdominal pain,   no nausea,   no vomiting,   no constipation,   no diarrhea  : no dysuria,   no frequency  NEURO: no headache,   no dizziness  PSYCH: no depression,   not anxious  Derm : no rash    MEDICATIONS  (STANDING):  atorvastatin 20 milliGRAM(s) Oral at bedtime  dextrose 40% Gel 15 Gram(s) Oral once  dextrose 5%. 1000 milliLiter(s) (50 mL/Hr) IV Continuous <Continuous>  dextrose 5%. 1000 milliLiter(s) (100 mL/Hr) IV Continuous <Continuous>  dextrose 50% Injectable 25 Gram(s) IV Push once  dextrose 50% Injectable 12.5 Gram(s) IV Push once  dextrose 50% Injectable 25 Gram(s) IV Push once  glucagon  Injectable 1 milliGRAM(s) IntraMuscular once  insulin lispro (ADMELOG) corrective regimen sliding scale   SubCutaneous three times a day before meals  metoprolol succinate ER 25 milliGRAM(s) Oral daily  pantoprazole  Injectable 40 milliGRAM(s) IV Push daily    MEDICATIONS  (PRN):      Vital Signs Last 24 Hrs  T(C): 36.5 (07 Oct 2021 04:40), Max: 36.9 (06 Oct 2021 18:14)  T(F): 97.7 (07 Oct 2021 04:40), Max: 98.5 (06 Oct 2021 18:14)  HR: 69 (07 Oct 2021 04:40) (69 - 98)  BP: 141/79 (07 Oct 2021 04:40) (129/77 - 148/72)  BP(mean): --  RR: 18 (07 Oct 2021 04:40) (16 - 18)  SpO2: 99% (07 Oct 2021 04:40) (98% - 100%)  CAPILLARY BLOOD GLUCOSE      POCT Blood Glucose.: 148 mg/dL (06 Oct 2021 22:32)    I&O's Summary      PHYSICAL EXAM:  HEAD:  Atraumatic, Normocephalic  NECK: Supple, No   JVD  CHEST/LUNG:   no     rales,     no,    rhonchi  HEART: Regular rate and rhythm;         murmur  ABDOMEN: Soft, Nontender, ;   EXTREMITIES:    no    edema  NEUROLOGY:  alert    LABS:                        8.8    5.17  )-----------( 172      ( 07 Oct 2021 04:55 )             25.6     10-07    136  |  105  |  10  ----------------------------<  78  3.8   |  22  |  0.65    Ca    8.2<L>      07 Oct 2021 06:41    TPro  7.8  /  Alb  3.0<L>  /  TBili  1.5<H>  /  DBili  0.5<H>  /  AST  115<H>  /  ALT  49<H>  /  AlkPhos  235<H>  10-07    PT/INR - ( 06 Oct 2021 12:44 )   PT: 44.0 sec;   INR: 3.91 ratio         PTT - ( 06 Oct 2021 12:44 )  PTT:47.3 sec      Urinalysis Basic - ( 06 Oct 2021 16:19 )    Color: Yellow / Appearance: Clear / S.018 / pH: x  Gluc: x / Ketone: Negative  / Bili: Negative / Urobili: Negative   Blood: x / Protein: Negative / Nitrite: Negative   Leuk Esterase: Negative / RBC: 1 /hpf / WBC 2 /HPF   Sq Epi: x / Non Sq Epi: 2 /hpf / Bacteria: Negative          10-06 @ 12:44  5.3  18              Consultant(s) Notes Reviewed:      Care Discussed with Consultants/Other Providers:    
CARDIOLOGY FOLLOW UP - Dr. Fitch  Date of Service: 10//11/21  CC: denies cp, sob, and palpitations     Review of Systems:  Constitutional: No fever, weight loss, or fatigue  Respiratory: No cough, wheezing, or hemoptysis, no shortness of breath  Cardiovascular: No chest pain, palpitations, passing out, dizziness, or leg swelling  Gastrointestinal: No abd or epigastric pain.  No nausea, vomiting, or hematemesis; no diarrhea or constipation, no melena or hematochezia  Vascular: no edema       PHYSICAL EXAM:  T(C): 36.6 (10-11-21 @ 11:27), Max: 36.7 (10-10-21 @ 13:45)  HR: 71 (10-11-21 @ 11:27) (68 - 84)  BP: 120/76 (10-11-21 @ 11:27) (114/73 - 120/76)  RR: 18 (10-11-21 @ 11:27) (18 - 18)  SpO2: 98% (10-11-21 @ 11:27) (98% - 99%)  Wt(kg): --  I&O's Summary    11 Oct 2021 07:01  -  11 Oct 2021 11:35  --------------------------------------------------------  IN: 420 mL / OUT: 0 mL / NET: 420 mL        Appearance: Normal	  Cardiovascular: Normal S1 S2,RRR, No JVD, No murmurs  Respiratory: Lungs clear to auscultation	  Gastrointestinal:  Soft, Non-tender, + BS	  Extremities: Normal range of motion, No clubbing, cyanosis or edema      Home Medications:  albuterol 90 mcg/inh inhalation aerosol: 1 puff(s) inhaled , As Needed (06 Oct 2021 15:16)  amlodipine-valsartan 10 mg-160 mg oral tablet: pt states 0.5 tab 2 to 3 times a week    note:Pharmacy has 1 tab once a day (06 Oct 2021 15:16)  atorvastatin 20 mg oral tablet: 1 tab(s) orally once a day    Note:Pt unsure of dosage (06 Oct 2021 15:16)  metFORMIN 850 mg oral tablet: 1 tab(s) orally once a day (in the morning)    Note:Pharmacy has directions as 1 tab twice a day (06 Oct 2021 15:16)      MEDICATIONS  (STANDING):  artificial tears (preservative free) Ophthalmic Solution 1 Drop(s) Both EYES four times a day  aspirin enteric coated 81 milliGRAM(s) Oral daily  bisacodyl 10 milliGRAM(s) Oral at bedtime  dextrose 40% Gel 15 Gram(s) Oral once  dextrose 5%. 1000 milliLiter(s) (50 mL/Hr) IV Continuous <Continuous>  dextrose 5%. 1000 milliLiter(s) (100 mL/Hr) IV Continuous <Continuous>  dextrose 50% Injectable 25 Gram(s) IV Push once  dextrose 50% Injectable 12.5 Gram(s) IV Push once  dextrose 50% Injectable 25 Gram(s) IV Push once  glucagon  Injectable 1 milliGRAM(s) IntraMuscular once  insulin lispro (ADMELOG) corrective regimen sliding scale   SubCutaneous three times a day before meals  metoprolol succinate ER 25 milliGRAM(s) Oral daily  montelukast 10 milliGRAM(s) Oral at bedtime  pantoprazole  Injectable 40 milliGRAM(s) IV Push daily  warfarin 4 milliGRAM(s) Oral once      TELEMETRY: 	    ECG:  	  RADIOLOGY: < from: MR MRCP w/wo IV Cont (10.10.21 @ 14:52) >  FINDINGS:  LOWER CHEST: Median sternotomy. Mitral annular calcifications. Abandoned epicardial pacer leads. Trace bilateral pleural effusions.    LIVER: No focal hepatic lesion. Medial segment atrophy with a subtle nodular/lobular surface contour of the liver. Increased periportal T2 signal (sagittal series 7 image 6), likely either edema or fibrosis.  BILE DUCTS: Within normal limits for caliber. No common bile duct stones. Common bile duct measures 6.7 mm.  GALLBLADDER: Cholelithiasis.  SPLEEN: Within normal limits.  PANCREAS: Within normal limits.  ADRENALS: Within normal limits.  KIDNEYS/URETERS: No hydronephrosis. Subcentimeter high T2 signal in the mid right kidney, likely a cyst. Subcentimeter low T2 lesion in the mid left kidney, possibly a cyst with hemorrhagic products content, however incompletely characterized on this noncontrast study.    VISUALIZED PORTIONS:  BOWEL: Within normal limits.  PERITONEUM: No ascites.  VESSELS: Abdominal aorta normal in caliber.  RETROPERITONEUM/LYMPH NODES: No lymphadenopathy.  ABDOMINALWALL: Within normal limits.  BONES: No aggressive osseous lesion.    IMPRESSION:  Cholelithiasis.    No biliary ductal dilatation or common bile duct stone.    Nodular/lobular surface contour of the liver with medial segment atrophy which can be seenin the setting of cirrhosis.      < end of copied text >    DIAGNOSTIC TESTING:  [ ] Echocardiogram:  [ ]  Catheterization:  [ ] Stress Test:    OTHER: 	    LABS:	 	                            9.1    5.00  )-----------( 194      ( 11 Oct 2021 07:09 )             28.5     10-11    136  |  106  |  6<L>  ----------------------------<  71  3.6   |  19<L>  |  0.68    Ca    8.0<L>      11 Oct 2021 07:09    TPro  7.5  /  Alb  3.0<L>  /  TBili  0.8  /  DBili  x   /  AST  74<H>  /  ALT  30  /  AlkPhos  228<H>  10-11    PT/INR - ( 11 Oct 2021 07:09 )   PT: 24.1 sec;   INR: 2.08 ratio                 
Chief Complaint:  Patient is a 81y old  Female who presents with a chief complaint of rectal bleed (07 Oct 2021 12:06)      Interval Events:   HDS  Denying any BM overnight, she noticed a small red drop of blood on her pad, otherwise nothing  Normal ferritin/iron levels  Downtrending liver chemistries  CBD 8 mm with cholelithiasis, normal GB, elevated liver chemistries    Hospital Medications:  dextrose 40% Gel 15 Gram(s) Oral once  dextrose 5%. 1000 milliLiter(s) IV Continuous <Continuous>  dextrose 5%. 1000 milliLiter(s) IV Continuous <Continuous>  dextrose 50% Injectable 25 Gram(s) IV Push once  dextrose 50% Injectable 12.5 Gram(s) IV Push once  dextrose 50% Injectable 25 Gram(s) IV Push once  glucagon  Injectable 1 milliGRAM(s) IntraMuscular once  insulin lispro (ADMELOG) corrective regimen sliding scale   SubCutaneous three times a day before meals  metoprolol succinate ER 25 milliGRAM(s) Oral daily  pantoprazole  Injectable 40 milliGRAM(s) IV Push daily      ROS: complete and normal except as mentioned above    PHYSICAL EXAM:   Vital Signs:  Vital Signs Last 24 Hrs  T(C): 36.5 (07 Oct 2021 13:49), Max: 36.9 (06 Oct 2021 18:14)  T(F): 97.7 (07 Oct 2021 13:49), Max: 98.5 (06 Oct 2021 18:14)  HR: 70 (07 Oct 2021 13:49) (69 - 98)  BP: 139/63 (07 Oct 2021 13:49) (129/77 - 148/72)  BP(mean): --  RR: 18 (07 Oct 2021 13:49) (18 - 18)  SpO2: 100% (07 Oct 2021 13:49) (98% - 100%)  Daily     Daily     GENERAL: no acute distress  NEURO: alert  HEENT: anicteric sclera, no conjunctival pallor appreciated  CHEST: no respiratory distress, no accessory muscle use  CARDIAC: regular rate, rhythm  ABDOMEN: soft, non-tender, non-distended, no rebound or guarding  EXTREMITIES: warm, well perfused, no edema  SKIN: no lesions noted    LABS: reviewed                        8.5    5.65  )-----------( 168      ( 07 Oct 2021 14:16 )             26.5     10-07    136  |  105  |  10  ----------------------------<  78  3.8   |  22  |  0.65    Ca    8.2<L>      07 Oct 2021 06:41    TPro  7.8  /  Alb  3.0<L>  /  TBili  1.5<H>  /  DBili  0.5<H>  /  AST  115<H>  /  ALT  49<H>  /  AlkPhos  235<H>  10-07    LIVER FUNCTIONS - ( 07 Oct 2021 06:41 )  Alb: 3.0 g/dL / Pro: 7.8 g/dL / ALK PHOS: 235 U/L / ALT: 49 U/L / AST: 115 U/L / GGT: x             Interval Diagnostic Studies: see sunrise for full report  
CARDIOLOGY FOLLOW UP - Dr. Fitch  Date of Service: 10/8/21  CC: denies cp, sob, and palpitations     Review of Systems:  Constitutional: No fever, weight loss, or fatigue  Respiratory: No cough, wheezing, or hemoptysis, no shortness of breath  Cardiovascular: No chest pain, palpitations, passing out, dizziness, or leg swelling  Gastrointestinal: No abd or epigastric pain.  No nausea, vomiting, or hematemesis; no diarrhea or constipation, no melena or hematochezia  Vascular: no edema       PHYSICAL EXAM:  T(C): 36.9 (10-08-21 @ 14:53), Max: 36.9 (10-08-21 @ 14:53)  HR: 74 (10-08-21 @ 14:53) (69 - 78)  BP: 125/74 (10-08-21 @ 14:53) (103/84 - 165/76)  RR: 18 (10-08-21 @ 14:53) (18 - 24)  SpO2: 100% (10-08-21 @ 14:53) (98% - 100%)  Wt(kg): --  I&O's Summary      Appearance: Normal	  Cardiovascular: Normal S1 S2,RRR, No JVD, No murmurs  Respiratory: Lungs clear to auscultation	  Gastrointestinal:  Soft, Non-tender, + BS	  Extremities: Normal range of motion, No clubbing, cyanosis or edema      Home Medications:  albuterol 90 mcg/inh inhalation aerosol: 1 puff(s) inhaled , As Needed (06 Oct 2021 15:16)  amlodipine-valsartan 10 mg-160 mg oral tablet: pt states 0.5 tab 2 to 3 times a week    note:Pharmacy has 1 tab once a day (06 Oct 2021 15:16)  atorvastatin 20 mg oral tablet: 1 tab(s) orally once a day    Note:Pt unsure of dosage (06 Oct 2021 15:16)  metFORMIN 850 mg oral tablet: 1 tab(s) orally once a day (in the morning)    Note:Pharmacy has directions as 1 tab twice a day (06 Oct 2021 15:16)      MEDICATIONS  (STANDING):  bisacodyl 10 milliGRAM(s) Oral at bedtime  dextrose 40% Gel 15 Gram(s) Oral once  dextrose 5%. 1000 milliLiter(s) (50 mL/Hr) IV Continuous <Continuous>  dextrose 5%. 1000 milliLiter(s) (100 mL/Hr) IV Continuous <Continuous>  dextrose 50% Injectable 25 Gram(s) IV Push once  dextrose 50% Injectable 12.5 Gram(s) IV Push once  dextrose 50% Injectable 25 Gram(s) IV Push once  glucagon  Injectable 1 milliGRAM(s) IntraMuscular once  insulin lispro (ADMELOG) corrective regimen sliding scale   SubCutaneous three times a day before meals  metoprolol succinate ER 25 milliGRAM(s) Oral daily  pantoprazole  Injectable 40 milliGRAM(s) IV Push daily      TELEMETRY: 	    ECG:  	  RADIOLOGY:  < from: Upper Endoscopy (10.08.21 @ 11:40) >                                                                                                        Impression:          - Normal esophagus.                       - Normal stomach.      - Normal duodenal bulb and second portion of the duodenum.                       - No specimens collected.                       - No findings to explain anemia or bleeding.  Recommendation:      - Proceed with colonoscopy.                                                                                    < from: Colonoscopy (10.08.21 @ 11:39) >                                                                  Findings:       The perianal and digital rectal examinations were normal.       Non-bleeding internal hemorrhoids were found during retroflexion. The hemorrhoids were Grade        II (internal hemorrhoids that prolapse but reduce spontaneously). Estimated blood loss: none.       A moderate amount of liquid semi-liquid solid stool was found in the entire colon,        interfering with visualization. Lavage of the area was performed, resulting in incomplete        clearance with fair visualization. No biopsies or other specimens were collected for this        exam.       Multiple medium-mouthed diverticula were found in the recto-sigmoid colon, descending colon,        ascending colon and cecum.       A 3 mm, non-bleeding polyp was found in the descending colon. The polyp was sessile. The        polyp was removed with a cold jumbo forceps and sent for biopsy. Resection and retrieval were        complete. Estimated blood loss was minimal.                                                                                                        Impression:          - Preparation of the colon was fair.                       - Non-bleeding internal hemorrhoids.   - Diverticulosis in the recto-sigmoid colon, in the descending colon, in the                        ascending colon and in the cecum. Likely source of bleeding, which is now                        resolved.                       - Stool in the entire examined colon. No specimens collected.                       - One 3 mm, non-bleeding polyp in the descending colon, removed with jumbo                        forceps. Resected and retrieved.  Recommendation:      - Resume regular diet. High fiber diet.                       - Return patient to hospital quach for ongoing care.                       - No absolute contraindication to resuming patient's AC from GI standpoing.                        Defer final decision to primary team.           - Repeat colonoscopy in 3 years for surveillance based on pathology results,                        given fair bowel prep.    < end of copied text >    DIAGNOSTIC TESTING:   [ ] Echocardiogram: < from: Transthoracic Echocardiogram (10.07.21 @ 07:52) >  ------------------------------------------------------------------------  Dimensions:    Normal Values:  LA:     3.4    2.0 - 4.0 cm  Ao:     3.1    2.0 - 3.8 cm  SEPTUM: 0.9    0.6 - 1.2 cm  PWT:    0.9    0.6 - 1.1 cm  LVIDd:  4.1    3.0 - 5.6 cm  LVIDs:  2.6    1.8 - 4.0 cm  Derived variables:  LVMI: 66 g/m2  RWT: 0.43  Fractional short: 37 %  EF (Visual Estimate): 75 %  Doppler Peak Velocity (m/sec): AoV=2.1  ------------------------------------------------------------------------  Observations:  Mitral Valve: Bioprosthetic mitral valve replacement. Mean  transmitral valve gradient equals 5 mm Hg at a heart rate  of 73bpm, which is probably normal in the setting of a  bioprosthetic mitral valve replacement.  Aortic Valve/Aorta: Calcified trileaflet aortic valve with  decreased opening. Peak transaortic valve gradient equals  18 mm Hg, mean transaortic valve gradient equals 11 mm Hg,  estimated aortic valve area equals 1.1-1.2 sqcm (by  continuity equation), aortic valve velocity time integral  equals 50 cm, consistent with moderate aortic  stenosis.Dimensionless index 0.34 Peak left ventricular  outflow tract gradient equals 2 mm Hg, mean gradient is  equal to 1 mm Hg, LVOT velocity time integral equals 19 cm.  Aortic Root: 3.1 cm.  LVOT diameter: 1.9 cm.  Left Atrium: Normal left atrium.  LA volume index = 24  cc/m2.  Left Ventricle: Hyperdynamic left ventricular systolic  function. Normal left ventricular internal dimensions and  wall thicknesses. Unable to evaluate diastology.  Right Heart: Normal right atrium. Normal right ventricular  size and function. Normal tricuspid valve. Mild-moderate  tricuspid regurgitation. Normal pulmonic valve. Mild  pulmonic regurgitation.  Pericardium/Pleura: Normal pericardium with no pericardial  effusion.  Hemodynamic: Estimated right atrial pressure is 8 mm Hg.  Estimated right ventricular systolic pressure equals 42 mm  Hg, assuming right atrial pressure equals 8 mm Hg,  consistent with mild pulmonary hypertension.  ------------------------------------------------------------------------  Conclusions:  1. Bioprosthetic mitral valve replacement. Mean transmitral  valve gradient equals 5 mm Hg at a heart rate of 73bpm,  which is probably normal in the setting of a bioprosthetic  mitral valve replacement.  2. Calcified trileaflet aortic valve with decreased  opening. Peak transaortic valve gradient equals 18 mm Hg,  mean transaortic valve gradient equals 11 mm Hg, estimated  aortic valve area equals 1.1-1.2 sqcm (by continuity  equation), aortic valve velocity time integral equals 50  cm, consistent with moderate aortic stenosis.Dimensionless  index 0.34  3. Hyperdynamic left ventricular systolic function.  4. Unable to evaluate diastology.  5. Normal right ventricular size and function.  6. Normal tricuspid valve. Mild-moderate tricuspid  regurgitation.  *** Compared with echocardiogram report of 1/11/2019,  BioMVR seen    < end of copied text >    [ ]  Catheterization:  [ ] Stress Test:    OTHER: 	    LABS:	 	                            9.2    4.99  )-----------( 177      ( 08 Oct 2021 06:49 )             28.6     10-08    135  |  104  |  7   ----------------------------<  83  3.9   |  23  |  0.71    Ca    8.5      08 Oct 2021 06:46  Phos  3.1     10-08  Mg     1.7     10-08    TPro  8.5<H>  /  Alb  3.2<L>  /  TBili  1.6<H>  /  DBili  x   /  AST  128<H>  /  ALT  51<H>  /  AlkPhos  284<H>  10-08    PT/INR - ( 08 Oct 2021 06:52 )   PT: 15.0 sec;   INR: 1.26 ratio         PTT - ( 08 Oct 2021 06:52 )  PTT:32.7 sec        
      REVIEW OF SYSTEMS:  GEN: no fever,    no chills  RESP: no SOB,   no cough  CVS: no chest pain,   no palpitations  GI: no abdominal pain,   no nausea,   no vomiting,   no constipation,   no diarrhea  : no dysuria,   no frequency  NEURO: no headache,   no dizziness  PSYCH: no depression,   not anxious  Derm : no rash    MEDICATIONS  (STANDING):  artificial tears (preservative free) Ophthalmic Solution 1 Drop(s) Both EYES four times a day  aspirin enteric coated 81 milliGRAM(s) Oral daily  bisacodyl 10 milliGRAM(s) Oral at bedtime  dextrose 40% Gel 15 Gram(s) Oral once  dextrose 5%. 1000 milliLiter(s) (50 mL/Hr) IV Continuous <Continuous>  dextrose 5%. 1000 milliLiter(s) (100 mL/Hr) IV Continuous <Continuous>  dextrose 50% Injectable 25 Gram(s) IV Push once  dextrose 50% Injectable 12.5 Gram(s) IV Push once  dextrose 50% Injectable 25 Gram(s) IV Push once  glucagon  Injectable 1 milliGRAM(s) IntraMuscular once  insulin lispro (ADMELOG) corrective regimen sliding scale   SubCutaneous three times a day before meals  metoprolol succinate ER 25 milliGRAM(s) Oral daily  montelukast 10 milliGRAM(s) Oral at bedtime  pantoprazole  Injectable 40 milliGRAM(s) IV Push daily    MEDICATIONS  (PRN):  ibuprofen  Tablet. 400 milliGRAM(s) Oral every 6 hours PRN Temp greater or equal to 38C (100.4F), Mild Pain (1 - 3)  melatonin 3 milliGRAM(s) Oral at bedtime PRN Insomnia      Vital Signs Last 24 Hrs  T(C): 36.6 (10 Oct 2021 20:48), Max: 36.7 (10 Oct 2021 13:45)  T(F): 97.9 (10 Oct 2021 20:48), Max: 98 (10 Oct 2021 13:45)  HR: 68 (10 Oct 2021 20:48) (68 - 84)  BP: 114/73 (10 Oct 2021 20:48) (114/73 - 115/74)  BP(mean): --  RR: 18 (10 Oct 2021 20:48) (18 - 18)  SpO2: 99% (10 Oct 2021 20:48) (99% - 99%)  CAPILLARY BLOOD GLUCOSE      POCT Blood Glucose.: 96 mg/dL (11 Oct 2021 08:10)  POCT Blood Glucose.: 134 mg/dL (10 Oct 2021 21:57)  POCT Blood Glucose.: 178 mg/dL (10 Oct 2021 17:22)  POCT Blood Glucose.: 124 mg/dL (10 Oct 2021 12:11)    I&O's Summary      PHYSICAL EXAM:  HEAD:  Atraumatic, Normocephalic  NECK: Supple, No   JVD  CHEST/LUNG:   no     rales,     no,    rhonchi  HEART: Regular rate and rhythm;         murmur  ABDOMEN: Soft, Nontender, ;   EXTREMITIES:        edema  NEUROLOGY:  alert    LABS:                        9.1    5.00  )-----------( 194      ( 11 Oct 2021 07:09 )             28.5     10-11    136  |  106  |  6<L>  ----------------------------<  71  3.6   |  19<L>  |  0.68    Ca    8.0<L>      11 Oct 2021 07:09    TPro  7.5  /  Alb  3.0<L>  /  TBili  0.8  /  DBili  x   /  AST  74<H>  /  ALT  30  /  AlkPhos  228<H>  10-11    PT/INR - ( 11 Oct 2021 07:09 )   PT: 24.1 sec;   INR: 2.08 ratio                                 Consultant(s) Notes Reviewed:      Care Discussed with Consultants/Other Providers:    
    afebrile  REVIEW OF SYSTEMS:  GEN: no fever,    no chills  RESP: no SOB,   no cough  CVS: no chest pain,   no palpitations  GI: no abdominal pain,   no nausea,   no vomiting,   no constipation,   no diarrhea  : no dysuria,   no frequency  NEURO: no headache,   no dizziness  PSYCH: no depression,   not anxious  Derm : no rash    MEDICATIONS  (STANDING):  artificial tears (preservative free) Ophthalmic Solution 1 Drop(s) Both EYES four times a day  aspirin enteric coated 81 milliGRAM(s) Oral daily  bisacodyl 10 milliGRAM(s) Oral at bedtime  dextrose 40% Gel 15 Gram(s) Oral once  dextrose 5%. 1000 milliLiter(s) (50 mL/Hr) IV Continuous <Continuous>  dextrose 5%. 1000 milliLiter(s) (100 mL/Hr) IV Continuous <Continuous>  dextrose 50% Injectable 25 Gram(s) IV Push once  dextrose 50% Injectable 12.5 Gram(s) IV Push once  dextrose 50% Injectable 25 Gram(s) IV Push once  glucagon  Injectable 1 milliGRAM(s) IntraMuscular once  insulin lispro (ADMELOG) corrective regimen sliding scale   SubCutaneous three times a day before meals  metoprolol succinate ER 25 milliGRAM(s) Oral daily  montelukast 10 milliGRAM(s) Oral at bedtime  pantoprazole  Injectable 40 milliGRAM(s) IV Push daily    MEDICATIONS  (PRN):  melatonin 3 milliGRAM(s) Oral at bedtime PRN Insomnia      Vital Signs Last 24 Hrs  T(C): 36.5 (12 Oct 2021 05:18), Max: 36.6 (11 Oct 2021 11:27)  T(F): 97.7 (12 Oct 2021 05:18), Max: 97.8 (11 Oct 2021 11:27)  HR: 71 (12 Oct 2021 05:18) (67 - 71)  BP: 119/72 (12 Oct 2021 05:18) (119/72 - 142/80)  BP(mean): --  RR: 18 (12 Oct 2021 05:18) (18 - 18)  SpO2: 98% (12 Oct 2021 05:18) (98% - 99%)  CAPILLARY BLOOD GLUCOSE      POCT Blood Glucose.: 107 mg/dL (12 Oct 2021 08:34)  POCT Blood Glucose.: 103 mg/dL (11 Oct 2021 22:04)  POCT Blood Glucose.: 97 mg/dL (11 Oct 2021 17:20)  POCT Blood Glucose.: 114 mg/dL (11 Oct 2021 12:19)    I&O's Summary    11 Oct 2021 07:01  -  12 Oct 2021 07:00  --------------------------------------------------------  IN: 1380 mL / OUT: 0 mL / NET: 1380 mL        PHYSICAL EXAM:  HEAD:  Atraumatic, Normocephalic  NECK: Supple, No   JVD  CHEST/LUNG:   no     rales,     no,    rhonchi  HEART: Regular rate and rhythm;         murmur  ABDOMEN: Soft, Nontender, ;   EXTREMITIES:    no    edema  NEUROLOGY:  alert    LABS:                        9.1    5.00  )-----------( 194      ( 11 Oct 2021 07:09 )             28.5     10-11    136  |  106  |  6<L>  ----------------------------<  71  3.6   |  19<L>  |  0.68    Ca    8.0<L>      11 Oct 2021 07:09    TPro  7.5  /  Alb  3.0<L>  /  TBili  0.8  /  DBili  x   /  AST  74<H>  /  ALT  30  /  AlkPhos  228<H>  10-11    PT/INR - ( 11 Oct 2021 07:09 )   PT: 24.1 sec;   INR: 2.08 ratio                                 Consultant(s) Notes Reviewed:      Care Discussed with Consultants/Other Providers:    
  afberile    REVIEW OF SYSTEMS:  GEN: no fever,    no chills  RESP: no SOB,   no cough  CVS: no chest pain,   no palpitations  GI: no abdominal pain,   no nausea,   no vomiting,   no constipation,   no diarrhea  : no dysuria,   no frequency  NEURO: no headache,   no dizziness  PSYCH: no depression,   not anxious  Derm : no rash    MEDICATIONS  (STANDING):  bisacodyl 10 milliGRAM(s) Oral at bedtime  dextrose 40% Gel 15 Gram(s) Oral once  dextrose 5%. 1000 milliLiter(s) (50 mL/Hr) IV Continuous <Continuous>  dextrose 5%. 1000 milliLiter(s) (100 mL/Hr) IV Continuous <Continuous>  dextrose 50% Injectable 25 Gram(s) IV Push once  dextrose 50% Injectable 12.5 Gram(s) IV Push once  dextrose 50% Injectable 25 Gram(s) IV Push once  glucagon  Injectable 1 milliGRAM(s) IntraMuscular once  insulin lispro (ADMELOG) corrective regimen sliding scale   SubCutaneous three times a day before meals  metoprolol succinate ER 25 milliGRAM(s) Oral daily  montelukast 10 milliGRAM(s) Oral at bedtime  pantoprazole  Injectable 40 milliGRAM(s) IV Push daily    MEDICATIONS  (PRN):  ibuprofen  Tablet. 400 milliGRAM(s) Oral every 6 hours PRN Temp greater or equal to 38C (100.4F), Mild Pain (1 - 3)  melatonin 3 milliGRAM(s) Oral at bedtime PRN Insomnia      Vital Signs Last 24 Hrs  T(C): 36.6 (09 Oct 2021 05:04), Max: 37.3 (08 Oct 2021 21:40)  T(F): 97.8 (09 Oct 2021 05:04), Max: 99.2 (08 Oct 2021 21:40)  HR: 71 (09 Oct 2021 05:04) (69 - 88)  BP: 124/62 (09 Oct 2021 05:04) (92/54 - 134/76)  BP(mean): --  RR: 18 (09 Oct 2021 05:04) (18 - 24)  SpO2: 99% (09 Oct 2021 05:04) (96% - 100%)  CAPILLARY BLOOD GLUCOSE      POCT Blood Glucose.: 99 mg/dL (09 Oct 2021 08:05)  POCT Blood Glucose.: 102 mg/dL (08 Oct 2021 22:07)  POCT Blood Glucose.: 84 mg/dL (08 Oct 2021 17:28)    I&O's Summary    08 Oct 2021 07:01  -  09 Oct 2021 07:00  --------------------------------------------------------  IN: 480 mL / OUT: 0 mL / NET: 480 mL        PHYSICAL EXAM:  HEAD:  Atraumatic, Normocephalic  NECK: Supple, No   JVD  CHEST/LUNG:   no     rales,     no,    rhonchi  HEART: Regular rate and rhythm;         murmur  ABDOMEN: Soft, Nontender, ;   EXTREMITIES:   no     edema  NEUROLOGY:  alert    LABS:                        8.7    9.23  )-----------( 180      ( 09 Oct 2021 07:08 )             26.7     10-09    135  |  103  |  9   ----------------------------<  103<H>  3.8   |  20<L>  |  0.77    Ca    8.2<L>      09 Oct 2021 07:07  Phos  3.1     10-08  Mg     1.7     10-08    TPro  8.0  /  Alb  2.9<L>  /  TBili  1.7<H>  /  DBili  0.7<H>  /  AST  102<H>  /  ALT  42  /  AlkPhos  242<H>  10-09    PT/INR - ( 09 Oct 2021 07:11 )   PT: 15.9 sec;   INR: 1.34 ratio         PTT - ( 08 Oct 2021 06:52 )  PTT:32.7 sec                        Consultant(s) Notes Reviewed:      Care Discussed with Consultants/Other Providers:    
  afberile, has  no [id number]    REVIEW OF SYSTEMS:  GEN: no fever,    no chills  RESP: no SOB,   no cough  CVS: no chest pain,   no palpitations  GI: no abdominal pain,   no nausea,   no vomiting,   no constipation,   no diarrhea  : no dysuria,   no frequency  NEURO: no headache,   no dizziness  PSYCH: no depression,   not anxious  Derm : no rash    MEDICATIONS  (STANDING):  artificial tears (preservative free) Ophthalmic Solution 1 Drop(s) Both EYES four times a day  aspirin enteric coated 81 milliGRAM(s) Oral daily  bisacodyl 10 milliGRAM(s) Oral at bedtime  dextrose 40% Gel 15 Gram(s) Oral once  dextrose 5%. 1000 milliLiter(s) (50 mL/Hr) IV Continuous <Continuous>  dextrose 5%. 1000 milliLiter(s) (100 mL/Hr) IV Continuous <Continuous>  dextrose 50% Injectable 25 Gram(s) IV Push once  dextrose 50% Injectable 12.5 Gram(s) IV Push once  dextrose 50% Injectable 25 Gram(s) IV Push once  glucagon  Injectable 1 milliGRAM(s) IntraMuscular once  insulin lispro (ADMELOG) corrective regimen sliding scale   SubCutaneous three times a day before meals  metoprolol succinate ER 25 milliGRAM(s) Oral daily  montelukast 10 milliGRAM(s) Oral at bedtime  pantoprazole  Injectable 40 milliGRAM(s) IV Push daily  potassium chloride    Tablet ER 40 milliEquivalent(s) Oral once  warfarin 7 milliGRAM(s) Oral once    MEDICATIONS  (PRN):  ibuprofen  Tablet. 400 milliGRAM(s) Oral every 6 hours PRN Temp greater or equal to 38C (100.4F), Mild Pain (1 - 3)  melatonin 3 milliGRAM(s) Oral at bedtime PRN Insomnia      Vital Signs Last 24 Hrs  T(C): 36.5 (10 Oct 2021 06:38), Max: 36.8 (09 Oct 2021 13:43)  T(F): 97.7 (10 Oct 2021 06:38), Max: 98.2 (09 Oct 2021 13:43)  HR: 66 (10 Oct 2021 06:38) (66 - 72)  BP: 127/76 (10 Oct 2021 06:38) (105/66 - 127/76)  BP(mean): --  RR: 17 (10 Oct 2021 06:38) (17 - 18)  SpO2: 98% (10 Oct 2021 06:38) (96% - 99%)  CAPILLARY BLOOD GLUCOSE      POCT Blood Glucose.: 96 mg/dL (10 Oct 2021 08:34)  POCT Blood Glucose.: 86 mg/dL (09 Oct 2021 22:10)  POCT Blood Glucose.: 162 mg/dL (09 Oct 2021 17:12)  POCT Blood Glucose.: 123 mg/dL (09 Oct 2021 12:23)    I&O's Summary    09 Oct 2021 07:01  -  10 Oct 2021 07:00  --------------------------------------------------------  IN: 480 mL / OUT: 0 mL / NET: 480 mL        PHYSICAL EXAM:  HEAD:  Atraumatic, Normocephalic  NECK: Supple, No   JVD  CHEST/LUNG:   no     rales,     no,    rhonchi  HEART: Regular rate and rhythm;         murmur  ABDOMEN: Soft, Nontender, ;   EXTREMITIES:   no     edema  NEUROLOGY:  alert    LABS:                        8.9    4.99  )-----------( 184      ( 10 Oct 2021 07:11 )             28.5     10-10    136  |  104  |  8   ----------------------------<  83  3.4<L>   |  19<L>  |  0.71    Ca    7.9<L>      10 Oct 2021 07:07    TPro  7.6  /  Alb  2.8<L>  /  TBili  1.2  /  DBili  x   /  AST  84<H>  /  ALT  36  /  AlkPhos  220<H>  10-10    PT/INR - ( 10 Oct 2021 07:11 )   PT: 17.0 sec;   INR: 1.44 ratio                                 Consultant(s) Notes Reviewed:      Care Discussed with Consultants/Other Providers:    
CARDIOLOGY FOLLOW UP - Dr. Fitch  DATE OF SERVICE: 10/9/21    CC no cp or sob        REVIEW OF SYSTEMS:  CONSTITUTIONAL: No fever, weight loss, or fatigue  RESPIRATORY: No cough, wheezing, chills or hemoptysis; No Shortness of Breath  CARDIOVASCULAR: No chest pain, palpitations, passing out, dizziness, or leg swelling  GASTROINTESTINAL: No abdominal or epigastric pain. No nausea, vomiting, or hematemesis; No diarrhea or constipation. No melena or hematochezia.  VASCULAR: No edema     PHYSICAL EXAM:  T(C): 36.6 (10-09-21 @ 05:04), Max: 37.3 (10-08-21 @ 21:40)  HR: 71 (10-09-21 @ 05:04) (71 - 88)  BP: 124/62 (10-09-21 @ 05:04) (92/54 - 125/74)  RR: 18 (10-09-21 @ 05:04) (18 - 18)  SpO2: 99% (10-09-21 @ 05:04) (96% - 100%)  Wt(kg): --  I&O's Summary    08 Oct 2021 07:01  -  09 Oct 2021 07:00  --------------------------------------------------------  IN: 480 mL / OUT: 0 mL / NET: 480 mL        Appearance: Normal	  Cardiovascular: Normal S1 S2,RRR,  +  murmurs  Respiratory: Lungs clear to auscultation	  Gastrointestinal:  Soft, Non-tender, + BS	  Extremities: Normal range of motion, No clubbing, cyanosis or edema      Home Medications:  albuterol 90 mcg/inh inhalation aerosol: 1 puff(s) inhaled , As Needed (06 Oct 2021 15:16)  amlodipine-valsartan 10 mg-160 mg oral tablet: pt states 0.5 tab 2 to 3 times a week    note:Pharmacy has 1 tab once a day (06 Oct 2021 15:16)  atorvastatin 20 mg oral tablet: 1 tab(s) orally once a day    Note:Pt unsure of dosage (06 Oct 2021 15:16)  metFORMIN 850 mg oral tablet: 1 tab(s) orally once a day (in the morning)    Note:Pharmacy has directions as 1 tab twice a day (06 Oct 2021 15:16)      MEDICATIONS  (STANDING):  bisacodyl 10 milliGRAM(s) Oral at bedtime  dextrose 40% Gel 15 Gram(s) Oral once  dextrose 5%. 1000 milliLiter(s) (50 mL/Hr) IV Continuous <Continuous>  dextrose 5%. 1000 milliLiter(s) (100 mL/Hr) IV Continuous <Continuous>  dextrose 50% Injectable 25 Gram(s) IV Push once  dextrose 50% Injectable 12.5 Gram(s) IV Push once  dextrose 50% Injectable 25 Gram(s) IV Push once  glucagon  Injectable 1 milliGRAM(s) IntraMuscular once  insulin lispro (ADMELOG) corrective regimen sliding scale   SubCutaneous three times a day before meals  metoprolol succinate ER 25 milliGRAM(s) Oral daily  montelukast 10 milliGRAM(s) Oral at bedtime  pantoprazole  Injectable 40 milliGRAM(s) IV Push daily  warfarin 7.5 milliGRAM(s) Oral once      TELEMETRY: 	    ECG:  	  RADIOLOGY:   DIAGNOSTIC TESTING:  [ ] Echocardiogram:  [ ]  Catheterization:  [ ] Stress Test:    OTHER: 	    LABS:	 	                            8.7    9.23  )-----------( 180      ( 09 Oct 2021 07:08 )             26.7     10-09    135  |  103  |  9   ----------------------------<  103<H>  3.8   |  20<L>  |  0.77    Ca    8.2<L>      09 Oct 2021 07:07  Phos  3.1     10-08  Mg     1.7     10-08    TPro  8.0  /  Alb  2.9<L>  /  TBili  1.7<H>  /  DBili  0.7<H>  /  AST  102<H>  /  ALT  42  /  AlkPhos  242<H>  10-09    PT/INR - ( 09 Oct 2021 07:11 )   PT: 15.9 sec;   INR: 1.34 ratio         PTT - ( 08 Oct 2021 06:52 )  PTT:32.7 sec        
CARDIOLOGY FOLLOW UP - Dr. Fitch  Date of Service: 10/12/21  CC: denies cp, sob, and palpitations     Review of Systems:  Constitutional: No fever, weight loss, or fatigue  Respiratory: No cough, wheezing, or hemoptysis, no shortness of breath  Cardiovascular: No chest pain, palpitations, passing out, dizziness, or leg swelling  Gastrointestinal: No abd or epigastric pain.  No nausea, vomiting, or hematemesis; no diarrhea or constipation, no melena or hematochezia  Vascular: no edema       PHYSICAL EXAM:  T(C): 36.5 (10-12-21 @ 05:18), Max: 36.6 (10-11-21 @ 11:27)  HR: 71 (10-12-21 @ 05:18) (67 - 71)  BP: 119/72 (10-12-21 @ 05:18) (119/72 - 142/80)  RR: 18 (10-12-21 @ 05:18) (18 - 18)  SpO2: 98% (10-12-21 @ 05:18) (98% - 99%)  Wt(kg): --  I&O's Summary    11 Oct 2021 07:01  -  12 Oct 2021 07:00  --------------------------------------------------------  IN: 1380 mL / OUT: 0 mL / NET: 1380 mL        Appearance: Normal	  Cardiovascular: Normal S1 S2,RRR, No JVD, No murmurs  Respiratory: Lungs clear to auscultation	  Gastrointestinal:  Soft, Non-tender, + BS	  Extremities: Normal range of motion, No clubbing, cyanosis or edema      Home Medications:  albuterol 90 mcg/inh inhalation aerosol: 1 puff(s) inhaled , As Needed (06 Oct 2021 15:16)  amlodipine-valsartan 10 mg-160 mg oral tablet: pt states 0.5 tab 2 to 3 times a week    note:Pharmacy has 1 tab once a day (06 Oct 2021 15:16)  atorvastatin 20 mg oral tablet: 1 tab(s) orally once a day    Note:Pt unsure of dosage (06 Oct 2021 15:16)  metFORMIN 850 mg oral tablet: 1 tab(s) orally once a day (in the morning)    Note:Pharmacy has directions as 1 tab twice a day (06 Oct 2021 15:16)      MEDICATIONS  (STANDING):  artificial tears (preservative free) Ophthalmic Solution 1 Drop(s) Both EYES four times a day  aspirin enteric coated 81 milliGRAM(s) Oral daily  bisacodyl 10 milliGRAM(s) Oral at bedtime  dextrose 40% Gel 15 Gram(s) Oral once  dextrose 5%. 1000 milliLiter(s) (50 mL/Hr) IV Continuous <Continuous>  dextrose 5%. 1000 milliLiter(s) (100 mL/Hr) IV Continuous <Continuous>  dextrose 50% Injectable 25 Gram(s) IV Push once  dextrose 50% Injectable 12.5 Gram(s) IV Push once  dextrose 50% Injectable 25 Gram(s) IV Push once  glucagon  Injectable 1 milliGRAM(s) IntraMuscular once  insulin lispro (ADMELOG) corrective regimen sliding scale   SubCutaneous three times a day before meals  metoprolol succinate ER 25 milliGRAM(s) Oral daily  montelukast 10 milliGRAM(s) Oral at bedtime  pantoprazole  Injectable 40 milliGRAM(s) IV Push daily      TELEMETRY: 	    ECG:  	  RADIOLOGY:   DIAGNOSTIC TESTING:  [ ] Echocardiogram:  [ ]  Catheterization:  [ ] Stress Test:    OTHER: 	    LABS:	 	                            9.1    5.00  )-----------( 194      ( 11 Oct 2021 07:09 )             28.5     10-11    136  |  106  |  6<L>  ----------------------------<  71  3.6   |  19<L>  |  0.68    Ca    8.0<L>      11 Oct 2021 07:09    TPro  7.5  /  Alb  3.0<L>  /  TBili  0.8  /  DBili  x   /  AST  74<H>  /  ALT  30  /  AlkPhos  228<H>  10-11    PT/INR - ( 11 Oct 2021 07:09 )   PT: 24.1 sec;   INR: 2.08 ratio                 
CARDIOLOGY FOLLOW UP NOTE - DR. MORILLO    Patient Name: JEANNE TRUJILLO  Date of Service: 10-10-21 @ 11:28    Patient seen and examined    Subjective:    cv: denies chest pain, dyspnea, palpitations, dizziness  pulmonary: denies cough  GI: denies abdominal pain, nausea, vomiting  vascular/legs: no edema   skin: no rash  ROS: otherwise negative   overnight events:      PHYSICAL EXAM:  T(C): 36.5 (10-10-21 @ 06:38), Max: 36.8 (10-09-21 @ 13:43)  HR: 66 (10-10-21 @ 06:38) (66 - 72)  BP: 127/76 (10-10-21 @ 06:38) (105/66 - 127/76)  RR: 17 (10-10-21 @ 06:38) (17 - 18)  SpO2: 98% (10-10-21 @ 06:38) (96% - 99%)  Wt(kg): --  I&O's Summary    09 Oct 2021 07:01  -  10 Oct 2021 07:00  --------------------------------------------------------  IN: 480 mL / OUT: 0 mL / NET: 480 mL      Daily     Daily     Appearance: Normal	  Cardiovascular: Normal S1 S2,RRR, No JVD, No murmurs  Respiratory: Lungs clear to auscultation	  Gastrointestinal:  Soft, Non-tender, + BS	  Extremities: Normal range of motion, No clubbing, cyanosis or edema      Home Medications:  albuterol 90 mcg/inh inhalation aerosol: 1 puff(s) inhaled , As Needed (06 Oct 2021 15:16)  amlodipine-valsartan 10 mg-160 mg oral tablet: pt states 0.5 tab 2 to 3 times a week    note:Pharmacy has 1 tab once a day (06 Oct 2021 15:16)  atorvastatin 20 mg oral tablet: 1 tab(s) orally once a day    Note:Pt unsure of dosage (06 Oct 2021 15:16)  metFORMIN 850 mg oral tablet: 1 tab(s) orally once a day (in the morning)    Note:Pharmacy has directions as 1 tab twice a day (06 Oct 2021 15:16)      MEDICATIONS  (STANDING):  artificial tears (preservative free) Ophthalmic Solution 1 Drop(s) Both EYES four times a day  aspirin enteric coated 81 milliGRAM(s) Oral daily  bisacodyl 10 milliGRAM(s) Oral at bedtime  dextrose 40% Gel 15 Gram(s) Oral once  dextrose 5%. 1000 milliLiter(s) (50 mL/Hr) IV Continuous <Continuous>  dextrose 5%. 1000 milliLiter(s) (100 mL/Hr) IV Continuous <Continuous>  dextrose 50% Injectable 25 Gram(s) IV Push once  dextrose 50% Injectable 12.5 Gram(s) IV Push once  dextrose 50% Injectable 25 Gram(s) IV Push once  glucagon  Injectable 1 milliGRAM(s) IntraMuscular once  insulin lispro (ADMELOG) corrective regimen sliding scale   SubCutaneous three times a day before meals  metoprolol succinate ER 25 milliGRAM(s) Oral daily  montelukast 10 milliGRAM(s) Oral at bedtime  pantoprazole  Injectable 40 milliGRAM(s) IV Push daily  potassium chloride    Tablet ER 40 milliEquivalent(s) Oral once  warfarin 7 milliGRAM(s) Oral once      TELEMETRY: 	    ECG:  	  RADIOLOGY:   DIAGNOSTIC TESTING:  [ ] Echocardiogram:  [ ] Catheterization:  [ ] Stress Test:    OTHER: 	    LABS:	 	    CARDIAC MARKERS:                                      8.9    4.99  )-----------( 184      ( 10 Oct 2021 07:11 )             28.5     10-10    136  |  104  |  8   ----------------------------<  83  3.4<L>   |  19<L>  |  0.71    Ca    7.9<L>      10 Oct 2021 07:07    TPro  7.6  /  Alb  2.8<L>  /  TBili  1.2  /  DBili  x   /  AST  84<H>  /  ALT  36  /  AlkPhos  220<H>  10-10    proBNP:   PT/INR - ( 10 Oct 2021 07:11 )   PT: 17.0 sec;   INR: 1.44 ratio           Lipid Profile:   HgA1c:     Creatinine, Serum: 0.71 mg/dL (10-10-21 @ 07:07)  Creatinine, Serum: 0.77 mg/dL (10-09-21 @ 07:07)  Creatinine, Serum: 0.71 mg/dL (10-08-21 @ 06:46)

## 2021-10-12 NOTE — DISCHARGE NOTE PROVIDER - NSDCFUADDAPPT_GEN_ALL_CORE_FT
you need to follow up within 1-2 weeks with a hepatologist  follow up with PD within 1 week after discharge  follow up with vascular surgery at your convenience you need to follow up within 1-2 weeks with a hepatologist  follow up with PD within 1 week after discharge  follow up with vascular surgery at your convenience    ****follow up with PMD within 3 days for INR check

## 2021-10-12 NOTE — DISCHARGE NOTE PROVIDER - PROVIDER TOKENS
PROVIDER:[TOKEN:[[id number]]],FREE:[LAST:[Hepatology],PHONE:[(   )    -],FAX:[(   )    -]],FREE:[LAST:[vascular surgery],PHONE:[(   )    -],FAX:[(   )    -]]

## 2021-10-12 NOTE — PROGRESS NOTE ADULT - TIME BILLING
Agree with above NP note.  82 y/o female with hx of HTN, HLD, DM, asthma, Urince CA s/p Radiation (on remission per pt) CAD s/p PCI (to LAD on 1/2019),  Afib (on coumadin severe MS S/P (2/18/2019) MVR/Cryomaze, and removal of LA thrombus presenting with GIB  CV stable  a/c on hold   eventual resume ASA for CAD/PCI  can proceed with endoscopic gi procedures with acceptable cardiac risk
Agree with above NP note.  82 y/o female with hx of HTN, HLD, DM, asthma, Urince CA s/p Radiation (on remission per pt) CAD s/p PCI (to LAD on 1/2019),  Afib (on coumadin severe MS S/P (2/18/2019) MVR/Cryomaze, and removal of LA thrombus presenting with GIB  CV stable  a/c per inr   resume ASA for CAD/PCI
Agree with above NP note.  cv stable  cont current tx  hepatology eval  noted  asa, a/c  dcp
Agree with above NP note.  80 y/o female with hx of HTN, HLD, DM, asthma, Urince CA s/p Radiation (on remission per pt) CAD s/p PCI (to LAD on 1/2019),  Afib (on coumadin severe MS S/P (2/18/2019) MVR/Cryomaze, and removal of LA thrombus presenting with GIB  CV stable  a/c per inr   ASA for CAD/PCI
Agree with above NP note.  cv stable  cont current tx  hepatology eval   asa, a/c

## 2021-10-12 NOTE — DISCHARGE NOTE PROVIDER - NSDCCPCAREPLAN_GEN_ALL_CORE_FT
PRINCIPAL DISCHARGE DIAGNOSIS  Diagnosis: Lower GI bleed  Assessment and Plan of Treatment: There are 2 common types of GI Bleed, Upper GI Bleed and Lower GI Bleed.  Upper GI Bleed affects the esophagus, stomach, and first part of the small intestine. Lower GI Bleed affects the colon and rectum.  Upper GI Bleed signs and symptoms to notify your Health Care Provider are vomiting blood, or coffee ground vomitus, and bowel movements that look like black tar.  Lower GI Bleed signs and symptoms to notify your health care provider are bright red bloody bowel movements.   Take your medications as prescribed by your Gastroenterologist.  If you have had an Endoscopy or Colonoscopy, follow up with your Gastroenterologist for Pathology results.  Avoid NSAIDs unless your Health Care Provider tells you that it is ok (Aspirin, Ibuprofen, Advil, Motrin, Aleve).  Follow up with your Gastroenterologist within 1-2 weeks of discharge.        SECONDARY DISCHARGE DIAGNOSES  Diagnosis: Cirrhosis  Assessment and Plan of Treatment: Cirrhosis is scarring of the liver which can cause heavy bleeding, swelling, & breathing problems  Call your doctor with belly & leg swelling, shortness of breath, bruising or bleeding easily, feeling full, tired, trouble getting enough or too much sleep, yellowing of skin or whites of eye, sudden confusion, or coma  Causes may include heavy alcohol use, hepatitis B or C, or fatty liver.    You can help yourself by avoiding alcohol, speak with your doctor before starting on any new medication, herbs, vitamins, or supplements which may cause more damage to the liver  Treatment includes treating the cause, reducing blood pressure, low salt diet, diuretics, belly fluid drainage, treating infections, getting vaccinations to prevent common infections, &/or lactulose to improve confusion  It is important to get help if you have an alcohol problem, use condoms when having sex, and nor sharing drug needles if this applies to you

## 2021-10-12 NOTE — DISCHARGE NOTE PROVIDER - NSDCMRMEDTOKEN_GEN_ALL_CORE_FT
albuterol 90 mcg/inh inhalation aerosol: 1 puff(s) inhaled , As Needed  amlodipine-valsartan 10 mg-160 mg oral tablet: pt states 0.5 tab 2 to 3 times a week    note:Pharmacy has 1 tab once a day  aspirin 81 mg oral delayed release tablet: 1 tab(s) orally once a day  atorvastatin 20 mg oral tablet: 1 tab(s) orally once a day    Note:Pt unsure of dosage  clopidogrel 75 mg oral tablet: 1 tab(s) orally once a day  Coumadin 5 mg oral tablet: 1 tab(s) orally once a day     Note:Pharmacy has directions 1 tab once a day m-f then 1.5 tab sat sunday.     unclear if pt knows directions.  furosemide 40 mg oral tablet: 1 tab(s) orally once a day    Note:Pt unsure of dose  metFORMIN 850 mg oral tablet: 1 tab(s) orally once a day (in the morning)    Note:Pharmacy has directions as 1 tab twice a day  metoprolol succinate 25 mg oral capsule, extended release: pharmacy has 1 tab twice a day    Pt states 1 tab based on bp    note:Unclear if pt knows directions  montelukast 10 mg oral tablet: 1 tab(s) orally once a day   pantoprazole 40 mg oral delayed release tablet: 1 tab(s) orally once a day    Note: No record with pharmacy  potassium chloride 20 mEq oral tablet, extended release: 1 tab(s) orally once a day    Note:No record with pharmacy   albuterol 90 mcg/inh inhalation aerosol: 1 puff(s) inhaled , As Needed  aspirin 81 mg oral delayed release tablet: 1 tab(s) orally once a day  atorvastatin 20 mg oral tablet: 1 tab(s) orally once a day    Note:Pt unsure of dosage  Jantoven 2 mg oral tablet: 1 tab(s) orally once a day   metFORMIN 850 mg oral tablet: 1 tab(s) orally once a day (in the morning)    Note:Pharmacy has directions as 1 tab twice a day  metoprolol succinate 25 mg oral capsule, extended release: pharmacy has 1 tab twice a day    Pt states 1 tab based on bp    note:Unclear if pt knows directions  montelukast 10 mg oral tablet: 1 tab(s) orally once a day   pantoprazole 40 mg oral delayed release tablet: 1 tab(s) orally once a day    Note: No record with pharmacy   albuterol 90 mcg/inh inhalation aerosol: 1 puff(s) inhaled , As Needed  aspirin 81 mg oral delayed release tablet: 1 tab(s) orally once a day  atorvastatin 20 mg oral tablet: 1 tab(s) orally once a day    Note:Pt unsure of dosage  metFORMIN 850 mg oral tablet: 1 tab(s) orally once a day (in the morning)    Note:Pharmacy has directions as 1 tab twice a day  metoprolol succinate 25 mg oral capsule, extended release: pharmacy has 1 tab twice a day    Pt states 1 tab based on bp    note:Unclear if pt knows directions  montelukast 10 mg oral tablet: 1 tab(s) orally once a day   pantoprazole 40 mg oral delayed release tablet: 1 tab(s) orally once a day    Note: No record with pharmacy  warfarin 2 mg oral tablet: 1 tab(s) orally once a day (at bedtime)

## 2021-10-12 NOTE — DISCHARGE NOTE NURSING/CASE MANAGEMENT/SOCIAL WORK - PATIENT PORTAL LINK FT
You can access the FollowMyHealth Patient Portal offered by [facility] by registering at the following website: [email or web address]. By joining [employer]’s FollowMyHealth portal, you will also be able to view your health information using other applications (apps) compatible with our system.

## 2021-10-12 NOTE — PROGRESS NOTE ADULT - REASON FOR ADMISSION
rectal bleed
clear bilaterally.  Pupils equal, round, and reactive to light.

## 2021-10-12 NOTE — DISCHARGE NOTE NURSING/CASE MANAGEMENT/SOCIAL WORK - NSDCPEPT PROEDMA_GEN_ALL_CORE
Outpatient Speech Therapy     [x] [place]  Phone: [phone]  Fax: [phone]      [] [place]  Phone: [phone]  Fax: [phone] THERAPY UPDATED PLAN OF CARE    Date: 10/13/2017  Patients Name:  Toño Cao  YOB: 2012 (4 y.o.)  Gender:  male  MRN:  [id number]  PCP: Tracie Asencio DO  Diagnosis:   1. Expressive language disorder  2. Speech delay    Onset date: 02/10/15    Frequency of Treatment:  Patient is seen by ST 1 times per [x]Week       []Month          []Other:    Certification Dates:  10/15/2017 through 11/13/2017    Compliance with Therapy:  [x]Good   []Fair   []Poor           Short-term Goal(s): Baseline Current Progress Current Progress   Goal 1: Produce CV syllables spontaneously with 80% accuracy. Spontaneously: 13%  In imitation only: 56%  In imitation with cues: 88% Spontaneously: 57%  In imitation: 83%  In imitation with cues: 100%  []Met  []Partially met  [x]Not met   Goal 2:  Produce [id number] syllables in imitation only with 80% accuracy Spontaneously: 42%  In imitation: 58%  In imitation with cues: 67%  Spontaneously: 55% imitation:  83%  In imitation with cues: 95%  [x]Met  []Partially met  []Not met   Goal 3: Jordon Dos Santos will demonstrate an understanding of spatial concepts under, in front, in back of, next to with 80% accuracy independently. 3/5  76% independently  100% with cues and model []Met  []Partially met  [x]Not met   Goal 4: Trial AAC device to assist with effective communication with others. initiated In progress     []Met  []Partially met  [x]Not met           Current Status:  Jordon Dos Santos was seen 7/4K this certification period. He continues to work toward spontaneous production of CV words. He has the most difficulty synthesizing bilabial + vowel productions. He is able to imitate CVCV productions, but struggles to produce them spontaneously.   He is progressing with understanding of spatial concepts, typically misunderstanding \"in
Yes

## 2021-10-12 NOTE — PROGRESS NOTE ADULT - ASSESSMENT
81y Female        with COPD, asthma, CHF, CAD s/p PCI 2019, severe MS with MVR/cryomaze with removal of LV thrombus,        Afib on coumadin, uterine cancer s/p RT ~2019    [place] [facility] [place] with   BRBPR.  Patient reporting bloody BMs for the past 3 days.  .Denies h/o EGD/colon. Denies heavy NSAID use but will occasionally take advil for headaches   Of note, patient reporting elevated INR a few weeks ago.     Hgb 8 (8.6 in 2/2019),  with white count 5.     INR 4. Cr 0.68, BUN 11. , , ALT 66,      given vitamin K   by  er, and GI consulted.    *   Hematochezia possibly 2/2 radiation proctitis vs diverticular bleed   baseline  hb  is  8   never had an EGD/colon,   [facility]  w/p, seen by house  gi  coumadin on hold/  coagulopathy on arrival, vit  K given  serial hb/ prbc  as needed  * elevated lft\   *  h/o  Afib, and  a/c on hold  *  h/o CAD/  s/p  MVR IN 2019    HTN/  DM,  on toprol, norvasc.    hold  lipitor, gioven high lft's/  w/p per gi    dvt ppx/ pas  *   s/p  hyperkalemia, on lokelma   * Cta /p,   h'rroids, left iliac A  occlusion   vascular  eval  noted,  awaiting  becky   *  s/p colonoscopy on friday/.  likely  diverticular  bleed  per  gi     coumadin restarted for  afib.  daily inr    mri , cirrhosis, seen by hepatology,   no  w/p   pt with pad,  may start  d/c planning, if  cleraed by vascular   spoke  with floor team     rad< from: CT Abdomen and Pelvis w/ IV Cont (10.06.21 @ 20:30) >  MPRESSION:  No CT evidence of active GI bleed.  Colonic diverticulosis without acute diverticulitis.  Question internal hemorrhoids at the rectum.  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  --- End of Report ---  < end of copied text >

## 2021-10-12 NOTE — DISCHARGE NOTE PROVIDER - HOSPITAL COURSE
HPI:JEANNE TRUJILLO is a 81y Female with COPD, asthma, CHF, CAD s/p PCI 2019, severe MS with MVR/cryomaze with removal of LV thrombus, afib on coumadin, uterine cancer s/p RT ~2019 and presents today as a consult to GI for BRBPR.    Patient reporting bloody BMs for the past 3 days. Says it only occurs with BMs and it is a lot in the toilet and with the stools. Stool she says is brown, but has been black in the past with iron, but never red. Denies n/v/d, constipation, no abdominal pain or discomfort. No f/c, reporting weight loss, but unable to quantify. Denies h/o EGD/colon. Denies heavy NSAID use but will occasionally take advil for headaches. Of note, patient reporting elevated INR a few weeks ago.    She has been HDS, with Hgb 8 (8.6 in 2/2019),  with white count 5. INR 4. Cr 0.68, BUN 11. , , ALT 66, T bili 1.2. She was started on PPI, given vitamin K and GI consulted.    The pt was admitted with a GI bleed and was incidentally found to have a LT iliac artery occlusion. She had femoral endarterectomy many years ago.  The occlusion is well known to the patient. She does not have any rest pain. She walks short distances only and does not have disabling claudication. She is followed by a vascular surgeon at [facility]. She had a palpable RT femoral pulse but no LT femoral, popliteal or pedal pulses. On the RT side the femoral pulse was the only palpable pulse.  She had normal capillary refill. She had good Doppler signals in her feet. The PVR study could be repeated but there is no  likelihood  of her needing vascular surgery intervention. After this she can follow up with her outside vascular surgeon.   - Preparation of the colon was fair.                       - Non-bleeding internal hemorrhoids.                       - Diverticulosis in the recto-sigmoid colon, in the descending colon, in the                        ascending colon and in the cecum. Likely source of bleeding, which is now                        resolved.                       - Stool in the entire examined colon. No specimens collected.                       - One 3 mm, non-bleeding polyp in the descending colon, removed with jumbo                        forceps. Resected and retrieved.  Recommendation:      - Resume regular diet. High fiber diet.                       - Return patient to hospital quach for ongoing care.                       - No absolute contraindication to resuming patient's AC from GI standpoing.                        Defer final decision to primary team.                       - Repeat colonoscopy in 3 years for surveillance based on pathology results,                        given fair bowel prep.  Pt declines to follow up with [facility] - She lives in [place] and prefers to follow up there.

## 2021-10-12 NOTE — DISCHARGE NOTE NURSING/CASE MANAGEMENT/SOCIAL WORK - NSDCFUADDAPPT_GEN_ALL_CORE_FT
you need to follow up within 1-2 weeks with a hepatologist  follow up with PD within 1 week after discharge  follow up with vascular surgery at your convenience    ****follow up with PMD within 3 days for INR check

## 2021-10-12 NOTE — PROGRESS NOTE ADULT - ASSESSMENT
Echo 10/7/21: EF 75%, Bio MVR fx, mod As, hyperdynamic lv sys fx, mild-mod TR, mild AZ, mild pHTN  Echo 2/15/19: EF 50%, mildly reduced LV fx, mild pulm htn, mod TR, severe mitral annular calcification.  moderate mitral regurgitation.  EMILI 1/7/19 : EF 66%, mod to sev MS ; large left atrial appendage thrombus   Cardiac Cath Lab - Adult (01.18.19 @ 11:34) >  PCI to severe proximal LAD lesion with BRENDA  in setting of new onset angina, acute diastolic HF    a/p     82 y/o female with hx of HTN, HLD, DM, asthma, Urince CA s/p Radiation (on remission per pt) CAD s/p PCI (to LAD on 1/2019),  Afib (on coumadin severe MS S/P (2/18/2019) MVR/Cryomaze, and removal of LA thrombus presenting with GIB    #GIB  -s/p PRBC-- monitor h/h   -supratheraputic INR on admission s/p vitamin K  -s/p EGD/colonscopy - results noted   -MRI noted   -back on a/c  -hepatology eval noted - outpt f/u   -gi f/u     # CAD s/p PCI (to LAD on 1/2019)  -cont asa 81 mg daily   -ecg with no acs - cv stable     # Chronic AFib   -Coumadin per INR   -c/w BB   -in NSR     #  Severe MS S/P (2/18/2019) MVR/Cryomaze, and removal of LA thrombus  -Echo noted with EF 75%, fx Bio MVR , mod As, hyperdynamic lv sys fx, mild-mod TR, mild AZ, mild pHTN  -c/w BB     dvt ppx     d/c planning  pt to f/u w outpt cardio upon DC

## 2021-10-13 VITALS
SYSTOLIC BLOOD PRESSURE: 124 MMHG | TEMPERATURE: 98 F | HEART RATE: 82 BPM | OXYGEN SATURATION: 98 % | DIASTOLIC BLOOD PRESSURE: 75 MMHG | RESPIRATION RATE: 18 BRPM

## 2021-10-13 LAB
IGA FLD-MCNC: 432 MG/DL — SIGNIFICANT CHANGE UP (ref 84–499)
IGG FLD-MCNC: 3356 MG/DL — HIGH (ref 610–1660)
IGM SERPL-MCNC: 150 MG/DL — SIGNIFICANT CHANGE UP (ref 35–242)
KAPPA LC SER QL IFE: 5.89 MG/DL — HIGH (ref 0.33–1.94)
KAPPA/LAMBDA FREE LIGHT CHAIN RATIO, SERUM: 1.49 RATIO — SIGNIFICANT CHANGE UP (ref 0.26–1.65)
LAMBDA LC SER QL IFE: 3.94 MG/DL — HIGH (ref 0.57–2.63)
LKM AB SER-ACNC: <20.1 UNITS — SIGNIFICANT CHANGE UP (ref 0–20)
MITOCHONDRIA AB SER-ACNC: SIGNIFICANT CHANGE UP
SMOOTH MUSCLE AB SER-ACNC: SIGNIFICANT CHANGE UP

## 2021-10-14 LAB
ANA PAT FLD IF-IMP: ABNORMAL
ANA TITR SER: ABNORMAL
SOLUBLE LIVER IGG SER IA-ACNC: 1.7 — SIGNIFICANT CHANGE UP (ref 0–20)
SURGICAL PATHOLOGY STUDY: SIGNIFICANT CHANGE UP

## 2021-10-15 LAB — HCV RNA FLD QL NAA+PROBE: SIGNIFICANT CHANGE UP

## 2021-10-26 PROCEDURE — 82607 VITAMIN B-12: CPT

## 2021-10-26 PROCEDURE — 76700 US EXAM ABDOM COMPLETE: CPT

## 2021-10-26 PROCEDURE — 82728 ASSAY OF FERRITIN: CPT

## 2021-10-26 PROCEDURE — 84466 ASSAY OF TRANSFERRIN: CPT

## 2021-10-26 PROCEDURE — 76937 US GUIDE VASCULAR ACCESS: CPT

## 2021-10-26 PROCEDURE — 84132 ASSAY OF SERUM POTASSIUM: CPT

## 2021-10-26 PROCEDURE — 85610 PROTHROMBIN TIME: CPT

## 2021-10-26 PROCEDURE — 86900 BLOOD TYPING SEROLOGIC ABO: CPT

## 2021-10-26 PROCEDURE — 85027 COMPLETE CBC AUTOMATED: CPT

## 2021-10-26 PROCEDURE — 84295 ASSAY OF SERUM SODIUM: CPT

## 2021-10-26 PROCEDURE — 83520 IMMUNOASSAY QUANT NOS NONAB: CPT

## 2021-10-26 PROCEDURE — 93005 ELECTROCARDIOGRAM TRACING: CPT

## 2021-10-26 PROCEDURE — 93923 UPR/LXTR ART STDY 3+ LVLS: CPT

## 2021-10-26 PROCEDURE — 87521 HEPATITIS C PROBE&RVRS TRNSC: CPT

## 2021-10-26 PROCEDURE — 74183 MRI ABD W/O CNTR FLWD CNTR: CPT

## 2021-10-26 PROCEDURE — 36430 TRANSFUSION BLD/BLD COMPNT: CPT

## 2021-10-26 PROCEDURE — 83605 ASSAY OF LACTIC ACID: CPT

## 2021-10-26 PROCEDURE — 83540 ASSAY OF IRON: CPT

## 2021-10-26 PROCEDURE — 83550 IRON BINDING TEST: CPT

## 2021-10-26 PROCEDURE — 86381 MITOCHONDRIAL ANTIBODY EACH: CPT

## 2021-10-26 PROCEDURE — 86769 SARS-COV-2 COVID-19 ANTIBODY: CPT

## 2021-10-26 PROCEDURE — P9016: CPT

## 2021-10-26 PROCEDURE — 86255 FLUORESCENT ANTIBODY SCREEN: CPT

## 2021-10-26 PROCEDURE — 82330 ASSAY OF CALCIUM: CPT

## 2021-10-26 PROCEDURE — 82784 ASSAY IGA/IGD/IGG/IGM EACH: CPT

## 2021-10-26 PROCEDURE — 82390 ASSAY OF CERULOPLASMIN: CPT

## 2021-10-26 PROCEDURE — 85025 COMPLETE CBC W/AUTO DIFF WBC: CPT

## 2021-10-26 PROCEDURE — 71045 X-RAY EXAM CHEST 1 VIEW: CPT

## 2021-10-26 PROCEDURE — 86901 BLOOD TYPING SEROLOGIC RH(D): CPT

## 2021-10-26 PROCEDURE — 80074 ACUTE HEPATITIS PANEL: CPT

## 2021-10-26 PROCEDURE — 82435 ASSAY OF BLOOD CHLORIDE: CPT

## 2021-10-26 PROCEDURE — 86038 ANTINUCLEAR ANTIBODIES: CPT

## 2021-10-26 PROCEDURE — 82947 ASSAY GLUCOSE BLOOD QUANT: CPT

## 2021-10-26 PROCEDURE — 86850 RBC ANTIBODY SCREEN: CPT

## 2021-10-26 PROCEDURE — 85018 HEMOGLOBIN: CPT

## 2021-10-26 PROCEDURE — 93922 UPR/L XTREMITY ART 2 LEVELS: CPT

## 2021-10-26 PROCEDURE — 84100 ASSAY OF PHOSPHORUS: CPT

## 2021-10-26 PROCEDURE — 80053 COMPREHEN METABOLIC PANEL: CPT

## 2021-10-26 PROCEDURE — 74177 CT ABD & PELVIS W/CONTRAST: CPT

## 2021-10-26 PROCEDURE — 88305 TISSUE EXAM BY PATHOLOGIST: CPT

## 2021-10-26 PROCEDURE — 81001 URINALYSIS AUTO W/SCOPE: CPT

## 2021-10-26 PROCEDURE — 86923 COMPATIBILITY TEST ELECTRIC: CPT

## 2021-10-26 PROCEDURE — 85014 HEMATOCRIT: CPT

## 2021-10-26 PROCEDURE — 36415 COLL VENOUS BLD VENIPUNCTURE: CPT

## 2021-10-26 PROCEDURE — 99285 EMERGENCY DEPT VISIT HI MDM: CPT | Mod: 25

## 2021-10-26 PROCEDURE — 83036 HEMOGLOBIN GLYCOSYLATED A1C: CPT

## 2021-10-26 PROCEDURE — U0005: CPT

## 2021-10-26 PROCEDURE — 82803 BLOOD GASES ANY COMBINATION: CPT

## 2021-10-26 PROCEDURE — 86376 MICROSOMAL ANTIBODY EACH: CPT

## 2021-10-26 PROCEDURE — 82248 BILIRUBIN DIRECT: CPT

## 2021-10-26 PROCEDURE — 82746 ASSAY OF FOLIC ACID SERUM: CPT

## 2021-10-26 PROCEDURE — U0003: CPT

## 2021-10-26 PROCEDURE — 83735 ASSAY OF MAGNESIUM: CPT

## 2021-10-26 PROCEDURE — 80048 BASIC METABOLIC PNL TOTAL CA: CPT

## 2021-10-26 PROCEDURE — 85730 THROMBOPLASTIN TIME PARTIAL: CPT

## 2021-10-26 PROCEDURE — 82962 GLUCOSE BLOOD TEST: CPT

## 2021-10-26 PROCEDURE — 93306 TTE W/DOPPLER COMPLETE: CPT

## 2021-10-26 PROCEDURE — 96374 THER/PROPH/DIAG INJ IV PUSH: CPT

## 2022-06-27 NOTE — H&P ADULT - MUSCULOSKELETAL
Discharge Note        SUMMARY     Admit Date: 6/27/2022    Attending Physician: Bill yLnn MD     Discharge Physician: Bill Lynn MD    Discharge Date: 6/27/2022 12:14 PM      Hospital Course: Patient tolerated procedure well.     Disposition: Home or Self Care    Patient Instructions:   Current Discharge Medication List      CONTINUE these medications which have NOT CHANGED    Details   DULoxetine (CYMBALTA) 60 MG capsule Take 1 capsule (60 mg total) by mouth every evening.  Qty: 30 capsule, Refills: 11    Associated Diagnoses: Anxiety      famotidine (PEPCID) 20 MG tablet Take 1 tablet (20 mg total) by mouth 2 (two) times daily. DOSE UNKNOWN  Qty: 60 tablet, Refills: 5    Associated Diagnoses: Gastroesophageal reflux disease, unspecified whether esophagitis present      ondansetron (ZOFRAN) 4 MG tablet Take 1 tablet (4 mg total) by mouth every 6 (six) hours as needed for Nausea.  Qty: 30 tablet, Refills: 1      potassium chloride SA (K-DUR,KLOR-CON) 20 MEQ tablet Take 1 tablet (20 mEq total) by mouth once daily.  Qty: 30 tablet, Refills: 0      traZODone (DESYREL) 100 MG tablet Take 1 tablet (100 mg total) by mouth nightly.  Qty: 90 tablet, Refills: 3      diaper,brief,adult,disposable Misc Use as directed  Qty: 96 each, Refills: 11    Associated Diagnoses: Urinary incontinence, unspecified type      oxybutynin (DITROPAN-XL) 10 MG 24 hr tablet Take 1 tablet (10 mg total) by mouth once daily.  Qty: 30 tablet, Refills: 11    Associated Diagnoses: Urinary incontinence, unspecified type         STOP taking these medications       amitriptyline (ELAVIL) 100 MG tablet Comments:   Reason for Stopping:               Discharge Procedure Orders (must include Diet, Follow-up, Activity):   Discharge Procedure Orders (must include Diet, Follow-up, Activity)   Diet general     Call MD for:  temperature >100.4     Call MD for:  persistent nausea and vomiting     Call MD for:  severe uncontrolled pain      Call MD for:  difficulty breathing, headache or visual disturbances     Call MD for:  redness, tenderness, or signs of infection (pain, swelling, redness, odor or green/yellow discharge around incision site)     Call MD for:  persistent dizziness or light-headedness        Follow Up:  Follow up as scheduled.  Resume routine diet.  Activity as tolerated.    No driving day of procedure.   details… detailed exam no joint warmth/no joint swelling/no calf tenderness/no joint erythema

## 2022-07-12 NOTE — DISCHARGE NOTE ADULT - USE THE 5 A'S (ASK, ADVISE, ASSESS, ASSIST, ARRANGE)
Pt parent notified of new RX sent to pharmacy.  Pt verbalized understanding with no additional questions.     
Statement Selected

## 2022-09-29 NOTE — CONSULT NOTE ADULT - PROBLEM SELECTOR RECOMMENDATION 4
You can access the FollowMyHealth Patient Portal offered by [facility] by registering at the following website: [email or web address]. By joining [employer]’s FollowMyHealth portal, you will also be able to view your health information using other applications (apps) compatible with our system. on statins

## 2023-01-24 NOTE — PATIENT PROFILE ADULT - PROVIDER NAME
Dr Nadia Angeles, PMD  [facility]
Render In Strict Bullet Format?: No
Continue Regimen: -Spironolactone 100 mg tablet, take 1 po qhs. \\n-Clindamycin 1 % lotion, apply to face QAM.\\n-Epiduo Forte 0.3 %-2.5 % topical gel with pump, aply pea size amount to face QHS. \\n-OTC CeraVe products.
Detail Level: Zone

## 2024-04-08 NOTE — PROGRESS NOTE ADULT - PROBLEM SELECTOR PLAN 1
Will continue current insulin regimen for now. Will continue monitoring FS, log, will Follow up.  DC home on Metformin 1000mg PO BID, FU 2 w  Patient counseled for compliance with consistent low carb diet.
Will continue current insulin regimen for now. Will continue monitoring FS, log, will Follow up.  Patient counseled for compliance with consistent low carb diet.
c/w BBlocker, statin asa  Heparin gtt for ANDREW thrombus  c/w cardizem for new onset Afib now SR
scheduled MVR on 1/14 aborted - found not to have significant MS/MR and brought back to CTU for recovery  Currently off diuretics  Strict I & Os, daily weight  Cough and deep breathe, Incentive Spirometry Q1h, Chest PT.  OOB to chair, ambulate as tolerated  Disposition: Home PT when medically cleared
Will continue current insulin regimen for now. Will continue monitoring FS, log, will Follow up.
Will continue current insulin regimen for now. Will continue monitoring FS, log, will Follow up.  DC home on Metformin 1000mg PO BID, FU 2 w  Patient counseled for compliance with consistent low carb diet.
Will continue current insulin regimen for now. Will continue monitoring FS, log, will Follow up.  DC home on Metformin 1000mg PO BID, FU 2 w  Patient counseled for compliance with consistent low carb diet.
Will continue current insulin regimen for now. Will continue monitoring FS, log, will Follow up.  Patient counseled for compliance with consistent low carb diet.
Will increase Lantus to 10 units at bed time.  Will increase Humalog to 7 units before each meal in addition to Humalog correction scale coverage.  Patient counseled for compliance with consistent low carb diet.
cont AC  1/7:I think A fibrilaiton in addition to chf is the major reson for SOB: no intrinsic lung disese : she  would  nee d outpt full PFT: FOR STRESS TESTING: AND CARDIOVEERSION  1/8: No cardioversion done secondary to LLClot: on AC  1/10: Cardiac cath today:  1/11: cath noted: transferred for surgery to [facility]: Has two LAD/ RCA-PA fistulas too:  1/12 on heparin gtt for ANDREW thrombus.  Pending CABG/MVR with ANDREW ligation and thrombus removal on Monday 1/13 on Heparin gtt. Pending OR on Monday. Pulmonary stand point, Pt is optimized for OR
cont AC  1/7:I think A fibrilaiton in addition to chf is the major reson for SOB: no intrinsic lung disese : she  would  nee d outpt full PFT: FOR STRESS TESTING: AND CARDIOVEERSION  1/8: No cardioversion done secondary to LLClot: on AC  1/10: Cardiac cath today:  1/11: cath noted: transferred for surgery to [facility]: Has two LAD/ RCA-PA fistulas too:
cont AC  1/7:I think A fibrilaiton in addition to chf is the major reson for SOB: no intrinsic lung disese : she  would  nee d outpt full PFT: FOR STRESS TESTING: AND CARDIOVEERSION  1/8: No cardioversion done secondary to LLClot: on AC  1/10: Cardiac cath today:  1/11: cath noted: transferred for surgery to [facility]: Has two LAD/ RCA-PA fistulas too:  1/12 on heparin gtt for ANDREW thrombus.  Pending CABG/MVR with ANDREW ligation and thrombus removal on Monday
statin, asa, coreg x1 dose on MONday 1/14 AM per Dr. Ingram  Heparin gtt for ANDREW thrombus  c/w cardizem for new onset Afib now SR  preop for Monday MVR/CABG  Endo consult for elevated TSH
[place], [place] [id number]

## 2024-04-12 NOTE — H&P ADULT - NECK DETAILS
Post-Op Assessment Note    CV Status:  Stable  Pain Score: 0    Pain management: adequate       Mental Status:  Alert   Hydration Status:  Stable   PONV Controlled:  Controlled   Airway Patency:  Patent     Post Op Vitals Reviewed: Yes    No anethesia notable event occurred.    Staff: CRNA               BP   185/111   Temp      Pulse  81   Resp   20   SpO2   95     
normal/supple/no JVD

## 2024-07-08 NOTE — PATIENT PROFILE ADULT - CENTRAL VENOUS CATHETER/PICC LINE
Take ibuprofen and/or acetaminophen as needed for pain.  If your symptoms or not improved by the end of the week, follow-up with Dr. Galan.    no

## 2024-11-19 ENCOUNTER — INPATIENT (INPATIENT)
Facility: HOSPITAL | Age: 84
LOS: 1 days | Discharge: HOME | DRG: 696 | End: 2024-11-21
Attending: STUDENT IN AN ORGANIZED HEALTH CARE EDUCATION/TRAINING PROGRAM | Admitting: STUDENT IN AN ORGANIZED HEALTH CARE EDUCATION/TRAINING PROGRAM
Payer: COMMERCIAL

## 2024-11-19 VITALS
HEART RATE: 70 BPM | WEIGHT: 158.07 LBS | HEIGHT: 60 IN | SYSTOLIC BLOOD PRESSURE: 107 MMHG | OXYGEN SATURATION: 97 % | RESPIRATION RATE: 20 BRPM | TEMPERATURE: 97 F | DIASTOLIC BLOOD PRESSURE: 71 MMHG

## 2024-11-19 DIAGNOSIS — Z98.890 OTHER SPECIFIED POSTPROCEDURAL STATES: Chronic | ICD-10-CM

## 2024-11-19 DIAGNOSIS — E11.9 TYPE 2 DIABETES MELLITUS WITHOUT COMPLICATIONS: ICD-10-CM

## 2024-11-19 DIAGNOSIS — R31.9 HEMATURIA, UNSPECIFIED: ICD-10-CM

## 2024-11-19 DIAGNOSIS — I25.10 ATHEROSCLEROTIC HEART DISEASE OF NATIVE CORONARY ARTERY WITHOUT ANGINA PECTORIS: ICD-10-CM

## 2024-11-19 DIAGNOSIS — I10 ESSENTIAL (PRIMARY) HYPERTENSION: ICD-10-CM

## 2024-11-19 DIAGNOSIS — D64.9 ANEMIA, UNSPECIFIED: ICD-10-CM

## 2024-11-19 DIAGNOSIS — I48.0 PAROXYSMAL ATRIAL FIBRILLATION: ICD-10-CM

## 2024-11-19 DIAGNOSIS — C55 MALIGNANT NEOPLASM OF UTERUS, PART UNSPECIFIED: ICD-10-CM

## 2024-11-19 DIAGNOSIS — Z95.9 PRESENCE OF CARDIAC AND VASCULAR IMPLANT AND GRAFT, UNSPECIFIED: Chronic | ICD-10-CM

## 2024-11-19 DIAGNOSIS — Z29.9 ENCOUNTER FOR PROPHYLACTIC MEASURES, UNSPECIFIED: ICD-10-CM

## 2024-11-19 LAB
ADD ON TEST-SPECIMEN IN LAB: SIGNIFICANT CHANGE UP
ALBUMIN SERPL ELPH-MCNC: 3.9 G/DL — SIGNIFICANT CHANGE UP (ref 3.3–5)
ALP SERPL-CCNC: 254 U/L — HIGH (ref 40–120)
ALT FLD-CCNC: 23 U/L — SIGNIFICANT CHANGE UP (ref 10–45)
ANION GAP SERPL CALC-SCNC: 14 MMOL/L — SIGNIFICANT CHANGE UP (ref 5–17)
APPEARANCE UR: ABNORMAL
AST SERPL-CCNC: 42 U/L — HIGH (ref 10–40)
BACTERIA # UR AUTO: NEGATIVE /HPF — SIGNIFICANT CHANGE UP
BASOPHILS # BLD AUTO: 0.03 K/UL — SIGNIFICANT CHANGE UP (ref 0–0.2)
BASOPHILS NFR BLD AUTO: 0.4 % — SIGNIFICANT CHANGE UP (ref 0–2)
BILIRUB SERPL-MCNC: 0.4 MG/DL — SIGNIFICANT CHANGE UP (ref 0.2–1.2)
BILIRUB UR-MCNC: NEGATIVE — SIGNIFICANT CHANGE UP
BUN SERPL-MCNC: 11 MG/DL — SIGNIFICANT CHANGE UP (ref 7–23)
CALCIUM SERPL-MCNC: 8.7 MG/DL — SIGNIFICANT CHANGE UP (ref 8.4–10.5)
CAST: 0 /LPF — SIGNIFICANT CHANGE UP (ref 0–4)
CHLORIDE SERPL-SCNC: 99 MMOL/L — SIGNIFICANT CHANGE UP (ref 96–108)
CO2 SERPL-SCNC: 22 MMOL/L — SIGNIFICANT CHANGE UP (ref 22–31)
COLOR SPEC: YELLOW — SIGNIFICANT CHANGE UP
CREAT SERPL-MCNC: 0.9 MG/DL — SIGNIFICANT CHANGE UP (ref 0.5–1.3)
DIFF PNL FLD: ABNORMAL
EGFR: 63 ML/MIN/1.73M2 — SIGNIFICANT CHANGE UP
EOSINOPHIL # BLD AUTO: 0.28 K/UL — SIGNIFICANT CHANGE UP (ref 0–0.5)
EOSINOPHIL NFR BLD AUTO: 3.7 % — SIGNIFICANT CHANGE UP (ref 0–6)
GAS PNL BLDV: SIGNIFICANT CHANGE UP
GLUCOSE BLDC GLUCOMTR-MCNC: 102 MG/DL — HIGH (ref 70–99)
GLUCOSE SERPL-MCNC: 92 MG/DL — SIGNIFICANT CHANGE UP (ref 70–99)
GLUCOSE UR QL: NEGATIVE MG/DL — SIGNIFICANT CHANGE UP
HCT VFR BLD CALC: 29.6 % — LOW (ref 34.5–45)
HGB BLD-MCNC: 9.3 G/DL — LOW (ref 11.5–15.5)
IMM GRANULOCYTES NFR BLD AUTO: 0.5 % — SIGNIFICANT CHANGE UP (ref 0–0.9)
KETONES UR-MCNC: NEGATIVE MG/DL — SIGNIFICANT CHANGE UP
LEUKOCYTE ESTERASE UR-ACNC: NEGATIVE — SIGNIFICANT CHANGE UP
LIDOCAIN IGE QN: 48 U/L — SIGNIFICANT CHANGE UP (ref 7–60)
LYMPHOCYTES # BLD AUTO: 2.05 K/UL — SIGNIFICANT CHANGE UP (ref 1–3.3)
LYMPHOCYTES # BLD AUTO: 26.9 % — SIGNIFICANT CHANGE UP (ref 13–44)
MCHC RBC-ENTMCNC: 26.8 PG — LOW (ref 27–34)
MCHC RBC-ENTMCNC: 31.4 G/DL — LOW (ref 32–36)
MCV RBC AUTO: 85.3 FL — SIGNIFICANT CHANGE UP (ref 80–100)
MONOCYTES # BLD AUTO: 0.66 K/UL — SIGNIFICANT CHANGE UP (ref 0–0.9)
MONOCYTES NFR BLD AUTO: 8.7 % — SIGNIFICANT CHANGE UP (ref 2–14)
NEUTROPHILS # BLD AUTO: 4.55 K/UL — SIGNIFICANT CHANGE UP (ref 1.8–7.4)
NEUTROPHILS NFR BLD AUTO: 59.8 % — SIGNIFICANT CHANGE UP (ref 43–77)
NITRITE UR-MCNC: NEGATIVE — SIGNIFICANT CHANGE UP
NRBC # BLD: 0 /100 WBCS — SIGNIFICANT CHANGE UP (ref 0–0)
PH UR: 6.5 — SIGNIFICANT CHANGE UP (ref 5–8)
PLATELET # BLD AUTO: 222 K/UL — SIGNIFICANT CHANGE UP (ref 150–400)
POTASSIUM SERPL-MCNC: 3.6 MMOL/L — SIGNIFICANT CHANGE UP (ref 3.5–5.3)
POTASSIUM SERPL-SCNC: 3.6 MMOL/L — SIGNIFICANT CHANGE UP (ref 3.5–5.3)
PROT SERPL-MCNC: 8.2 G/DL — SIGNIFICANT CHANGE UP (ref 6–8.3)
PROT UR-MCNC: 30 MG/DL
RBC # BLD: 3.47 M/UL — LOW (ref 3.8–5.2)
RBC # FLD: 13.9 % — SIGNIFICANT CHANGE UP (ref 10.3–14.5)
RBC CASTS # UR COMP ASSIST: 292 /HPF — HIGH (ref 0–4)
SODIUM SERPL-SCNC: 135 MMOL/L — SIGNIFICANT CHANGE UP (ref 135–145)
SP GR SPEC: 1.01 — SIGNIFICANT CHANGE UP (ref 1–1.03)
SQUAMOUS # UR AUTO: 4 /HPF — SIGNIFICANT CHANGE UP (ref 0–5)
UROBILINOGEN FLD QL: 0.2 MG/DL — SIGNIFICANT CHANGE UP (ref 0.2–1)
WBC # BLD: 7.61 K/UL — SIGNIFICANT CHANGE UP (ref 3.8–10.5)
WBC # FLD AUTO: 7.61 K/UL — SIGNIFICANT CHANGE UP (ref 3.8–10.5)
WBC UR QL: 6 /HPF — HIGH (ref 0–5)

## 2024-11-19 PROCEDURE — 70450 CT HEAD/BRAIN W/O DYE: CPT | Mod: 26,MC

## 2024-11-19 PROCEDURE — 36410 VNPNXR 3YR/> PHY/QHP DX/THER: CPT

## 2024-11-19 PROCEDURE — 74177 CT ABD & PELVIS W/CONTRAST: CPT | Mod: 26,MC

## 2024-11-19 PROCEDURE — 99223 1ST HOSP IP/OBS HIGH 75: CPT

## 2024-11-19 PROCEDURE — 99285 EMERGENCY DEPT VISIT HI MDM: CPT | Mod: 25

## 2024-11-19 RX ORDER — FUROSEMIDE 40 MG/1
1 TABLET ORAL
Refills: 0 | DISCHARGE

## 2024-11-19 RX ORDER — 0.9 % SODIUM CHLORIDE 0.9 %
1000 INTRAVENOUS SOLUTION INTRAVENOUS
Refills: 0 | Status: DISCONTINUED | OUTPATIENT
Start: 2024-11-19 | End: 2024-11-21

## 2024-11-19 RX ORDER — NITROGLYCERIN 0.4MG/HR
1 PATCH, TRANSDERMAL 24 HOURS TRANSDERMAL
Refills: 0 | DISCHARGE

## 2024-11-19 RX ORDER — ONDANSETRON HYDROCHLORIDE 4 MG/1
4 TABLET, FILM COATED ORAL EVERY 8 HOURS
Refills: 0 | Status: DISCONTINUED | OUTPATIENT
Start: 2024-11-19 | End: 2024-11-21

## 2024-11-19 RX ORDER — AMLODIPINE AND VALSARTAN 5; 160 MG/1; MG/1
0 TABLET, FILM COATED ORAL
Refills: 0 | DISCHARGE

## 2024-11-19 RX ORDER — ACETAMINOPHEN 500MG 500 MG/1
1000 TABLET, COATED ORAL ONCE
Refills: 0 | Status: COMPLETED | OUTPATIENT
Start: 2024-11-19 | End: 2024-11-19

## 2024-11-19 RX ORDER — BRIMONIDINE TARTRATE 1.5 MG/ML
1 SOLUTION/ DROPS OPHTHALMIC AT BEDTIME
Refills: 0 | Status: DISCONTINUED | OUTPATIENT
Start: 2024-11-19 | End: 2024-11-21

## 2024-11-19 RX ORDER — LATANOPROST 50 UG/ML
1 SOLUTION/ DROPS OPHTHALMIC AT BEDTIME
Refills: 0 | Status: DISCONTINUED | OUTPATIENT
Start: 2024-11-19 | End: 2024-11-21

## 2024-11-19 RX ORDER — BRIMONIDINE TARTRATE 1.5 MG/ML
1 SOLUTION/ DROPS OPHTHALMIC
Refills: 0 | DISCHARGE

## 2024-11-19 RX ORDER — SODIUM CHLORIDE 9 MG/ML
500 INJECTION, SOLUTION INTRAMUSCULAR; INTRAVENOUS; SUBCUTANEOUS ONCE
Refills: 0 | Status: COMPLETED | OUTPATIENT
Start: 2024-11-19 | End: 2024-11-19

## 2024-11-19 RX ORDER — ACETAMINOPHEN 500MG 500 MG/1
650 TABLET, COATED ORAL EVERY 6 HOURS
Refills: 0 | Status: DISCONTINUED | OUTPATIENT
Start: 2024-11-19 | End: 2024-11-21

## 2024-11-19 RX ORDER — GLUCAGON INJECTION, SOLUTION 0.5 MG/.1ML
1 INJECTION, SOLUTION SUBCUTANEOUS ONCE
Refills: 0 | Status: DISCONTINUED | OUTPATIENT
Start: 2024-11-19 | End: 2024-11-21

## 2024-11-19 RX ORDER — ACETAMINOPHEN, DIPHENHYDRAMINE HCL, PHENYLEPHRINE HCL 325; 25; 5 MG/1; MG/1; MG/1
3 TABLET ORAL AT BEDTIME
Refills: 0 | Status: DISCONTINUED | OUTPATIENT
Start: 2024-11-19 | End: 2024-11-21

## 2024-11-19 RX ORDER — RIVAROXABAN 10 MG/1
1 TABLET, FILM COATED ORAL
Refills: 0 | DISCHARGE

## 2024-11-19 RX ORDER — LATANOPROST 50 UG/ML
0 SOLUTION/ DROPS OPHTHALMIC
Refills: 0 | DISCHARGE

## 2024-11-19 RX ORDER — MAGNESIUM, ALUMINUM HYDROXIDE 200-225/5
30 SUSPENSION, ORAL (FINAL DOSE FORM) ORAL EVERY 4 HOURS
Refills: 0 | Status: DISCONTINUED | OUTPATIENT
Start: 2024-11-19 | End: 2024-11-21

## 2024-11-19 RX ORDER — MONTELUKAST SODIUM 10 MG/1
10 TABLET ORAL DAILY
Refills: 0 | Status: DISCONTINUED | OUTPATIENT
Start: 2024-11-19 | End: 2024-11-21

## 2024-11-19 RX ADMIN — SODIUM CHLORIDE 500 MILLILITER(S): 9 INJECTION, SOLUTION INTRAMUSCULAR; INTRAVENOUS; SUBCUTANEOUS at 15:00

## 2024-11-19 RX ADMIN — ACETAMINOPHEN 500MG 1000 MILLIGRAM(S): 500 TABLET, COATED ORAL at 14:20

## 2024-11-19 RX ADMIN — ACETAMINOPHEN 500MG 1000 MILLIGRAM(S): 500 TABLET, COATED ORAL at 14:30

## 2024-11-19 RX ADMIN — ACETAMINOPHEN, DIPHENHYDRAMINE HCL, PHENYLEPHRINE HCL 3 MILLIGRAM(S): 325; 25; 5 TABLET ORAL at 22:26

## 2024-11-19 RX ADMIN — SODIUM CHLORIDE 500 MILLILITER(S): 9 INJECTION, SOLUTION INTRAMUSCULAR; INTRAVENOUS; SUBCUTANEOUS at 13:58

## 2024-11-19 RX ADMIN — ACETAMINOPHEN 500MG 400 MILLIGRAM(S): 500 TABLET, COATED ORAL at 13:57

## 2024-11-19 NOTE — ED ADULT NURSE NOTE - NSFALLRISKINTERV_ED_ALL_ED

## 2024-11-19 NOTE — H&P ADULT - HISTORY OF PRESENT ILLNESS
84F PMHx HTN, HLD, Afib on xarelto, CAD s/p BRENDA, s/p MVR, uterine ca s/p RT, h/o DVT, DM2, COPD.  Prsenting w/ abd pain and distension, vaginal vs. hematuria. Pt was admitted at OSH ([facility]) for hematuria 1mo ago, required blood transfusion, thought to be d/t h/o RT for uterine ca. There was c/f endometrium thickening. She had a fall 2 weeksa go. 2 days ago, had another bleeding, c/f this was vaginal bleeding w/ clots.     In the ED, initially soft BP and otherwise stable vitals. Had episode of hyptoension down to 80/46, received 500cc IVF and 1u prbc.   CBC w/ wbc 7.6, hgb 9.3, plt 222.   CMP w/ SCr 0.9, alk phos 254, AST 42, ALT 23. Trop 7 and probnp 130.   VBG w/ lactate 1.4.   UA showed cloudy, large blood, 292 rbc, neg for NI or LE.   CTH obtained d/t fall 2 weeks ago on xarelto, neg for acute findings.  CT a/p showed nodular thickneing of posteiror bladder wall, rec uro consult; ?cirrhosis, and occluded left external iliac artery.     Urology called in the ED, pending recs.      84F PMHx HTN, HLD, Afib on xarelto, CAD s/p BRENDA, s/p MVR, uterine ca s/p RT, h/o DVT, DM2, COPD.  Prsenting w/ abd pain and distension, vaginal vs. hematuria. Pt was admitted at OSH ([place]) for hematuria 1mo ago, required blood transfusion, thought to be d/t h/o RT for uterine ca. There was c/f endometrium thickening. She had a fall 2 weeksa go. 2 days ago, had another bleeding, c/f this was vaginal bleeding w/ clots.     In the ED, initially soft BP and otherwise stable vitals. Had episode of hyptoension down to 80/46, received 500cc IVF and 1u prbc.   CBC w/ wbc 7.6, hgb 9.3, plt 222.   CMP w/ SCr 0.9, alk phos 254, AST 42, ALT 23. Trop 7 and probnp 130.   VBG w/ lactate 1.4.   UA showed cloudy, large blood, 292 rbc, neg for NI or LE.   CTH obtained d/t fall 2 weeks ago on xarelto, neg for acute findings.  CT a/p showed nodular thickneing of posteiror bladder wall, rec uro consult; ?cirrhosis, and occluded left external iliac artery.     Urology called in the ED, pending recs.   Transfusion completed in the ED. f/u rpt cbc.   *Copy of the discharge paper from the [place] is in the physical chart.     On my interview/exam, pt aaox3 and not in acute distress. Curretly denies HA, dizziness, fever, chills, cp, sob, abd pain, n/v/d. Reports hematuria gets lighter than darker with clots intermittently Pt cannot say for certain if she does not have vaginal bleeding.

## 2024-11-19 NOTE — H&P ADULT - PROBLEM SELECTOR PLAN 7
-pt strongly requests to have transvaginal u/s to evaluate for recurrence of uterine cancer which may be causing bleeding.   -f/u transvaginal u/s

## 2024-11-19 NOTE — ED PROVIDER NOTE - ATTENDING CONTRIBUTION TO CARE
I have personally performed a face to face medical and diagnostic evaluation of the patient. I have discussed with and reviewed the Resident's and/or ACP's and/or Medical/PA/NP student's note and agree with the History, ROS, Physical Exam and MDM unless otherwise indicated. A brief summary of my personal evaluation and impression can be found below.     84-year-old  female past medical history hypertension, hyperlipidemia, A-fib on Xarelto, CAD status post stent, mitral valve replacement, uterine cancer status post radiation, DVT, diabetes, COPD presenting to the emergency department with 2 days of vaginal bleeding versus hematuria associated with lower abdominal cramping and distention.  Patient with recent hospitalization at [facility] for hematuria, had cystoscopy that showed post radiation cystitis.  Patient was started on antibiotics at that time and bladder was irrigated with CBI with resolution.  Patient feeling better up until 2 days ago.  Patient also endorses some positional lightheadedness, reports a fall a few weeks ago, unclear head trauma, no LOC.      Physical exam showing mucosal pallor and conjunctival pallor.  Regular rate and rhythm, lungs clear to auscultation.  Patient with mild tenderness palpation of bilateral lower abdomen otherwise soft and no rebound.  Pupils equal round reactive to light, no signs of trauma on exam.  Neuro intact.  No pitting edema bilateral extremities, distal pulses 2+ bilaterally.  given history and physical differential includes is not limited to anemia, metabolic derangement, UTI, return of uterine malignancy, TIMOTHY, lower concern for ICH but given fall on blood thinners will further evaluate with CT.  Plan for labs, CT, UA, reassess.

## 2024-11-19 NOTE — H&P ADULT - NSHPPHYSICALEXAM_GEN_ALL_CORE
Vital Signs Last 24 Hrs  T(C): 36.4 (19 Nov 2024 17:58), Max: 36.6 (19 Nov 2024 15:51)  T(F): 97.6 (19 Nov 2024 17:58), Max: 97.8 (19 Nov 2024 15:51)  HR: 65 (19 Nov 2024 17:58) (63 - 70)  BP: 113/82 (19 Nov 2024 17:58) (80/46 - 120/88)  BP(mean): 57 (19 Nov 2024 12:44) (57 - 57)  RR: 16 (19 Nov 2024 17:58) (16 - 22)  SpO2: 100% (19 Nov 2024 17:58) (95% - 100%)    Parameters below as of 19 Nov 2024 17:58  Patient On (Oxygen Delivery Method): room air        CONSTITUTIONAL: Well-groomed, in no apparent distress  EYES: No conjunctival or scleral injection, non-icteric  ENMT: No external nasal lesions; MMM  RESPIRATORY: Breathing comfortably; lungs CTA without wheeze/rhonchi/rales  CARDIOVASCULAR: +S1S2, RRR; no lower extremity edema  GASTROINTESTINAL: No tenderness, +BS throughout, no rebound/guarding  NEUROLOGIC: No gross focal neurological deficits, AAOX3  PSYCHIATRIC: mood and affect appropriate; appropriate insight and judgment

## 2024-11-19 NOTE — H&P ADULT - NSHPLABSRESULTS_GEN_ALL_CORE
9.3    7.61  )-----------( 222      ( 2024 16:35 )             29.6       11    135  |  99  |  11  ----------------------------<  92  3.6   |  22  |  0.90    Ca    8.7      2024 14:04    TPro  8.2  /  Alb  3.9  /  TBili  0.4  /  DBili  x   /  AST  42[H]  /  ALT  23  /  AlkPhos  254[H]                Urinalysis Basic - ( 2024 14:04 )    Color: Yellow / Appearance: Cloudy / S.006 / pH: x  Gluc: 92 mg/dL / Ketone: Negative mg/dL  / Bili: Negative / Urobili: 0.2 mg/dL   Blood: x / Protein: 30 mg/dL / Nitrite: Negative   Leuk Esterase: Negative / RBC: 292 /HPF / WBC 6 /HPF   Sq Epi: x / Non Sq Epi: 4 /HPF / Bacteria: Negative /HPF                  CAPILLARY BLOOD GLUCOSE

## 2024-11-19 NOTE — ED ADULT NURSE NOTE - NSICDXPASTMEDICALHX_GEN_ALL_CORE_FT
PAST MEDICAL HISTORY:  Asthma     Asthma     Atrial fibrillation     CAD (coronary artery disease)     COPD (chronic obstructive pulmonary disease)     DM (diabetes mellitus)     DM (diabetes mellitus)     DVT (deep venous thrombosis)     HLD (hyperlipidemia)     HTN (hypertension)     Uterine cancer

## 2024-11-19 NOTE — H&P ADULT - PROBLEM SELECTOR PLAN 1
-s/p 1u prbc in the ED. f/u rpt cbc.   -given h/o CAD goal is hgb >8, although she was transfused at higher hgb level d/t symptomatic.  -hold xarelto, hold BP meds.

## 2024-11-19 NOTE — H&P ADULT - PROBLEM SELECTOR PLAN 2
-at last admission at [facility], pt required CBI and holding xarelto. Was discharged off xarelto and to f/u with outpatient urology. However, pt reports when she was discharged, she noticed that she was having weakness on one side of her body, or "twisting" of her limbs which resolved when she resumed xarelto.   -reports she's been on blood thinner for many years, previously on warfarin then xarelto. Her uterine ca was dx'd and treated with RT in 2019.   -given h/o DVT and pAFib, CHADVASc 6, pt would benefit from long-term AC but this needs to be weighed against the risk of bleeding since this is her 2nd episode of hematuria.   -for now, holding xarelto, holding bp meds, and waiting for urology consult.

## 2024-11-19 NOTE — H&P ADULT - PROBLEM SELECTOR PLAN 4
-currently in sinus rhythm, with some pvcs on the tele monitor.   -holding xarelto and toprol.   -if pt develops RVR, may give lopressor iv prn.

## 2024-11-19 NOTE — ED ADULT NURSE REASSESSMENT NOTE - NS ED NURSE REASSESS COMMENT FT1
ED resident Oberly inserted USIV on LAC - continue PRBC transfuion @1655. Pt tolerating transfusion, no complaints of pain. PRBCs to finish in 1 hour.
Pt c/o IV site pain, IV does not appear infiltrated, red or irritate. When flushing IV, pt does not state it is painful. Blood started @1536 and stopped @1552 d/t IV site pain. Ed resident Oberly aware and will input another USIV to finish the PRBC transfusion
Upon assessment of pt, noted swelling and bruising near USIV on R upper arm - IV removed and hot pack applied to site. BP cuff moved to other arm now that PRBC infusion is complete.
No. AJ screening performed.  STOP BANG Legend: 0-2 = LOW Risk; 3-4 = INTERMEDIATE Risk; 5-8 = HIGH Risk

## 2024-11-19 NOTE — ED ADULT NURSE NOTE - OBJECTIVE STATEMENT
1100 84 yr old female brought to ER by daughter for further eval and tx of heavy vaginal bleeding x 2 days associated with dizziness. PMH Uterine cancer with radiation 2019. Hospitalized at [facility] approx 1-2 months ago. Received blood transfusion at that time and also fell while she was in the hospital 2 months ago per daughter, Yousifies HA. States she walked to bathroom in ER a few minutes ago and passed clots in toilet. Dr notified, Skin W&D. Lips and nailbeds pink. Denies chest pain, palp , SOB. fever or chills. Pt A&Ox4. Hx through daughter. Fall risk precautions maintained. Pt wearing diaper

## 2024-11-19 NOTE — ED PROVIDER NOTE - PROGRESS NOTE DETAILS
Ni Munoz DO (Attending): EKG showing sinus bradycardia with sinus arrhythmia, heart rate 59, , QRS interval 94, QTc 4439, T wave inversions noted in lead III. Betsey Kemp M.D. (Resident Physician): discussed with uro, they are not sure if her urologist comes her. the pa will discuss with the team.

## 2024-11-19 NOTE — ED PROVIDER NOTE - CLINICAL SUMMARY MEDICAL DECISION MAKING FREE TEXT BOX
84 female past medical history hypertension, hyperlipidemia, A-fib, on Xarelto, CAD status post stent, mitral valve replacement, uterine cancer status post radiation, DVT, diabetes, COPD, episode presenting with lower abdominal pain, abdominal distention, vaginal bleeding versus hematuria.  Daughter-in-law at bedside.  1 month ago patient was hospitalized at [facility] for hematuria, which was thought to be due to her prior radiation.  There also was concern for increased thickening of her endometrium.  2 days ago patient started having bleeding, which she thinks is coming from her vagina, endorsing some large clots.  Also feeling lightheaded 1 month ago she required a blood transfusion.  Denies fever, chest pain, palpitations, shortness of breath, dysuria.  Had a fall 2 weeks ago. Vitals nonactionable.  On exam: Patient sitting comfortable in bed.  Skin pale.  Heart regular rate.  Lungs clear to auscultation bilaterally.  Mild lower abdominal tenderness to palpation.  Differential diagnosis includes but not limited to: Hematuria secondary to radiation versus malignancy, vaginal bleeding secondary to endometrial hyperplasia versus malignancy, anemia, diverticulitis, will evaluate for intracranial hemorrhage.  Plan: Blood work, CT abdomen pelvis, CT head, urinalysis, pain control.  Will reassess. Likely tba.

## 2024-11-19 NOTE — H&P ADULT - ASSESSMENT
84F PMHx HTN, HLD, Afib on xarelto, CAD s/p BRENDA, s/p MVR, uterine ca s/p RT, h/o DVT, DM2, COPD.  Admitted for symptomatic anemia and hematuria.

## 2024-11-20 LAB
ANION GAP SERPL CALC-SCNC: 14 MMOL/L — SIGNIFICANT CHANGE UP (ref 5–17)
BUN SERPL-MCNC: 11 MG/DL — SIGNIFICANT CHANGE UP (ref 7–23)
CALCIUM SERPL-MCNC: 9.3 MG/DL — SIGNIFICANT CHANGE UP (ref 8.4–10.5)
CHLORIDE SERPL-SCNC: 102 MMOL/L — SIGNIFICANT CHANGE UP (ref 96–108)
CO2 SERPL-SCNC: 20 MMOL/L — LOW (ref 22–31)
CREAT SERPL-MCNC: 0.8 MG/DL — SIGNIFICANT CHANGE UP (ref 0.5–1.3)
EGFR: 73 ML/MIN/1.73M2 — SIGNIFICANT CHANGE UP
GLUCOSE BLDC GLUCOMTR-MCNC: 133 MG/DL — HIGH (ref 70–99)
GLUCOSE BLDC GLUCOMTR-MCNC: 176 MG/DL — HIGH (ref 70–99)
GLUCOSE BLDC GLUCOMTR-MCNC: 97 MG/DL — SIGNIFICANT CHANGE UP (ref 70–99)
GLUCOSE BLDC GLUCOMTR-MCNC: 98 MG/DL — SIGNIFICANT CHANGE UP (ref 70–99)
GLUCOSE SERPL-MCNC: 91 MG/DL — SIGNIFICANT CHANGE UP (ref 70–99)
HCT VFR BLD CALC: 37.5 % — SIGNIFICANT CHANGE UP (ref 34.5–45)
HGB BLD-MCNC: 11.9 G/DL — SIGNIFICANT CHANGE UP (ref 11.5–15.5)
MAGNESIUM SERPL-MCNC: 1.8 MG/DL — SIGNIFICANT CHANGE UP (ref 1.6–2.6)
MCHC RBC-ENTMCNC: 27.7 PG — SIGNIFICANT CHANGE UP (ref 27–34)
MCHC RBC-ENTMCNC: 31.7 G/DL — LOW (ref 32–36)
MCV RBC AUTO: 87.2 FL — SIGNIFICANT CHANGE UP (ref 80–100)
NRBC # BLD: 0 /100 WBCS — SIGNIFICANT CHANGE UP (ref 0–0)
PHOSPHATE SERPL-MCNC: 3.6 MG/DL — SIGNIFICANT CHANGE UP (ref 2.5–4.5)
PLATELET # BLD AUTO: 207 K/UL — SIGNIFICANT CHANGE UP (ref 150–400)
POTASSIUM SERPL-MCNC: 3.8 MMOL/L — SIGNIFICANT CHANGE UP (ref 3.5–5.3)
POTASSIUM SERPL-SCNC: 3.8 MMOL/L — SIGNIFICANT CHANGE UP (ref 3.5–5.3)
RBC # BLD: 4.3 M/UL — SIGNIFICANT CHANGE UP (ref 3.8–5.2)
RBC # FLD: 13.8 % — SIGNIFICANT CHANGE UP (ref 10.3–14.5)
SODIUM SERPL-SCNC: 136 MMOL/L — SIGNIFICANT CHANGE UP (ref 135–145)
WBC # BLD: 5.86 K/UL — SIGNIFICANT CHANGE UP (ref 3.8–10.5)
WBC # FLD AUTO: 5.86 K/UL — SIGNIFICANT CHANGE UP (ref 3.8–10.5)

## 2024-11-20 PROCEDURE — 76830 TRANSVAGINAL US NON-OB: CPT | Mod: 26

## 2024-11-20 PROCEDURE — 99232 SBSQ HOSP IP/OBS MODERATE 35: CPT

## 2024-11-20 PROCEDURE — 76856 US EXAM PELVIC COMPLETE: CPT | Mod: 26

## 2024-11-20 RX ADMIN — Medication 20 MILLIGRAM(S): at 21:39

## 2024-11-20 RX ADMIN — BRIMONIDINE TARTRATE 1 DROP(S): 1.5 SOLUTION/ DROPS OPHTHALMIC at 21:39

## 2024-11-20 RX ADMIN — LATANOPROST 1 DROP(S): 50 SOLUTION/ DROPS OPHTHALMIC at 21:39

## 2024-11-20 RX ADMIN — Medication 400 MILLIGRAM(S): at 17:49

## 2024-11-20 NOTE — PROGRESS NOTE ADULT - NSPROGADDITIONALINFOA_GEN_ALL_CORE
Pt woud like to DC asap if hgb is stable and pending TVUS for assessment of bleeding risk and decision on xarelto

## 2024-11-20 NOTE — PROGRESS NOTE ADULT - PROBLEM SELECTOR PLAN 6
-pt strongly requests to have transvaginal u/s to evaluate for recurrence of uterine cancer which may be causing bleeding.   -f/u transvaginal u/s scheduled 1 pm today  -F/U with oncologist as outpt if necessary

## 2024-11-20 NOTE — PROGRESS NOTE ADULT - SUBJECTIVE AND OBJECTIVE BOX
PROGRESS NOTE:   Kristie Rincon, DO  Hospitalist  Teams  After 5pm/weekends or if no answer ext: [phone]      Patient is a 84y old  Female who presents with a chief complaint of     SUBJECTIVE / OVERNIGHT EVENTS: Pt states that she has vaginal bleeding.     ADDITIONAL REVIEW OF SYSTEMS:  no fever or chills no n/v/d    MEDICATIONS  (STANDING):  atorvastatin 20 milliGRAM(s) Oral at bedtime  brimonidine 0.2% Ophthalmic Solution 1 Drop(s) Both EYES at bedtime  dextrose 5%. 1000 milliLiter(s) (50 mL/Hr) IV Continuous <Continuous>  dextrose 5%. 1000 milliLiter(s) (100 mL/Hr) IV Continuous <Continuous>  dextrose 50% Injectable 25 Gram(s) IV Push once  dextrose 50% Injectable 12.5 Gram(s) IV Push once  dextrose 50% Injectable 25 Gram(s) IV Push once  glucagon  Injectable 1 milliGRAM(s) IntraMuscular once  insulin lispro (ADMELOG) corrective regimen sliding scale   SubCutaneous three times a day before meals  insulin lispro (ADMELOG) corrective regimen sliding scale   SubCutaneous at bedtime  latanoprost 0.005% Ophthalmic Solution 1 Drop(s) Both EYES at bedtime  montelukast 10 milliGRAM(s) Oral daily    MEDICATIONS  (PRN):  acetaminophen     Tablet .. 650 milliGRAM(s) Oral every 6 hours PRN Temp greater or equal to 38C (100.4F), Mild Pain (1 - 3)  aluminum hydroxide/magnesium hydroxide/simethicone Suspension 30 milliLiter(s) Oral every 4 hours PRN Dyspepsia  dextrose Oral Gel 15 Gram(s) Oral once PRN Blood Glucose LESS THAN 70 milliGRAM(s)/deciliter  melatonin 3 milliGRAM(s) Oral at bedtime PRN Insomnia  ondansetron Injectable 4 milliGRAM(s) IV Push every 8 hours PRN Nausea and/or Vomiting      CAPILLARY BLOOD GLUCOSE      POCT Blood Glucose.: 98 mg/dL (20 Nov 2024 08:31)  POCT Blood Glucose.: 102 mg/dL (19 Nov 2024 22:22)    I&O's Summary    20 Nov 2024 07:01  -  20 Nov 2024 13:11  --------------------------------------------------------  IN: 240 mL / OUT: 0 mL / NET: 240 mL        PHYSICAL EXAM:  Vital Signs Last 24 Hrs  T(C): 36.6 (20 Nov 2024 04:39), Max: 36.6 (19 Nov 2024 15:51)  T(F): 97.8 (20 Nov 2024 04:39), Max: 97.8 (19 Nov 2024 15:51)  HR: 62 (20 Nov 2024 04:39) (62 - 70)  BP: 108/66 (20 Nov 2024 04:39) (85/57 - 138/53)  BP(mean): 88 (19 Nov 2024 20:53) (88 - 88)  RR: 18 (20 Nov 2024 04:39) (16 - 22)  SpO2: 98% (20 Nov 2024 04:39) (96% - 100%)    Parameters below as of 20 Nov 2024 04:39  Patient On (Oxygen Delivery Method): room air        CONSTITUTIONAL: NAD, well-developed  MUSCLOSKELETAL: no clubbing or cyanosis of digits; no joint swelling or tenderness to palpation; eccymosis over right a/c area noted   PSYCH: A+O to person, place, and time; affect appropriate    LABS:                        11.9   5.86  )-----------( 207      ( 20 Nov 2024 06:50 )             37.5     11-20    136  |  102  |  11  ----------------------------<  91  3.8   |  20[L]  |  0.80    Ca    9.3      20 Nov 2024 06:48  Phos  3.6     11-20  Mg     1.8     11-20    TPro  8.2  /  Alb  3.9  /  TBili  0.4  /  DBili  x   /  AST  42[H]  /  ALT  23  /  AlkPhos  254[H]  11-19          Urinalysis Basic - ( 20 Nov 2024 06:48 )    Color: x / Appearance: x / SG: x / pH: x  Gluc: 91 mg/dL / Ketone: x  / Bili: x / Urobili: x   Blood: x / Protein: x / Nitrite: x   Leuk Esterase: x / RBC: x / WBC x   Sq Epi: x / Non Sq Epi: x / Bacteria: x          RADIOLOGY & ADDITIONAL TESTS:  Results Reviewed:   Imaging Personally Reviewed:  Electrocardiogram Personally Reviewed:    COORDINATION OF CARE:  Care Discussed with Consultants/Other Providers [Y/N]:  Prior or Outpatient Records Reviewed [Y/N]:

## 2024-11-20 NOTE — CONSULT NOTE ADULT - SUBJECTIVE AND OBJECTIVE BOX
GynOnc Consult Note      84y P6 with h/o uterine cancer s/p RT (2019), extensive vascular disease, CAD and afib on Xarelto, HTN, HLD, T2DM, and s/p MVR (2019) who is admitted for symptomatic anemia and treatment of possible vaginal bleeding vs hematuria. Xarelto held on admission. Gyn consulted for evaluation of bleeding and with question of when to resume anticoagulation in the setting of bleeding causing anemia.   Patient was admitted to OSH ([place]) for hematuria 1 month ago, when she also received a blood transfusion and had continuous bladder irrigation performed by urology. Hematuria cleared up, and patient was restarted on Xarelto prior to discharge and set up for outpatient cystoscopy. Patient states she did not have cystoscopy performed. She presented this admission after having another episode of bleeding on  and having associated lightheadedness and dizziness. She was found to be hypotensive, and received IV hydration as well as 1u pRBC transfusion. She had appropriate response of H/H from 9.3/29.6 -> 11.9/37.5.     Today, patient has no further bleeding. States she has no chest pain or shortness of breath, and denies any abdominal pain, bloating, nausea, or vomiting.         OB/GYN HISTORY:   Physician: unknown @ [facility]  Ob:  x6  Gyn: h/o uterine cancer s/p RT in 2019 ( patient states she was deemed not a surgical candidate due to recent heart surgery on Xarelto)   PMSHx:   open bioprosthetic mitral valve replacement (2019)  open left CFA thrombectomy (2018)  LLE angiogram (2019)  COPD no home O2  HLD  HTN  DM2  CAD  chronic afib on Xarelto  Asthma    Meds:  MEDICATIONS  (STANDING):  atorvastatin 20 milliGRAM(s) Oral at bedtime  brimonidine 0.2% Ophthalmic Solution 1 Drop(s) Both EYES at bedtime  dextrose 5%. 1000 milliLiter(s) (50 mL/Hr) IV Continuous <Continuous>  dextrose 5%. 1000 milliLiter(s) (100 mL/Hr) IV Continuous <Continuous>  dextrose 50% Injectable 25 Gram(s) IV Push once  dextrose 50% Injectable 12.5 Gram(s) IV Push once  dextrose 50% Injectable 25 Gram(s) IV Push once  glucagon  Injectable 1 milliGRAM(s) IntraMuscular once  insulin lispro (ADMELOG) corrective regimen sliding scale   SubCutaneous three times a day before meals  insulin lispro (ADMELOG) corrective regimen sliding scale   SubCutaneous at bedtime  latanoprost 0.005% Ophthalmic Solution 1 Drop(s) Both EYES at bedtime  magnesium oxide 400 milliGRAM(s) Oral three times a day with meals  montelukast 10 milliGRAM(s) Oral daily      Vital Signs Last 24 Hrs  T(C): 36.6 (2024 12:15), Max: 36.6 (2024 20:00)  T(F): 97.8 (2024 12:15), Max: 97.8 (2024 20:00)  HR: 65 (2024 12:15) (62 - 66)  BP: 107/67 (2024 12:15) (107/67 - 138/53)  BP(mean): 88 (2024 20:53) (88 - 88)  RR: 18 (2024 12:15) (16 - 20)  SpO2: 96% (2024 12:15) (96% - 100%)    Parameters below as of 2024 12:15  Patient On (Oxygen Delivery Method): room air        PHYSICAL EXAM: Chaperoned w/ Gyn Team at bedside.   Gen: NAD, alert and oriented x 3  Cardiovascular: regular   Respiratory: breathing comfortably on RA  Abd: soft, non tender, non-distended  Bimanual: small, anteverted uterus palpable without tenderness; no palpable cervix/os  : no bleeding at urethral meatus  Adnexa: non tender, no palpable masses  Extremities: NTBL  Skin: warm and well perfused      LABS:                        11.9   5.86  )-----------( 207      ( 2024 06:50 )             37.5     11-    136  |  102  |  11  ----------------------------<  91  3.8   |  20[L]  |  0.80    Ca    9.3      2024 06:48  Phos  3.6     11-20  Mg     1.8     -20    TPro  8.2  /  Alb  3.9  /  TBili  0.4  /  DBili  x   /  AST  42[H]  /  ALT  23  /  AlkPhos  254[H]  11-19      Urinalysis Basic - ( 2024 06:48 )    Color: x / Appearance: x / SG: x / pH: x  Gluc: 91 mg/dL / Ketone: x  / Bili: x / Urobili: x   Blood: x / Protein: x / Nitrite: x   Leuk Esterase: x / RBC: x / WBC x   Sq Epi: x / Non Sq Epi: x / Bacteria: x        RADIOLOGY & ADDITIONAL STUDIES:   GynOnc Consult Note      84y P6 with h/o uterine cancer s/p RT (2019), extensive vascular disease, CAD and afib on Xarelto, HTN, HLD, T2DM, and s/p MVR (2019) who is admitted for symptomatic anemia and treatment of possible vaginal bleeding vs hematuria. Xarelto held on admission. Gyn consulted for evaluation of bleeding and with question of when to resume anticoagulation in the setting of bleeding causing anemia.   Patient was admitted to OSH ([place]) for hematuria 1 month ago, when she also received a blood transfusion and had continuous bladder irrigation performed by urology. Hematuria cleared up, and patient was restarted on Xarelto prior to discharge and set up for outpatient cystoscopy. Patient states she did not have cystoscopy performed. She presented this admission after having another episode of bleeding on  and having associated lightheadedness and dizziness. She was found to be hypotensive, and received IV hydration as well as 1u pRBC transfusion. She had appropriate response of H/H from 9.3/29.6 -> 11.9/37.5.     Today, patient has no further bleeding. States she has no chest pain or shortness of breath, and denies any abdominal pain, bloating, nausea, or vomiting.         OB/GYN HISTORY:   Physician: unknown @ [facility]  Ob:  x6  Gyn: h/o uterine cancer s/p RT in 2019 ( patient states she was deemed not a surgical candidate due to recent heart surgery on Xarelto)   PMSHx:   open bioprosthetic mitral valve replacement (2019)  open left CFA thrombectomy (2018)  LLE angiogram (2019)  COPD no home O2  HLD  HTN  DM2  CAD  chronic afib on Xarelto  Asthma    Meds:  MEDICATIONS  (STANDING):  atorvastatin 20 milliGRAM(s) Oral at bedtime  brimonidine 0.2% Ophthalmic Solution 1 Drop(s) Both EYES at bedtime  dextrose 5%. 1000 milliLiter(s) (50 mL/Hr) IV Continuous <Continuous>  dextrose 5%. 1000 milliLiter(s) (100 mL/Hr) IV Continuous <Continuous>  dextrose 50% Injectable 25 Gram(s) IV Push once  dextrose 50% Injectable 12.5 Gram(s) IV Push once  dextrose 50% Injectable 25 Gram(s) IV Push once  glucagon  Injectable 1 milliGRAM(s) IntraMuscular once  insulin lispro (ADMELOG) corrective regimen sliding scale   SubCutaneous three times a day before meals  insulin lispro (ADMELOG) corrective regimen sliding scale   SubCutaneous at bedtime  latanoprost 0.005% Ophthalmic Solution 1 Drop(s) Both EYES at bedtime  magnesium oxide 400 milliGRAM(s) Oral three times a day with meals  montelukast 10 milliGRAM(s) Oral daily      Vital Signs Last 24 Hrs  T(C): 36.6 (2024 12:15), Max: 36.6 (2024 20:00)  T(F): 97.8 (2024 12:15), Max: 97.8 (2024 20:00)  HR: 65 (2024 12:15) (62 - 66)  BP: 107/67 (2024 12:15) (107/67 - 138/53)  BP(mean): 88 (2024 20:53) (88 - 88)  RR: 18 (2024 12:15) (16 - 20)  SpO2: 96% (2024 12:15) (96% - 100%)    Parameters below as of 2024 12:15  Patient On (Oxygen Delivery Method): room air        PHYSICAL EXAM: Chaperoned w/ Gyn Team at bedside.   Gen: NAD, alert and oriented x 3  Cardiovascular: regular   Respiratory: breathing comfortably on RA  Abd: soft, non tender, non-distended  Bimanual: small, anteverted uterus palpable without tenderness; no palpable cervix/os  : no bleeding at urethral meatus  Adnexa: non tender, no palpable masses  Extremities: NTBL  Skin: warm and well perfused      LABS:                        11.9   5.86  )-----------( 207      ( 2024 06:50 )             37.5     11-    136  |  102  |  11  ----------------------------<  91  3.8   |  20[L]  |  0.80    Ca    9.3      2024 06:48  Phos  3.6     11-20  Mg     1.8     -20    TPro  8.2  /  Alb  3.9  /  TBili  0.4  /  DBili  x   /  AST  42[H]  /  ALT  23  /  AlkPhos  254[H]        Urinalysis Basic - ( 2024 06:48 )    Color: x / Appearance: x / SG: x / pH: x  Gluc: 91 mg/dL / Ketone: x  / Bili: x / Urobili: x   Blood: x / Protein: x / Nitrite: x   Leuk Esterase: x / RBC: x / WBC x   Sq Epi: x / Non Sq Epi: x / Bacteria: x        RADIOLOGY & ADDITIONAL STUDIES:  < from: US Transvaginal (24 @ 13:40) >  *** ADDENDUM # 1 ***    Addendum and  IMPRESSION: Trace fluid is noted within the endocervical canal.    --- End of Report ---    *** END OF ADDENDUM # 1 ***      PROCEDURE DATE:  2024          INTERPRETATION:  CLINICAL INFORMATION: Question thickening posterior   urinary bladder wall which on CT 2024. Postmenopausal. History of   uterine cancer.History of radiation for uterine cancer. Uterine cancer   was diagnosed at [facility]. Vaginal bleeding. Patient was on   anticoagulation.    COMPARISON: CT 10/6/2021. CT of 2024.    TECHNIQUE: Transabdominal and transvaginal pelvic sonography was   performed. Color Doppler was performed.    FINDINGS:  Uterus: 6.4 cm Multiple tiny/small myometrial calcifications may   represent calcified myometrial veins. Left-sided peripherally calcified   leiomyoma measuring 2.7 cm x 2.1 cm x 2 cm.  Endometrium: Not well delineated on transabdominal or transvaginal pelvic   sonography due to due to the multiple tiny/small uterine calcifications.   No gross thickening is appreciated. Color Doppler placed in the area of   the endometrium reveals novascularity.    Right ovary: Not visualized. No mass or abnormality in the right adnexal   region. Within normal limits.  Left ovary: Not not visualized. No mass or abnormality in the left   adnexal region. Within normal limits.    Fluid: None.    The urinary bladder was evaluated with the video clips and still images.   No abnormality was identified.    IMPRESSION: Multiple tiny/small myometrial uterine calcifications   (leiomyomatous and vascular) which makes delineation of the endometrium   difficult. No gross thickening is appreciated on transabdominal or   transvaginal pelvic sonographic imaging. If clinically warranted,   recommend GYN consultation and consider pelvic MRI.    Neither ovary is visualized but no abnormalities seen in the adnexal   regions.    No abnormality urinary bladder.        --- End of Report ---    < end of copied text >  < from: CT Abdomen and Pelvis w/ IV Cont (24 @ 15:09) >  BLADDER: Mild soft tissue thickening of the posterior bladder wall with   punctate calcification.  REPRODUCTIVE ORGANS: Fibroid uterus with calcification    BOWEL: No bowel obstruction. Colonic diverticulosis. Appendix is not   visualized.  Small hiatal hernia.  PERITONEUM/RETROPERITONEUM: Within normal limits.  VESSELS: Diffuse atherosclerosis of the abdominal aorta and its branches.   Occluded left external iliac artery.  LYMPH NODES: No lymphadenopathy.  ABDOMINAL WALL: Within normal limits.  BONES: Degenerative changes.    IMPRESSION:  Nodular thickening posterior bladder wall of uncertain etiology;   recommend urology consultation.    Question cirrhosis.    Occluded left external iliac artery.    --- End of Report ---      < end of copied text >

## 2024-11-20 NOTE — CONSULT NOTE ADULT - NSCONSULTADDITIONALINFOA_GEN_ALL_CORE
Gyn Onc Fellow Addendum    Agree with above.   85yo with history of uterine cancer (diagnosis limited by patient history) s/p RT in 2019 and significant cardiovascular disease on Xarelto presenting with bleeding, likely hematuria.  VS, Labs, Imaging reviewed   No active bleeding. Holding Xarelto. S/p 1U PRBCs for symptomatic anemia   Imaging notable for thickened posterior bladder wall   Pelvic exam limited by significant vaginal scarring/stenosis likely 2/2 radiation, however no evidence of vaginal bleeding on exam or imaging  No evidence of tumor recurrence elsewhere in pelvis or abdomen   Given constellation of symptoms and findings and recent history of hematuria at OSH, concern for radiation cystitis/ ulcer formation vs fistulization vs tumor recurrence   Recommend urology consult for possible cystoscopy   Consider placement of ortega catheter, recommend three-way catheter should need for continuous bladder irrigation arise   Continue to hold anticoagulation pending diagnosis of bleeding source     Oriana Hernandez MD PGY7 Gyn Onc Fellow Addendum    Agree with above.   83yo with history of uterine cancer (diagnosis limited by patient history) s/p RT in 2019 and significant cardiovascular disease on Xarelto presenting with bleeding, likely hematuria.  VS, Labs, Imaging reviewed   No active bleeding. Holding Xarelto. S/p 1U PRBCs for symptomatic anemia   Imaging notable for thickened posterior bladder wall   Pelvic exam limited by significant vaginal scarring/stenosis likely 2/2 radiation, however no evidence of vaginal bleeding on exam or imaging  No evidence of tumor recurrence elsewhere in pelvis or abdomen   Given constellation of symptoms and findings and recent history of hematuria at OSH, concern for radiation cystitis/ ulcer formation vs fistulization vs tumor recurrence   Recommend urology consult for possible cystoscopy   Consider placement of ortega catheter, recommend three-way catheter should need for continuous bladder irrigation arise   Continue to hold anticoagulation pending diagnosis of bleeding source     Oriana Hernandez MD PGY7    GYN ONC attending addendum:  I went to see patient 11/21/24 however she had been discharged home today by primary team; I have discussed the case with Dr. Hernandez and agree with her above note including assessment and plan. LDS

## 2024-11-20 NOTE — CONSULT NOTE ADULT - ASSESSMENT
84 yof who PMHx significant for uterine CA s/p RT, AFib on Xarelto, and MVR and prior CFA thrombectomy with stent placement who presented with symptomatic anemia i/s/o hematuria vs vaginal bleeding. On exam, no vaginal bleeding or hematuria noted and possible scarring of upper vaginal canal as a sequelae of radiation therapy. Patient remains off Xarelto and anticoagulation at this time. Patient is currently hemodynamically stable.      - recommend urology consult; patient may require cystoscopy while inpatient to assess for the thickening of the posterior bladder wall seen on CT (as well as imaging from OSH, per discharge records in patient's paper chart)  - recommend placement of 3-way ortega, and patient may benefit from continuous bladder irrigation as performed at previous hospital  - continue to hold anticoagulation      D/w Dr. Hernandez, GynOnc Fellow  Leonard Singh, PGY3   p1222    NOTE NOT COMPLETE UNTIL SIGNED BY ATTENDING   84 yof who PMHx significant for uterine CA s/p RT, AFib on Xarelto, and MVR and prior CFA thrombectomy with stent placement who presented with symptomatic anemia i/s/o hematuria vs vaginal bleeding. On exam, no vaginal bleeding or hematuria noted and possible scarring of upper vaginal canal as a sequelae of radiation therapy. Patient remains off Xarelto and anticoagulation at this time. Patient is currently hemodynamically stable.      - recommend urology consult; patient may require cystoscopy while inpatient to assess for the thickening of the posterior bladder wall seen on CT (as well as imaging from OSH, per discharge records in patient's paper chart)  - recommend placement of 3-way ortega, and patient may benefit from continuous bladder irrigation as performed at previous hospital  - continue to hold anticoagulation      D/w Dr. Hernandez, GynOnc Fellow  Leonard Singh, PGY3   [id number]

## 2024-11-20 NOTE — PROGRESS NOTE ADULT - ASSESSMENT
84F PMHx HTN, HLD, Afib on xarelto, CAD s/p BRENDA, s/p MVR, uterine ca s/p RT, h/o DVT, DM2, COPD admitted for symptomatic anemia.

## 2024-11-20 NOTE — PROGRESS NOTE ADULT - PROBLEM SELECTOR PLAN 1
-s/p 1u prbc in the ED with good response  -given h/o CAD goal is hgb >8, although she was transfused at higher hgb level d/t symptomatic.  -hold xarelto, hold BP meds. Pt charisse car has a bio valve and cardiology documentation from [facility] supportive of this. Not mechanical no need for hep drip

## 2024-11-21 ENCOUNTER — TRANSCRIPTION ENCOUNTER (OUTPATIENT)
Age: 84
End: 2024-11-21

## 2024-11-21 VITALS
DIASTOLIC BLOOD PRESSURE: 56 MMHG | OXYGEN SATURATION: 100 % | SYSTOLIC BLOOD PRESSURE: 103 MMHG | TEMPERATURE: 98 F | HEART RATE: 76 BPM | RESPIRATION RATE: 18 BRPM

## 2024-11-21 LAB
APPEARANCE UR: ABNORMAL
BACTERIA # UR AUTO: ABNORMAL /HPF
BILIRUB UR-MCNC: NEGATIVE — SIGNIFICANT CHANGE UP
CAST: 2 /LPF — SIGNIFICANT CHANGE UP (ref 0–4)
COLOR SPEC: YELLOW — SIGNIFICANT CHANGE UP
DIFF PNL FLD: NEGATIVE — SIGNIFICANT CHANGE UP
GLUCOSE BLDC GLUCOMTR-MCNC: 185 MG/DL — HIGH (ref 70–99)
GLUCOSE BLDC GLUCOMTR-MCNC: 92 MG/DL — SIGNIFICANT CHANGE UP (ref 70–99)
GLUCOSE UR QL: NEGATIVE MG/DL — SIGNIFICANT CHANGE UP
HCT VFR BLD CALC: 32.1 % — LOW (ref 34.5–45)
HGB BLD-MCNC: 10.4 G/DL — LOW (ref 11.5–15.5)
KETONES UR-MCNC: NEGATIVE MG/DL — SIGNIFICANT CHANGE UP
LEUKOCYTE ESTERASE UR-ACNC: ABNORMAL
MCHC RBC-ENTMCNC: 27.4 PG — SIGNIFICANT CHANGE UP (ref 27–34)
MCHC RBC-ENTMCNC: 32.4 G/DL — SIGNIFICANT CHANGE UP (ref 32–36)
MCV RBC AUTO: 84.5 FL — SIGNIFICANT CHANGE UP (ref 80–100)
NITRITE UR-MCNC: NEGATIVE — SIGNIFICANT CHANGE UP
NRBC # BLD: 0 /100 WBCS — SIGNIFICANT CHANGE UP (ref 0–0)
PH UR: 6 — SIGNIFICANT CHANGE UP (ref 5–8)
PLATELET # BLD AUTO: 205 K/UL — SIGNIFICANT CHANGE UP (ref 150–400)
PROT UR-MCNC: NEGATIVE MG/DL — SIGNIFICANT CHANGE UP
RBC # BLD: 3.8 M/UL — SIGNIFICANT CHANGE UP (ref 3.8–5.2)
RBC # FLD: 13.8 % — SIGNIFICANT CHANGE UP (ref 10.3–14.5)
RBC CASTS # UR COMP ASSIST: 0 /HPF — SIGNIFICANT CHANGE UP (ref 0–4)
SP GR SPEC: 1.01 — SIGNIFICANT CHANGE UP (ref 1–1.03)
SQUAMOUS # UR AUTO: 1 /HPF — SIGNIFICANT CHANGE UP (ref 0–5)
UROBILINOGEN FLD QL: 0.2 MG/DL — SIGNIFICANT CHANGE UP (ref 0.2–1)
WBC # BLD: 7.03 K/UL — SIGNIFICANT CHANGE UP (ref 3.8–10.5)
WBC # FLD AUTO: 7.03 K/UL — SIGNIFICANT CHANGE UP (ref 3.8–10.5)
WBC UR QL: 151 /HPF — HIGH (ref 0–5)

## 2024-11-21 PROCEDURE — 96361 HYDRATE IV INFUSION ADD-ON: CPT

## 2024-11-21 PROCEDURE — 86850 RBC ANTIBODY SCREEN: CPT

## 2024-11-21 PROCEDURE — 76856 US EXAM PELVIC COMPLETE: CPT

## 2024-11-21 PROCEDURE — 83735 ASSAY OF MAGNESIUM: CPT

## 2024-11-21 PROCEDURE — 85018 HEMOGLOBIN: CPT

## 2024-11-21 PROCEDURE — 70450 CT HEAD/BRAIN W/O DYE: CPT | Mod: MC

## 2024-11-21 PROCEDURE — 80048 BASIC METABOLIC PNL TOTAL CA: CPT

## 2024-11-21 PROCEDURE — 86900 BLOOD TYPING SEROLOGIC ABO: CPT

## 2024-11-21 PROCEDURE — 96365 THER/PROPH/DIAG IV INF INIT: CPT

## 2024-11-21 PROCEDURE — 87077 CULTURE AEROBIC IDENTIFY: CPT

## 2024-11-21 PROCEDURE — 84484 ASSAY OF TROPONIN QUANT: CPT

## 2024-11-21 PROCEDURE — 36430 TRANSFUSION BLD/BLD COMPNT: CPT

## 2024-11-21 PROCEDURE — 82330 ASSAY OF CALCIUM: CPT

## 2024-11-21 PROCEDURE — P9016: CPT

## 2024-11-21 PROCEDURE — 84295 ASSAY OF SERUM SODIUM: CPT

## 2024-11-21 PROCEDURE — 74177 CT ABD & PELVIS W/CONTRAST: CPT | Mod: MC

## 2024-11-21 PROCEDURE — 76830 TRANSVAGINAL US NON-OB: CPT

## 2024-11-21 PROCEDURE — 85027 COMPLETE CBC AUTOMATED: CPT

## 2024-11-21 PROCEDURE — 82435 ASSAY OF BLOOD CHLORIDE: CPT

## 2024-11-21 PROCEDURE — 99285 EMERGENCY DEPT VISIT HI MDM: CPT

## 2024-11-21 PROCEDURE — 87186 SC STD MICRODIL/AGAR DIL: CPT

## 2024-11-21 PROCEDURE — 86923 COMPATIBILITY TEST ELECTRIC: CPT

## 2024-11-21 PROCEDURE — 83880 ASSAY OF NATRIURETIC PEPTIDE: CPT

## 2024-11-21 PROCEDURE — 82803 BLOOD GASES ANY COMBINATION: CPT

## 2024-11-21 PROCEDURE — 83690 ASSAY OF LIPASE: CPT

## 2024-11-21 PROCEDURE — 83605 ASSAY OF LACTIC ACID: CPT

## 2024-11-21 PROCEDURE — 36415 COLL VENOUS BLD VENIPUNCTURE: CPT

## 2024-11-21 PROCEDURE — 85014 HEMATOCRIT: CPT

## 2024-11-21 PROCEDURE — 82962 GLUCOSE BLOOD TEST: CPT

## 2024-11-21 PROCEDURE — 84132 ASSAY OF SERUM POTASSIUM: CPT

## 2024-11-21 PROCEDURE — 99239 HOSP IP/OBS DSCHRG MGMT >30: CPT

## 2024-11-21 PROCEDURE — 86901 BLOOD TYPING SEROLOGIC RH(D): CPT

## 2024-11-21 PROCEDURE — 82947 ASSAY GLUCOSE BLOOD QUANT: CPT

## 2024-11-21 PROCEDURE — 81001 URINALYSIS AUTO W/SCOPE: CPT

## 2024-11-21 PROCEDURE — 85025 COMPLETE CBC W/AUTO DIFF WBC: CPT

## 2024-11-21 PROCEDURE — 84100 ASSAY OF PHOSPHORUS: CPT

## 2024-11-21 PROCEDURE — 80053 COMPREHEN METABOLIC PANEL: CPT

## 2024-11-21 PROCEDURE — 87086 URINE CULTURE/COLONY COUNT: CPT

## 2024-11-21 RX ADMIN — Medication 400 MILLIGRAM(S): at 11:34

## 2024-11-21 RX ADMIN — ACETAMINOPHEN 500MG 650 MILLIGRAM(S): 500 TABLET, COATED ORAL at 02:45

## 2024-11-21 RX ADMIN — Medication 400 MILLIGRAM(S): at 09:53

## 2024-11-21 RX ADMIN — ACETAMINOPHEN 500MG 650 MILLIGRAM(S): 500 TABLET, COATED ORAL at 03:45

## 2024-11-21 NOTE — DISCHARGE NOTE NURSING/CASE MANAGEMENT/SOCIAL WORK - FINANCIAL ASSISTANCE
[facility] provides services at a reduced cost to those who are determined to be eligible through [facility]’s financial assistance program. Information regarding [facility]’s financial assistance program can be found by going to [email or web address] or by calling [phone].

## 2024-11-21 NOTE — DISCHARGE NOTE PROVIDER - NSDCCPCAREPLAN_GEN_ALL_CORE_FT
PRINCIPAL DISCHARGE DIAGNOSIS  Diagnosis: Hematuria  Assessment and Plan of Treatment: TVUS showing thickening of the bladder all suggestive of either mass or radiation cystitis as likely culprit for bleed.  Gyn did vagial exam and did not find bleeding. Urology evaulated and recommended outpatient follow up.      SECONDARY DISCHARGE DIAGNOSES  Diagnosis: Acute recurrent cystitis  Assessment and Plan of Treatment: Likely related to previous radiation treatment but needs outpt f/u with Urologist.

## 2024-11-21 NOTE — DISCHARGE NOTE NURSING/CASE MANAGEMENT/SOCIAL WORK - PATIENT PORTAL LINK FT
You can access the FollowMyHealth Patient Portal offered by [facility] by registering at the following website: [email or web address]. By joining [facility]’s FollowMyHealth portal, you will also be able to view your health information using other applications (apps) compatible with our system.

## 2024-11-21 NOTE — CONSULT NOTE ADULT - ASSESSMENT
84F with extensive PMHx - h/o uterine cancer s/p RT (2019), extensive vascular disease, CAD and afib on Xarelto, HTN, HLD, T2DM, and s/p MVR (2019, and hematuria s/p admission at [facility] requiring CHI and cystoscopy, presented to ER on 11/19 for abdominal pain and hematuria. Admitted to medical service and transfused one unit of PRBCs, Xarelto held. Urology consult was requested for workup of potential hematuria/initiation of CBI and questionable bladder mass seen on imaging.    Recommendations:  - f/u PVR to ensure patient is fully emptying, and f/u repeat UA   - no acute urologic intervention at this time  - no indication for 3-way ortega catheter placement and CBI at this time in the setting of clear yellow urine   - recommend routine outpatient follow up with patient's urologist, or our urologists at the [facility] if patient desires:  [facility]  [address] [place] [address]  [phone]   - plan discussed at length with patient as well as daughter Alie via telephone, both in agreement   - to be discussed with attending Dr. Rodriguez 84F with extensive PMHx - h/o uterine cancer s/p RT (2019), extensive vascular disease, CAD and afib on Xarelto, HTN, HLD, T2DM, and s/p MVR (2019, and hematuria s/p admission at [facility] requiring CBI, presented to ER on 11/19 for abdominal pain and hematuria. Admitted to medical service and transfused one unit of PRBCs, Xarelto held. Urology consult was requested for workup of potential hematuria/initiation of CBI and questionable bladder mass seen on imaging.    Recommendations:  - f/u PVR to ensure patient is fully emptying, and f/u repeat UA   - no acute urologic intervention at this time  - no indication for 3-way ortega catheter placement and CBI at this time in the setting of clear yellow urine   - recommend routine outpatient follow up with patient's urologist, patient should have a cystoscopy if she has not already. Patient may also follow up with our urologists at the [facility] if patient desires:  [facility]  [address] [place] [place] [address], [place] [id number]  [place] [address] [phone]   - plan discussed at length with patient as well as daughter Alie via telephone, both in agreement   - discussed with attending Dr. Rodriguez 84F with extensive PMHx - h/o uterine cancer s/p RT (2019), extensive vascular disease, CAD and afib on Xarelto, HTN, HLD, T2DM, and s/p MVR (2019, and hematuria s/p admission at [facility] requiring CBI, presented to ER on 11/19 for abdominal pain and hematuria. Admitted to medical service and transfused one unit of PRBCs, Xarelto held. Urology consult was requested for workup of potential hematuria/initiation of CBI and questionable bladder mass seen on imaging.    Recommendations:  - f/u PVR to ensure patient is fully emptying, and f/u repeat UA   - no acute urologic intervention at this time  - no indication for 3-way ortega catheter placement and CBI at this time in the setting of clear yellow urine   - recommend routine outpatient follow up with patient's urologist, patient should have a cystoscopy if she has not already. Patient may also follow up with our urologists at the [facility] if patient desires:  [facility]  [address] [place] [address], [address]  [place] [id number] [phone]   - Hematuria appears it may be related to Xarelto as well, weigh risk vs. benefit, anticoagulation plan overall up to primary team   - plan discussed at length with patient as well as daughter Alie via telephone, both in agreement   - discussed with attending Dr. Rodriguez

## 2024-11-21 NOTE — DISCHARGE NOTE NURSING/CASE MANAGEMENT/SOCIAL WORK - NSDCPEXARELTOCOMP_GEN_ALL_CORE
5 (moderate pain) Rivaroxaban/Xarelto is used to treat and prevent blood clots.  If you are not able to swallow the tablets whole, you may crush the tablets and mix them with a small amount of applesauce and promptly take within four hours. Eat some food right after you swallow the mixture. Take 2.5mg or 10mg tablets of Rivaroxaban/Xarelto with or without food. Take 15mg or 20mg tablets of Rivaroxaban/Xarelto with food. Never skip a dose of Rivaroxaban/Xarelto. If you take Rivaroxaban/Xarelto once a day and you forget to take your dose, take a dose as soon as you remember on the same day. If you take Rivaroxaban/Xarelto 2.5 mg twice a day and you forget to take your dose, skip the missed dose and take your next dose at your usual time. DO NOT take an extra pill to ‘catch up’. If you take Rivaroxaban/Xarelto 15 mg twice a day and you forget to take your dose, take a dose as soon as you remember on the same day. You may take 2 doses at the same time to make up for missed dose ONLY if you take a total of 30mg per day. Notify your doctor that you missed a dose. Take Rivaroxaban/Xarelto at the same time each morning and evening. Rivaroxaban/Xarelto may be taken with other medication or food.

## 2024-11-21 NOTE — CONSULT NOTE ADULT - SUBJECTIVE AND OBJECTIVE BOX
Subjective obtained from chart, patient, and daughter via telephone per patient request [Alie - [phone]]  HPI:  84F with extensive PMHx - h/o uterine cancer s/p RT (2019), extensive vascular disease, CAD and afib on Xarelto, HTN, HLD, T2DM, and s/p MVR (2019, and hematuria s/p admission at [facility] requiring CHI and cystoscopy, presented to ER on 11/19 for abdominal pain and hematuria. Patient received 1u PRBC for symptomatic anemia in ER. Patient was admitted to medicine team, Xarelto was held, and gyn-onc was consulted for potential vaginal bleeding. No vaginal bleeding noted on their exam, and urology consult was requested for workup of potential hematuria/initiation of CBI and questionable bladder mass seen on imaging.  Per patient (and daughter), she was recently within the last month hospitalized at [facility] and required a catheter and cystoscopy for bleeding. Daughter reports that she took her to the urologist office after discharge who said "the exams" came back normal. Patient states she did not have bleeding again until a couple of days ago but it has stopped since 11/20 in the morning. Since then, she has been urinating yellow with no signs of hematuria and blood clots, and reports feeling her bladder is fully empty after she voids.  Denies fever/chills, chest pain, abdominal pain, dysuria, back pain.     PAST MEDICAL & SURGICAL HISTORY:  DM (diabetes mellitus)  COPD (chronic obstructive pulmonary disease)  Asthma  DVT (deep venous thrombosis)  HTN (hypertension)  DM (diabetes mellitus)  HLD (hyperlipidemia)  Atrial fibrillation  Asthma  CAD (coronary artery disease)  Uterine cancer  History of embolectomy LLE  S/P arterial stent left arterial thrombectomy      MEDICATIONS  (STANDING):  atorvastatin 20 milliGRAM(s) Oral at bedtime  brimonidine 0.2% Ophthalmic Solution 1 Drop(s) Both EYES at bedtime  dextrose 5%. 1000 milliLiter(s) (50 mL/Hr) IV Continuous <Continuous>  dextrose 5%. 1000 milliLiter(s) (100 mL/Hr) IV Continuous <Continuous>  dextrose 50% Injectable 25 Gram(s) IV Push once  dextrose 50% Injectable 12.5 Gram(s) IV Push once  dextrose 50% Injectable 25 Gram(s) IV Push once  glucagon  Injectable 1 milliGRAM(s) IntraMuscular once  insulin lispro (ADMELOG) corrective regimen sliding scale   SubCutaneous three times a day before meals  insulin lispro (ADMELOG) corrective regimen sliding scale   SubCutaneous at bedtime  latanoprost 0.005% Ophthalmic Solution 1 Drop(s) Both EYES at bedtime  magnesium oxide 400 milliGRAM(s) Oral three times a day with meals  montelukast 10 milliGRAM(s) Oral daily    MEDICATIONS  (PRN):  acetaminophen     Tablet .. 650 milliGRAM(s) Oral every 6 hours PRN Temp greater or equal to 38C (100.4F), Mild Pain (1 - 3)  aluminum hydroxide/magnesium hydroxide/simethicone Suspension 30 milliLiter(s) Oral every 4 hours PRN Dyspepsia  dextrose Oral Gel 15 Gram(s) Oral once PRN Blood Glucose LESS THAN 70 milliGRAM(s)/deciliter  melatonin 3 milliGRAM(s) Oral at bedtime PRN Insomnia  ondansetron Injectable 4 milliGRAM(s) IV Push every 8 hours PRN Nausea and/or Vomiting    Allergies  No Known Allergies    REVIEW OF SYSTEMS: Pertinent positives and negatives as stated in HPI, otherwise negative    Vital signs  T(C): 36.4 (11-21-24 @ 12:52), Max: 36.8 (11-20-24 @ 20:46)  HR: 76 (11-21-24 @ 12:52)  BP: 103/56 (11-21-24 @ 12:52)  SpO2: 100% (11-21-24 @ 12:52)    Physical Exam  Gen: NAD  HEENT: NCAT  Pulm: nonlabored respirations on room air    CV: appears well perfused  Abd: Soft, NT, ND, no rebound tenderness or guarding in all 4 quadrants  : no suprapubic tenderness  MSK:  No CVAT   NEURO: A&Ox3  SKIN: warm, dry, no rash.    LABS:  11-21 @ 07:08    WBC 7.03  / Hct 32.1  / SCr --       11-20 @ 06:50    WBC 5.86  / Hct 37.5  / SCr --       11-20    136  |  102  |  11  ----------------------------<  91  3.8   |  20[L]  |  0.80    Ca    9.3      20 Nov 2024 06:48  Phos  3.6     11-20  Mg     1.8     11-20    TPro  8.2  /  Alb  3.9  /  TBili  0.4  /  DBili  x   /  AST  42[H]  /  ALT  23  /  AlkPhos  254[H]  11-19      Urinalysis Basic - ( 20 Nov 2024 06:48 )    Color: x / Appearance: x / SG: x / pH: x  Gluc: 91 mg/dL / Ketone: x  / Bili: x / Urobili: x   Blood: x / Protein: x / Nitrite: x   Leuk Esterase: x / RBC: x / WBC x   Sq Epi: x / Non Sq Epi: x / Bacteria: x      Urine Cx:   Blood Cx:    Radiology:  < from: CT Abdomen and Pelvis w/ IV Cont (11.19.24 @ 15:09) >  IMPRESSION:  Nodular thickening posterior bladder wall of uncertain etiology;   recommend urology consultation.    Question cirrhosis.    Occluded left external iliac artery.    < end of copied text >      < from: US Pelvis Complete (11.20.24 @ 13:40) >  *** ADDENDUM # 1 ***    Addendum and  IMPRESSION: Trace fluid is noted within the endocervical canal.    --- End of Report ---    *** END OF ADDENDUM # 1 ***      PROCEDURE DATE:  11/20/2024          INTERPRETATION:  CLINICAL INFORMATION: Question thickening posterior   urinary bladder wall which on CT 11/19/2024. Postmenopausal. History of   uterine cancer.History of radiation for uterine cancer. Uterine cancer   was diagnosed at [facility]. Vaginal bleeding. Patient was on   anticoagulation.    COMPARISON: CT 10/6/2021. CT of 11/19/2024.    TECHNIQUE: Transabdominal and transvaginal pelvic sonography was   performed. Color Doppler was performed.    FINDINGS:  Uterus: 6.4 cm Multiple tiny/small myometrial calcifications may   represent calcified myometrial veins. Left-sided peripherally calcified   leiomyoma measuring 2.7 cm x 2.1 cm x 2 cm.  Endometrium: Not well delineated on transabdominal or transvaginal pelvic   sonography due to due to the multiple tiny/small uterine calcifications.   No gross thickening is appreciated. Color Doppler placed in the area of   the endometrium reveals novascularity.    Right ovary: Not visualized. No mass or abnormality in the right adnexal   region. Within normal limits.  Left ovary: Not not visualized. No mass or abnormality in the left   adnexal region. Within normal limits.    Fluid: None.    The urinary bladder was evaluated with the video clips and still images.   No abnormality was identified.    IMPRESSION: Multiple tiny/small myometrial uterine calcifications   (leiomyomatous and vascular) which makes delineation of the endometrium   difficult. No gross thickening is appreciated on transabdominal or   transvaginal pelvic sonographic imaging. If clinically warranted,   recommend GYN consultation and consider pelvic MRI.    Neither ovary is visualized but no abnormalities seen in the adnexal   regions.    No abnormality urinary bladder.        --- End of Report ---    ***Please see the addendum at the top of this report. It may contain   additional important information or changes.****      < end of copied text >     Subjective obtained from chart, patient, and daughter via telephone per patient request [Alie - [phone]]  HPI:  84F with extensive PMHx - h/o uterine cancer s/p RT (2019), extensive vascular disease, CAD and afib on Xarelto, HTN, HLD, T2DM, and s/p MVR (2019), and hematuria s/p admission at [facility] requiring CBI, presented to ER on 11/19 for abdominal pain and hematuria. Patient received 1u PRBC for symptomatic anemia in ER. Patient was admitted to medicine team, Xarelto was held, and gyn-onc was consulted for potential vaginal bleeding. No vaginal bleeding noted on their exam, and urology consult was requested for workup of potential hematuria/initiation of CBI and questionable bladder mass seen on imaging.  Per patient (and daughter), she was recently within the last month hospitalized at [facility] and required a catheter and cystoscopy for bleeding. Daughter reports that she took her to the urologist office after discharge who said "the exams" came back normal. [place] documentation supports that CBI was initiated but there is no evidence in the paperwork that patient had a cystoscopy. Discharge papers recommend outpatient cystoscopy and state that she had a scheduled appointment with Dr. Saldana. Patient states she did not have bleeding again until a couple of days ago when she presented to [facility] ED but it has stopped since 11/20 in the morning. Since then, she has been urinating yellow with no signs of hematuria and blood clots, and reports feeling her bladder is fully empty after she voids.  Denies fever/chills, chest pain, abdominal pain, dysuria, back pain.     PAST MEDICAL & SURGICAL HISTORY:  DM (diabetes mellitus)  COPD (chronic obstructive pulmonary disease)  Asthma  DVT (deep venous thrombosis)  HTN (hypertension)  DM (diabetes mellitus)  HLD (hyperlipidemia)  Atrial fibrillation  Asthma  CAD (coronary artery disease)  Uterine cancer  History of embolectomy LLE  S/P arterial stent left arterial thrombectomy      MEDICATIONS  (STANDING):  atorvastatin 20 milliGRAM(s) Oral at bedtime  brimonidine 0.2% Ophthalmic Solution 1 Drop(s) Both EYES at bedtime  dextrose 5%. 1000 milliLiter(s) (50 mL/Hr) IV Continuous <Continuous>  dextrose 5%. 1000 milliLiter(s) (100 mL/Hr) IV Continuous <Continuous>  dextrose 50% Injectable 25 Gram(s) IV Push once  dextrose 50% Injectable 12.5 Gram(s) IV Push once  dextrose 50% Injectable 25 Gram(s) IV Push once  glucagon  Injectable 1 milliGRAM(s) IntraMuscular once  insulin lispro (ADMELOG) corrective regimen sliding scale   SubCutaneous three times a day before meals  insulin lispro (ADMELOG) corrective regimen sliding scale   SubCutaneous at bedtime  latanoprost 0.005% Ophthalmic Solution 1 Drop(s) Both EYES at bedtime  magnesium oxide 400 milliGRAM(s) Oral three times a day with meals  montelukast 10 milliGRAM(s) Oral daily    MEDICATIONS  (PRN):  acetaminophen     Tablet .. 650 milliGRAM(s) Oral every 6 hours PRN Temp greater or equal to 38C (100.4F), Mild Pain (1 - 3)  aluminum hydroxide/magnesium hydroxide/simethicone Suspension 30 milliLiter(s) Oral every 4 hours PRN Dyspepsia  dextrose Oral Gel 15 Gram(s) Oral once PRN Blood Glucose LESS THAN 70 milliGRAM(s)/deciliter  melatonin 3 milliGRAM(s) Oral at bedtime PRN Insomnia  ondansetron Injectable 4 milliGRAM(s) IV Push every 8 hours PRN Nausea and/or Vomiting    Allergies  No Known Allergies    REVIEW OF SYSTEMS: Pertinent positives and negatives as stated in HPI, otherwise negative    Vital signs  T(C): 36.4 (11-21-24 @ 12:52), Max: 36.8 (11-20-24 @ 20:46)  HR: 76 (11-21-24 @ 12:52)  BP: 103/56 (11-21-24 @ 12:52)  SpO2: 100% (11-21-24 @ 12:52)    Physical Exam  Gen: NAD  HEENT: NCAT  Pulm: nonlabored respirations on room air    CV: appears well perfused  Abd: Soft, NT, ND, no rebound tenderness or guarding in all 4 quadrants  : no suprapubic tenderness  MSK:  No CVAT   NEURO: A&Ox3  SKIN: warm, dry, no rash.    LABS:  11-21 @ 07:08    WBC 7.03  / Hct 32.1  / SCr --       11-20 @ 06:50    WBC 5.86  / Hct 37.5  / SCr --       11-20    136  |  102  |  11  ----------------------------<  91  3.8   |  20[L]  |  0.80    Ca    9.3      20 Nov 2024 06:48  Phos  3.6     11-20  Mg     1.8     11-20    TPro  8.2  /  Alb  3.9  /  TBili  0.4  /  DBili  x   /  AST  42[H]  /  ALT  23  /  AlkPhos  254[H]  11-19      Urinalysis Basic - ( 20 Nov 2024 06:48 )    Color: x / Appearance: x / SG: x / pH: x  Gluc: 91 mg/dL / Ketone: x  / Bili: x / Urobili: x   Blood: x / Protein: x / Nitrite: x   Leuk Esterase: x / RBC: x / WBC x   Sq Epi: x / Non Sq Epi: x / Bacteria: x      Urine Cx:   Blood Cx:    Radiology:  < from: CT Abdomen and Pelvis w/ IV Cont (11.19.24 @ 15:09) >  IMPRESSION:  Nodular thickening posterior bladder wall of uncertain etiology;   recommend urology consultation.    Question cirrhosis.    Occluded left external iliac artery.    < end of copied text >      < from: US Pelvis Complete (11.20.24 @ 13:40) >  *** ADDENDUM # 1 ***    Addendum and  IMPRESSION: Trace fluid is noted within the endocervical canal.    --- End of Report ---    *** END OF ADDENDUM # 1 ***      PROCEDURE DATE:  11/20/2024          INTERPRETATION:  CLINICAL INFORMATION: Question thickening posterior   urinary bladder wall which on CT 11/19/2024. Postmenopausal. History of   uterine cancer.History of radiation for uterine cancer. Uterine cancer   was diagnosed at [facility]. Vaginal bleeding. Patient was on   anticoagulation.    COMPARISON: CT 10/6/2021. CT of 11/19/2024.    TECHNIQUE: Transabdominal and transvaginal pelvic sonography was   performed. Color Doppler was performed.    FINDINGS:  Uterus: 6.4 cm Multiple tiny/small myometrial calcifications may   represent calcified myometrial veins. Left-sided peripherally calcified   leiomyoma measuring 2.7 cm x 2.1 cm x 2 cm.  Endometrium: Not well delineated on transabdominal or transvaginal pelvic   sonography due to due to the multiple tiny/small uterine calcifications.   No gross thickening is appreciated. Color Doppler placed in the area of   the endometrium reveals novascularity.    Right ovary: Not visualized. No mass or abnormality in the right adnexal   region. Within normal limits.  Left ovary: Not not visualized. No mass or abnormality in the left   adnexal region. Within normal limits.    Fluid: None.    The urinary bladder was evaluated with the video clips and still images.   No abnormality was identified.    IMPRESSION: Multiple tiny/small myometrial uterine calcifications   (leiomyomatous and vascular) which makes delineation of the endometrium   difficult. No gross thickening is appreciated on transabdominal or   transvaginal pelvic sonographic imaging. If clinically warranted,   recommend GYN consultation and consider pelvic MRI.    Neither ovary is visualized but no abnormalities seen in the adnexal   regions.    No abnormality urinary bladder.        --- End of Report ---    ***Please see the addendum at the top of this report. It may contain   additional important information or changes.****      < end of copied text >

## 2024-11-21 NOTE — DISCHARGE NOTE PROVIDER - NSFOLLOWUPCLINICS_GEN_ALL_ED_FT
[facility]  [address] [place] [address], [place] [address]  Phone: [phone]  Fax:

## 2024-11-21 NOTE — DISCHARGE NOTE PROVIDER - NSDCFUADDAPPT_GEN_ALL_CORE_FT
Can follow with your urologist or if you would like to follow with Noel then can make an appointment at the [facility]

## 2024-11-21 NOTE — DISCHARGE NOTE PROVIDER - ATTENDING DISCHARGE PHYSICAL EXAMINATION:
Saw patient at bedside this morning with GYN onc who reviewed imaging studies with myself and the patient. No obvious signs of recurrence of malignancy or bleeding in the uterus, but unable to rule out mets now in the bladder.  They recommend Urology evaluation for the ultrasound findings. Urology evaluated and rec outpt f/u with her urologist.  Will Resume xarelto after discussion of bleeding risk with daughter Nancy.  She understands and will make appt for her urologist and follow up as outpatient.  Yes

## 2024-11-21 NOTE — DISCHARGE NOTE PROVIDER - NSDCMRMEDTOKEN_GEN_ALL_CORE_FT
albuterol 90 mcg/inh inhalation aerosol: 1 puff(s) inhaled , As Needed  amlodipine-valsartan: 10mg-160mg once daily  atorvastatin 20 mg oral tablet: 1 tab(s) orally once a day    Note:Pt unsure of dosage  brimonidine 0.2% ophthalmic solution: 1 drop(s) in each affected eye once a day (at bedtime)  furosemide 20 mg oral tablet: 1 tab(s) orally  Iyuzeh 0.005% ophthalmic solution: to each affected eye once a day (at bedtime)  metFORMIN 500 mg oral tablet: 1 tab(s) orally once a day  metoprolol succinate 25 mg oral capsule, extended release: pharmacy has 1 tab twice a day    Pt states 1 tab based on bp    note:Unclear if pt knows directions  montelukast 10 mg oral tablet: 1 tab(s) orally once a day   nitroglycerin 0.4 mg sublingual tablet: 1 tab(s) sublingually as needed for  chest pain  Xarelto 20 mg oral tablet: 1 tab(s) orally once a day

## 2024-11-21 NOTE — DISCHARGE NOTE NURSING/CASE MANAGEMENT/SOCIAL WORK - NSDCFUADDAPPT_GEN_ALL_CORE_FT
Can follow with your urologist or if you would like to follow with Noel then can make an appointment at the [facility]

## 2024-11-21 NOTE — DISCHARGE NOTE PROVIDER - HOSPITAL COURSE
HPI:  84F PMHx HTN, HLD, Afib on xarelto, CAD s/p BRENDA, s/p MVR, uterine ca s/p RT, h/o DVT, DM2, COPD.  Prsenting w/ abd pain and distension, vaginal vs. hematuria. Pt was admitted at OSH ([facility]) for hematuria 1mo ago, required blood transfusion, thought to be d/t h/o RT for uterine ca. There was c/f endometrium thickening. She had a fall 2 weeksa go. 2 days ago, had another bleeding, c/f this was vaginal bleeding w/ clots.     In the ED, initially soft BP and otherwise stable vitals. Had episode of hyptoension down to 80/46, received 500cc IVF and 1u prbc.   CBC w/ wbc 7.6, hgb 9.3, plt 222.   CMP w/ SCr 0.9, alk phos 254, AST 42, ALT 23. Trop 7 and probnp 130.   VBG w/ lactate 1.4.   UA showed cloudy, large blood, 292 rbc, neg for NI or LE.   CTH obtained d/t fall 2 weeks ago on xarelto, neg for acute findings.  CT a/p showed nodular thickneing of posteiror bladder wall, rec uro consult; ?cirrhosis, and occluded left external iliac artery.     Urology called in the ED, pending recs.   Transfusion completed in the ED. f/u rpt cbc.   *Copy of the discharge paper from the [facility] is in the physical chart.     On my interview/exam, pt aaox3 and not in acute distress. Curretly denies HA, dizziness, fever, chills, cp, sob, abd pain, n/v/d. Reports hematuria gets lighter than darker with clots intermittently Pt cannot say for certain if she does not have vaginal bleeding.  (19 Nov 2024 18:24)    Hospital Course:  Bleeding stopped with holding xarelto. TVUS showed calcified fibroids and thickening of the bladder wall.  She was evaluated by GYN and Urology who recommend outpatient follow up.  Daughter was informed of this as well.       Important Medication Changes and Reason:  Resume Xarelto  Active or Pending Issues Requiring Follow-up:  Urology as outpatient   Advanced Directives:   [ ] Full code  [ ] DNR  [ ] Hospice    Discharge Diagnoses:  Hematuria  Acute blood loss anemia

## 2024-11-22 LAB
-  AMOXICILLIN/CLAVULANIC ACID: SIGNIFICANT CHANGE UP
-  AMPICILLIN/SULBACTAM: SIGNIFICANT CHANGE UP
-  AMPICILLIN: SIGNIFICANT CHANGE UP
-  AZTREONAM: SIGNIFICANT CHANGE UP
-  CEFAZOLIN: SIGNIFICANT CHANGE UP
-  CEFEPIME: SIGNIFICANT CHANGE UP
-  CEFOXITIN: SIGNIFICANT CHANGE UP
-  CEFTRIAXONE: SIGNIFICANT CHANGE UP
-  CEFUROXIME: SIGNIFICANT CHANGE UP
-  CIPROFLOXACIN: SIGNIFICANT CHANGE UP
-  ERTAPENEM: SIGNIFICANT CHANGE UP
-  GENTAMICIN: SIGNIFICANT CHANGE UP
-  IMIPENEM: SIGNIFICANT CHANGE UP
-  LEVOFLOXACIN: SIGNIFICANT CHANGE UP
-  MEROPENEM: SIGNIFICANT CHANGE UP
-  NITROFURANTOIN: SIGNIFICANT CHANGE UP
-  PIPERACILLIN/TAZOBACTAM: SIGNIFICANT CHANGE UP
-  TOBRAMYCIN: SIGNIFICANT CHANGE UP
-  TRIMETHOPRIM/SULFAMETHOXAZOLE: SIGNIFICANT CHANGE UP
CULTURE RESULTS: ABNORMAL
METHOD TYPE: SIGNIFICANT CHANGE UP
ORGANISM # SPEC MICROSCOPIC CNT: ABNORMAL
ORGANISM # SPEC MICROSCOPIC CNT: ABNORMAL
SPECIMEN SOURCE: SIGNIFICANT CHANGE UP

## 2024-12-28 NOTE — DIETITIAN INITIAL EVALUATION ADULT. - PROBLEM SELECTOR PROBLEM 1
UPT NEGATIVE   Coronary artery disease involving native coronary artery of native heart with other form of angina pectoris

## 2025-02-07 NOTE — DISCHARGE NOTE ADULT - CONDITION (STATED IN TERMS THAT PERMIT A SPECIFIC MEASURABLE COMPARISON WITH CONDITION ON ADMISSION):
Problem: Knowledge Deficit  Goal: Patient/family/caregiver demonstrates understanding of disease process, treatment plan, medications, and discharge instructions  Description: Complete learning assessment and assess knowledge base.  Interventions:  - Provide teaching at level of understanding  - Provide teaching via preferred learning methods  Outcome: Progressing     
Stable cleared for dc by

## 2025-07-04 NOTE — CONSULT NOTE ADULT - PROBLEM SELECTOR RECOMMENDATION 9
room air Likely reason for her SOB along with CHF: THERE IS NO pe: But limited: Her INR was therapeutic on admission. So unlikely to be PE : For EMILI DCCV on Monday